# Patient Record
Sex: FEMALE | Race: WHITE | NOT HISPANIC OR LATINO | URBAN - METROPOLITAN AREA
[De-identification: names, ages, dates, MRNs, and addresses within clinical notes are randomized per-mention and may not be internally consistent; named-entity substitution may affect disease eponyms.]

---

## 2017-02-09 ENCOUNTER — OUTPATIENT (OUTPATIENT)
Dept: OUTPATIENT SERVICES | Facility: HOSPITAL | Age: 52
LOS: 1 days | Discharge: HOME | End: 2017-02-09

## 2017-06-27 DIAGNOSIS — C50.919 MALIGNANT NEOPLASM OF UNSPECIFIED SITE OF UNSPECIFIED FEMALE BREAST: ICD-10-CM

## 2018-06-04 ENCOUNTER — HOSPITAL ENCOUNTER (INPATIENT)
Dept: HOSPITAL 17 - NEPC | Age: 53
LOS: 7 days | Discharge: HOME HEALTH | DRG: 843 | End: 2018-06-11
Attending: HOSPITALIST | Admitting: HOSPITALIST
Payer: COMMERCIAL

## 2018-06-04 VITALS
SYSTOLIC BLOOD PRESSURE: 195 MMHG | DIASTOLIC BLOOD PRESSURE: 117 MMHG | TEMPERATURE: 98.4 F | HEART RATE: 98 BPM | RESPIRATION RATE: 20 BRPM | OXYGEN SATURATION: 100 %

## 2018-06-04 VITALS
DIASTOLIC BLOOD PRESSURE: 85 MMHG | HEART RATE: 83 BPM | SYSTOLIC BLOOD PRESSURE: 130 MMHG | RESPIRATION RATE: 20 BRPM | OXYGEN SATURATION: 99 %

## 2018-06-04 VITALS
RESPIRATION RATE: 18 BRPM | OXYGEN SATURATION: 100 % | HEART RATE: 87 BPM | DIASTOLIC BLOOD PRESSURE: 85 MMHG | SYSTOLIC BLOOD PRESSURE: 132 MMHG

## 2018-06-04 VITALS
RESPIRATION RATE: 20 BRPM | OXYGEN SATURATION: 96 % | SYSTOLIC BLOOD PRESSURE: 165 MMHG | HEART RATE: 90 BPM | DIASTOLIC BLOOD PRESSURE: 102 MMHG

## 2018-06-04 VITALS — BODY MASS INDEX: 34.32 KG/M2 | HEIGHT: 62 IN | WEIGHT: 186.51 LBS

## 2018-06-04 DIAGNOSIS — R74.0: ICD-10-CM

## 2018-06-04 DIAGNOSIS — E66.9: ICD-10-CM

## 2018-06-04 DIAGNOSIS — R14.0: ICD-10-CM

## 2018-06-04 DIAGNOSIS — R60.0: ICD-10-CM

## 2018-06-04 DIAGNOSIS — K59.09: ICD-10-CM

## 2018-06-04 DIAGNOSIS — R63.0: ICD-10-CM

## 2018-06-04 DIAGNOSIS — J90: ICD-10-CM

## 2018-06-04 DIAGNOSIS — N39.0: ICD-10-CM

## 2018-06-04 DIAGNOSIS — J18.9: ICD-10-CM

## 2018-06-04 DIAGNOSIS — Z90.13: ICD-10-CM

## 2018-06-04 DIAGNOSIS — C78.00: ICD-10-CM

## 2018-06-04 DIAGNOSIS — Z92.3: ICD-10-CM

## 2018-06-04 DIAGNOSIS — R10.13: ICD-10-CM

## 2018-06-04 DIAGNOSIS — D63.0: ICD-10-CM

## 2018-06-04 DIAGNOSIS — Z92.21: ICD-10-CM

## 2018-06-04 DIAGNOSIS — Z85.3: ICD-10-CM

## 2018-06-04 DIAGNOSIS — C79.89: Primary | ICD-10-CM

## 2018-06-04 LAB
ALBUMIN SERPL-MCNC: 3.3 GM/DL (ref 3.4–5)
ALP SERPL-CCNC: 164 U/L (ref 45–117)
ALT SERPL-CCNC: 249 U/L (ref 10–53)
AMORPHOUS SEDIMENT, URINE: (no result)
AST SERPL-CCNC: 113 U/L (ref 15–37)
BASOPHILS # BLD AUTO: 0.1 TH/MM3 (ref 0–0.2)
BASOPHILS NFR BLD: 0.6 % (ref 0–2)
BILIRUB SERPL-MCNC: 0.5 MG/DL (ref 0.2–1)
BUN SERPL-MCNC: 19 MG/DL (ref 7–18)
CALCIUM SERPL-MCNC: 9.3 MG/DL (ref 8.5–10.1)
CHLORIDE SERPL-SCNC: 100 MEQ/L (ref 98–107)
COLOR UR: YELLOW
CREAT SERPL-MCNC: 0.79 MG/DL (ref 0.5–1)
EOSINOPHIL # BLD: 0 TH/MM3 (ref 0–0.4)
EOSINOPHIL NFR BLD: 0.4 % (ref 0–4)
ERYTHROCYTE [DISTWIDTH] IN BLOOD BY AUTOMATED COUNT: 18.9 % (ref 11.6–17.2)
GFR SERPLBLD BASED ON 1.73 SQ M-ARVRAT: 76 ML/MIN (ref 89–?)
GLUCOSE SERPL-MCNC: 125 MG/DL (ref 74–106)
GLUCOSE UR STRIP-MCNC: (no result) MG/DL
HCO3 BLD-SCNC: 22.4 MEQ/L (ref 21–32)
HCT VFR BLD CALC: 33.6 % (ref 35–46)
HGB BLD-MCNC: 10.1 GM/DL (ref 11.6–15.3)
HGB UR QL STRIP: (no result)
INR PPP: 1.1 RATIO
KETONES UR STRIP-MCNC: (no result) MG/DL
LYMPHOCYTES # BLD AUTO: 2.2 TH/MM3 (ref 1–4.8)
LYMPHOCYTES NFR BLD AUTO: 19.8 % (ref 9–44)
MAGNESIUM SERPL-MCNC: 2.2 MG/DL (ref 1.5–2.5)
MCH RBC QN AUTO: 20.8 PG (ref 27–34)
MCHC RBC AUTO-ENTMCNC: 30.1 % (ref 32–36)
MCV RBC AUTO: 69 FL (ref 80–100)
MONOCYTE #: 1.1 TH/MM3 (ref 0–0.9)
MONOCYTES NFR BLD: 9.8 % (ref 0–8)
MUCOUS THREADS #/AREA URNS LPF: (no result) /LPF
NEUTROPHILS # BLD AUTO: 7.6 TH/MM3 (ref 1.8–7.7)
NEUTROPHILS NFR BLD AUTO: 69.4 % (ref 16–70)
NITRITE UR QL STRIP: (no result)
PLATELET # BLD: 447 TH/MM3 (ref 150–450)
PMV BLD AUTO: 9.6 FL (ref 7–11)
PROT SERPL-MCNC: 7 GM/DL (ref 6.4–8.2)
PROTHROMBIN TIME: 11 SEC (ref 9.8–11.6)
RBC # BLD AUTO: 4.87 MIL/MM3 (ref 4–5.3)
SODIUM SERPL-SCNC: 134 MEQ/L (ref 136–145)
SP GR UR STRIP: 1.03 (ref 1–1.03)
SQUAMOUS #/AREA URNS HPF: 2 /HPF (ref 0–5)
TRANS CELLS #/AREA URNS HPF: 2 /HPF
TROPONIN I SERPL-MCNC: (no result) NG/ML (ref 0.02–0.05)
URINE LEUKOCYTE ESTERASE: (no result)
WBC # BLD AUTO: 11 TH/MM3 (ref 4–11)

## 2018-06-04 PROCEDURE — 82550 ASSAY OF CK (CPK): CPT

## 2018-06-04 PROCEDURE — 85025 COMPLETE CBC W/AUTO DIFF WBC: CPT

## 2018-06-04 PROCEDURE — 99153 MOD SED SAME PHYS/QHP EA: CPT

## 2018-06-04 PROCEDURE — 76705 ECHO EXAM OF ABDOMEN: CPT

## 2018-06-04 PROCEDURE — 96361 HYDRATE IV INFUSION ADD-ON: CPT

## 2018-06-04 PROCEDURE — 80048 BASIC METABOLIC PNL TOTAL CA: CPT

## 2018-06-04 PROCEDURE — 99152 MOD SED SAME PHYS/QHP 5/>YRS: CPT

## 2018-06-04 PROCEDURE — 86403 PARTICLE AGGLUT ANTBDY SCRN: CPT

## 2018-06-04 PROCEDURE — C1729 CATH, DRAINAGE: HCPCS

## 2018-06-04 PROCEDURE — 88112 CYTOPATH CELL ENHANCE TECH: CPT

## 2018-06-04 PROCEDURE — 84484 ASSAY OF TROPONIN QUANT: CPT

## 2018-06-04 PROCEDURE — 83615 LACTATE (LD) (LDH) ENZYME: CPT

## 2018-06-04 PROCEDURE — 74177 CT ABD & PELVIS W/CONTRAST: CPT

## 2018-06-04 PROCEDURE — 93308 TTE F-UP OR LMTD: CPT

## 2018-06-04 PROCEDURE — 85610 PROTHROMBIN TIME: CPT

## 2018-06-04 PROCEDURE — 93005 ELECTROCARDIOGRAM TRACING: CPT

## 2018-06-04 PROCEDURE — 87086 URINE CULTURE/COLONY COUNT: CPT

## 2018-06-04 PROCEDURE — 81001 URINALYSIS AUTO W/SCOPE: CPT

## 2018-06-04 PROCEDURE — 71275 CT ANGIOGRAPHY CHEST: CPT

## 2018-06-04 PROCEDURE — 96375 TX/PRO/DX INJ NEW DRUG ADDON: CPT

## 2018-06-04 PROCEDURE — 85730 THROMBOPLASTIN TIME PARTIAL: CPT

## 2018-06-04 PROCEDURE — 83690 ASSAY OF LIPASE: CPT

## 2018-06-04 PROCEDURE — 80053 COMPREHEN METABOLIC PANEL: CPT

## 2018-06-04 PROCEDURE — 94618 PULMONARY STRESS TESTING: CPT

## 2018-06-04 PROCEDURE — C1769 GUIDE WIRE: HCPCS

## 2018-06-04 PROCEDURE — 83605 ASSAY OF LACTIC ACID: CPT

## 2018-06-04 PROCEDURE — 96365 THER/PROPH/DIAG IV INF INIT: CPT

## 2018-06-04 PROCEDURE — 83880 ASSAY OF NATRIURETIC PEPTIDE: CPT

## 2018-06-04 PROCEDURE — 74018 RADEX ABDOMEN 1 VIEW: CPT

## 2018-06-04 PROCEDURE — 94640 AIRWAY INHALATION TREATMENT: CPT

## 2018-06-04 PROCEDURE — 82239 BILE ACIDS TOTAL: CPT

## 2018-06-04 PROCEDURE — 94664 DEMO&/EVAL PT USE INHALER: CPT

## 2018-06-04 PROCEDURE — 89051 BODY FLUID CELL COUNT: CPT

## 2018-06-04 PROCEDURE — 33010: CPT

## 2018-06-04 PROCEDURE — 83735 ASSAY OF MAGNESIUM: CPT

## 2018-06-04 PROCEDURE — 71045 X-RAY EXAM CHEST 1 VIEW: CPT

## 2018-06-04 PROCEDURE — 77012 CT SCAN FOR NEEDLE BIOPSY: CPT

## 2018-06-04 PROCEDURE — 87040 BLOOD CULTURE FOR BACTERIA: CPT

## 2018-06-04 PROCEDURE — 88305 TISSUE EXAM BY PATHOLOGIST: CPT

## 2018-06-04 NOTE — RADRPT
EXAM DATE:  6/4/2018 7:30 PM EDT

AGE/SEX:        53 years / Female



INDICATIONS:  Short of breath.



CLINICAL DATA:  This is the patient's initial encounter. Patient reports that signs and symptoms have
 been present for 1 day and indicates a pain score of 0/10. 

                                                                          

MEDICAL/SURGICAL HISTORY:       Carcinoma, breast. . Port placement.



COMPARISON:      [facility] [place], [place] PULMONARY ANGIOGRAM, 6/4/2018.  .



FINDINGS:  

Right [id number] in superior vena cava. Cardiomegaly. Right greater than left basilar airspace dis
ease with small pleural effusions.



CONCLUSION: 

Cardiomegaly with bilateral small pleural effusions and basilar airspace disease, right greater than 
left.



Electronically signed by: Conner Aparicio MD  6/4/2018 7:40 PM EDT

## 2018-06-04 NOTE — PD
Physical Exam


Narrative


General: 


The patient is a well-developed well-nourished female, pale appearing, however 

otherwise in no acute distress. 





Head and Neck exam: 


Head is normocephalic atraumatic. 


Eyes: EOMI, pupils are equal round and reactive to light. 


Nose: Midline septum with pink mucous membranes 


Mouth: Dentition unremarkable. Moist mucus membranes. Posterior oropharynx is 

not erythematous. No tonsillar hypertrophy. Uvula midline. Airway patent. 


Neck: No palpable lymphadenopathy. No nuchal rigidity. No thyromegaly. 





Cardiovascular: 


Regular rate and rhythm without murmurs, gallops, or rubs. No pulse deficit to 

the extremities on simultaneous auscultation and palpation of her radial 

artery. 





Lungs: 


Decreased breath sounds are noted in the right lower lung base, no wheezes, 

rhonchi, or crackles are audible.  No accessory muscle use noted.  No 

tripoding.  No paroxysmal abdominal breathing.


 


Abdomen:


Soft, without tenderness to palpation in all 4 quadrants of the abdomen. No 

guarding, rebound, or rigidity.  Normal bowel sounds are audible.  No 

tenderness on palpation of McBurney's point.





Extremities: 


No clubbing, cyanosis, or edema. 2+ pulses in all 4 extremities.  No calf 

tenderness on palpation.





Back: 


No spinous process tenderness to palpation. No costovertebral angle tenderness 

to palpation. 





Neurologic Exam: Grossly nonfocal.





Skin Exam: No rash noted. Intact skin that is warm and dry.





Data


Data


Last Documented VS





Vital Signs








  Date Time  Temp Pulse Resp B/P (MAP) Pulse Ox O2 Delivery O2 Flow Rate FiO2


 


6/4/18 22:59  83 20 130/85 (100) 99 Room Air  


 


6/4/18 18:01 98.4       








Orders





 Orders


Complete Blood Count With Diff (6/4/18 18:56)


Comprehensive Metabolic Panel (6/4/18 18:56)


Act Partial Throm Time (Ptt) (6/4/18 18:56)


Prothrombin Time / Inr (Pt) (6/4/18 18:56)


Magnesium (Mg) (6/4/18 18:56)


Ckmb (Isoenzyme) Profile (6/4/18 18:56)


Troponin I (6/4/18 18:56)


Urinalysis - C+S If Indicated (6/4/18 18:56)


Iv Access Insert/Monitor (6/4/18 18:56)


Electrocardiogram (6/4/18 18:56)


Ecg Monitoring (6/4/18 18:56)


Oximetry (6/4/18 18:56)


Oxygen Administration (6/4/18 18:56)


Chest, Single Ap (6/4/18 18:56)


Ct Pulmonary Angiogram (6/4/18 18:56)


Sodium Chloride 0.9% Flush (Ns Flush) (6/4/18 19:00)


Lipase (6/4/18 18:56)


Heparin Central Flush (Heparin Central F (6/4/18 19:00)


Sodium Chloride 0.9% Flush (Ns Flush) (6/4/18 19:00)


Heparin Central Flush (Heparin Central F (6/4/18 19:00)


Heparin Central Flush (Heparin Central F (6/4/18 19:15)


Heparin Central Flush (Heparin Central F (6/4/18 19:15)


B-Type Natriuretic Peptide (6/4/18 19:04)


Sodium Chlor 0.9% 1000 Ml Inj (Ns 1000 M (6/4/18 21:15)


Ct Abd/Pel W Iv Contrast(Rout) (6/4/18 21:47)


Urine Culture (6/4/18 22:20)


Lorazepam Inj (Ativan Inj) (6/4/18 23:30)


Blood Culture (6/5/18 00:17)


Lactic Acid Sepsis Protocol (6/5/18 00:17)


Ceftriaxone Inj (Rocephin Inj) (6/5/18 00:30)


Azithromycin Inj (Zithromax Inj) (6/5/18 00:30)


Admit Order (Ed Use Only) (6/5/18 00:46)





Labs





Laboratory Tests








Test


  6/4/18


19:42 6/4/18


20:40 6/4/18


22:20 6/5/18


00:41


 


White Blood Count 11.0 TH/MM3    


 


Red Blood Count 4.87 MIL/MM3    


 


Hemoglobin 10.1 GM/DL    


 


Hematocrit 33.6 %    


 


Mean Corpuscular Volume 69.0 FL    


 


Mean Corpuscular Hemoglobin 20.8 PG    


 


Mean Corpuscular Hemoglobin


Concent 30.1 % 


  


  


  


 


 


Red Cell Distribution Width 18.9 %    


 


Platelet Count 447 TH/MM3    


 


Mean Platelet Volume 9.6 FL    


 


Neutrophils (%) (Auto) 69.4 %    


 


Lymphocytes (%) (Auto) 19.8 %    


 


Monocytes (%) (Auto) 9.8 %    


 


Eosinophils (%) (Auto) 0.4 %    


 


Basophils (%) (Auto) 0.6 %    


 


Neutrophils # (Auto) 7.6 TH/MM3    


 


Lymphocytes # (Auto) 2.2 TH/MM3    


 


Monocytes # (Auto) 1.1 TH/MM3    


 


Eosinophils # (Auto) 0.0 TH/MM3    


 


Basophils # (Auto) 0.1 TH/MM3    


 


CBC Comment DIFF FINAL    


 


Differential Comment     


 


Prothrombin Time 11.0 SEC    


 


Prothromb Time International


Ratio 1.1 RATIO 


  


  


  


 


 


Activated Partial


Thromboplast Time 21.7 SEC 


  


  


  


 


 


Blood Urea Nitrogen 19 MG/DL    


 


Creatinine 0.79 MG/DL    


 


Random Glucose 125 MG/DL    


 


Total Protein 7.0 GM/DL    


 


Albumin 3.3 GM/DL    


 


Calcium Level 9.3 MG/DL    


 


Magnesium Level 2.2 MG/DL    


 


Alkaline Phosphatase 164 U/L    


 


Aspartate Amino Transf


(AST/SGOT) 113 U/L 


  


  


  


 


 


Alanine Aminotransferase


(ALT/SGPT) 249 U/L 


  


  


  


 


 


Total Bilirubin 0.5 MG/DL    


 


Sodium Level 134 MEQ/L    


 


Potassium Level 4.0 MEQ/L    


 


Chloride Level 100 MEQ/L    


 


Carbon Dioxide Level 22.4 MEQ/L    


 


Anion Gap 12 MEQ/L    


 


Estimat Glomerular Filtration


Rate 76 ML/MIN 


  


  


  


 


 


Total Creatine Kinase 60 U/L    


 


Troponin I


  LESS THAN 0.02


NG/ML 


  


  


 


 


Lipase 259 U/L    


 


B-Type Natriuretic Peptide  18 PG/ML   


 


Urine Color   YELLOW  


 


Urine Turbidity   HAZY  


 


Urine pH   5.5  


 


Urine Specific Gravity   1.029  


 


Urine Protein   30 mg/dL  


 


Urine Glucose (UA)   NEG mg/dL  


 


Urine Ketones   NEG mg/dL  


 


Urine Occult Blood   NEG  


 


Urine Nitrite   NEG  


 


Urine Bilirubin   NEG  


 


Urine Urobilinogen


  


  


  LESS THAN 2.0


MG/DL 


 


 


Urine Leukocyte Esterase   LARGE  


 


Urine RBC   3 /hpf  


 


Urine WBC   11 /hpf  


 


Urine Squamous Epithelial


Cells 


  


  2 /hpf 


  


 


 


Urine Transitional Epithelial


Cells 


  


  2 /hpf 


  


 


 


Urine Amorphous Sediment   RARE  


 


Urine Mucus   FEW /lpf  


 


Microscopic Urinalysis Comment


  


  


  CULTURE


INDICATED 


 


 


Lactic Acid Level    1.5 mmol/L 











MDM


Medical Record Reviewed:  Yes


Supervised Visit with IMANI:  Yes


Narrative Course


I, Dr. Montoya, have reviewed the advance practice practitioner's documentation 

and am in agreement, met with the patient face to face, made the diagnosis, and 

the medical decision making was done by me.  The patient was initially 

evaluated by Sharonda, the PA.  Please see their complete history and physical.





*My assessment and Findings: The patient presents with a history of progressive 

decline over the last 10 days with dyspnea on exertion, nausea with vomiting 1 

yesterday, poor p.o. intake.  The patient reports that she suffers with chronic 

constipation and uses an enema to move her bowels.  She last moved her bowels 

earlier today with the use of an enema.  The patient's medical history is 

significant for having a history of breast cancer with metastasis on palliative 

chemotherapy.





During the course of the patient's emergency department visit, the patient's 

history, examination, and differential diagnosis were reviewed with the 

patient. The patient was placed on a cardiac monitor with oximetry and frequent 

blood pressure monitoring. The patient had  IV access obtained and blood work 

sent for analysis.  The patient had a EKG done on arrival that shows a sinus 

rhythm heart rate of 86, QRS duration 101 ms.  No acute ST segment elevation





The patient's laboratory studies were reviewed and remarkable for a white count 

of 11, hemoglobin 10.1, platelets 447 with 9.8 monocytes.  CMP is remarkable 

for a sodium of 134, BUN 19, glucose 125, , , alk phos 164 which 

is elevated compared to the patient's prior LFTs.  There was a concern that the 

patient may have metastasis to the liver as a next dictation for the elevated 

LFTs.  CT scan of the abdomen and pelvis was added to her workup, initial set 

of cardiac enzymes are within normal limits, BNP 18, lipase 259, lactic acid 1.5

, PT 11, PTT 21.7, urinalysis shows large leukocyte Estrace 11 WBCs, culture 

indicated.





Radiology studies were reviewed and remarkable for a chest x-ray that shows 

cardiomegaly with bilateral small pleural effusions and basilar airspace 

disease right greater than the left.  Blood cultures were added to the patient'

s workup.  The patient was given Rocephin 1 g IV, Zithromax 500 IV for 

antibiotic coverage for what appeared to be a UTI and possible lung infiltrate.

  The patient was also given Ativan 1 mg IV for sedation related to anxiety 

while she was completing her CT scan of the abdomen and pelvis and CTA to rule 

out PE.








Last Impressions








Liver Ultrasound 6/5/18 0000 Signed





Impressions: 





 CONCLUSION: 





 1.  The gallbladder is thickened however the patient reports no tenderness over





  the gallbladder. Small amount of ascites around the liver.





  





 


 


Abdomen/Pelvis CT 6/4/18 2147 Signed





Impressions: 





 CONCLUSION:





 1.  There is a trace of fluid adjacent to the liver.





 2.  Gallbladder wall thickening.





 3.  Bulky fibroid uterus.





  





 


 


Chest X-Ray 6/4/18 1856 Signed





Impressions: 





 CONCLUSION: 





 Cardiomegaly with bilateral small pleural effusions and basilar airspace diseas





 e, right greater than left.





  





 


 


CT Angiography 6/4/18 1856 Signed





Impressions: 





 CONCLUSION:





 1.  No evidence of pulmonary embolism





 2.  Small to moderate pericardial effusion.





 3.  Bilateral nonspecific interstitial pulmonary infiltrates





 4.  Parenchymal consolidation in the right lower lung.





 5.  Small bilateral pleural effusions.





 6.  Diffuse bony metastatic disease.





  





 





Given the patient's findings of a small to moderate pericardial effusion, 

bilateral nonspecific interstitial pulmonary infiltrates, parenchymal 

consolidation consistent with pneumonia in the right lower lung, UTI, the 

patient will be admitted to the hospital for continued antibiotics and further 

investigation regarding the pericardial effusion.





The patient's results were discussed with the patient, including the plan of 

care. I explained that further testing and/ or monitoring is indicated based on 

the patient's history, examination, and/ or laboratory findings. Therefore, I 

recommended admission for additional evaluation. The patient expressed 

understanding and was agreeable with this plan. The patient was admitted to the 

hospital in stable condition and sent to a bed under the care of the [facility] service.


Physician Communication


Physician Communication


The patient's case including history, pertinent physical examination findings, 

and laboratory studies were discussed with Dr. Mendez. It was agreed that the 

patient would be admitted to the [facility] service.





Diagnosis





 Primary Impression:  


 Generalized weakness


 Additional Impressions:  


 Shortness of breath


 Pneumonia


 Qualified Codes:  J18.1 - Lobar pneumonia, unspecified organism


 Urinary tract infection


 Qualified Codes:  N39.0 - Urinary tract infection, site not specified


 Pericardial effusion





Admitting Information


Admitting Physician Requests:  Admit


Scripts


No Active Prescriptions or Reported Meds











Suzy Montoya MD Jun 4, 2018 19:47

## 2018-06-04 NOTE — PD
HPI


Chief Complaint:  General Weakness


Time Seen by Provider:  18:18


Travel History


International Travel<30 days:  No


Contact w/Intl Traveler<30days:  No


Traveled to known affect area:  No





History of Present Illness


HPI


52 YO female with PMH of metastatic breast cancer status post bilateral 

mastectomy.  Presents to the ED for evaluation of 10 day history of dyspnea on 

exertion, shortness of breath.  Patient describes a sensation of "feeling 

smothered.'  She states that this is improved by using a small hand-held fan or 

having cold air blowing on her.  She denies chest pain, palpitations.  She 

denies fever, chills.  She endorses nausea and states that the symptoms are 

worsened by attempting to eat.  She endorses one episode of emesis.  She denies 

abdominal pain.  She reports small, hard, nonbloody stools.  States her urine 

has been pale and yellow, without foul odor.  Patient sees Dr. Parker but is not 

undergoing any current therapies.  She endorses metastasis to bone, lung and 

spine.  She endorses history of anemia, takes iron supplements.  She has a 

indwelling port in the left chest.





PFSH


Past Medical History


Cancer:  Yes (BREAST)


Tetanus Vaccination:  Unknown


Influenza Vaccination:  No


Pregnant?:  Not Pregnant





Past Surgical History


 Section:  Yes


Mastectomy:  Yes


Thoracic Surgery:  Yes


Other Surgery:  Yes (LYMPH NODES;  BIOPSIES)





Social History


Alcohol Use:  No


Tobacco Use:  No


Substance Use:  No





Allergies-Medications


(Allergen,Severity, Reaction):  


Coded Allergies:  


     paclitaxel (Verified  Allergy, Intermediate, 18)


Reported Meds & Prescriptions





Reported Meds & Active Scripts


Active


No Active Prescriptions or Reported Medications    








Review of Systems


Except as stated in HPI:  all other systems reviewed are Neg





Physical Exam


Narrative


GENERAL: Chronically ill-appearing white female in no acute distress.


SKIN: Focused skin assessment waxy, pale, dry.


HEAD: Normocephalic.


EYES: No scleral icterus. No injection or drainage. 


NECK: Supple, trachea midline. No JVD or lymphadenopathy.


CARDIOVASCULAR: Regular rate and rhythm without murmurs, gallops, or rubs. 


RESPIRATORY: Breath sounds clear and equal bilaterally. No accessory muscle use.


GASTROINTESTINAL: Abdomen soft, non-tender, nondistended.  Hypoactive bowel 

sounds.


MUSCULOSKELETAL: No cyanosis, or edema. 


BACK: Nontender without obvious deformity. No CVA tenderness.





Data


Data


Last Documented VS





Vital Signs








  Date Time  Temp Pulse Resp B/P (MAP) Pulse Ox O2 Delivery O2 Flow Rate FiO2


 


18 22:59  83 20 130/85 (100) 99 Room Air  


 


18 18:01 98.4       








Orders





 Orders


Complete Blood Count With Diff (18 18:56)


Comprehensive Metabolic Panel (18 18:56)


Act Partial Throm Time (Ptt) (18 18:56)


Prothrombin Time / Inr (Pt) (18 18:56)


Magnesium (Mg) (18 18:56)


Ckmb (Isoenzyme) Profile (18 18:56)


Troponin I (18 18:56)


Urinalysis - C+S If Indicated (18 18:56)


Iv Access Insert/Monitor (18 18:56)


Electrocardiogram (18 18:56)


Ecg Monitoring (18 18:56)


Oximetry (18 18:56)


Oxygen Administration (18 18:56)


Chest, Single Ap (18 18:56)


Ct Pulmonary Angiogram (18 18:56)


Sodium Chloride 0.9% Flush (Ns Flush) (18 19:00)


Lipase (18 18:56)


Heparin Central Flush (Heparin Central F (18 19:00)


Sodium Chloride 0.9% Flush (Ns Flush) (18 19:00)


Heparin Central Flush (Heparin Central F (18 19:00)


Heparin Central Flush (Heparin Central F (18 19:15)


Heparin Central Flush (Heparin Central F (18 19:15)


B-Type Natriuretic Peptide (18 19:04)


Sodium Chlor 0.9% 1000 Ml Inj (Ns 1000 M (18 21:15)


Ct Abd/Pel W Iv Contrast(Rout) (18 21:47)


Urine Culture (18 22:20)


Lorazepam Inj (Ativan Inj) (18 23:30)


Blood Culture (18 00:17)


Lactic Acid Sepsis Protocol (18 00:17)


Ceftriaxone Inj (Rocephin Inj) (18 00:30)


Azithromycin Inj (Zithromax Inj) (18 00:30)


Admit Order (Ed Use Only) (18 00:46)





Labs





Laboratory Tests








Test


  18


19:42 18


20:40 18


22:20 18


00:41


 


White Blood Count 11.0 TH/MM3    


 


Red Blood Count 4.87 MIL/MM3    


 


Hemoglobin 10.1 GM/DL    


 


Hematocrit 33.6 %    


 


Mean Corpuscular Volume 69.0 FL    


 


Mean Corpuscular Hemoglobin 20.8 PG    


 


Mean Corpuscular Hemoglobin


Concent 30.1 % 


  


  


  


 


 


Red Cell Distribution Width 18.9 %    


 


Platelet Count 447 TH/MM3    


 


Mean Platelet Volume 9.6 FL    


 


Neutrophils (%) (Auto) 69.4 %    


 


Lymphocytes (%) (Auto) 19.8 %    


 


Monocytes (%) (Auto) 9.8 %    


 


Eosinophils (%) (Auto) 0.4 %    


 


Basophils (%) (Auto) 0.6 %    


 


Neutrophils # (Auto) 7.6 TH/MM3    


 


Lymphocytes # (Auto) 2.2 TH/MM3    


 


Monocytes # (Auto) 1.1 TH/MM3    


 


Eosinophils # (Auto) 0.0 TH/MM3    


 


Basophils # (Auto) 0.1 TH/MM3    


 


CBC Comment DIFF FINAL    


 


Differential Comment     


 


Prothrombin Time 11.0 SEC    


 


Prothromb Time International


Ratio 1.1 RATIO 


  


  


  


 


 


Activated Partial


Thromboplast Time 21.7 SEC 


  


  


  


 


 


Blood Urea Nitrogen 19 MG/DL    


 


Creatinine 0.79 MG/DL    


 


Random Glucose 125 MG/DL    


 


Total Protein 7.0 GM/DL    


 


Albumin 3.3 GM/DL    


 


Calcium Level 9.3 MG/DL    


 


Magnesium Level 2.2 MG/DL    


 


Alkaline Phosphatase 164 U/L    


 


Aspartate Amino Transf


(AST/SGOT) 113 U/L 


  


  


  


 


 


Alanine Aminotransferase


(ALT/SGPT) 249 U/L 


  


  


  


 


 


Total Bilirubin 0.5 MG/DL    


 


Sodium Level 134 MEQ/L    


 


Potassium Level 4.0 MEQ/L    


 


Chloride Level 100 MEQ/L    


 


Carbon Dioxide Level 22.4 MEQ/L    


 


Anion Gap 12 MEQ/L    


 


Estimat Glomerular Filtration


Rate 76 ML/MIN 


  


  


  


 


 


Total Creatine Kinase 60 U/L    


 


Troponin I


  LESS THAN 0.02


NG/ML 


  


  


 


 


Lipase 259 U/L    


 


B-Type Natriuretic Peptide  18 PG/ML   


 


Urine Color   YELLOW  


 


Urine Turbidity   HAZY  


 


Urine pH   5.5  


 


Urine Specific Gravity   1.029  


 


Urine Protein   30 mg/dL  


 


Urine Glucose (UA)   NEG mg/dL  


 


Urine Ketones   NEG mg/dL  


 


Urine Occult Blood   NEG  


 


Urine Nitrite   NEG  


 


Urine Bilirubin   NEG  


 


Urine Urobilinogen


  


  


  LESS THAN 2.0


MG/DL 


 


 


Urine Leukocyte Esterase   LARGE  


 


Urine RBC   3 /hpf  


 


Urine WBC   11 /hpf  


 


Urine Squamous Epithelial


Cells 


  


  2 /hpf 


  


 


 


Urine Transitional Epithelial


Cells 


  


  2 /hpf 


  


 


 


Urine Amorphous Sediment   RARE  


 


Urine Mucus   FEW /lpf  


 


Microscopic Urinalysis Comment


  


  


  CULTURE


INDICATED 


 


 


Lactic Acid Level    1.5 mmol/L 











MDM


Medical Decision Making


Medical Screen Exam Complete:  Yes


Emergency Medical Condition:  Yes


Differential Diagnosis


Metastatic breast cancer versus PNA versus PE versus anemia versus metabolic 

derangement versus dehydration versus other


Narrative Course


52 YO female with PMH of metastatic breast cancer status post bilateral 

mastectomy presents to the ED for evaluation of 10 day history of dyspnea on 

exertion, shortness of breath.  She endorses one episode of emesis.  Patient 

sees Dr. Parker but is not undergoing any current therapies.  She endorses 

metastasis to bone, lung and spine.  She endorses history of anemia, takes iron 

supplements.  She has a indwelling port in the left chest.  Patient's afebrile, 

pulse 98, respiratory rate 20, O2 sats 100% on room air on arrival.  The 

patient is pale and chronically ill-appearing but exam is otherwise reassuring.

  IV was established.  Patient was ministered 1 L normal saline.





EKG rate 86, sinus rhythm.  KY interval 152, , QTc 389 ms.  Normal axis.

  No acute ST changes.  Reviewed by Dr. Montoya.


CBC: WBC 11.0.  Hemoglobin 10.1.


INR 1.1.


CMP: BUN 19, creatinine 0.79.  , .  Bili 0.5


Lipase 259.


Lactic acid 1.5





Remaining lab work and imaging pending in the shift.  Patient signed out to Dr. Montoya.  Please see her note for disposition.





Diagnosis





 Primary Impression:  


 Generalized weakness


 Additional Impression:  


 Shortness of breath


Scripts


No Active Prescriptions or Reported Meds











Sharonda James 2018 20:44

## 2018-06-05 VITALS — HEART RATE: 90 BPM

## 2018-06-05 VITALS
HEART RATE: 89 BPM | DIASTOLIC BLOOD PRESSURE: 81 MMHG | OXYGEN SATURATION: 100 % | RESPIRATION RATE: 18 BRPM | SYSTOLIC BLOOD PRESSURE: 126 MMHG | TEMPERATURE: 97.9 F

## 2018-06-05 VITALS
OXYGEN SATURATION: 99 % | DIASTOLIC BLOOD PRESSURE: 95 MMHG | TEMPERATURE: 97.6 F | RESPIRATION RATE: 18 BRPM | SYSTOLIC BLOOD PRESSURE: 146 MMHG | HEART RATE: 85 BPM

## 2018-06-05 VITALS
OXYGEN SATURATION: 100 % | TEMPERATURE: 97.7 F | DIASTOLIC BLOOD PRESSURE: 90 MMHG | SYSTOLIC BLOOD PRESSURE: 121 MMHG | RESPIRATION RATE: 18 BRPM | HEART RATE: 95 BPM

## 2018-06-05 VITALS — HEART RATE: 80 BPM

## 2018-06-05 VITALS
RESPIRATION RATE: 18 BRPM | OXYGEN SATURATION: 100 % | HEART RATE: 95 BPM | DIASTOLIC BLOOD PRESSURE: 120 MMHG | SYSTOLIC BLOOD PRESSURE: 155 MMHG | TEMPERATURE: 97.9 F

## 2018-06-05 VITALS — HEART RATE: 86 BPM

## 2018-06-05 VITALS — HEART RATE: 88 BPM

## 2018-06-05 VITALS — OXYGEN SATURATION: 99 %

## 2018-06-05 VITALS
DIASTOLIC BLOOD PRESSURE: 111 MMHG | RESPIRATION RATE: 20 BRPM | TEMPERATURE: 98.5 F | HEART RATE: 89 BPM | OXYGEN SATURATION: 95 % | SYSTOLIC BLOOD PRESSURE: 149 MMHG

## 2018-06-05 VITALS — SYSTOLIC BLOOD PRESSURE: 145 MMHG | DIASTOLIC BLOOD PRESSURE: 105 MMHG

## 2018-06-05 VITALS — HEART RATE: 78 BPM

## 2018-06-05 VITALS — HEART RATE: 97 BPM

## 2018-06-05 VITALS — HEART RATE: 93 BPM

## 2018-06-05 VITALS — HEART RATE: 79 BPM

## 2018-06-05 VITALS — HEART RATE: 87 BPM

## 2018-06-05 VITALS — HEART RATE: 92 BPM

## 2018-06-05 VITALS — HEART RATE: 99 BPM

## 2018-06-05 VITALS — HEART RATE: 94 BPM

## 2018-06-05 VITALS — HEART RATE: 100 BPM

## 2018-06-05 RX ADMIN — HEPARIN SODIUM SCH UNITS: 10000 INJECTION, SOLUTION INTRAVENOUS; SUBCUTANEOUS at 14:00

## 2018-06-05 RX ADMIN — IPRATROPIUM BROMIDE AND ALBUTEROL SULFATE SCH AMPULE: .5; 3 SOLUTION RESPIRATORY (INHALATION) at 04:25

## 2018-06-05 RX ADMIN — IPRATROPIUM BROMIDE AND ALBUTEROL SULFATE SCH AMPULE: .5; 3 SOLUTION RESPIRATORY (INHALATION) at 10:50

## 2018-06-05 RX ADMIN — IPRATROPIUM BROMIDE AND ALBUTEROL SULFATE SCH AMPULE: .5; 3 SOLUTION RESPIRATORY (INHALATION) at 21:40

## 2018-06-05 RX ADMIN — IPRATROPIUM BROMIDE AND ALBUTEROL SULFATE SCH AMPULE: .5; 3 SOLUTION RESPIRATORY (INHALATION) at 16:00

## 2018-06-05 RX ADMIN — HEPARIN SODIUM SCH UNITS: 10000 INJECTION, SOLUTION INTRAVENOUS; SUBCUTANEOUS at 20:57

## 2018-06-05 RX ADMIN — HEPARIN SODIUM SCH UNITS: 10000 INJECTION, SOLUTION INTRAVENOUS; SUBCUTANEOUS at 06:04

## 2018-06-05 RX ADMIN — Medication SCH ML: at 19:53

## 2018-06-05 RX ADMIN — Medication SCH ML: at 09:00

## 2018-06-05 NOTE — HHI.PR
Subjective


Remarks


Follow-up for metastatic breast cancer, shortness of breath, pericarditis.  

Patient is currently doing well.  She is on 2 L of oxygen and feels 

comfortable. She does report that at home sometimes she is short of breath and 

uses multiple fans to feel better. No chest pain, shortness of breath, fever, 

chills.





Objective


Vitals





Vital Signs








  Date Time  Temp Pulse Resp B/P (MAP) Pulse Ox O2 Delivery O2 Flow Rate FiO2


 


6/5/18 09:48  87      


 


6/5/18 08:17     99 Nasal Cannula 2.00 


 


6/5/18 08:17 97.6 85 18 146/95 (112) 99   


 


6/5/18 08:17  85      


 


6/5/18 06:00  80      


 


6/5/18 05:00  90      


 


6/5/18 04:30     99 Nasal Cannula 2.00 


 


6/5/18 04:00  78      


 


6/5/18 03:33     95 Nasal Cannula 2.00 


 


6/5/18 03:32 98.5 89 20 149/111 (124) 95   


 


6/5/18 03:00  79      


 


6/4/18 22:59  83 20 130/85 (100) 99 Room Air  


 


6/4/18 19:27  87 18 132/85 (101) 100 Room Air  


 


6/4/18 18:19  90 20  99 Room Air  


 


6/4/18 18:18  90 20 165/102 (123) 96 Room Air  


 


6/4/18 18:01 98.4 98 20 195/117 (143) 100   














I/O      


 


 6/4/18 6/4/18 6/4/18 6/5/18 6/5/18 6/5/18





 07:00 15:00 23:00 07:00 15:00 23:00


 


Intake Total    240 ml  


 


Output Total    100 ml  


 


Balance    140 ml  


 


      


 


Intake Oral    240 ml  


 


Output Urine Total    100 ml  








Result Diagram:  


6/4/18 1942 6/4/18 1942





Imaging





Last Impressions








Abdomen/Pelvis CT 6/4/18 2147 Signed





Impressions: 





 CONCLUSION:





 1.  There is a trace of fluid adjacent to the liver.





 2.  Gallbladder wall thickening.





 3.  Bulky fibroid uterus.





  





 


 


Chest X-Ray 6/4/18 1856 Signed





Impressions: 





 CONCLUSION: 





 Cardiomegaly with bilateral small pleural effusions and basilar airspace diseas





 e, right greater than left.





  





 


 


CT Angiography 6/4/18 1856 Signed





Impressions: 





 CONCLUSION:





 1.  No evidence of pulmonary embolism





 2.  Small to moderate pericardial effusion.





 3.  Bilateral nonspecific interstitial pulmonary infiltrates





 4.  Parenchymal consolidation in the right lower lung.





 5.  Small bilateral pleural effusions.





 6.  Diffuse bony metastatic disease.





  





 








Objective Remarks


GENERAL:  Alert, Oriented x 3, NAD. 


SKIN: Warm and dry.


HEAD: Normocephalic.


EYES: No scleral icterus. No injection or drainage. 


NECK: Supple, trachea midline. No JVD or lymphadenopathy.


CARDIOVASCULAR: Regular rate and rhythm without murmurs, gallops, or rubs. 


RESPIRATORY: Breath sounds equal bilaterally. No accessory muscle use.


GASTROINTESTINAL: Abdomen soft, non-tender, nondistended. 


MUSCULOSKELETAL: No cyanosis, or edema. 


BACK: Nontender without obvious deformity. No CVA tenderness.





Procedures


None.





A/P


Assessment and Plan


1.  Pneumonia


Chest CT significant for consolidation in the right lower lung with small 

bilateral pleural effusions and diffuse bony metastatic disease, personally 

reviewed


Azithromycin/Rocephin


Supplemental oxygen as needed


Continue DuoNeb treatments. 


Patient reports feeling significant relief on oxygen, requests home oxygen.  

Walk test shows no need for home O2. 





2.  Pericardial effusion


Small to moderate pericardial effusion on CT


Limited echo pending to evaluate pericardial effusion. 





3.  Transaminitis


New onset


CT of the abdomen and pelvis shows gallbladder wall thickening with trace fluid 

adjacent to the liver


Unclear etiology


Liver ultrasound pending


Patient reports itchiness, bile salts pending





4.  Metastatic breast cancer


Patient currently choosing not to undergo treatment. She prefers alternative 

treatments. 


Patient's oncologist, Dr. Parker  is following. 





Discharge plan: If echo shows non-significant pericardial effusion, patient can 

likely be discharged on PO Levaquin.











Booker Dotson DO Jun 5, 2018 10:08

## 2018-06-05 NOTE — HHI.HP
__________________________________________________





HPI


Service


[facility]


Primary Care Physician


No Primary Care Physician


Admission Diagnosis





Pneumonia, Pericardial effusion, h/o metastatic breast CA


Diagnoses:  


Travel History


International Travel<30 Days:  No


Contact w/Intl Traveler <30 Da:  No


Traveled to Known Affected Are:  No


History of Present Illness


53-year-old female with past medical history significant for metastatic breast 

cancer presents the emergency department for the evaluation of weakness and 

shortness of breath 2 weeks.  The patient reports that she has been feeling 

dizzy and has had associated anorexia.  She reports feeling itchy every time 

she eats food.  She endorses subjective fever/chills.  States she has 

difficulty breathing which is worse at night and she often awakens from sleep 

gasping for breath.  Positive dizziness.  No chest pain.  No abdominal pain.  

No nausea/vomiting/diarrhea.  No lateralizing signs/symptoms.


The patient has declined chemotherapy which was recommended by her oncologist.  

She wishes to pursue natural therapies.





Review of Systems


Except as stated in HPI:  all other systems reviewed are Neg





Past Family Social History


Past Medical History


Metastatic breast cancer


Past Surgical History


Bilateral vasectomy


Right lung Pleurx catheter


Port placement


 1


Reported Medications





Reported Meds & Active Scripts


Active


No Active Prescriptions or Reported Medications


Allergies:  


Coded Allergies:  


     paclitaxel (Verified  Allergy, Intermediate, 18)


Family History


Mother with diabetes mellitus


Social History


Negative for alcohol, tobacco and illicit drugs





Physical Exam


Vital Signs





Vital Signs








  Date Time  Temp Pulse Resp B/P (MAP) Pulse Ox O2 Delivery O2 Flow Rate FiO2


 


18 22:59  83 20 130/85 (100) 99 Room Air  


 


18 19:27  87 18 132/85 (101) 100 Room Air  


 


18 18:19  90 20  99 Room Air  


 


18 18:18  90 20 165/102 (123) 96 Room Air  


 


18 18:01 98.4 98 20 195/117 (143) 100   








Physical Exam


GENERAL:  female lying in bed sleeping


SKIN: No rashes, ecchymoses or lesions. Cool and dry.


HEAD: Atraumatic. Normocephalic. No temporal or scalp tenderness.


EYES: Pupils equal round and reactive. Extraocular motions intact. No scleral 

icterus. No injection or drainage. 


ENT: Nose without bleeding, purulent drainage or septal hematoma. Throat 

without erythema, tonsillar hypertrophy or exudate. Uvula midline. Airway 

patent.


NECK: Trachea midline. No JVD or lymphadenopathy. Supple, nontender, no 

meningeal signs.


CARDIOVASCULAR: Regular rate and rhythm without murmurs, gallops, or rubs. 


RESPIRATORY: Clear to auscultation. Breath sounds equal bilaterally. No wheezes

, rales, or rhonchi.  


GASTROINTESTINAL: Abdomen soft, non-tender, nondistended. No hepato-splenomegaly

, or palpable masses. No guarding.


MUSCULOSKELETAL: Extremities without clubbing, cyanosis, or edema. No joint 

tenderness, effusion, or edema noted. No calf tenderness. 


NEUROLOGICAL: Awake and alert. Cranial nerves II through XII intact.  Motor and 

sensory grossly within normal limits. Normal speech.


Laboratory





Laboratory Tests








Test


  18


19:42 18


20:40 18


22:20 18


00:41


 


White Blood Count 11.0    


 


Red Blood Count 4.87    


 


Hemoglobin 10.1    


 


Hematocrit 33.6    


 


Mean Corpuscular Volume 69.0    


 


Mean Corpuscular Hemoglobin 20.8    


 


Mean Corpuscular Hemoglobin


Concent 30.1 


  


  


  


 


 


Red Cell Distribution Width 18.9    


 


Platelet Count 447    


 


Mean Platelet Volume 9.6    


 


Neutrophils (%) (Auto) 69.4    


 


Lymphocytes (%) (Auto) 19.8    


 


Monocytes (%) (Auto) 9.8    


 


Eosinophils (%) (Auto) 0.4    


 


Basophils (%) (Auto) 0.6    


 


Neutrophils # (Auto) 7.6    


 


Lymphocytes # (Auto) 2.2    


 


Monocytes # (Auto) 1.1    


 


Eosinophils # (Auto) 0.0    


 


Basophils # (Auto) 0.1    


 


CBC Comment DIFF FINAL    


 


Differential Comment     


 


Prothrombin Time 11.0    


 


Prothromb Time International


Ratio 1.1 


  


  


  


 


 


Activated Partial


Thromboplast Time 21.7 


  


  


  


 


 


Blood Urea Nitrogen 19    


 


Creatinine 0.79    


 


Random Glucose 125    


 


Total Protein 7.0    


 


Albumin 3.3    


 


Calcium Level 9.3    


 


Magnesium Level 2.2    


 


Alkaline Phosphatase 164    


 


Aspartate Amino Transf


(AST/SGOT) 113 


  


  


  


 


 


Alanine Aminotransferase


(ALT/SGPT) 249 


  


  


  


 


 


Total Bilirubin 0.5    


 


Sodium Level 134    


 


Potassium Level 4.0    


 


Chloride Level 100    


 


Carbon Dioxide Level 22.4    


 


Anion Gap 12    


 


Estimat Glomerular Filtration


Rate 76 


  


  


  


 


 


Total Creatine Kinase 60    


 


Troponin I LESS THAN 0.02    


 


Lipase 259    


 


B-Type Natriuretic Peptide  18   


 


Urine Color   YELLOW  


 


Urine Turbidity   HAZY  


 


Urine pH   5.5  


 


Urine Specific Gravity   1.029  


 


Urine Protein   30  


 


Urine Glucose (UA)   NEG  


 


Urine Ketones   NEG  


 


Urine Occult Blood   NEG  


 


Urine Nitrite   NEG  


 


Urine Bilirubin   NEG  


 


Urine Urobilinogen   LESS THAN 2.0  


 


Urine Leukocyte Esterase   LARGE  


 


Urine RBC   3  


 


Urine WBC   11  


 


Urine Squamous Epithelial


Cells 


  


  2 


  


 


 


Urine Transitional Epithelial


Cells 


  


  2 


  


 


 


Urine Amorphous Sediment   RARE  


 


Urine Mucus   FEW  


 


Microscopic Urinalysis Comment


  


  


  CULTURE


INDICATED 


 


 


Lactic Acid Level    1.5 














 Date/Time


Source Procedure


Growth Status


 


 


 18 00:45


Blood Peripheral Aerobic Blood Culture


Pending Received


 


 18 00:45


Blood Peripheral Anaerobic Blood Culture


Pending Received


 


 18 22:20


Urine Random Urine Urine Culture


Pending Received








Result Diagram:  


18








Caprini VTE Risk Assessment


Caprini VTE Risk Assessment:  Mod/High Risk (score >= 2)


Caprini Risk Assessment Model











 Point Value = 1          Point Value = 2  Point Value = 3  Point Value = 5


 


Age 41-60


Minor surgery


BMI > 25 kg/m2


Swollen legs


Varicose veins


Pregnancy or postpartum


History of unexplained or recurrent


   spontaneous 


Oral contraceptives or hormone


   replacement


Sepsis (< 1 month)


Serious lung disease, including


   pneumonia (< 1 month)


Abnormal pulmonary function


Acute myocardial infarction


Congestive heart failure (< 1 month)


History of inflammatory bowel disease


Medical patient at bed rest Age 61-74


Arthroscopic surgery


Major open surgery (> 45 min)


Laparoscopic surgery (> 45 min)


Malignancy


Confined to bed (> 72 hours)


Immobilizing plaster cast


Central venous access Age >= 75


History of VTE


Family history of VTE


Factor V Leiden


Prothrombin 85729Y


Lupus anticoagulant


Anticardiolipin antibodies


Elevated serum homocysteine


Heparin-induced thrombocytopenia


Other congenital or acquired


   thrombophilia Stroke (< 1 month)


Elective arthroplasty


Hip, pelvis, or leg fracture


Acute spinal cord injury (< 1 month)








Prophylaxis Regimen











   Total Risk


Factor Score Risk Level Prophylaxis Regimen


 


0-1      Low Early ambulation


 


2 Moderate Order ONE of the following:


*Sequential Compression Device (SCD)


*Heparin 5000 units SQ BID


 


3-4 Higher Order ONE of the following medications:


*Heparin 5000 units SQ TID


*Enoxaparin/Lovenox 40 mg SQ daily (WT < 150 kg, CrCl > 30 mL/min)


*Enoxaparin/Lovenox 30 mg SQ daily (WT < 150 kg, CrCl > 10-29 mL/min)


*Enoxaparin/Lovenox 30 mg SQ BID (WT < 150 kg, CrCl > 30 mL/min)


AND/OR


*Sequential Compression Device (SCD)


 


5 or more Highest Order ONE of the following medications:


*Heparin 5000 units SQ TID (Preferred with Epidurals)


*Enoxaparin/Lovenox 40 mg SQ daily (WT < 150 kg, CrCl > 30 mL/min)


*Enoxaparin/Lovenox 30 mg SQ daily (WT < 150 kg, CrCl > 10-29 mL/min)


*Enoxaparin/Lovenox 30 mg SQ BID (WT < 150 kg, CrCl > 30 mL/min)


AND


*Sequential Compression Device (SCD)











Assessment and Plan


Assessment and Plan


Assessment/plan:





1.  Pneumonia


Chest CT significant for consolidation in the right lower lung with small 

bilateral pleural effusions and diffuse bony metastatic disease, personally 

reviewed


Azithromycin/Rocephin


Supplemental oxygen as needed


Duo nebs


Patient reports feeling significant relief on oxygen, requests home oxygen.  

Walk test ordered.





2.  Pericardial effusion


Small to moderate pericardial effusion on CT


Echo pending





3.  Transaminitis


New onset


CT of the abdomen and pelvis shows gallbladder wall thickening with trace fluid 

adjacent to the liver


Unclear etiology


Liver ultrasound pending


Patient reports itchiness, bile salts pending





4.  Metastatic breast cancer


Patient currently choosing not to undergo treatment


Patient's oncologist, Dr. Pinedo consulted, appreciate any assistance


Palliative care consulted, appreciate clarification of goals of care





FEN


Regular diet


Electrolytes: Monitor and replete as needed


Heparin





Physician Certification


2 Midnight Certification Type:  Admission for Inpatient Services


Order for Inpatient Services


The services are ordered in accordance with Medicare regulations or non-

Medicare payer requirements, as applicable.  In the case of services not 

specified as inpatient-only, they are appropriately provided as inpatient 

services in accordance with the 2-midnight benchmark.


Estimated LOS (days):  2


2 days is the estimated time the patient will need to remain in the hospital, 

assuming treatment plan goals are met and no additional complications.


Post-Hospital Plan:  Not yet determined











Ciara Mendez MD 2018 03:12

## 2018-06-05 NOTE — RADRPT
EXAM DATE:  2018 5:22 PM EDT

AGE/SEX:        53 years / Female



INDICATIONS:  Increased lab values.



CLINICAL DATA:  This is the patient's initial encounter. Patient reports that signs and symptoms have
 been present for 1 day and indicates a pain score of 3/10. 

                                                                          

MEDICAL/SURGICAL HISTORY:       Gastroesophageal reflux disease.  Anemia. Sleep apnea. Metastatic valentín
ast cancer. Shortness of breath. Right lower lung pneumonia. Bilateral pleural effusions. Pericardial
 effusion.   section. Right lung Pleurx catheter . Mastectomy. Chemotherapy. Radiation therap
y. Lymph node biopsy. Port placement.



COMPARISON:      No prior [facility] exams available for comparison. [facility], PET/CT, 
017.



MEASUREMENTS (cm x cm x cm):

Liver:__ 18.5  cm length

Common Bile Duct:__ 5mm

Right Kidney:__ 11.2 x 5.8 x 3.4 cm



FINDINGS:

Liver: Normal echotexture without focal lesion or ductal dilatation. There is some fluid around the l
iver.

Portal Vein: Hepatopedal flow seen in portal vein.

Common Duct: No intraluminal mass or stone visualized.

Gallbladder: No stones or mass identified Gallbladder Wall: 7 mm

Pancreas: The visualized portions are within normal limits

Right Kidney:  No mass or hydronephrosis

Other: None.



CONCLUSION: 

1.  The gallbladder is thickened however the patient reports no tenderness over the gallbladder. Smal
l amount of ascites around the liver.



Electronically signed by: Jose Miguel Moody MD  2018 5:27 PM EDT

## 2018-06-05 NOTE — EKG
Date [facility]: 06/04/2018       Time [facility]: 19:53:42

 

PTAGE:      53 years

 

EKG:      Sinus rhythm 

 

 NONSPECIFIC T-WAVE ABNORMALITY BORDERLINE ECG 

 

NO PREVIOUS TRACING            

 

DOCTOR:   Michelle Queen  Interpretating Date/Time  06/05/2018 15:19:55

## 2018-06-05 NOTE — MB
cc:

Oj Parker MD

****

 

 

DATE:

2018

 

ATTENDING PHYSICIAN:

Dr. Mendez.

 

REASON FOR CONSULTATION:

Oncology consulted to render opinion regarding patient with metastatic

breast cancer, admitted with weakness and shortness of breath.

 

HISTORY OF PRESENT ILLNESS:

The patient is a 53-year-old female with history of metastatic breast 

cancer and refusing treatment, presented to the hospital complaining 

of increased weakness and shortness of breath.  She has been doing 

natural therapy.  She stated that she drank hydrogen peroxide a few 

weeks ago, she started having a headache about 2 weeks ago.  It lasted

for 3 days and then it resolved.  She also experienced pruritus every 

time she eats. About a week ago, she started having increased weakness

and shortness of breath.  She has dizziness.  She has decreased oral 

intake because it makes her uncomfortable after she eats.  She had 

nausea and threw up once yesterday.  She has midepigastric pain.  She 

denies any cough.  She had subjective fever.  She denies any diarrhea.

 Denies any change in urinary habit.  She has no numbness or weakness 

of extremities.  She has low back pain.

 

PAST MEDICAL HISTORY:

1.  Metastatic breast cancer.

2.  Pleural effusion.

3.  Obesity.

 

PAST SURGICAL HISTORY:

Bilateral mastectomy, PleurX catheter placement in the right side, 

pleural biopsy, port placement, .

 

FAMILY HISTORY:

Two sons and 2 daughters, all healthy.  No cancer history in the 

family.

 

SOCIAL HISTORY:

Denies tobacco or alcohol use.  She lives with her .

 

ALLERGIES:

TAXOL.

 

CURRENT MEDICATIONS:

1.  Ceftriaxone.

2.  Azithromycin.

3.  Heparin.

4.  DuoNebs.

 

REVIEW OF SYSTEMS:

CONSTITUTIONAL:  As above.

EYES: Negative.

ENT:  Negative.

CARDIOVASCULAR:  No chest pressure or palpitation.

RESPIRATORY:  As above.

GASTROINTESTINAL:  As above.

GENITOURINARY:  No dysuria or hematuria.

MUSCULOSKELETAL:  As above.

ENDOCRINE:  Negative.

HEMATOLOGIC:  Negative.

DERMATOLOGIC:  As above.

PSYCHIATRIC:  She has anxiety.

NEUROLOGIC:  Negative.

 

PHYSICAL EXAMINATION:

VITAL SIGNS:  Temperature 98.5, blood pressure 149/111, O2 saturation 

99% on 2 liters nasal cannula.

GENERAL:  She is alert, oriented x 3, in no acute distress.

HEENT:  Atraumatic, normocephalic.  Pupils are equal, round and 

reactive to light.  Extraocular movements are intact.  No scleral 

icterus.  Oropharynx dry mucosa.  No lesion, no thrush.  No mucositis.

NECK:  No thyromegaly.  No palpable mass.

LYMPHATIC:  No palpable cervical, clavicular, axillary, inguinal lymph

nodes.

CARDIOVASCULAR:  Regular S1, S2 with no murmur.

LUNGS:  Decreased breath sounds at lung bases.  No wheezing.

ABDOMEN:  Soft. A little tender in the midepigastric area.  No 

rebound, no rigidity.  Positive bowel sounds.

EXTREMITIES:  No cyanosis, clubbing or edema.

BACK:  No paravertebral tenderness.

SKIN:  No rash or petechiae.

NEUROLOGIC:  Nonfocal.

 

LABORATORY DATA:

, , alkaline phosphatase 164, total bilirubin 0.5.

 

ASSESSMENT AND PLAN:

1.  Metastatic breast cancer.  She was first diagnosed with left 

breast cancer 10/2001.  She did not followup for treatment.  She 

re-presented in  with progression of disease and had left 

mastectomy.  She received chemotherapy and radiation at that time.

 She was on tamoxifen for 5 years.  In , she was found to have a 

right pleural effusion.  She subsequently had a right pleural biopsy 

which showed metastatic breast cancer. At the same time, she was found

to have a right breast mass and underwent right mastectomy, but 

pathology was benign.  She was given Taxol in 2017, but had 

anaphylactic reaction.  She was switched to Gemzar and had poor 

tolerance and decided to stop around 2017.  She has not been on any

therapy since then.  She has been trying natural therapy.  Recent PET 

scan showed multiple bone lesions consistent with metastatic disease. 

There was also left lower lobe and right lower lobe lung nodules with 

bilateral pleural effusion consistent with metastatic disease.  CA 

15-3 trended up to around 90.  I have offered her aromatase inhibitor 

with Ibrance, but she declined all treatment.  I suspect some of her 

symptoms are due to progression of the breast cancer.

2.  Shortness of breath.  A CT angiogram did not show any pulmonary 

embolism; however, she has small to moderate bilateral pleural 

effusion.  There is also a consolidation right lower lobe, which could

be due to progression of breast cancer versus pneumonia.  She also 

noted to have a pericardial effusion.  Echocardiogram is pending at 

this time.

3.  Anorexia with pruritus. Her liver enzymes are elevated.  CT 

abdomen and pelvis did not show clear evidence of metastatic disease 

in the liver; however, there was thickening of gallbladder wall.  Ultrasound of 

the abdomen, liver is pending at this time.

4.  Anemia, which is chronic due to anemia of chronic disease.

 

RECOMMENDATIONS:

1.  Await echocardiogram to evaluate pericardial effusion and 

ultrasound to evaluate the gallbladder.

2.  May need pericardiocentesis. If she has a significant pericardial 

effusion, can send for fluid cytology at that time.

3.  Continue supportive care.

4.  The patient still declines treatment for breast cancer.

5.  Continue deep venous thrombosis prophylaxis with heparin.

 

Thank you, Dr. Mendez, for asking me to see this patient.

 

 

__________________________________

Oj Parker MD

 

 

BYC/TL

D: 2018, 09:18 AM

T: 2018, 10:03 AM

Visit #: [id number]

Job #: [id number]

[email or web address]

## 2018-06-05 NOTE — PD.CONS
Consult


Service


Palliative Care


.


Consult Requested By


Zara Claudio NP/ Dr. Mendez 


.


Primary Care Physician


No Primary Care Physician


.


Reason for Consultation


   a.  To assist with evaluation and management of symptoms including: weakness

, dyspnea, decreased appetite, anxiety. 


   b.  To assist medical decision maker(s) with: better understanding of 

current medical conditions; weighing benefits/burdens of medical treatment 

options; making        


        medical treatment decisions.


.





HPI


History of Present Illness


Mrs. Asencio is a 53 year old female with past medical history of metastatic 

breast cancer to lung and bone, anemia, pleural effusion and elevated BMI. 

Patient was initially diagnosed with left breast cancer in 2001, she 

decided not to have treatment. In  she presented with increased pain and 

underwent left mastectomy with chemo before and after surgery. She then had 

radiation therapy and was on Tamoxifen for 5 years. In , she was seen by 

cardiothoracic surgery for cough and pleural effusion. She underwent lung 

biopsy that revealed metastatic breast cancer ER/UT +, HER2 negative. Around 

that same time she was found to have a mass in the right breast and underwent 

right mastectomy (2016). In 2017, she was started on Taxol 

chemotherapy but had an anaphylactic reaction, treatment was switched to Gemzar 

until 2017. She reportedly did not tolerate chemotherapy well and was 

lost to follow up until she relocated to [place] when she saw Dr. Parker, medical 

oncologist. Notes indicate patient has declined any treatment offered by 

oncology and has been doing natural therapies, recently reports drinking 

hydrogen peroxide. 





Patient presented to [facility] emergency department on 18 with 10 day 

history of dyspnea. She reported sensation of "feeling smothered" improved with 

fan blowing on her. Denied any associated fever, chills, palpitations or chest 

pain. She also reported nausea worsening when eating, one episode of emesis. 

Urine was malodorous. ER evaluation revealed:


* VS; 98.4, pulse 87, respiration 18, /85, oxygen saturation 100% on room 

air


* WBC 11, hemoglobin 10.1, hematocrit 33.6, platelet count 447, neutrophils 69.4

%


* PT 11, INR 1.1, PTT 21.7


* BUN 19, creatinine 0.79, glucose 125, calcium 9.3, magnesium 2.2, sodium 134, 

potassium 4.0, chloride 100, carbon dioxide 22.4, GFR 76


* Total bilirubin 0.5, alkaline phosphatase 164, , 


* Total protein 7.0, albumin 3.3


* Troponin less than 0.02, total creatine kinase 60


* Lipase 259


* EKG sinus rhythm, no ST changes


* Chest x-ray-cardiomegaly with bilateral small pleural effusions and basilar 

airspace disease, right greater than left.


* CTA-no pulmonary embolism, small to moderate pericardial effusion, bilateral 

nonspecific interstitial pulmonary infiltrates, parenchymal consolidation in 

the right lower lung, small bilateral pleural effusions, diffuse bony 

metastasis.


* CT abdomen/pelvis-trace fluid adjacent to the liver, gallbladder wall 

thickening, bulky fibroid uterus.





Patient was admitted with shortness of breath likely secondary to mets breast 

cancer disease progression. Echocardiogram pending to evaluate of pericardial 

effusion for possible pericardiocentesis. Gallbladder US also pending. Dr. Parker 

was consulted and patient continues to decline treatment for breast cancer. 





Palliative care was consulted to assist with further clarification of treatment 

goals. 


.


Function/Cognitive Trajectory


Patient has been able to care for herself until about 10 days ago. She then had 

significant change in functional and nutritional status. She was only eating 

bites and sips. She reports increased weakness and fatigue. She was very short 

of breath and unable to ambulate long distances. She was having trouble 

sleeping. 


.





Review of Systems


Constitutional:  COMPLAINS OF: Fatigue, Weight loss, Change in appetite (

decreased), Generalized weakness, Sleep problems (insommnia. )


Endocrine:  DENIES: Abnorml menstrual pattern, Heat/cold intolerance, Polydipsia

, Polyuria, Polyphagia


Eyes:  DENIES: Blurred vision, Diplopia, Eye inflammation, Eye pain, Vision loss

, Photosensitivity, Double Vision, Blind spots


Ears, nose, mouth, throat:  DENIES: Tinnitus, Hearing loss, Vertigo, Nasal 

discharge, Oral lesions, Throat pain, Hoarseness, Ear Pain, Running Nose, 

Epistaxis, Sinus Pain, Toothache, Odynophagia


Respiratory:  COMPLAINS OF: Shortness of breath


Cardiovascular:  COMPLAINS OF: Dyspnea on Exertion


Gastrointestinal:  COMPLAINS OF: Nausea, Vomiting


Genitourinary:  DENIES: Abnormal vaginal bleeding, Dysmenorrhea, Dyspareunia, 

Sexual dysfunction, Urinary frequency, Urinary incontinence, Urgency, Hematuria

, Dysuria, Nocturia, Vaginal discharge, Hesitancy, Dribbling, Decreased stream


Musculoskeletal:  COMPLAINS OF: Back pain


Integumentary:  DENIES: Abnormal pigmentation, Pruritus, Rash, Nail changes, 

Breast masses, Breast skin changes, Nipple discharge, Nodules, Tumors, 

Excessive dryness, Non-healing sores


Hematologic/Lymphatics:  COMPLAINS OF: Bruising


Psychiatric:  COMPLAINS OF: Anxiety





Past Family Social History


Coded Allergies:  


     paclitaxel (Verified  Allergy, Intermediate, 18)


Past Medical History


Metastatic breast cancer to lung


Anemia


Elevated BMI


.


Past Surgical History


Breast biopsy


Bilateral mastectomy (left , right )


Right lung Pleurx catheter


Port placement


 1


Lung biopsy


Thoracentesis


LN biopsy


Root Canal


.


Reported Medications


Reported Meds & Active Scripts


Active


No Active Prescriptions or Reported Medications    


.





Current Medications








 Medications


  (Trade)  Dose


 Ordered  Sig/Antonia


 Route  Start Time


 Stop Time Status Last Admin


 


  (NS Flush)  5 ml  UNSCH  PRN


 IVF  18 19:00


     


 


 


  (Heparin Central


 Flush)  250 unit  UNSCH  PRN


 IV FLUSH  18 19:15


     


 


 


  (Heparin Central


 Flush)  500 unit  UNSCH


 IV FLUSH  18 19:15


     


 


 


  (NS Flush)  2 ml  UNSCH  PRN


 IV FLUSH  18 01:45


     


 


 


  (NS Flush)  2 ml  BID


 IV FLUSH  18 09:00


    18 09:00


 


 


  (Tylenol)  650 mg  Q4H  PRN


 PO  18 01:45


     


 


 


  (Narcan Inj)  0.4 mg  UNSCH  PRN


 IV PUSH  18 01:45


     


 


 


  (Milk Of


 Magnesia Liq)  30 ml  Q12H  PRN


 PO  18 01:45


     


 


 


  (Senokot)  17.2 mg  Q12H  PRN


 PO  18 01:45


     


 


 


  (Dulcolax Supp)  10 mg  DAILY  PRN


 RECTAL  18 01:45


     


 


 


  (Lactulose Liq)  30 ml  DAILY  PRN


 PO  18 01:45


     


 


 


 Ceftriaxone


 Sodium 1000 mg/


 Sodium Chloride  100 ml @ 


 200 mls/hr  Q24H


 IV  18 01:00


     


 


 


 Azithromycin 500


 mg/Sodium Chloride  250 ml @ 


 250 mls/hr  Q24H


 IV  18 00:00


     


 


 


  (Duoneb Neb)  1 ampule  Q4HR NEB  PRN


 NEB  18 03:00


     


 


 


  (Duoneb Neb)  1 ampule  Q6HR  NEB


 NEB  18 04:00


    18 04:25


 


 


  (Heparin Inj)  5,000 units  Q8HR


 SQ  18 06:00


    18 06:04


 








Family History


Mother with diabetes mellitus, alive. Father alive with colon cancer. Maternal 

and paternal grandparents , unknown causes. 


.


Substance Use


Tobacco: never smoked.


Alcohol: former drinker. 


Prescription med abuse: none. 


Illicits: none.


.


Psychosocial History


. College graduate. Retired. Has 2 sons and 2 daughters. 1 brother. 


.


Spiritual/Cultural Factors


Pentecostalism. 


.


Living Will:  Never completed


Health Care Surrogate:  Never completed


Durable Power of :  Never completed


Health Care Surrogate(s):


Patient is currently capacitated to make her own decisions. No written advanced 

directives, she declines completion. According to [place] statutes, health care 

proxy decision making falls to her spouse (which is in keeping with the patient 

wishes). 


.


Documented care wishes:


No written advanced directives. 


.


Today's verbally stated goals:


Patient desires continued evaluation (echo, abdomen US) at this time. She 

practices more holistic treatments and hopes to return home in the coming days 

to continue her holistic treatment. She does not want want standard treatment 

for her cancer. She is considering code status. 


.


Family/friends goals:


Spouse is supportive of patient wishes. 


.


Ethical and Legal Issues


Patient is currently capacitated to make her own decisions. No written advanced 

directives, she declines completion. According to [place] statutes, health care 

proxy decision making falls to her spouse (which is in keeping with the patient 

wishes). 


.





Physical Exam





Vital Signs








  Date Time  Temp Pulse Resp B/P (MAP) Pulse Ox O2 Delivery O2 Flow Rate FiO2


 


18 09:48  87      


 


18 08:17     99 Nasal Cannula 2.00 


 


18 08:17 97.6 85 18 146/95 (112) 99   


 


18 08:17  85      


 


18 06:00  80      


 


18 05:00  90      


 


18 04:30     99 Nasal Cannula 2.00 


 


18 04:00  78      


 


18 03:33     95 Nasal Cannula 2.00 


 


18 03:32 98.5 89 20 149/111 (124) 95   


 


18 03:00  79      


 


18 22:59  83 20 130/85 (100) 99 Room Air  


 


18 19:27  87 18 132/85 (101) 100 Room Air  


 


18 18:19  90 20  99 Room Air  


 


18 18:18  90 20 165/102 (123) 96 Room Air  


 


18 18:01 98.4 98 20 195/117 (143) 100   








Exam


CONSTITUTIONAL/GENERAL: This is an adequately nourished patient, in no apparent 

distress.


TUBES/LINES/DRAINS: Oxygen, PIV. 


SKIN: No jaundice, rashes, or lesions. Ecchymoses on upper extremities. No 

wounds seen anteriorly. Skin temperature appropriate. Not diaphoretic. 


HEAD: Atraumatic. Normocephalic.


EYES: Pupils equal and round and reactive. Extraocular motions intact. No 

scleral icterus. No injection or drainage. Fundi not examined.


ENT: Hearing grossly normal. Nose without bleeding or purulent drainage. Throat 

without visible erythema, exudates, masses, or lesions.


NECK: Trachea midline. Supple, nontender. 


CARDIOVASCULAR: Regular rate and rhythm without murmurs, gallops, or rubs. No 

JVD. Peripheral pulses symmetric.


RESPIRATORY/CHEST: Clear to auscultation. Breath sounds equal bilaterally. 


GASTROINTESTINAL: Abdomen soft, non-tender, nondistended. No guarding. Bowel 

sounds present.


GENITOURINARY: Without palpable bladder distension. 


MUSCULOSKELETAL: Extremities without clubbing, cyanosis, or edema. No mottling 

or clubbing.


LYMPHATICS: No palpable cervical or supraclavicular adenopathy.


NEUROLOGICAL: Awake and alert. Generalized weakness. Follows commands. 

Cognitively sharp. Moves all extremities.


PSYCHIATRIC: Self reports anxiety at times. 


.





Diagnostic Tests


Laboratory





Laboratory Tests








Test


  18


19:42 18


20:40 18


22:20 18


00:41


 


White Blood Count


  11.0 TH/MM3


(4.0-11.0) 


  


  


 


 


Red Blood Count


  4.87 MIL/MM3


(4.00-5.30) 


  


  


 


 


Hemoglobin


  10.1 GM/DL


(11.6-15.3) 


  


  


 


 


Hematocrit


  33.6 %


(35.0-46.0) 


  


  


 


 


Mean Corpuscular Volume


  69.0 FL


(80.0-100.0) 


  


  


 


 


Mean Corpuscular Hemoglobin


  20.8 PG


(27.0-34.0) 


  


  


 


 


Mean Corpuscular Hemoglobin


Concent 30.1 %


(32.0-36.0) 


  


  


 


 


Red Cell Distribution Width


  18.9 %


(11.6-17.2) 


  


  


 


 


Platelet Count


  447 TH/MM3


(150-450) 


  


  


 


 


Mean Platelet Volume


  9.6 FL


(7.0-11.0) 


  


  


 


 


Neutrophils (%) (Auto)


  69.4 %


(16.0-70.0) 


  


  


 


 


Lymphocytes (%) (Auto)


  19.8 %


(9.0-44.0) 


  


  


 


 


Monocytes (%) (Auto)


  9.8 %


(0.0-8.0) 


  


  


 


 


Eosinophils (%) (Auto)


  0.4 %


(0.0-4.0) 


  


  


 


 


Basophils (%) (Auto)


  0.6 %


(0.0-2.0) 


  


  


 


 


Neutrophils # (Auto)


  7.6 TH/MM3


(1.8-7.7) 


  


  


 


 


Lymphocytes # (Auto)


  2.2 TH/MM3


(1.0-4.8) 


  


  


 


 


Monocytes # (Auto)


  1.1 TH/MM3


(0-0.9) 


  


  


 


 


Eosinophils # (Auto)


  0.0 TH/MM3


(0-0.4) 


  


  


 


 


Basophils # (Auto)


  0.1 TH/MM3


(0-0.2) 


  


  


 


 


CBC Comment DIFF FINAL    


 


Differential Comment     


 


Prothrombin Time


  11.0 SEC


(9.8-11.6) 


  


  


 


 


Prothromb Time International


Ratio 1.1 RATIO 


  


  


  


 


 


Activated Partial


Thromboplast Time 21.7 SEC


(24.3-30.1) 


  


  


 


 


Blood Urea Nitrogen


  19 MG/DL


(7-18) 


  


  


 


 


Creatinine


  0.79 MG/DL


(0.50-1.00) 


  


  


 


 


Random Glucose


  125 MG/DL


() 


  


  


 


 


Total Protein


  7.0 GM/DL


(6.4-8.2) 


  


  


 


 


Albumin


  3.3 GM/DL


(3.4-5.0) 


  


  


 


 


Calcium Level


  9.3 MG/DL


(8.5-10.1) 


  


  


 


 


Magnesium Level


  2.2 MG/DL


(1.5-2.5) 


  


  


 


 


Alkaline Phosphatase


  164 U/L


() 


  


  


 


 


Aspartate Amino Transf


(AST/SGOT) 113 U/L


(15-37) 


  


  


 


 


Alanine Aminotransferase


(ALT/SGPT) 249 U/L


(10-53) 


  


  


 


 


Total Bilirubin


  0.5 MG/DL


(0.2-1.0) 


  


  


 


 


Sodium Level


  134 MEQ/L


(136-145) 


  


  


 


 


Potassium Level


  4.0 MEQ/L


(3.5-5.1) 


  


  


 


 


Chloride Level


  100 MEQ/L


() 


  


  


 


 


Carbon Dioxide Level


  22.4 MEQ/L


(21.0-32.0) 


  


  


 


 


Anion Gap


  12 MEQ/L


(5-15) 


  


  


 


 


Estimat Glomerular Filtration


Rate 76 ML/MIN


(>89) 


  


  


 


 


Total Creatine Kinase


  60 U/L


() 


  


  


 


 


Troponin I


  LESS THAN 0.02


NG/ML 


  


  


 


 


Lipase


  259 U/L


() 


  


  


 


 


B-Type Natriuretic Peptide


  


  18 PG/ML


(0-100) 


  


 


 


Urine Color


  


  


  YELLOW


(YELLW/STRAW) 


 


 


Urine Turbidity   HAZY (CLEAR)  


 


Urine pH   5.5 (5.0-8.5)  


 


Urine Specific Gravity


  


  


  1.029


(1.002-1.035) 


 


 


Urine Protein


  


  


  30 mg/dL


(NEG-TRACE) 


 


 


Urine Glucose (UA)


  


  


  NEG mg/dL


(NEG) 


 


 


Urine Ketones


  


  


  NEG mg/dL


(NEG) 


 


 


Urine Occult Blood   NEG (NEG)  


 


Urine Nitrite   NEG (NEG)  


 


Urine Bilirubin   NEG (NEG)  


 


Urine Urobilinogen


  


  


  LESS THAN 2.0


MG/DL (LESS 


 


 


Urine Leukocyte Esterase   LARGE (NEG)  


 


Urine RBC   3 /hpf (0-3)  


 


Urine WBC   11 /hpf (0-5)  


 


Urine Squamous Epithelial


Cells 


  


  2 /hpf (0-5) 


  


 


 


Urine Transitional Epithelial


Cells 


  


  2 /hpf (NONE) 


  


 


 


Urine Amorphous Sediment   RARE  


 


Urine Mucus   FEW /lpf (OCC)  


 


Microscopic Urinalysis Comment


  


  


  CULTURE


INDICATED 


 


 


Lactic Acid Level


  


  


  


  1.5 mmol/L


(0.4-2.0)








Result Diagram:  


18





Microbiology





Microbiology








 Date/Time


Source Procedure


Growth Status


 


 


 18 00:45


Blood Peripheral Aerobic Blood Culture


Pending Received


 


 18 00:45


Blood Peripheral Anaerobic Blood Culture


Pending Received





 18 00:40


Blood Peripheral Aerobic Blood Culture


Pending Received


 


 18 00:40


Blood Peripheral Anaerobic Blood Culture


Pending Received


 


 18 22:20


Urine Random Urine Urine Culture


Pending Received








Imaging





Last Impressions








Abdomen/Pelvis CT 18 Signed





Impressions: 





 CONCLUSION:





 1.  There is a trace of fluid adjacent to the liver.





 2.  Gallbladder wall thickening.





 3.  Bulky fibroid uterus.





  





 


 


Chest X-Ray 18 [id number] Signed





Impressions: 





 CONCLUSION: 





 Cardiomegaly with bilateral small pleural effusions and basilar airspace diseas





 e, right greater than left.





  





 


 


CT Angiography 18 [id number] Signed





Impressions: 





 CONCLUSION:





 1.  No evidence of pulmonary embolism





 2.  Small to moderate pericardial effusion.





 3.  Bilateral nonspecific interstitial pulmonary infiltrates





 4.  Parenchymal consolidation in the right lower lung.





 5.  Small bilateral pleural effusions.





 6.  Diffuse bony metastatic disease.





  





 











Patient/Family Conference


Present at Family Conference:


Patient and her , Carrillo. 


.


Family Conference Time (mins):  60


Family Conference Location:  Bedside


Issues Discussed:


* Palliative care role, purpose, approach


* Additional medical, psychosocial, and spiritual history


* Patients general health, functional status, and cognitive changes in the 

months leading up to the current hospitalization


* Patient/family understanding of the current medical problems


* Patient/family understanding of prognosis


* Patients goals of care as best understood from advance directives and/or 

conversations and/or values


* Current medical treatment options and benefits/burdens of those options


* Likely scenarios comparing ongoing aggressive care with a transition to 

comfort measures only


* Questions answered to the best of my ability


* Palliative care contact information provided


.





Assessment and Plan


Disease Oriented Problem List:  


(1) Metastatic breast cancer to lung and bone


(2) Pericardial effusion


(3) Bilateral pleural effusion


(4) Pneumonia


(5) Shortness of breath


(6) Transaminitis


(7) Generalized weakness


Symptom Scale:  


(1) Shortness of breath


0-10 Scale:  3





(2) Generalized weakness


0-10 Scale:  3





Pertinent Non-Medical Issues


Psychosocial: . Has 2 sons and 2 daughters. 


Spiritual: Pentecostalism. 


Legal: Patient is currently capacitated to make her own decisions. No written 

advanced directives, she declines completion. According to [place] statutes, 

health care proxy decision making falls to her spouse (which is in keeping with 

the patient wishes). 


Ethical issues impacting care: No known concerns at this time. 


.


Important Contacts


* Omari Asencio, spouse: [phone]


* Fabian Hicks, son: [phone]


.


Prognosis


Mrs. Asencio has metastatic breast cancer to lung, she was admitted with weakness

, decreased appetite and severe shortness of breath at rest and with exertion 

found to have pericardial effusion. Her overall prognosis is poor. Hospice 

appropriate if goals were comfort oriented. 


.


Code Status:  Full Code


Plan


* Decision Maker: Patient is currently capacitated to make her own decisions. 

No written advanced directives, she declines completion. According to [place] 

statutes, health care proxy decision making falls to her spouse (which is in 

keeping with the patient wishes). 


* FULL CODE 


* Palliative care met with patient/ family:patient is considering code status. 

She declined completion of written advanced directives. She wants to pursue 

additional testing, though is not sure if she would have pericardiocentesis. 

She hopes to return home in the coming days to continue her holistic therapies. 


* SYMPTOMS: Weakness: due to mets breast cancer to lung and bone. Dyspnea: due 

to mets breast cancer to lung, pleural and pericardial effusions. Decreased 

appetite: due to malignancy, possible progression. Anxiety: due to shortness of 

breath, declines use of medication. No new medication recommendations at this 

time. 


* Palliative care number provided. 


* Palliative care will continue to follow throughout hospital course to assist 

with symptom management and clarification of goals as needed. 


.





Thank you for the opportunity to participate in the care of Ms. Asencio.





Attestation


To help prompt me to consider important information that might be impacting 

today's encounter and assessment, information from prior notes written by 

myself or my colleagues may have been "brought forward" into today's note.  My 

signature on this note, however, is an attestation that I personally performed 

the exam, history, and/or decision-making noted today, and, unless otherwise 

indicated, the interactions with patient, family, and staff as well as the 

review of records all occurred today.  I also attest that the listed assessment 

and stated plan reflect my best clinical judgment today based on the 

combination of historical information, prior notes, and today's exam/ 

interactions.  When time spent is documented, it refers only to time spent 

today by the signer, or if indicated, combined time spent today by 

collaborating physician/nurse practitioner.











Stephanie Jackson 2018 10:25

## 2018-06-05 NOTE — RADRPT
EXAM DATE:  6/5/2018 12:03 AM EDT

AGE/SEX:        53 years / Female



INDICATIONS:  Shortness of breath. Stage 4 breast cancer.



CLINICAL DATA:  This is the patient's initial encounter. Patient reports that signs and symptoms have
 been present for 1 week and indicates a pain score of 4/10. 

                                                                          

MEDICAL/SURGICAL HISTORY:   Carcinoma, breast. Mastectomy, left.



RADIATION DOSE:  22.41 CTDI (mGy)









COMPARISON:      No prior [facility] exams available for comparison. 





TECHNIQUE:  Volumetric scanning was performed using a multi-row detector CT scanner during bolus infu
nohemy of 70 ml Omnipaque 350 (iohexol)  nonionic water-soluble contrast as a cumulative dose for multi
ple exams. The data was post processed with a variety of visualization algorithms including full volu
me maximum intensity projection and sliding thin slab reformation.  Using automated exposure control 
and adjustment of the mA and/or kV according to patient size, radiation dose was kept as low as reaso
nably achievable to obtain optimal diagnostic quality images.



FINDINGS:  

Pulmonary Arteries:  No filling defects are seen in the pulmonary arteries out to the subsegmental ve
ssels.  The left and right pulmonary arteries are normal in diameter.

Lung:  Bilateral interstitial infiltrates. There are some parenchymal infiltrates in the right lower 
lung. There is some atelectasis in the left lung base.

Effusion:  Small bilateral effusions.

Mediastinum:  No evidence of mediastinal or hilar adenopathy. Small to moderate pericardial effusion.
 There is approximately 1.9 cm of separation.

Other:  Diffuse bony metastatic disease.



CONCLUSION:

1.  No evidence of pulmonary embolism

2.  Small to moderate pericardial effusion.

3.  Bilateral nonspecific interstitial pulmonary infiltrates

4.  Parenchymal consolidation in the right lower lung.

5.  Small bilateral pleural effusions.

6.  Diffuse bony metastatic disease.



Electronically signed by: Tomasz Vazquez MD  6/5/2018 12:13 AM EDT

## 2018-06-06 VITALS — HEART RATE: 101 BPM

## 2018-06-06 VITALS
SYSTOLIC BLOOD PRESSURE: 136 MMHG | TEMPERATURE: 99 F | DIASTOLIC BLOOD PRESSURE: 78 MMHG | RESPIRATION RATE: 18 BRPM | OXYGEN SATURATION: 98 % | HEART RATE: 88 BPM

## 2018-06-06 VITALS — HEART RATE: 80 BPM

## 2018-06-06 VITALS
DIASTOLIC BLOOD PRESSURE: 92 MMHG | TEMPERATURE: 99.6 F | HEART RATE: 94 BPM | OXYGEN SATURATION: 95 % | SYSTOLIC BLOOD PRESSURE: 132 MMHG | RESPIRATION RATE: 16 BRPM

## 2018-06-06 VITALS
RESPIRATION RATE: 18 BRPM | DIASTOLIC BLOOD PRESSURE: 72 MMHG | OXYGEN SATURATION: 100 % | SYSTOLIC BLOOD PRESSURE: 130 MMHG | TEMPERATURE: 98.8 F | HEART RATE: 98 BPM

## 2018-06-06 VITALS
HEART RATE: 88 BPM | TEMPERATURE: 97.5 F | OXYGEN SATURATION: 96 % | RESPIRATION RATE: 18 BRPM | DIASTOLIC BLOOD PRESSURE: 85 MMHG | SYSTOLIC BLOOD PRESSURE: 113 MMHG

## 2018-06-06 VITALS
SYSTOLIC BLOOD PRESSURE: 146 MMHG | HEART RATE: 86 BPM | TEMPERATURE: 98 F | DIASTOLIC BLOOD PRESSURE: 105 MMHG | OXYGEN SATURATION: 96 % | RESPIRATION RATE: 18 BRPM

## 2018-06-06 VITALS — HEART RATE: 78 BPM

## 2018-06-06 VITALS — HEART RATE: 94 BPM

## 2018-06-06 VITALS
OXYGEN SATURATION: 93 % | HEART RATE: 93 BPM | SYSTOLIC BLOOD PRESSURE: 141 MMHG | DIASTOLIC BLOOD PRESSURE: 96 MMHG | RESPIRATION RATE: 18 BRPM | TEMPERATURE: 97.7 F

## 2018-06-06 VITALS
HEART RATE: 87 BPM | SYSTOLIC BLOOD PRESSURE: 145 MMHG | OXYGEN SATURATION: 99 % | RESPIRATION RATE: 18 BRPM | TEMPERATURE: 98.1 F | DIASTOLIC BLOOD PRESSURE: 98 MMHG

## 2018-06-06 VITALS — HEART RATE: 98 BPM

## 2018-06-06 VITALS — HEART RATE: 93 BPM

## 2018-06-06 VITALS — OXYGEN SATURATION: 97 %

## 2018-06-06 VITALS — HEART RATE: 87 BPM

## 2018-06-06 VITALS — HEART RATE: 86 BPM

## 2018-06-06 VITALS — HEART RATE: 84 BPM

## 2018-06-06 VITALS — HEART RATE: 96 BPM

## 2018-06-06 VITALS — HEART RATE: 99 BPM

## 2018-06-06 VITALS — HEART RATE: 100 BPM

## 2018-06-06 LAB
BASOPHILS # BLD AUTO: 0 TH/MM3 (ref 0–0.2)
BASOPHILS NFR BLD: 0.4 % (ref 0–2)
BUN SERPL-MCNC: 22 MG/DL (ref 7–18)
CALCIUM SERPL-MCNC: 9.2 MG/DL (ref 8.5–10.1)
CHLORIDE SERPL-SCNC: 102 MEQ/L (ref 98–107)
CREAT SERPL-MCNC: 0.77 MG/DL (ref 0.5–1)
EOSINOPHIL # BLD: 0 TH/MM3 (ref 0–0.4)
EOSINOPHIL NFR BLD: 0.2 % (ref 0–4)
ERYTHROCYTE [DISTWIDTH] IN BLOOD BY AUTOMATED COUNT: 19 % (ref 11.6–17.2)
GFR SERPLBLD BASED ON 1.73 SQ M-ARVRAT: 78 ML/MIN (ref 89–?)
GLUCOSE SERPL-MCNC: 130 MG/DL (ref 74–106)
HCO3 BLD-SCNC: 24.9 MEQ/L (ref 21–32)
HCT VFR BLD CALC: 32.9 % (ref 35–46)
HGB BLD-MCNC: 10.1 GM/DL (ref 11.6–15.3)
LYMPHOCYTES # BLD AUTO: 1.9 TH/MM3 (ref 1–4.8)
LYMPHOCYTES NFR BLD AUTO: 15.4 % (ref 9–44)
MCH RBC QN AUTO: 21.1 PG (ref 27–34)
MCHC RBC AUTO-ENTMCNC: 30.6 % (ref 32–36)
MCV RBC AUTO: 68.9 FL (ref 80–100)
MONOCYTE #: 0.9 TH/MM3 (ref 0–0.9)
MONOCYTES NFR BLD: 7.3 % (ref 0–8)
NEUTROPHILS # BLD AUTO: 9.2 TH/MM3 (ref 1.8–7.7)
NEUTROPHILS NFR BLD AUTO: 76.7 % (ref 16–70)
PLATELET # BLD: 387 TH/MM3 (ref 150–450)
PMV BLD AUTO: 9.5 FL (ref 7–11)
RBC # BLD AUTO: 4.78 MIL/MM3 (ref 4–5.3)
SODIUM SERPL-SCNC: 135 MEQ/L (ref 136–145)
WBC # BLD AUTO: 12 TH/MM3 (ref 4–11)

## 2018-06-06 RX ADMIN — HEPARIN SODIUM SCH UNITS: 10000 INJECTION, SOLUTION INTRAVENOUS; SUBCUTANEOUS at 21:18

## 2018-06-06 RX ADMIN — HEPARIN SODIUM SCH UNITS: 10000 INJECTION, SOLUTION INTRAVENOUS; SUBCUTANEOUS at 14:00

## 2018-06-06 RX ADMIN — IPRATROPIUM BROMIDE AND ALBUTEROL SULFATE SCH AMPULE: .5; 3 SOLUTION RESPIRATORY (INHALATION) at 02:47

## 2018-06-06 RX ADMIN — AZITHROMYCIN SCH MLS/HR: 500 INJECTION, POWDER, LYOPHILIZED, FOR SOLUTION INTRAVENOUS at 23:09

## 2018-06-06 RX ADMIN — Medication SCH ML: at 09:00

## 2018-06-06 RX ADMIN — AZITHROMYCIN SCH MLS/HR: 500 INJECTION, POWDER, LYOPHILIZED, FOR SOLUTION INTRAVENOUS at 00:14

## 2018-06-06 RX ADMIN — HEPARIN SODIUM SCH UNITS: 10000 INJECTION, SOLUTION INTRAVENOUS; SUBCUTANEOUS at 06:00

## 2018-06-06 RX ADMIN — SODIUM CHLORIDE SCH MLS/HR: 900 INJECTION INTRAVENOUS at 02:28

## 2018-06-06 RX ADMIN — IPRATROPIUM BROMIDE AND ALBUTEROL SULFATE SCH AMPULE: .5; 3 SOLUTION RESPIRATORY (INHALATION) at 16:33

## 2018-06-06 RX ADMIN — IPRATROPIUM BROMIDE AND ALBUTEROL SULFATE SCH AMPULE: .5; 3 SOLUTION RESPIRATORY (INHALATION) at 19:31

## 2018-06-06 RX ADMIN — IPRATROPIUM BROMIDE AND ALBUTEROL SULFATE SCH AMPULE: .5; 3 SOLUTION RESPIRATORY (INHALATION) at 09:14

## 2018-06-06 RX ADMIN — Medication SCH ML: at 20:47

## 2018-06-06 NOTE — HHI.HCPN
Attempted to see Mrs. Asencio x2 today. Door is closed, son is sitting in the 

hallway. Son requests patient's privacy at this time. Offered to set up time 

for visit tomorrow, Thursday 6/7 as he desires any updated medical information. 

He declines coordination of visit and states his father will be present and be 

able to update him. Palliative care to visit again tomorrow. Palliative care 

will continue follow.











Andra Frank, COSMEW Jun 6, 2018 15:06

## 2018-06-06 NOTE — HHI.PR
Subjective


Remarks


Follow-up for metastatic breast cancer, shortness of breath, pericarditis. 

Patient is currently doing well. On room air. No CP, SOB. Waiting for Echo to 

be done.





Objective


Vitals





Vital Signs








  Date Time  Temp Pulse Resp B/P (MAP) Pulse Ox O2 Delivery O2 Flow Rate FiO2


 


6/6/18 09:15     97   21


 


6/6/18 04:42 98.1 87 18 145/98 (114) 99   


 


6/6/18 04:00  84      


 


6/6/18 03:00  78      


 


6/6/18 02:00  80      


 


6/6/18 01:00  80      


 


6/6/18 00:13  87      


 


6/6/18 00:11 97.5 88 18 113/85 (94) 96   


 


6/5/18 23:00  94      


 


6/5/18 22:00  86      


 


6/5/18 21:00  88      


 


6/5/18 20:02  99      


 


6/5/18 20:00     100 Nasal Cannula 2.00 


 


6/5/18 19:53 97.7 95 18 121/90 (100) 100   


 


6/5/18 19:00  100      


 


6/5/18 18:31    145/105 (118)    


 


6/5/18 18:12  97      


 


6/5/18 17:40  92      


 


6/5/18 16:34 97.9 95 18 155/120 (132) 100   


 


6/5/18 16:34  95      


 


6/5/18 14:47  93      


 


6/5/18 13:42  99      


 


6/5/18 12:41  93      


 


6/5/18 11:39 97.9 102 18 126/81 (96) 100   


 


6/5/18 11:39  89      


 


6/5/18 10:51     99 Nasal Cannula 2.00 


 


6/5/18 10:35       2.00 


 


6/5/18 10:08  88      














I/O      


 


 6/5/18 6/5/18 6/5/18 6/6/18 6/6/18 6/6/18





 06:59 14:59 22:59 06:59 14:59 22:59


 


Intake Total 240 ml  640 ml 490 ml  


 


Output Total 100 ml     


 


Balance 140 ml  640 ml 490 ml  


 


      


 


Intake Oral 240 ml  640 ml 240 ml  


 


IV Total    250 ml  


 


Output Urine Total 100 ml     


 


# Voids   4 3  


 


# Bowel Movements    1  








Result Diagram:  


6/6/18 0500                                                                    

            6/6/18 0500





Objective Remarks


GENERAL:  Alert, Oriented x 3, NAD. 


SKIN: Warm and dry.


HEAD: Normocephalic.


EYES: No scleral icterus. No injection or drainage. 


NECK: Supple, trachea midline. No JVD or lymphadenopathy.


CARDIOVASCULAR: Regular rate and rhythm without murmurs, gallops, or rubs. 


RESPIRATORY: Breath sounds equal bilaterally. No accessory muscle use.


GASTROINTESTINAL: Abdomen soft, non-tender, nondistended. 


MUSCULOSKELETAL: No cyanosis, or edema. 


BACK: Nontender without obvious deformity. No CVA tenderness.





Procedures


None.





A/P


Assessment and Plan


1.  Pneumonia


Chest CT significant for consolidation in the right lower lung with small 

bilateral pleural effusions and diffuse bony metastatic disease, personally 

reviewed


Azithromycin/Rocephin


Supplemental oxygen as needed


Continue DuoNeb treatments. 


Patient reports feeling significant relief on oxygen, requests home oxygen.  

Walk test shows no need for home O2. 





2.  Pericardial effusion


Small to moderate pericardial effusion on CT


Limited echo was done this morning - shows moderate pericardial effusion with 

some RV hemodynamic effect. 


Will consult IR for CT guided pericardiocentesis. Will send fluid for Cytology 

a well. 


If IR finds it difficult to do, we will obtain CV Surgery consult. 





3.  Transaminitis


New onset


CT of the abdomen and pelvis shows gallbladder wall thickening with trace fluid 

adjacent to the liver


Unclear etiology


Liver ultrasound pending


Patient reports itchiness, bile salts pending





4.  Metastatic breast cancer


Patient currently choosing not to undergo treatment. She prefers alternative 

treatments. 


Patient's oncologist, Dr. Parker  is following.











Booker Dotson DO Jun 6, 2018 09:57

## 2018-06-06 NOTE — ECHRPT
Indication:   Pericardial Effusion 

 

 CONCLUSIONS 

 Normal left ventricular size and wall thickness. 

 The left ventricular systolic function is normal with an estimated ejection fraction in the range of
 60-65%. 

 Left ventricular diastolic function parameters are normal.   

 

 There is a moderate pericardial effusion present measuring 2.90 cm in apical four chamber view. 

 There is evidence of right ventricular hemodynamic compromise. 

 

 BP:  146   / 95      HR: 85                       Rhythm:           Sinus 

 

                                                   Technical Quality:Fair 

 

 FINDINGS 

 

 LEFT VENTRICLE 

 Normal LV systolic fx and size. 

 

 RIGHT VENTRICLE 

 Normal right ventricular size and systolic function.   

 

 LEFT ATRIUM 

 The left atrial size is normal.   

 

 RIGHT ATRIUM 

 The right atrial size is normal.   

 

 ATRIAL SEPTUM 

 Normal atrial septal thickness without atrial level shunting by limited color doppler interrogation.
   

 

 AORTA 

 The aortic root and proximal ascending aorta are normal in size on limited imaging.   

 

 MITRAL VALVE 

 Structurally normal mitral valve. No mitral valve stenosis or regurgitation.   

 

 AORTIC VALVE 

 Trileaflet aortic valve. No aortic valve stenosis or regurgitation.   

 

 TRICUSPID VALVE 

 Structurally normal tricuspid valve. No tricuspid valve stenosis or regurgitation.   

 

 PULMONARY VALVE 

 The pulmonary valve is not well visualized.   

 

 VESSELS 

 The inferior vena cava is normal in size.   

 

 PERICARDIUM 

 There is a moderate pericardial effusion present.  

 Measured at 2.90 cm in apical four chamber view. 

 Evidence of RV hemodynamic compromise. 

 

 

 

 

  Olga Salvador MD, FACC 

  (Electronically Signed) 

  Final Date:06 June 2018 18:09

## 2018-06-07 VITALS
SYSTOLIC BLOOD PRESSURE: 143 MMHG | HEART RATE: 85 BPM | OXYGEN SATURATION: 98 % | DIASTOLIC BLOOD PRESSURE: 87 MMHG | RESPIRATION RATE: 18 BRPM

## 2018-06-07 VITALS — HEART RATE: 100 BPM

## 2018-06-07 VITALS
OXYGEN SATURATION: 92 % | TEMPERATURE: 97.5 F | RESPIRATION RATE: 18 BRPM | HEART RATE: 110 BPM | SYSTOLIC BLOOD PRESSURE: 171 MMHG | DIASTOLIC BLOOD PRESSURE: 107 MMHG

## 2018-06-07 VITALS — HEART RATE: 84 BPM

## 2018-06-07 VITALS — HEART RATE: 88 BPM

## 2018-06-07 VITALS
TEMPERATURE: 97.6 F | DIASTOLIC BLOOD PRESSURE: 73 MMHG | OXYGEN SATURATION: 99 % | HEART RATE: 93 BPM | SYSTOLIC BLOOD PRESSURE: 137 MMHG | RESPIRATION RATE: 18 BRPM

## 2018-06-07 VITALS
RESPIRATION RATE: 18 BRPM | HEART RATE: 92 BPM | TEMPERATURE: 97.1 F | DIASTOLIC BLOOD PRESSURE: 95 MMHG | OXYGEN SATURATION: 98 % | SYSTOLIC BLOOD PRESSURE: 165 MMHG

## 2018-06-07 VITALS
RESPIRATION RATE: 18 BRPM | DIASTOLIC BLOOD PRESSURE: 108 MMHG | SYSTOLIC BLOOD PRESSURE: 143 MMHG | HEART RATE: 83 BPM | TEMPERATURE: 97.5 F | OXYGEN SATURATION: 97 %

## 2018-06-07 VITALS — HEART RATE: 90 BPM

## 2018-06-07 VITALS
HEART RATE: 92 BPM | SYSTOLIC BLOOD PRESSURE: 164 MMHG | RESPIRATION RATE: 18 BRPM | DIASTOLIC BLOOD PRESSURE: 90 MMHG | OXYGEN SATURATION: 98 %

## 2018-06-07 VITALS
RESPIRATION RATE: 16 BRPM | SYSTOLIC BLOOD PRESSURE: 116 MMHG | HEART RATE: 88 BPM | DIASTOLIC BLOOD PRESSURE: 64 MMHG | TEMPERATURE: 98.3 F | OXYGEN SATURATION: 93 %

## 2018-06-07 VITALS
HEART RATE: 96 BPM | RESPIRATION RATE: 18 BRPM | DIASTOLIC BLOOD PRESSURE: 91 MMHG | OXYGEN SATURATION: 98 % | SYSTOLIC BLOOD PRESSURE: 146 MMHG | TEMPERATURE: 97.4 F

## 2018-06-07 VITALS
HEART RATE: 110 BPM | OXYGEN SATURATION: 92 % | SYSTOLIC BLOOD PRESSURE: 171 MMHG | RESPIRATION RATE: 18 BRPM | DIASTOLIC BLOOD PRESSURE: 107 MMHG | TEMPERATURE: 97.5 F

## 2018-06-07 VITALS — HEART RATE: 83 BPM

## 2018-06-07 VITALS — HEART RATE: 85 BPM

## 2018-06-07 VITALS — HEART RATE: 101 BPM

## 2018-06-07 VITALS — HEART RATE: 96 BPM

## 2018-06-07 VITALS — HEART RATE: 98 BPM

## 2018-06-07 VITALS — HEART RATE: 78 BPM

## 2018-06-07 LAB
EOSINOPHIL NFR PCAR MANUAL: 5 %
HISTIOCYTES/CELLS IN PERICARDIAL FLUID BY MANUAL COUNT: 11 %
LEUKOCYTES [#/VOLUME] IN PERICARDIAL FLUID BY AUTOMATED COUNT: 2340 /MM3 (ref 0–10)
LYMPHOCYTES/LEUKOCYTES IN PERICARDIAL FLUID BY MANUAL COUNT: 12 %
MESOTHELIAL CELLS/LEUKOCYTES IN PERICARDIAL FLUID BY MANUAL COUNT: 22 %
NEUTROPHILS NFR PCAR MANUAL: 50 %
RBC # PCAR AUTO: (no result) /MM3 (ref 0–0)

## 2018-06-07 PROCEDURE — 0W9D3ZZ DRAINAGE OF PERICARDIAL CAVITY, PERCUTANEOUS APPROACH: ICD-10-PCS

## 2018-06-07 RX ADMIN — SODIUM CHLORIDE SCH MLS/HR: 900 INJECTION INTRAVENOUS at 00:36

## 2018-06-07 RX ADMIN — NIFEDIPINE SCH MG: 30 TABLET, FILM COATED, EXTENDED RELEASE ORAL at 11:22

## 2018-06-07 RX ADMIN — IPRATROPIUM BROMIDE AND ALBUTEROL SULFATE SCH AMPULE: .5; 3 SOLUTION RESPIRATORY (INHALATION) at 16:00

## 2018-06-07 RX ADMIN — IPRATROPIUM BROMIDE AND ALBUTEROL SULFATE SCH AMPULE: .5; 3 SOLUTION RESPIRATORY (INHALATION) at 03:42

## 2018-06-07 RX ADMIN — Medication SCH ML: at 09:00

## 2018-06-07 RX ADMIN — Medication SCH ML: at 20:48

## 2018-06-07 RX ADMIN — IPRATROPIUM BROMIDE AND ALBUTEROL SULFATE SCH AMPULE: .5; 3 SOLUTION RESPIRATORY (INHALATION) at 20:26

## 2018-06-07 RX ADMIN — IPRATROPIUM BROMIDE AND ALBUTEROL SULFATE SCH AMPULE: .5; 3 SOLUTION RESPIRATORY (INHALATION) at 08:40

## 2018-06-07 RX ADMIN — HEPARIN SODIUM SCH UNITS: 10000 INJECTION, SOLUTION INTRAVENOUS; SUBCUTANEOUS at 05:46

## 2018-06-07 RX ADMIN — AZITHROMYCIN SCH MLS/HR: 500 INJECTION, POWDER, LYOPHILIZED, FOR SOLUTION INTRAVENOUS at 23:13

## 2018-06-07 RX ADMIN — HEPARIN SODIUM SCH UNITS: 10000 INJECTION, SOLUTION INTRAVENOUS; SUBCUTANEOUS at 21:52

## 2018-06-07 RX ADMIN — HEPARIN SODIUM SCH UNITS: 10000 INJECTION, SOLUTION INTRAVENOUS; SUBCUTANEOUS at 14:00

## 2018-06-07 NOTE — RADRPT
EXAM DATE:  2018 2:20 PM EDT

AGE/SEX:        53 years / Female



INDICATIONS:  Nausea.



CLINICAL DATA:  This is the patient's subsequent encounter. Patient reports that signs and symptoms h
ave been present for 2 days and indicates a pain score of 0/10. 

                                                                          

MEDICAL/SURGICAL HISTORY:       . metastatic breast cancer. chemo. radation   section.  Maste
ctomy, bilateral.



COMPARISON:      No prior exams available for comparison. 





FINDINGS: The bowel gas is nonspecific.  There are no signs of obstruction or free air for technique.
 No definite calcified stones are identified for technique.  



CONCLUSION: Unremarkable study.  



Electronically signed by: GAUDENCIO Salomon MD  2018 2:51 PM EDT

## 2018-06-07 NOTE — PD.RAD
Post CT Procedure Prog Note


Pre Procedure Diagnosis:  


(1) Pericardial effusion


Post Procedure Diagnosis:  


(1) Pericardial effusion


Procedure Date:


Jun 7, 2018


Supervising Radiologist:


Miguel Ángel Schneider


Anesthesia:  Conscious Sedation


Plan of Activity


Patient to Unit:  ROPU


Patient Condition:  Good


See PACS Report for procedural detail/treatment





Drainage Procedure


Procedure 1


Imaging Guidance:  CT


Procedure Type:  Aspiration


Procedure:  Removal


Estonian:  6


Drainage:  Suction


Fluid Removal (CCs):  470


Fluid Description:  Red


Plan


To ROPU for 1 hour then return to floor.











Miguel Ángel Schneider MD Jun 7, 2018 15:29

## 2018-06-07 NOTE — PD.ONC.PN
Subjective


Subjective


Remarks


Oncology coverage for Dr. Parker.


Ms. Asencio was seen and examined this morning, vital signs, labs, medications 

and imaging studies were reviewed.  Echocardiogram was also reviewed and plans 

for CT-guided pericardiocentesis also noted for later today.


Subjectively; the patient reports difficulty breathing with exertion, pain in 

her upper abdomen, diarrhea and an unusual symptom of upper abdominal pain, 

difficulty breathing and palpitations after she tries to eat or drink almost 

anything.


Her  is at bedside who also participates in the discussions.





Objective


Data











  Date Time  Temp Pulse Resp B/P (MAP) Pulse Ox O2 Delivery O2 Flow Rate FiO2


 


6/7/18 06:00  88      


 


6/7/18 05:00  100      


 


6/7/18 04:00  88      


 


6/7/18 03:42      Room Air  


 


6/7/18 03:36 97.5 83 18 143/108 (120) 97   


 


6/7/18 03:07  83      


 


6/7/18 02:00  78      


 


6/7/18 01:00  84      


 


6/7/18 00:24   18     


 


6/7/18 00:00  90      


 


6/6/18 23:20 97.7 93 18 141/96 (111) 93   


 


6/6/18 23:20      Room Air  


 


6/6/18 23:03  93      


 


6/6/18 22:00  98      


 


6/6/18 21:00  94      


 


6/6/18 20:05      Room Air  


 


6/6/18 20:04 99.6 94 16 132/92 (105) 95   


 


6/6/18 20:00  96      


 


6/6/18 19:05  101      


 


6/6/18 18:00  96      


 


6/6/18 17:00  100      


 


6/6/18 16:00  99      


 


6/6/18 15:00  88      


 


6/6/18 15:00     99 Room Air  


 


6/6/18 15:00 99.0  18 136/78 (97) 98   


 


6/6/18 14:00  80      


 


6/6/18 13:00  86      


 


6/6/18 12:00  78      


 


6/6/18 11:00      Room Air  


 


6/6/18 11:00 98.0 94 18 146/105 (119) 96   


 


6/6/18 11:00  86      


 


6/6/18 10:00  78      


 


6/6/18 09:15     97   21


 


6/6/18 09:00  80      


 


6/6/18 08:00  78      














 6/7/18 6/7/18 6/7/18





 07:00 15:00 23:00


 


Intake Total 240 ml  


 


Balance 240 ml  








Result Diagram:  


6/6/18 0500                                                                    

            6/6/18 0500





Culture Results





Microbiology








 Date/Time


Source Procedure


Growth Status


 


 


 6/5/18 00:45


Blood Peripheral Aerobic Blood Culture - Preliminary


NO GROWTH IN 1 DAY Resulted


 


 6/5/18 00:45


Blood Peripheral Anaerobic Blood Culture - Preliminary


NO GROWTH IN 1 DAY Resulted





 6/5/18 00:40


Blood Peripheral Aerobic Blood Culture - Preliminary


NO GROWTH IN 1 DAY Resulted


 


 6/5/18 00:40


Blood Peripheral Anaerobic Blood Culture - Preliminary


NO GROWTH IN 1 DAY Resulted


 


 6/4/18 22:20


Urine Random Urine Urine Culture - Final


Group B Beta Strep Complete











Administered Medications








 Medications


  (Trade)  Dose


 Ordered  Sig/Antonia


 Route


 PRN Reason  Start Time


 Stop Time Status Last Admin


Dose Admin


 


 Sodium Chloride


  (NS Flush)  2 ml  BID


 IV FLUSH


   6/5/18 09:00


    6/6/18 20:47


 


 


 Acetaminophen


  (Tylenol)  650 mg  Q4H  PRN


 PO


 TEMP > 100.4  6/5/18 01:45


    6/6/18 23:24


 


 


 Sennosides


  (Senokot)  17.2 mg  Q12H  PRN


 PO


 Moderate constipation  6/5/18 01:45


    6/6/18 06:23


 


 


 Ceftriaxone


 Sodium 1000 mg/


 Sodium Chloride  100 ml @ 


 200 mls/hr  Q24H


 IV


   6/6/18 01:00


    6/7/18 00:36


 


 


 Azithromycin 500


 mg/Sodium Chloride  250 ml @ 


 250 mls/hr  Q24H


 IV


   6/6/18 00:00


    6/6/18 23:09


 


 


 Albuterol/


 Ipratropium


  (Duoneb Neb)  1 ampule  Q6HR  NEB


 NEB


   6/5/18 04:00


    6/5/18 10:50


 


 


 Heparin Sodium


  (Porcine)


  (Heparin Inj)  5,000 units  Q8HR


 SQ


   6/5/18 06:00


    6/5/18 06:04


 


 


 Ondansetron HCl


  (Zofran  Odt)  4 mg  Q6H  PRN


 SL


 nausea  6/6/18 06:30


    6/7/18 00:25


 


 


 Simethicone


  (Mylicon Chew)  80 mg  PCHS  PRN


 CHEW


 gas  6/6/18 06:30


    6/6/18 23:08


 


 


 Lorazepam


  (Ativan)  0.5 mg  Q8H  PRN


 PO


 Anxiety  6/6/18 10:00


    6/7/18 03:34


 








Objective Remarks


GENERAL: Middle-aged female, sitting up in bed, appears to be uncomfortable, 

she is holding an electric fan to her face.


SKIN: Warm and damp.


HEAD: Normocephalic.


EYES: No scleral icterus. No injection or drainage. 


NECK: Supple, trachea midline. No JVD or lymphadenopathy.


LYMPHATIC: No adenopathy.


CARDIOVASCULAR: Tachycardic, regular rate and rhythm, S1-S2 no obvious murmurs 

or gallops.


RESPIRATORY: Decreased bibasilar breath sounds, good air movement over the 

upper and middle lung zones on posterior examination.


GASTROINTESTINAL: Obese abdomen, there appears to be some element of distention

, positive bowel sounds abdomen soft, non-tender, nondistended. No organ 

enlargement is appreciated.


EXTREMITIES: No cyanosis, or edema. 


MUSCULOSKELETAL: Adequate muscle tone.


NEUROLOGICAL: No obvious focal deficit. Awake, alert, and oriented x3.


PSYCHIATRIC: Appropriate mood and affect; insight and judgment normal.





Assessment/Plan


Assessment


Ms. Asencio is a 53-year-old female with a diagnosis of metastatic breast 

carcinoma, she has had metastatic disease for several years now and has been on 

various lines of palliative systemic chemotherapy.  Her treatment to date has 

been delivered in [place] prior to her relocating to the [facility].  

She has not been on treatment and a little over 1 year.  The patient has 

radiographic evidence of metastatic disease to the vertebral bodies, pleura and 

now appears to have likely pericardial metastases resulting in a pericardial 

effusion with resultant impairment/compression of the left atrium.  Per the 

patient and her primary oncologist's notes the patient has declined additional 

palliative systemic therapy in favor of supportive care. She now has symptoms 

of progressive difficulty breathing, diarrhea and upper abdominal pain.


Plan


1.  Metastatic breast carcinoma: I did speak to the patient today about overall 

goals of care and treatment options.  This included future potential treatments 

which are disease directed i.e. palliative systemic therapy.  The patient tells 

me that she is open to palliative treatment and has been open to palliative 

treatment in the past but she just had difficulty tolerating treatments 

including Gemzar in the past and therefore was hesitant/reluctant to receive 

any more treatment.  She tells me she will be willing to discuss treatment 

options with Dr. Parker, but at the present time her major concern is to have her 

symptoms of difficulty breathing, abdominal pain, diarrhea and food intolerance 

addressed.


2.  Pericardial effusion: Agree with pericardiocentesis.  I will request fluid 

be sent off for cytology.


3.  Upper abdominal pain: May be related to metastatic disease burden to her 

thoracic/lumbar spine.  No definite etiology of the pain is identified on the 

imaging studies.


4.  Intolerance of food: With symptoms including difficulty breathing, 

palpitations and abdominal pain immediately after eating.  I see she was 

initiated on simethicone, it may be reasonable to start her on a proton pump 

inhibitor and also to have her evaluated by gastroenterology if this does not 

work.





Oncology will follow with you.











Mart Rowe MD Jun 7, 2018 08:08

## 2018-06-07 NOTE — HHI.PR
Subjective


Remarks


Follow-up for metastatic breast cancer, shortness of breath, pericardial 

effusion. Patient reports no CP, fever, chills. However, she complains of 

epigastric pain, discomfort including sweating, allergic type of reaction after 

eating any food.





Objective


Vitals





Vital Signs








  Date Time  Temp Pulse Resp B/P (MAP) Pulse Ox O2 Delivery O2 Flow Rate FiO2


 


6/7/18 16:25  92 18 164/90 (114) 98   


 


6/7/18 15:55  85 18 143/87 (105) 98   


 


6/7/18 15:40 97.1 92 18 165/95 (118) 98   


 


6/7/18 12:01  100      


 


6/7/18 11:45 97.5 110 18 171/107 (128) 92   


 


6/7/18 11:45     96 Room Air  


 


6/7/18 11:00  100      


 


6/7/18 10:00  98      


 


6/7/18 09:00  90      


 


6/7/18 08:30 97.5 110 18 171/107 (128) 92   


 


6/7/18 08:30     92 Room Air  


 


6/7/18 08:00  96      


 


6/7/18 07:00  85      


 


6/7/18 06:00  88      


 


6/7/18 05:00  100      


 


6/7/18 04:00  88      


 


6/7/18 03:42      Room Air  


 


6/7/18 03:36 97.5 83 18 143/108 (120) 97   


 


6/7/18 03:07  83      


 


6/7/18 02:00  78      


 


6/7/18 01:00  84      


 


6/7/18 00:24   18     


 


6/7/18 00:00  90      


 


6/6/18 23:20 97.7 93 18 141/96 (111) 93   


 


6/6/18 23:20      Room Air  


 


6/6/18 23:03  93      


 


6/6/18 22:00  98      


 


6/6/18 21:00  94      


 


6/6/18 20:05      Room Air  


 


6/6/18 20:04 99.6 94 16 132/92 (105) 95   


 


6/6/18 20:00  96      


 


6/6/18 19:05  101      


 


6/6/18 18:00  96      














I/O      


 


 6/6/18 6/6/18 6/6/18 6/7/18 6/7/18 6/7/18





 07:00 15:00 23:00 07:00 15:00 23:00


 


Intake Total 490 ml  760 ml 240 ml  


 


Output Total   520 ml   


 


Balance 490 ml  240 ml 240 ml  


 


      


 


Intake Oral 240 ml  760 ml 240 ml  


 


IV Total 250 ml     


 


Output Urine Total   520 ml   


 


# Voids 3   2  


 


# Bowel Movements 1     








Result Diagram:  


6/6/18 0500                                                                    

            6/6/18 0500





Imaging





Last Impressions








Pericardiocentesis 6/7/18 0000 Signed





Impressions: 





 CONCLUSION:  





 Pericardiocentesis procedure was performed, as above, with removal of 470 cc of





  fluid. Samples were sent to the lab for evaluation as ordered. There is a ques





 tionable amount of left pleural air on the postprocedure imaging. Therefore, th





 e pleural space may have been crossed by the needle. A follow-up chest x-ray ha





 s been ordered for later this afternoon.





  





 


 


Abdomen X-Ray 6/7/18 0000 Signed





Impressions: 





 CONCLUSION: Unremarkable study.  





  





 


 


Liver Ultrasound 6/5/18 0000 Signed





Impressions: 





 CONCLUSION: 





 1.  The gallbladder is thickened however the patient reports no tenderness over





  the gallbladder. Small amount of ascites around the liver.





  





 


 


Abdomen/Pelvis CT 6/4/18 2147 Signed





Impressions: 





 CONCLUSION:





 1.  There is a trace of fluid adjacent to the liver.





 2.  Gallbladder wall thickening.





 3.  Bulky fibroid uterus.





  





 


 


Chest X-Ray 6/4/18 1856 Signed





Impressions: 





 CONCLUSION: 





 Cardiomegaly with bilateral small pleural effusions and basilar airspace diseas





 e, right greater than left.





  





 


 


CT Angiography 6/4/18 1856 Signed





Impressions: 





 CONCLUSION:





 1.  No evidence of pulmonary embolism





 2.  Small to moderate pericardial effusion.





 3.  Bilateral nonspecific interstitial pulmonary infiltrates





 4.  Parenchymal consolidation in the right lower lung.





 5.  Small bilateral pleural effusions.





 6.  Diffuse bony metastatic disease.





  





 








Objective Remarks


GENERAL:  Alert, Oriented x 3, NAD. 


SKIN: Warm and dry.


HEAD: Normocephalic.


EYES: No scleral icterus. No injection or drainage. 


NECK: Supple, trachea midline. No JVD or lymphadenopathy.


CARDIOVASCULAR: Regular rate and rhythm without murmurs, gallops, or rubs. 


RESPIRATORY: Breath sounds equal bilaterally. No accessory muscle use.


GASTROINTESTINAL: Abdomen soft, non-tender, nondistended. 


MUSCULOSKELETAL: No cyanosis, or edema. 


BACK: Nontender without obvious deformity. No CVA tenderness.





Procedures


None.





A/P


Assessment and Plan


53-year-old female with past medical history significant for metastatic breast 

cancer presents the emergency department for the evaluation of weakness and 

shortness of breath 2 weeks. Imaging studies indicated possible pneumonia as 

well as small to moderate pleural effusion. 





1.  Pneumonia


Chest CT significant for consolidation in the right lower lung with small 

bilateral pleural effusions and diffuse bony metastatic disease, personally 

reviewed


Azithromycin/Rocephin


Supplemental oxygen as needed


Continue DuoNeb treatments. 


Patient reports feeling significant relief on oxygen, requests home oxygen.  

Walk test shows no need for home O2. 





2.  Pericardial effusion


Small to moderate pericardial effusion on CT


Limited echo was done - shows moderate pericardial effusion with some RV 

hemodynamic effect. 


status post CT guided pericardiocentesis. Fluid labs pending. 





3.  Transaminitis


New onset


CT of the abdomen and pelvis shows gallbladder wall thickening with trace fluid 

adjacent to the liver


Unclear etiology


Liver ultrasound showed gallbladder wall thickening - no acute concerns for 

Cholecystitis. 





4.  Metastatic breast cancer


Patient currently choosing not to undergo treatment. She prefers alternative 

treatments. 


Oncology following. 





Full code. Heparin SQ.











Booker Dotson DO Jun 7, 2018 17:40

## 2018-06-07 NOTE — RADRPT
EXAM DATE:  2018 5:33 PM EDT

AGE/SEX:        53 years / Female



INDICATIONS:  Pneumothorax.



CLINICAL DATA:  This is the patient's initial encounter. Patient reports that signs and symptoms have
 been present for 1 day and indicates a pain score of 0/10. 

                                                                          

MEDICAL/SURGICAL HISTORY:       .  Metastatic breast cancer. chemo. radiation.  .   section. 
Mastectomy, bilateral.Infusaport.



COMPARISON:      [facility] [place], 2018.  . 





FINDINGS:  

Right chest port is stable in position and is accessed. There are bilateral pleural effusions present
, larger on the right than the left. Slight right base consolidation/ volume loss with rightward card
iomediastinal shift is unchanged.



CONCLUSION: 

No significant change



Electronically signed by: Miguel Ángel Ramirez MD  2018 5:40 PM EDT

## 2018-06-08 VITALS — HEART RATE: 70 BPM

## 2018-06-08 VITALS
SYSTOLIC BLOOD PRESSURE: 140 MMHG | HEART RATE: 91 BPM | DIASTOLIC BLOOD PRESSURE: 81 MMHG | OXYGEN SATURATION: 95 % | TEMPERATURE: 98.4 F | RESPIRATION RATE: 20 BRPM

## 2018-06-08 VITALS — HEART RATE: 56 BPM

## 2018-06-08 VITALS
RESPIRATION RATE: 18 BRPM | HEART RATE: 84 BPM | SYSTOLIC BLOOD PRESSURE: 134 MMHG | DIASTOLIC BLOOD PRESSURE: 81 MMHG | TEMPERATURE: 98.7 F | OXYGEN SATURATION: 92 %

## 2018-06-08 VITALS
HEART RATE: 78 BPM | TEMPERATURE: 98.1 F | DIASTOLIC BLOOD PRESSURE: 81 MMHG | OXYGEN SATURATION: 93 % | RESPIRATION RATE: 18 BRPM | SYSTOLIC BLOOD PRESSURE: 127 MMHG

## 2018-06-08 VITALS — HEART RATE: 86 BPM

## 2018-06-08 VITALS — HEART RATE: 72 BPM

## 2018-06-08 VITALS — HEART RATE: 97 BPM

## 2018-06-08 VITALS
RESPIRATION RATE: 17 BRPM | OXYGEN SATURATION: 90 % | SYSTOLIC BLOOD PRESSURE: 107 MMHG | DIASTOLIC BLOOD PRESSURE: 47 MMHG | TEMPERATURE: 97.9 F | HEART RATE: 85 BPM

## 2018-06-08 VITALS — HEART RATE: 71 BPM

## 2018-06-08 VITALS
OXYGEN SATURATION: 94 % | HEART RATE: 82 BPM | TEMPERATURE: 98.7 F | DIASTOLIC BLOOD PRESSURE: 88 MMHG | SYSTOLIC BLOOD PRESSURE: 130 MMHG | RESPIRATION RATE: 18 BRPM

## 2018-06-08 VITALS — HEART RATE: 66 BPM

## 2018-06-08 VITALS — HEART RATE: 88 BPM

## 2018-06-08 VITALS — HEART RATE: 81 BPM

## 2018-06-08 VITALS — HEART RATE: 92 BPM

## 2018-06-08 VITALS — HEART RATE: 64 BPM

## 2018-06-08 VITALS — HEART RATE: 90 BPM

## 2018-06-08 LAB
BASOPHILS # BLD AUTO: 0 TH/MM3 (ref 0–0.2)
BASOPHILS NFR BLD: 0.2 % (ref 0–2)
BUN SERPL-MCNC: 15 MG/DL (ref 7–18)
CALCIUM SERPL-MCNC: 8.9 MG/DL (ref 8.5–10.1)
CHLORIDE SERPL-SCNC: 103 MEQ/L (ref 98–107)
CREAT SERPL-MCNC: 0.65 MG/DL (ref 0.5–1)
EOSINOPHIL # BLD: 0 TH/MM3 (ref 0–0.4)
EOSINOPHIL NFR BLD: 0.3 % (ref 0–4)
ERYTHROCYTE [DISTWIDTH] IN BLOOD BY AUTOMATED COUNT: 18.9 % (ref 11.6–17.2)
GFR SERPLBLD BASED ON 1.73 SQ M-ARVRAT: 95 ML/MIN (ref 89–?)
GLUCOSE SERPL-MCNC: 86 MG/DL (ref 74–106)
HCO3 BLD-SCNC: 29.1 MEQ/L (ref 21–32)
HCT VFR BLD CALC: 29.2 % (ref 35–46)
HGB BLD-MCNC: 9 GM/DL (ref 11.6–15.3)
LYMPHOCYTES # BLD AUTO: 1.3 TH/MM3 (ref 1–4.8)
LYMPHOCYTES NFR BLD AUTO: 12.1 % (ref 9–44)
MCH RBC QN AUTO: 21.4 PG (ref 27–34)
MCHC RBC AUTO-ENTMCNC: 31 % (ref 32–36)
MCV RBC AUTO: 69.1 FL (ref 80–100)
MONOCYTE #: 1.1 TH/MM3 (ref 0–0.9)
MONOCYTES NFR BLD: 9.8 % (ref 0–8)
NEUTROPHILS # BLD AUTO: 8.6 TH/MM3 (ref 1.8–7.7)
NEUTROPHILS NFR BLD AUTO: 77.6 % (ref 16–70)
PLATELET # BLD: 319 TH/MM3 (ref 150–450)
PMV BLD AUTO: 8.9 FL (ref 7–11)
RBC # BLD AUTO: 4.23 MIL/MM3 (ref 4–5.3)
SODIUM SERPL-SCNC: 140 MEQ/L (ref 136–145)
WBC # BLD AUTO: 11.1 TH/MM3 (ref 4–11)

## 2018-06-08 PROCEDURE — 0DJ08ZZ INSPECTION OF UPPER INTESTINAL TRACT, VIA NATURAL OR ARTIFICIAL OPENING ENDOSCOPIC: ICD-10-PCS | [facility] by: SPECIALIST

## 2018-06-08 RX ADMIN — Medication SCH ML: at 12:50

## 2018-06-08 RX ADMIN — HEPARIN SODIUM SCH UNITS: 10000 INJECTION, SOLUTION INTRAVENOUS; SUBCUTANEOUS at 21:16

## 2018-06-08 RX ADMIN — HEPARIN SODIUM SCH UNITS: 10000 INJECTION, SOLUTION INTRAVENOUS; SUBCUTANEOUS at 13:20

## 2018-06-08 RX ADMIN — SODIUM CHLORIDE SCH MLS/HR: 900 INJECTION INTRAVENOUS at 00:18

## 2018-06-08 RX ADMIN — NIFEDIPINE SCH MG: 30 TABLET, FILM COATED, EXTENDED RELEASE ORAL at 09:00

## 2018-06-08 RX ADMIN — IPRATROPIUM BROMIDE AND ALBUTEROL SULFATE SCH AMPULE: .5; 3 SOLUTION RESPIRATORY (INHALATION) at 09:42

## 2018-06-08 RX ADMIN — AZITHROMYCIN SCH MLS/HR: 500 INJECTION, POWDER, LYOPHILIZED, FOR SOLUTION INTRAVENOUS at 23:35

## 2018-06-08 RX ADMIN — IPRATROPIUM BROMIDE AND ALBUTEROL SULFATE SCH AMPULE: .5; 3 SOLUTION RESPIRATORY (INHALATION) at 15:37

## 2018-06-08 RX ADMIN — Medication SCH ML: at 21:00

## 2018-06-08 RX ADMIN — HEPARIN SODIUM SCH UNITS: 10000 INJECTION, SOLUTION INTRAVENOUS; SUBCUTANEOUS at 05:23

## 2018-06-08 RX ADMIN — PANTOPRAZOLE SCH MG: 40 TABLET, DELAYED RELEASE ORAL at 09:00

## 2018-06-08 RX ADMIN — IPRATROPIUM BROMIDE AND ALBUTEROL SULFATE SCH AMPULE: .5; 3 SOLUTION RESPIRATORY (INHALATION) at 03:19

## 2018-06-08 NOTE — HHI.PR
Subjective


Remarks


Follow-up for metastatic breast cancer, shortness of breath, pericardial 

effusion. Patient is currently doing well. She returned from GI lab after EGD 

which was normal. Patient feels much better after pericardiocentesis. No CP, SOB

, fever, chills.





Objective


Vitals





Vital Signs








  Date Time  Temp Pulse Resp B/P (MAP) Pulse Ox O2 Delivery O2 Flow Rate FiO2


 


6/8/18 12:04 98.5 95 18 128/58 (81) 90   


 


6/8/18 08:24 98.1 78 18 127/81 (96) 93   


 


6/8/18 08:24     93 Room Air  


 


6/8/18 06:00  71      


 


6/8/18 05:00  72      


 


6/8/18 04:00  90      


 


6/8/18 03:10     92 Room Air  


 


6/8/18 03:10 97.9 85 17 107/47 (67) 90   


 


6/8/18 03:00  86      


 


6/8/18 03:00  86      


 


6/8/18 03:00  81      


 


6/8/18 02:00  86      


 


6/8/18 01:00  88      


 


6/8/18 00:00  80      


 


6/8/18 00:00  92      


 


6/7/18 23:11 98.3 88 16 116/64 (81) 93   


 


6/7/18 23:10     93  2.00 


 


6/7/18 23:00  84      


 


6/7/18 22:00  90      


 


6/7/18 21:00  96      


 


6/7/18 20:23     99 Nasal Cannula 2.00 


 


6/7/18 20:23 97.6 93 18 137/73 (94) 99   


 


6/7/18 20:00  96      


 


6/7/18 19:00  98      


 


6/7/18 17:01  101      














I/O      


 


 6/7/18 6/7/18 6/7/18 6/8/18 6/8/18 6/8/18





 07:00 15:00 23:00 07:00 15:00 23:00


 


Intake Total 240 ml   1070 ml 250 ml 


 


Balance 240 ml   1070 ml 250 ml 


 


      


 


Intake Oral 240 ml   720 ml  


 


IV Total    350 ml  


 


Other     250 ml 


 


# Voids 2   2  








Result Diagram:  


6/8/18 1249                                                                    

            6/8/18 1249





Imaging





Last Impressions








Chest X-Ray 6/7/18 1700 Signed





Impressions: 





 CONCLUSION: 





 No significant change





  





 


 


Pericardiocentesis 6/7/18 0000 Signed





Impressions: 





 CONCLUSION:  





 Pericardiocentesis procedure was performed, as above, with removal of 470 cc of





  fluid. Samples were sent to the lab for evaluation as ordered. There is a ques





 tionable amount of left pleural air on the postprocedure imaging. Therefore, th





 e pleural space may have been crossed by the needle. A follow-up chest x-ray ha





 s been ordered for later this afternoon.





  





 


 


Abdomen X-Ray 6/7/18 0000 Signed





Impressions: 





 CONCLUSION: Unremarkable study.  





  





 


 


Liver Ultrasound 6/5/18 0000 Signed





Impressions: 





 CONCLUSION: 





 1.  The gallbladder is thickened however the patient reports no tenderness over





  the gallbladder. Small amount of ascites around the liver.





  





 


 


Abdomen/Pelvis CT 6/4/18 2147 Signed





Impressions: 





 CONCLUSION:





 1.  There is a trace of fluid adjacent to the liver.





 2.  Gallbladder wall thickening.





 3.  Bulky fibroid uterus.





  





 


 


CT Angiography 6/4/18 1856 Signed





Impressions: 





 CONCLUSION:





 1.  No evidence of pulmonary embolism





 2.  Small to moderate pericardial effusion.





 3.  Bilateral nonspecific interstitial pulmonary infiltrates





 4.  Parenchymal consolidation in the right lower lung.





 5.  Small bilateral pleural effusions.





 6.  Diffuse bony metastatic disease.





  





 








Objective Remarks


GENERAL:  Alert, Oriented x 3, NAD. 


SKIN: Warm and dry.


HEAD: Normocephalic.


EYES: No scleral icterus. No injection or drainage. 


NECK: Supple, trachea midline. No JVD or lymphadenopathy.


CARDIOVASCULAR: Regular rate and rhythm without murmurs, gallops, or rubs. 


RESPIRATORY: Breath sounds equal bilaterally. No accessory muscle use.


GASTROINTESTINAL: Abdomen soft, non-tender, nondistended. 


MUSCULOSKELETAL: No cyanosis, or edema. 


BACK: Nontender without obvious deformity. No CVA tenderness.





Procedures


None.





A/P


Assessment and Plan


53-year-old female with past medical history significant for metastatic breast 

cancer presents the emergency department for the evaluation of weakness and 

shortness of breath 2 weeks. Imaging studies indicated possible pneumonia as 

well as small to moderate pleural effusion. 





1.  Pneumonia


Chest CT significant for consolidation in the right lower lung with small 

bilateral pleural effusions and diffuse bony metastatic disease, personally 

reviewed


Azithromycin/Rocephin


Supplemental oxygen as needed


Continue DuoNeb treatments. 


Patient reports feeling significant relief on oxygen, requests home oxygen.  

Walk test shows no need for home O2. 





2.  Pericardial effusion


Small to moderate pericardial effusion on CT


Limited echo was done - shows moderate pericardial effusion with some RV 

hemodynamic effect. 


status post CT guided pericardiocentesis. Cytology ordered


Symptomatically much improved. 





3.  Transaminitis


New onset


CT of the abdomen and pelvis shows gallbladder wall thickening with trace fluid 

adjacent to the liver


Unclear etiology


Liver ultrasound showed gallbladder wall thickening - no acute concerns for 

Cholecystitis. 





4.  Metastatic breast cancer


Patient currently choosing not to undergo treatment. She prefers alternative 

treatments. 


Oncology following.  She will see Dr. Parker in the outpatient setting and 

consider Aromatase inhibitor. 





Full code. Heparin SQ. 





Probable discharge tomorrow Saturday 6/9/2018 after sunset due to Sabbath 

observation by the patient.











Booker Dotson DO Jun 8, 2018 16:34

## 2018-06-08 NOTE — HHI.HCPN
Reason for visit


   a.  To assist with evaluation and management of symptoms including: weakness

, dyspnea, decreased appetite, anxiety. 


   b.  To assist medical decision maker(s) with: better understanding of 

current medical conditions; weighing benefits/burdens of medical treatment 

options; making        


        medical treatment decisions.


.





Subjective/Interval History


INTERVAL NOTE:


The patient says her dyspnea is much improved since the pericardiocentesis.





470 cc of sanguinous fluid was obtained via pericardiocentesis yesterday.  

Cytology is pending, but certainly it seems this is likely malignant.





Patient denies pain or dyspnea at this time.


.


Family/friend interactions


As she has not wanted chemotherapy recently, but last evening said to the 

oncologist that she would now consider it, I reengaged her and her  in 

that subject.  She had questions about how much longer chemo might help her live

, and I suggested she ask that specifically to the oncologist.  I also reminded 

her that one person may not respond to any particular medication the same as 

another person would.  She smiled and commented, "I have been doing pretty well 

on my own so far, and I might just keep doing that."


.





Advance Directives


Living Will:  Never completed


Health Care Surrogate:  Never completed


Durable Power of :  Never completed


Advance Directive Specifics


Health Care Surrogate(s):


Patient is currently capacitated to make her own decisions. No written advanced 

directives, she declines completion. According to [place] statutes, health care 

proxy decision making falls to her spouse (which is in keeping with the patient 

wishes). 


.


Documented care wishes:


No written advanced directives. 


.





Objective





Vital Signs








  Date Time  Temp Pulse Resp B/P (MAP) Pulse Ox O2 Delivery O2 Flow Rate FiO2


 


6/8/18 08:24 98.1 78 18 127/81 (96) 93   


 


6/8/18 08:24     93 Room Air  


 


6/8/18 06:00  71      


 


6/8/18 05:00  72      


 


6/8/18 04:00  90      


 


6/8/18 03:10     92 Room Air  


 


6/8/18 03:10 97.9 85 17 107/47 (67) 90   


 


6/8/18 03:00  86      


 


6/8/18 03:00  86      


 


6/8/18 03:00  81      


 


6/8/18 02:00  86      


 


6/8/18 01:00  88      


 


6/8/18 00:00  80      


 


6/8/18 00:00  92      


 


6/7/18 23:11 98.3 88 16 116/64 (81) 93   


 


6/7/18 23:10     93  2.00 


 


6/7/18 23:00  84      


 


6/7/18 22:00  90      


 


6/7/18 21:00  96      


 


6/7/18 20:23     99 Nasal Cannula 2.00 


 


6/7/18 20:23 97.6 93 18 137/73 (94) 99   


 


6/7/18 20:00  96      


 


6/7/18 19:00  98      


 


6/7/18 17:01  101      


 


6/7/18 16:25  92 18 164/90 (114) 98   


 


6/7/18 16:01     98 Room Air  


 


6/7/18 16:01 97.4 96 18 146/91 (109) 98   


 


6/7/18 16:01  96      


 


6/7/18 15:55  85 18 143/87 (105) 98   


 


6/7/18 15:40 97.1 92 18 165/95 (118) 98   


 


6/7/18 12:01  100      














Intake & Output  


 


 6/8/18 6/8/18





 07:00 19:00


 


Intake Total 1070 ml 250 ml


 


Balance 1070 ml 250 ml


 


  


 


Intake Oral 720 ml 


 


IV Total 350 ml 


 


Other  250 ml


 


# Voids 2 








Physical Exam


CONSTITUTIONAL/GENERAL: This is an adequately nourished patient, in no apparent 

distress.


CARDIOVASCULAR: Regular rate and rhythm without murmurs, gallops, or rubs. No 

JVD. Peripheral pulses symmetric.


RESPIRATORY/CHEST: Clear to auscultation. Breath sounds equal bilaterally. 


GASTROINTESTINAL: Abdomen soft, non-tender, nondistended. No guarding. Bowel 

sounds present.


MUSCULOSKELETAL: Extremities without clubbing, cyanosis, or edema. No mottling 

or clubbing.


NEUROLOGICAL: Awake and alert. Generalized weakness. Follows commands. 

Cognitively sharp. Moves all extremities.


PSYCHIATRIC: Self reports anxiety at times. 


.





Diagnostic Tests


Laboratory





Laboratory Tests








Test


  6/6/18


05:00 6/7/18


15:45


 


White Blood Count


  12.0 TH/MM3


(4.0-11.0) 


 


 


Red Blood Count


  4.78 MIL/MM3


(4.00-5.30) 


 


 


Hemoglobin


  10.1 GM/DL


(11.6-15.3) 


 


 


Hematocrit


  32.9 %


(35.0-46.0) 


 


 


Mean Corpuscular Volume


  68.9 FL


(80.0-100.0) 


 


 


Mean Corpuscular Hemoglobin


  21.1 PG


(27.0-34.0) 


 


 


Mean Corpuscular Hemoglobin


Concent 30.6 %


(32.0-36.0) 


 


 


Red Cell Distribution Width


  19.0 %


(11.6-17.2) 


 


 


Platelet Count


  387 TH/MM3


(150-450) 


 


 


Mean Platelet Volume


  9.5 FL


(7.0-11.0) 


 


 


Neutrophils (%) (Auto)


  76.7 %


(16.0-70.0) 


 


 


Lymphocytes (%) (Auto)


  15.4 %


(9.0-44.0) 


 


 


Monocytes (%) (Auto)


  7.3 %


(0.0-8.0) 


 


 


Eosinophils (%) (Auto)


  0.2 %


(0.0-4.0) 


 


 


Basophils (%) (Auto)


  0.4 %


(0.0-2.0) 


 


 


Neutrophils # (Auto)


  9.2 TH/MM3


(1.8-7.7) 


 


 


Lymphocytes # (Auto)


  1.9 TH/MM3


(1.0-4.8) 


 


 


Monocytes # (Auto)


  0.9 TH/MM3


(0-0.9) 


 


 


Eosinophils # (Auto)


  0.0 TH/MM3


(0-0.4) 


 


 


Basophils # (Auto)


  0.0 TH/MM3


(0-0.2) 


 


 


CBC Comment DIFF FINAL  


 


Differential Comment   


 


Blood Urea Nitrogen


  22 MG/DL


(7-18) 


 


 


Creatinine


  0.77 MG/DL


(0.50-1.00) 


 


 


Random Glucose


  130 MG/DL


() 


 


 


Calcium Level


  9.2 MG/DL


(8.5-10.1) 


 


 


Sodium Level


  135 MEQ/L


(136-145) 


 


 


Potassium Level


  4.9 MEQ/L


(3.5-5.1) 


 


 


Chloride Level


  102 MEQ/L


() 


 


 


Carbon Dioxide Level


  24.9 MEQ/L


(21.0-32.0) 


 


 


Anion Gap 8 MEQ/L (5-15)  


 


Estimat Glomerular Filtration


Rate 78 ML/MIN


(>89) 


 


 


Pericardial Fluid WBC


  


  2340 /MM3


(0-10)


 


Pericardial Fluid RBC


  


  982487 /MM3


(0-0)


 


Pericardial Fluid Neutrophils  50 % 


 


Pericardial Fluid Lymphocytes  12 % 


 


Pericardial Fluid Eosinophils  5 % 


 


Pericardial Fluid Histiocytes  11 % 


 


Pericardial Fluid Mesothelial


Cells 


  22 % 


 


 


Pericardial Fluid Comment   


 


Pericardial Fluid LDH  472 U/L 








Result Diagram:  


6/6/18 0500                                                                    

            6/6/18 0500





Imaging





Last Impressions








Chest X-Ray 6/7/18 1700 Signed





Impressions: 





 CONCLUSION: 





 No significant change





  





 


 


Pericardiocentesis 6/7/18 0000 Signed





Impressions: 





 CONCLUSION:  





 Pericardiocentesis procedure was performed, as above, with removal of 470 cc of





  fluid. Samples were sent to the lab for evaluation as ordered. There is a ques





 tionable amount of left pleural air on the postprocedure imaging. Therefore, th





 e pleural space may have been crossed by the needle. A follow-up chest x-ray ha





 s been ordered for later this afternoon.





  





 


 


Abdomen X-Ray 6/7/18 0000 Signed





Impressions: 





 CONCLUSION: Unremarkable study.  





  





 


 


Liver Ultrasound 6/5/18 0000 Signed





Impressions: 





 CONCLUSION: 





 1.  The gallbladder is thickened however the patient reports no tenderness over





  the gallbladder. Small amount of ascites around the liver.





  





 


 


Abdomen/Pelvis CT 6/4/18 2147 Signed





Impressions: 





 CONCLUSION:





 1.  There is a trace of fluid adjacent to the liver.





 2.  Gallbladder wall thickening.





 3.  Bulky fibroid uterus.





  





 


 


CT Angiography 6/4/18 1856 Signed





Impressions: 





 CONCLUSION:





 1.  No evidence of pulmonary embolism





 2.  Small to moderate pericardial effusion.





 3.  Bilateral nonspecific interstitial pulmonary infiltrates





 4.  Parenchymal consolidation in the right lower lung.





 5.  Small bilateral pleural effusions.





 6.  Diffuse bony metastatic disease.





  





 








Procedures


EGD 6/8/18


.





Assessment and Plan


Disease Oriented Problem List:  


(1) Metastatic breast cancer to lung and bone


(2) Pericardial effusion


Comment:  470 cc of bloody fluid removed 6/7/18





(3) Bilateral pleural effusion


(4) Pneumonia


(5) Shortness of breath


Comment:  Improved after pericardiocentesis





(6) Transaminitis


(7) Generalized weakness


Symptom Scale:  


(1) Shortness of breath


0-10 Scale:  1 (Improved after pericardiocentesis)





(2) Generalized weakness


0-10 Scale:  3





Pertinent Non-Medical Issues


Psychosocial: . Has 2 sons and 2 daughters. 


Spiritual: Moravian. 


Legal: Patient is currently capacitated to make her own decisions. No written 

advanced directives, she declines completion. According to [place] statutes, 

health care proxy decision making falls to her spouse (which is in keeping with 

the patient wishes). 


Ethical issues impacting care: No known concerns at this time. 


.


Important Contacts


* Omari Asencio, spouse: [phone]


* Fabian Hicks, son: [phone]


.


Prognosis


Mrs. Asencio has metastatic breast cancer to lung, she was admitted with weakness

, decreased appetite and severe shortness of breath at rest and with exertion 

found to have pericardial effusion. Her overall prognosis is poor. Hospice 

appropriate if goals were comfort oriented. 


.


Code Status:  Full Code


Plan


* Decision Maker: Patient is currently capacitated to make her own decisions. 

No written advanced directives, she declines completion. According to [place] 

statutes, health care proxy decision making falls to her spouse (which is in 

keeping with the patient wishes). 


* FULL CODE 


* GOALS: The patient had the pericardiocentesis 6/7/18, and is undergoing EGD 

at this time 6/8/18.  As she has not wanted chemotherapy the past couple years, 

but 6/7/18 said to the oncologist that she would now consider it, I reengaged 

her and her  regarding that subject.  She had questions about how much 

longer chemo might help her live, and I suggested she ask that specifically to 

the oncologist.  I also reminded her that one person may not respond to any 

particular medication the same as another person would.  She smiled and 

commented, "I have been doing pretty well on my own so far, and I might just 

keep doing that."


* SYMPTOMS: Weakness: due to mets breast cancer to lung and bone. Dyspnea: due 

to mets breast cancer to lung, pleural and pericardial effusions; improved 

after pericardiocentesis. Decreased appetite: due to malignancy, possible 

progression.  Undergoing EGD 6/8/18.  Anxiety: due to shortness of breath, 

declines use of medication. No new medication recommendations at this time. 


* Palliative care will continue to follow throughout hospital course to assist 

with symptom management and clarification of goals as needed. 


.





Time Spent


Total Floor Time (mins):  39


Face to Face Time (mins):  25


>50% Counseling/Coord of Care:  Yes





Attestation


To help prompt me to consider important information that might be impacting 

today's encounter and assessment, information from prior notes written by 

myself or my colleagues may have been "brought forward" into today's note.  My 

signature on this note, however, is an attestation that I personally performed 

the exam, history, and/or decision-making noted today, and, unless otherwise 

indicated, the interactions with patient, family, and staff as well as the 

review of records all occurred today.  I also attest that the listed assessment 

and stated plan reflect my best clinical judgment today based on the 

combination of historical information, prior notes, and today's exam/ 

interactions.  When time spent is documented, it refers only to time spent 

today by the signer, or if indicated, combined time spent today by 

collaborating physician/nurse practitioner.











Joy Gomez MD Jun 8, 2018 11:57

## 2018-06-08 NOTE — PD.ONC.PN
Subjective


Subjective


Remarks


Afebrile overnight. 


Patient just back from EGD. 


She states she is breathing much better today. 


 at bedside. 


patient states her face feels hot, but her body feels cold. 


she is worried the ativan is causing her to feel anxious because when it starts 

to wear off, she feels jumpy.





Objective


Data











  Date Time  Temp Pulse Resp B/P (MAP) Pulse Ox O2 Delivery O2 Flow Rate FiO2


 


6/8/18 12:04 98.5 95 18 128/58 (81) 90   


 


6/8/18 08:24 98.1 78 18 127/81 (96) 93   


 


6/8/18 08:24     93 Room Air  


 


6/8/18 06:00  71      


 


6/8/18 05:00  72      


 


6/8/18 04:00  90      


 


6/8/18 03:10     92 Room Air  


 


6/8/18 03:10 97.9 85 17 107/47 (67) 90   


 


6/8/18 03:00  86      


 


6/8/18 03:00  86      


 


6/8/18 03:00  81      


 


6/8/18 02:00  86      


 


6/8/18 01:00  88      


 


6/8/18 00:00  80      


 


6/8/18 00:00  92      


 


6/7/18 23:11 98.3 88 16 116/64 (81) 93   


 


6/7/18 23:10     93  2.00 


 


6/7/18 23:00  84      


 


6/7/18 22:00  90      


 


6/7/18 21:00  96      


 


6/7/18 20:23     99 Nasal Cannula 2.00 


 


6/7/18 20:23 97.6 93 18 137/73 (94) 99   


 


6/7/18 20:00  96      


 


6/7/18 19:00  98      


 


6/7/18 17:01  101      


 


6/7/18 16:25  92 18 164/90 (114) 98   


 


6/7/18 16:01     98 Room Air  


 


6/7/18 16:01 97.4 96 18 146/91 (109) 98   


 


6/7/18 16:01  96      


 


6/7/18 15:55  85 18 143/87 (105) 98   


 


6/7/18 15:40 97.1 92 18 165/95 (118) 98   














 6/8/18 6/8/18 6/8/18





 07:00 15:00 23:00


 


Intake Total 1070 ml 250 ml 


 


Balance 1070 ml 250 ml 








Result Diagram:  


6/6/18 0500                                                                    

            6/6/18 0500





Laboratory Results





Laboratory Tests








Test


  6/7/18


15:45


 


Pericardial Fluid WBC 2340 /MM3 


 


Pericardial Fluid RBC 684566 /MM3 


 


Pericardial Fluid Neutrophils 50 % 


 


Pericardial Fluid Lymphocytes 12 % 


 


Pericardial Fluid Eosinophils 5 % 


 


Pericardial Fluid Histiocytes 11 % 


 


Pericardial Fluid Mesothelial


Cells 22 % 


 


 


Pericardial Fluid Comment  


 


Pericardial Fluid  U/L 








Imaging Studies





Last 24 hours Impressions








Chest X-Ray 6/7/18 1700 Signed





Impressions: 





 CONCLUSION: 





 No significant change





  





 











Administered Medications








 Medications


  (Trade)  Dose


 Ordered  Sig/Antonia


 Route


 PRN Reason  Start Time


 Stop Time Status Last Admin


Dose Admin


 


 Sodium Chloride


  (NS Flush)  2 ml  BID


 IV FLUSH


   6/5/18 09:00


    6/7/18 20:48


 


 


 Acetaminophen


  (Tylenol)  650 mg  Q4H  PRN


 PO


 TEMP > 100.4  6/5/18 01:45


    6/6/18 23:24


 


 


 Sennosides


  (Senokot)  17.2 mg  Q12H  PRN


 PO


 Moderate constipation  6/5/18 01:45


    6/6/18 06:23


 


 


 Ceftriaxone


 Sodium 1000 mg/


 Sodium Chloride  100 ml @ 


 200 mls/hr  Q24H


 IV


   6/6/18 01:00


    6/8/18 00:18


 


 


 Azithromycin 500


 mg/Sodium Chloride  250 ml @ 


 250 mls/hr  Q24H


 IV


   6/6/18 00:00


    6/7/18 23:13


 


 


 Heparin Sodium


  (Porcine)


  (Heparin Inj)  5,000 units  Q8HR


 SQ


   6/5/18 06:00


    6/5/18 06:04


 


 


 Ondansetron HCl


  (Zofran  Odt)  4 mg  Q6H  PRN


 SL


 nausea  6/6/18 06:30


    6/7/18 00:25


 


 


 Simethicone


  (Mylicon Chew)  80 mg  PCHS  PRN


 CHEW


 gas  6/6/18 06:30


    6/6/18 23:08


 


 


 Lorazepam


  (Ativan)  0.5 mg  Q8H  PRN


 PO


 Anxiety  6/6/18 10:00


    6/7/18 20:47


 


 


 Nifedipine


  (Procardia Xl)  30 mg  DAILY


 PO


   6/7/18 11:00


    6/7/18 11:22


 








Objective Remarks


GENERAL: Middle aged female, sitting up in bed


SKIN: Warm and dry.


HEAD: Normocephalic.


EYES: No injection or drainage. 


NECK: Supple, trachea midline.


CARDIOVASCULAR: Regular rate and rhythm


RESPIRATORY: Breath sounds equal bilaterally. No accessory muscle use.


GASTROINTESTINAL: Abdomen soft, non-tender, nondistended. 


EXTREMITIES: No cyanosis


NEUROLOGICAL: awake and alert. normal speech. moving all extremities.





Assessment/Plan


Assessment


Ms. Asencio is a 53-year-old female with a diagnosis of metastatic breast 

carcinoma, she has had metastatic disease for several years now and has been on 

various lines of palliative systemic chemotherapy.  Her treatment to date has 

been delivered in [place] prior to her relocating to the [facility].  

She has not been on treatment and a little over 1 year.  The patient has 

radiographic evidence of metastatic disease to the vertebral bodies, pleura and 

now appears to have likely pericardial metastases resulting in a pericardial 

effusion with resultant impairment/compression of the left atrium.  Per the 

patient and her primary oncologist's notes the patient has declined additional 

palliative systemic therapy in favor of supportive care. She now has symptoms 

of progressive difficulty breathing, diarrhea and upper abdominal pain.


Plan


1.  Metastatic breast carcinoma:


--patient considering trying aromatase inhibitor again. 


--she says she stopped it before because it made her feel crazy and made her 

"mean"


--she does is also worried the AI may have made her cancer spread in the past. 


--states she will make an appointment with Dr. Parker to discuss it further upon 

discharge. 


2.  Pericardial effusion: s/p pericardiocentesis.  cytology pending. patient's 

dyspnea improved.


3.  Intolerance of food: unclear etiology; GI following. s/p EGD today that was 

unremarkable.











Vivian Johnson Jun 8, 2018 12:44

## 2018-06-08 NOTE — PD.CONS
HPI


History of Present Illness


This is a 53 year old female who was admitted to the hospital on 2018 with 

shortness of breath, nausea especially after eating and emesis 1.  Patient 

also noted some gastric abdominal bloating aggravated by food.  Patient has a 

history of anemia and current hemoglobin remains 10.1.  Patient has also been 

treated for breast cancer and according to the record has been on palliative 

chemotherapy and multiple treatment regimens.  Patient also has complaints of 

urticaria which seems to wax and wane.  According to staff members and previous 

report patient has been into a lot of homeopathic treatment regimens and does 

note a history of constipation which was worsened 2 years ago during her 

treatment regimen.  Patient received pericardiocentesis on 2018 which 4 L 

was removed.  Patient states shortness of breath much improved but still 

complains of decreased appetite and mild abdominal bloating.  According to the 

patient she did take a fleets enema several days ago with good results.  EGD 

and colonoscopy was done approximately 10 years ago.  Patient states dad had 

colon cancer "cells".  Patient has supportive family members with her and is 

currently n.p.o. gastroenterology was asked to consult secondary to patient's 

anemia, epigastric pain especially after eating in the possible need for EGD.


 (Michelle Butler)





PFSH


Past Medical History


Per the record 


metastatic breast cancer to lung


Anemia


Obesity





.


Past Surgical History


Per the record breast biopsy


Bilateral mastectomy (left , right )


Right lung Pleurx catheter


Port placement


 1


Lung biopsy


Thoracentesis


LN biopsy


Root Canal


EGD colonoscopy approximately 10 years ago


.


 (Michelle Butler)


Coded Allergies:  


     morphine (Verified  Allergy, Intermediate, Itching, 18)


     paclitaxel (Verified  Allergy, Intermediate, 18)


Medications





Administered Medications








 Medications


  (Trade)  Dose


 Ordered  Sig/Antonia


 Route


 PRN Reason  Start Time


 Stop Time Status Last Admin


Dose Admin


 


 Sodium Chloride


  (NS Flush)  2 ml  BID


 IV FLUSH


   18 09:00


    18 20:48


 


 


 Acetaminophen


  (Tylenol)  650 mg  Q4H  PRN


 PO


 TEMP > 100.4  18 01:45


    18 23:24


 


 


 Sennosides


  (Senokot)  17.2 mg  Q12H  PRN


 PO


 Moderate constipation  18 01:45


    18 06:23


 


 


 Ceftriaxone


 Sodium 1000 mg/


 Sodium Chloride  100 ml @ 


 200 mls/hr  Q24H


 IV


   18 01:00


    18 00:18


 


 


 Azithromycin 500


 mg/Sodium Chloride  250 ml @ 


 250 mls/hr  Q24H


 IV


   18 00:00


    18 23:13


 


 


 Heparin Sodium


  (Porcine)


  (Heparin Inj)  5,000 units  Q8HR


 SQ


   18 06:00


    18 06:04


 


 


 Ondansetron HCl


  (Zofran  Odt)  4 mg  Q6H  PRN


 SL


 nausea  18 06:30


    18 00:25


 


 


 Simethicone


  (Mylicon Chew)  80 mg  PCHS  PRN


 CHEW


 gas  18 06:30


    18 23:08


 


 


 Lorazepam


  (Ativan)  0.5 mg  Q8H  PRN


 PO


 Anxiety  18 10:00


    18 20:47


 


 


 Nifedipine


  (Procardia Xl)  30 mg  DAILY


 PO


   18 11:00


    18 11:22


 








Family History


Mother with diabetes mellitus, alive. Father alive with colon cancer. Maternal 

and paternal grandparents , unknown causes. 


.


Social History


Negative for alcohol, tobacco and illicit drugs


 (Michelle Butler)





Review of Systems


Gastrointestinal:  COMPLAINS OF: Abdominal pain (Epigastric), Constipation (

History of), Nausea, Vomiting (Michelle Butler)





GI Exam


Vitals I&O





Vital Signs








  Date Time  Temp Pulse Resp B/P (MAP) Pulse Ox O2 Delivery O2 Flow Rate FiO2


 


18 08:24 98.1 78 18 127/81 (96) 93   


 


18 08:24     93 Room Air  


 


18 06:00  71      


 


18 05:00  72      


 


18 04:00  90      


 


18 03:10     92 Room Air  


 


18 03:10 97.9 85 17 107/47 (67) 90   


 


18 03:00  86      


 


18 03:00  86      


 


18 03:00  81      


 


18 02:00  86      


 


18 01:00  88      


 


18 00:00  80      


 


18 00:00  92      


 


18 23:11 98.3 88 16 116/64 (81) 93   


 


18 23:10     93  2.00 


 


18 23:00  84      


 


18 22:00  90      


 


18 21:00  96      


 


18 20:23     99 Nasal Cannula 2.00 


 


18 20:23 97.6 93 18 137/73 (94) 99   


 


18 20:00  96      


 


18 19:00  98      


 


18 17:01  101      


 


18 16:25  92 18 164/90 (114) 98   


 


18 16:01     98 Room Air  


 


18 16:01 97.4 96 18 146/91 (109) 98   


 


18 16:01  96      


 


18 15:55  85 18 143/87 (105) 98   


 


18 15:40 97.1 92 18 165/95 (118) 98   


 


18 12:01  100      


 


18 11:45 97.5 110 18 171/107 (128) 92   


 


18 11:45     96 Room Air  


 


18 11:00  100      














I/O      


 


 18





 06:59 14:59 22:59 06:59 14:59 22:59


 


Intake Total 240 ml   1070 ml  


 


Balance 240 ml   1070 ml  


 


      


 


Intake Oral 240 ml   720 ml  


 


IV Total    350 ml  


 


# Voids 2   2  








Imaging





Last Impressions








Chest X-Ray 18 1700 Signed





Impressions: 





 CONCLUSION: 





 No significant change





  





 


 


Pericardiocentesis 18 0000 Signed





Impressions: 





 CONCLUSION:  





 Pericardiocentesis procedure was performed, as above, with removal of 470 cc of





  fluid. Samples were sent to the lab for evaluation as ordered. There is a ques





 tionable amount of left pleural air on the postprocedure imaging. Therefore, th





 e pleural space may have been crossed by the needle. A follow-up chest x-ray ha





 s been ordered for later this afternoon.





  





 


 


Abdomen X-Ray 18 0000 Signed





Impressions: 





 CONCLUSION: Unremarkable study.  





  





 


 


Liver Ultrasound 18 0000 Signed





Impressions: 





 CONCLUSION: 





 1.  The gallbladder is thickened however the patient reports no tenderness over





  the gallbladder. Small amount of ascites around the liver.





  





 


 


Abdomen/Pelvis CT 18 Signed





Impressions: 





 CONCLUSION:





 1.  There is a trace of fluid adjacent to the liver.





 2.  Gallbladder wall thickening.





 3.  Bulky fibroid uterus.





  





 


 


CT Angiography 18 Signed





Impressions: 





 CONCLUSION:





 1.  No evidence of pulmonary embolism





 2.  Small to moderate pericardial effusion.





 3.  Bilateral nonspecific interstitial pulmonary infiltrates





 4.  Parenchymal consolidation in the right lower lung.





 5.  Small bilateral pleural effusions.





 6.  Diffuse bony metastatic disease.





  





 








Laboratory











Test


  18


15:45


 


Pericardial Fluid WBC 2340 /MM3 


 


Pericardial Fluid RBC 411262 /MM3 


 


Pericardial Fluid Neutrophils 50 % 


 


Pericardial Fluid Lymphocytes 12 % 


 


Pericardial Fluid Eosinophils 5 % 


 


Pericardial Fluid Histiocytes 11 % 


 


Pericardial Fluid Mesothelial


Cells 22 % 


 


 


Pericardial Fluid Comment  


 


Pericardial Fluid  U/L 














 Date/Time


Source Procedure


Growth Status


 


 


 18 00:45


Blood Peripheral Aerobic Blood Culture - Preliminary


NO GROWTH IN 2 DAYS Resulted


 


 18 00:45


Blood Peripheral Anaerobic Blood Culture - Preliminary


NO GROWTH IN 2 DAYS Resulted


 


 18 22:20


Urine Random Urine Urine Culture - Final


Group B Beta Strep Complete








Physical Examination


HEENT: Pale, normocephalic; atraumatic; no obvious jaundice


NECK: Neck is supple, 


CHEST: Diminished breath sounds


CARDIAC:  Regular rate and rhythm


ABDOMEN: Round, soft, nondistended, nontender; bowel sounds are present in all 

four quadrants.


EXTREMITIES: No clubbing, cyanosis, or edema.


SKIN: No rash


CNS: Fair historian


 (Michelle Butler)





Assessment and Plan


Assessment:  


(1) Transaminitis


ICD Codes:  R74.0 - Nonspecific elevation of levels of transaminase and lactic 

acid dehydrogenase [LDH]


Plan


Abdominal bloating and epigastric pain, patient states symptoms worsening when 

eating but is unable to give a time factor associated with her symptoms. 


Transaminitis elevated liver enzymes this could possibly be related to her 

metastasis


History of constipation, probably related to decreased appetite and medications

, decreased fiber in her diet





53-year-old female currently in the hospital day 3 with shortness of breath, 

epigastric abdominal pain and bloating worsening after eating.  Patient has 

significant history of breast cancer with multiple treatment regimens including 

chemotherapy and has also been diagnosed with metastasis.  Patient also has 

anemia current hemoglobin 10.1, WBC count 12, platelet count 387, alkaline 

phosphatase 164,   and normal lipase level.  X-rays and CT scans 

do show some gallbladder thickening but patient is asymptomatic for any right 

upper quadrant pain patient also is noted to have small amount of ascites in 

her abdomen which could be related to her cancer.  Patient had 

pericardiocentesis yesterday with removing 4 L which has much improved her 

shortness of breath.  She is currently n.p.o. and still states nausea abdominal 

bloating sensations.  Patient has elevated LFTs and alkaline phosphatase but 

will monitor for now.  These could be secondary to metastasis, medications





Plan


Consent for EGD today, scheduled


Explained the process to patient and her family


Anti-medics


MiraLAX daily


PPI


Further recommendations to follow after EGD





She was seen per myself and Dr. Gibson, note was written on his behalf


 (Michelle Butler)


Physician Comments


Seen with [place], plan as above.


Obtain consent for EGD today.


Thank you for the consult.


 (Julianna Gibson MD)











Michelle Butler 2018 10:57


Julianna Gibson MD 2018 11:10

## 2018-06-08 NOTE — GIPROC
[facility]

[address] [facility]. [facility], [address]

 

 

EGD PROCEDURE REPORT     EXAM DATE: 06/08/2018

 

PATIENT NAME:      Alberto Asencio           MR #:      [id number]

YOB: 1965      VISIT #:     [id number]

ATTENDING:     Julianna Gibson MD     ORDER #:     [id number]

ASSISTANT:      Andra Abraham and Jeane Reagan     STATUS:     inpatient

 

 

INDICATIONS:  The patient is a 53 yr old female here for an EGD due to nausea

and vomiting

PROCEDURE PERFORMED:     EGD, diagnostic

MEDICATIONS:     None and Per Anesthesia.

TOPICAL ANESTHETIC:     none

 

CONSENT: The patient understands the risks and benefits of the procedure and

understands that these risks include, but are not limited to: sedation,

allergic reaction, infection, perforation and/or bleeding. Alternative means of

evaluation and treatment include, among others: physical exam, x-rays, and/or

surgical intervention. The patient elects to proceed with this endoscopic

procedure.

 



medical equipment was checked for proper function. Hand hygiene and appropriate

measures for infection prevention was taken. After the risks, benefits and

alternatives of the procedure were thoroughly explained, Informed consent was

verified, confirmed and timeout was successfully executed by the treatment

team. The patient was anesthetized with topical anesthesia and the Pentax

[id number] endoscope was introduced through the mouth and advanced to the second

portion of the duodenum.  Retroflexion was performed and was normal  The

gastroscope was then slowly withdrawn and removed.

The endoscopy was otherwise normal.

 

 

 

ADVERSE EVENTS:     There were no complications.

IMPRESSIONS:     Normal endoscopy

 

RECOMMENDATIONS:     No treatment

PATIENT CONDITION:     stable

DISPOSITION:     Observation

REPEAT EXAM:     NONE

 

 

___________________________________

Julianna Gibson MD

eSigned:  Julianna Gibson MD 06/08/2018 11:47 AM

 

 

cc:

## 2018-06-09 VITALS — HEART RATE: 86 BPM

## 2018-06-09 VITALS — HEART RATE: 83 BPM

## 2018-06-09 VITALS
SYSTOLIC BLOOD PRESSURE: 129 MMHG | HEART RATE: 98 BPM | RESPIRATION RATE: 16 BRPM | OXYGEN SATURATION: 96 % | TEMPERATURE: 99 F | DIASTOLIC BLOOD PRESSURE: 79 MMHG

## 2018-06-09 VITALS — HEART RATE: 88 BPM

## 2018-06-09 VITALS — HEART RATE: 76 BPM

## 2018-06-09 VITALS — HEART RATE: 82 BPM

## 2018-06-09 VITALS — HEART RATE: 90 BPM

## 2018-06-09 VITALS — HEART RATE: 92 BPM

## 2018-06-09 VITALS
OXYGEN SATURATION: 94 % | HEART RATE: 81 BPM | TEMPERATURE: 98.4 F | SYSTOLIC BLOOD PRESSURE: 109 MMHG | DIASTOLIC BLOOD PRESSURE: 60 MMHG | RESPIRATION RATE: 18 BRPM

## 2018-06-09 VITALS — HEART RATE: 77 BPM

## 2018-06-09 VITALS — HEART RATE: 94 BPM

## 2018-06-09 VITALS — HEART RATE: 93 BPM

## 2018-06-09 VITALS — HEART RATE: 80 BPM

## 2018-06-09 VITALS — HEART RATE: 84 BPM

## 2018-06-09 RX ADMIN — IPRATROPIUM BROMIDE AND ALBUTEROL SULFATE SCH AMPULE: .5; 3 SOLUTION RESPIRATORY (INHALATION) at 10:42

## 2018-06-09 RX ADMIN — NIFEDIPINE SCH MG: 30 TABLET, FILM COATED, EXTENDED RELEASE ORAL at 09:00

## 2018-06-09 RX ADMIN — IPRATROPIUM BROMIDE AND ALBUTEROL SULFATE SCH AMPULE: .5; 3 SOLUTION RESPIRATORY (INHALATION) at 16:57

## 2018-06-09 RX ADMIN — IPRATROPIUM BROMIDE AND ALBUTEROL SULFATE SCH AMPULE: .5; 3 SOLUTION RESPIRATORY (INHALATION) at 19:42

## 2018-06-09 RX ADMIN — HEPARIN SODIUM SCH UNITS: 10000 INJECTION, SOLUTION INTRAVENOUS; SUBCUTANEOUS at 05:33

## 2018-06-09 RX ADMIN — SODIUM CHLORIDE SCH MLS/HR: 900 INJECTION INTRAVENOUS at 01:11

## 2018-06-09 RX ADMIN — Medication SCH ML: at 09:00

## 2018-06-09 RX ADMIN — LEVOFLOXACIN SCH MG: 750 TABLET, FILM COATED ORAL at 12:00

## 2018-06-09 RX ADMIN — TORSEMIDE SCH MG: 5 TABLET ORAL at 15:38

## 2018-06-09 RX ADMIN — HEPARIN SODIUM SCH UNITS: 10000 INJECTION, SOLUTION INTRAVENOUS; SUBCUTANEOUS at 14:00

## 2018-06-09 RX ADMIN — IPRATROPIUM BROMIDE AND ALBUTEROL SULFATE SCH AMPULE: .5; 3 SOLUTION RESPIRATORY (INHALATION) at 03:47

## 2018-06-09 RX ADMIN — Medication SCH ML: at 21:48

## 2018-06-09 RX ADMIN — POLYETHYLENE GLYCOL 3350 SCH GM: 17 POWDER, FOR SOLUTION ORAL at 10:33

## 2018-06-09 RX ADMIN — HEPARIN SODIUM SCH UNITS: 10000 INJECTION, SOLUTION INTRAVENOUS; SUBCUTANEOUS at 21:51

## 2018-06-09 RX ADMIN — PANTOPRAZOLE SCH MG: 40 TABLET, DELAYED RELEASE ORAL at 09:00

## 2018-06-09 NOTE — PD.ONC.PN
Subjective


Subjective


Remarks


Afebrile overnight. 


Resting in chair in room. 


states she feels short of breath when she lays down in bed for any length of 

time. 


prefers to sit up in chair.





Objective


Data











  Date Time  Temp Pulse Resp B/P (MAP) Pulse Ox O2 Delivery O2 Flow Rate FiO2


 


6/9/18 06:00  94      


 


6/9/18 05:00  93      


 


6/9/18 04:00  80      


 


6/9/18 03:16 98.4 81 18 109/60 (76) 94   


 


6/9/18 03:16     94 Room Air  


 


6/9/18 03:00  77      


 


6/9/18 02:00  88      


 


6/9/18 01:00  86      


 


6/9/18 00:00  88      


 


6/8/18 23:25 98.4 91 20 140/81 (100) 95   


 


6/8/18 23:25     95 Room Air  


 


6/8/18 23:00  97      


 


6/8/18 22:00  86      


 


6/8/18 21:01     94 Room Air  


 


6/8/18 21:01 98.7 82 18 130/88 (102) 94   


 


6/8/18 21:00  86      


 


6/8/18 20:00  90      


 


6/8/18 19:00  81      


 


6/8/18 15:00     93 Room Air  


 


6/8/18 15:00 98.7 92 18 134/81 (98) 92   


 


6/8/18 15:00  84      


 


6/8/18 14:00  66      


 


6/8/18 13:00  56      


 


6/8/18 12:04 98.5 95 18 128/58 (81) 90   


 


6/8/18 12:00  64      


 


6/8/18 11:00  66      














 6/9/18 6/9/18 6/9/18





 07:00 15:00 23:00


 


Intake Total 1070 ml  


 


Balance 1070 ml  








Result Diagram:  


6/8/18 1249                                                                    

            6/8/18 1249





Laboratory Results





Laboratory Tests








Test


  6/8/18


12:49


 


White Blood Count 11.1 TH/MM3 


 


Red Blood Count 4.23 MIL/MM3 


 


Hemoglobin 9.0 GM/DL 


 


Hematocrit 29.2 % 


 


Mean Corpuscular Volume 69.1 FL 


 


Mean Corpuscular Hemoglobin 21.4 PG 


 


Mean Corpuscular Hemoglobin


Concent 31.0 % 


 


 


Red Cell Distribution Width 18.9 % 


 


Platelet Count 319 TH/MM3 


 


Mean Platelet Volume 8.9 FL 


 


Neutrophils (%) (Auto) 77.6 % 


 


Lymphocytes (%) (Auto) 12.1 % 


 


Monocytes (%) (Auto) 9.8 % 


 


Eosinophils (%) (Auto) 0.3 % 


 


Basophils (%) (Auto) 0.2 % 


 


Neutrophils # (Auto) 8.6 TH/MM3 


 


Lymphocytes # (Auto) 1.3 TH/MM3 


 


Monocytes # (Auto) 1.1 TH/MM3 


 


Eosinophils # (Auto) 0.0 TH/MM3 


 


Basophils # (Auto) 0.0 TH/MM3 


 


CBC Comment DIFF FINAL 


 


Differential Comment  


 


Blood Urea Nitrogen 15 MG/DL 


 


Creatinine 0.65 MG/DL 


 


Random Glucose 86 MG/DL 


 


Calcium Level 8.9 MG/DL 


 


Sodium Level 140 MEQ/L 


 


Potassium Level 3.5 MEQ/L 


 


Chloride Level 103 MEQ/L 


 


Carbon Dioxide Level 29.1 MEQ/L 


 


Anion Gap 8 MEQ/L 


 


Estimat Glomerular Filtration


Rate 95 ML/MIN 


 











Administered Medications








 Medications


  (Trade)  Dose


 Ordered  Sig/Antonia


 Route


 PRN Reason  Start Time


 Stop Time Status Last Admin


Dose Admin


 


 Sodium Chloride


  (NS Flush)  2 ml  BID


 IV FLUSH


   6/5/18 09:00


    6/8/18 21:00


 


 


 Acetaminophen


  (Tylenol)  650 mg  Q4H  PRN


 PO


 TEMP > 100.4  6/5/18 01:45


    6/6/18 23:24


 


 


 Sennosides


  (Senokot)  17.2 mg  Q12H  PRN


 PO


 Moderate constipation  6/5/18 01:45


    6/6/18 06:23


 


 


 Heparin Sodium


  (Porcine)


  (Heparin Inj)  5,000 units  Q8HR


 SQ


   6/5/18 06:00


    6/5/18 06:04


 


 


 Ondansetron HCl


  (Zofran  Odt)  4 mg  Q6H  PRN


 SL


 nausea  6/6/18 06:30


    6/7/18 00:25


 


 


 Simethicone


  (Mylicon Chew)  80 mg  PCHS  PRN


 CHEW


 gas  6/6/18 06:30


    6/6/18 23:08


 


 


 Lorazepam


  (Ativan)  0.5 mg  Q8H  PRN


 PO


 Anxiety  6/6/18 10:00


    6/7/18 20:47


 


 


 Nifedipine


  (Procardia Xl)  30 mg  DAILY


 PO


   6/7/18 11:00


    6/7/18 11:22


 








Objective Remarks


GENERAL: Middle aged female, sitting up in chair next to bed in nad. 


SKIN: Warm and dry.


HEAD: Normocephalic.


EYES: No injection or drainage. 


NECK: Supple, trachea midline. 


CARDIOVASCULAR: Regular rate and rhythm


RESPIRATORY: anterior fields clear. 


GASTROINTESTINAL: Abdomen soft, non-tender, nondistended. 


EXTREMITIES: No cyanosis


NEUROLOGICAL: awake but lethargic. normal speech.





Assessment/Plan


Assessment


Ms. Asencio is a 53-year-old female with a diagnosis of metastatic breast 

carcinoma, she has had metastatic disease for several years now and has been on 

various lines of palliative systemic chemotherapy.  Her treatment to date has 

been delivered in [place] prior to her relocating to the [facility].  

She has not been on treatment and a little over 1 year.  The patient has 

radiographic evidence of metastatic disease to the vertebral bodies, pleura and 

now appears to have likely pericardial metastases resulting in a pericardial 

effusion with resultant impairment/compression of the left atrium.  Per the 

patient and her primary oncologist's notes the patient has declined additional 

palliative systemic therapy in favor of supportive care. She now has symptoms 

of progressive difficulty breathing, diarrhea and upper abdominal pain.


Plan


1.  Metastatic breast carcinoma:


--patient considering treatment for her breast cancer. 


--will discuss with Dr. Parker when he returns. 


2.  Pericardial effusion: s/p pericardiocentesis.  cytology pending. continue 

to monitor 


3.  Intolerance of food: unclear etiology; GI following. s/p unremarkable EGD.


Attending Statement


The exam, history, and the medical decision-making described in the above note 

were completed with the assistance of the mid-level provider. I reviewed and 

agree with the findings presented.  I attest that I had a face-to-face 

encounter with the patient on the same day, and personally performed and 

documented my assessment and findings in the medical record.





Pt seen and examined.


c/o of leg swelling, very suspicious, guarded.


Relates her breast cancer progression to the port flush she had recently.


Discussed the coincidence but that the true culprit to her symptoms is the 

progression of breast cancer


despite the fact that she thinks she was doing well last 6 months.


Suspect she was progressing slowly all this time.











Vivian Johnson Jun 9, 2018 10:40


Jie Lemus MD Jun 9, 2018 12:28

## 2018-06-09 NOTE — HHI.PR
Subjective


Remarks


Follow-up for metastatic breast cancer, shortness of breath, pericardial 

effusion. Patient is ambulating well, no chest pain, fever, chills. She does 

report some orthopnea. Her legs are also edematous.





Objective


Vitals





Vital Signs








  Date Time  Temp Pulse Resp B/P (MAP) Pulse Ox O2 Delivery O2 Flow Rate FiO2


 


6/9/18 06:00  94      


 


6/9/18 05:00  93      


 


6/9/18 04:00  80      


 


6/9/18 03:16 98.4 81 18 109/60 (76) 94   


 


6/9/18 03:16     94 Room Air  


 


6/9/18 03:00  77      


 


6/9/18 02:00  88      


 


6/9/18 01:00  86      


 


6/9/18 00:00  88      


 


6/8/18 23:25 98.4 91 20 140/81 (100) 95   


 


6/8/18 23:25     95 Room Air  


 


6/8/18 23:00  97      


 


6/8/18 22:00  86      


 


6/8/18 21:01     94 Room Air  


 


6/8/18 21:01 98.7 82 18 130/88 (102) 94   


 


6/8/18 21:00  86      


 


6/8/18 20:00  90      


 


6/8/18 19:00  81      














I/O      


 


 6/8/18 6/8/18 6/8/18 6/9/18 6/9/18 6/9/18





 07:00 15:00 23:00 07:00 15:00 23:00


 


Intake Total 1070 ml 250 ml  1070 ml  


 


Balance 1070 ml 250 ml  1070 ml  


 


      


 


Intake Oral 720 ml   720 ml  


 


IV Total 350 ml   350 ml  


 


Other  250 ml    


 


# Voids 2   1  








Result Diagram:  


6/8/18 1249                                                                    

            6/8/18 1249





Imaging





Last Impressions








Chest X-Ray 6/7/18 1700 Signed





Impressions: 





 CONCLUSION: 





 No significant change





  





 


 


Pericardiocentesis 6/7/18 0000 Signed





Impressions: 





 CONCLUSION:  





 Pericardiocentesis procedure was performed, as above, with removal of 470 cc of





  fluid. Samples were sent to the lab for evaluation as ordered. There is a ques





 tionable amount of left pleural air on the postprocedure imaging. Therefore, th





 e pleural space may have been crossed by the needle. A follow-up chest x-ray ha





 s been ordered for later this afternoon.





  





 


 


Abdomen X-Ray 6/7/18 0000 Signed





Impressions: 





 CONCLUSION: Unremarkable study.  





  





 


 


Liver Ultrasound 6/5/18 0000 Signed





Impressions: 





 CONCLUSION: 





 1.  The gallbladder is thickened however the patient reports no tenderness over





  the gallbladder. Small amount of ascites around the liver.





  





 


 


Abdomen/Pelvis CT 6/4/18 2147 Signed





Impressions: 





 CONCLUSION:





 1.  There is a trace of fluid adjacent to the liver.





 2.  Gallbladder wall thickening.





 3.  Bulky fibroid uterus.





  





 


 


CT Angiography 6/4/18 1856 Signed





Impressions: 





 CONCLUSION:





 1.  No evidence of pulmonary embolism





 2.  Small to moderate pericardial effusion.





 3.  Bilateral nonspecific interstitial pulmonary infiltrates





 4.  Parenchymal consolidation in the right lower lung.





 5.  Small bilateral pleural effusions.





 6.  Diffuse bony metastatic disease.





  





 








Objective Remarks


GENERAL:  Alert, Oriented x 3, NAD. 


SKIN: Warm and dry.


HEAD: Normocephalic.


EYES: No scleral icterus. No injection or drainage. 


NECK: Supple, trachea midline. No JVD or lymphadenopathy.


CARDIOVASCULAR: Regular rate and rhythm without murmurs, gallops, or rubs. 


RESPIRATORY: Breath sounds equal bilaterally. No accessory muscle use.


GASTROINTESTINAL: Abdomen soft, non-tender, nondistended. 


MUSCULOSKELETAL: No cyanosis, or edema. 


BACK: Nontender without obvious deformity. No CVA tenderness.





Procedures


None.





A/P


Assessment and Plan


53-year-old female with past medical history significant for metastatic breast 

cancer presents the emergency department for the evaluation of weakness and 

shortness of breath 2 weeks. Imaging studies indicated possible pneumonia as 

well as small to moderate pleural effusion. 





1.  Pneumonia


Chest CT significant for consolidation in the right lower lung with small 

bilateral pleural effusions and diffuse bony metastatic disease, personally 

reviewed


Switch abx to Levaquin PO. 


Supplemental oxygen as needed - currently on room air. 


Continue DuoNeb treatments. 


Patient reports feeling significant relief on oxygen, requests home oxygen.  

Walk test shows no need for home O2. 





2.  Pericardial effusion


Small to moderate pericardial effusion on CT


Limited echo was done - shows moderate pericardial effusion with some RV 

hemodynamic effect. 


status post CT guided pericardiocentesis. Cytology ordered


Symptomatically much improved. 





3.  Lower ext swelling


Keep legs elevated. Lower ext and orthopnea is concerning for diastolic heart 

failure. EF is preserved. 


Will start on Torsemide 10mg BID. Advised low salt diet. 





4.  Metastatic breast cancer


Patient currently choosing not to undergo treatment. She prefers alternative 

treatments. 


Oncology following.  She will see Dr. Parker in the outpatient setting and 

consider Aromatase inhibitor. 





Full code. Heparin SQ.











Booker Dotson DO Jun 9, 2018 18:06

## 2018-06-10 VITALS
RESPIRATION RATE: 20 BRPM | SYSTOLIC BLOOD PRESSURE: 148 MMHG | DIASTOLIC BLOOD PRESSURE: 97 MMHG | OXYGEN SATURATION: 96 % | TEMPERATURE: 98.8 F | HEART RATE: 85 BPM

## 2018-06-10 VITALS — HEART RATE: 90 BPM

## 2018-06-10 VITALS — HEART RATE: 74 BPM

## 2018-06-10 VITALS
DIASTOLIC BLOOD PRESSURE: 71 MMHG | OXYGEN SATURATION: 98 % | TEMPERATURE: 98.6 F | HEART RATE: 86 BPM | RESPIRATION RATE: 18 BRPM | SYSTOLIC BLOOD PRESSURE: 124 MMHG

## 2018-06-10 VITALS — HEART RATE: 88 BPM

## 2018-06-10 VITALS
OXYGEN SATURATION: 94 % | DIASTOLIC BLOOD PRESSURE: 94 MMHG | RESPIRATION RATE: 16 BRPM | SYSTOLIC BLOOD PRESSURE: 142 MMHG | HEART RATE: 82 BPM

## 2018-06-10 VITALS — HEART RATE: 92 BPM

## 2018-06-10 VITALS
OXYGEN SATURATION: 94 % | DIASTOLIC BLOOD PRESSURE: 83 MMHG | HEART RATE: 94 BPM | RESPIRATION RATE: 16 BRPM | SYSTOLIC BLOOD PRESSURE: 150 MMHG

## 2018-06-10 VITALS
RESPIRATION RATE: 18 BRPM | HEART RATE: 85 BPM | TEMPERATURE: 98.7 F | SYSTOLIC BLOOD PRESSURE: 139 MMHG | OXYGEN SATURATION: 95 % | DIASTOLIC BLOOD PRESSURE: 82 MMHG

## 2018-06-10 VITALS — HEART RATE: 84 BPM

## 2018-06-10 VITALS — HEART RATE: 78 BPM

## 2018-06-10 VITALS — HEART RATE: 94 BPM

## 2018-06-10 VITALS — HEART RATE: 86 BPM

## 2018-06-10 VITALS
TEMPERATURE: 98.3 F | OXYGEN SATURATION: 96 % | DIASTOLIC BLOOD PRESSURE: 65 MMHG | HEART RATE: 78 BPM | SYSTOLIC BLOOD PRESSURE: 110 MMHG | RESPIRATION RATE: 20 BRPM

## 2018-06-10 VITALS — HEART RATE: 82 BPM

## 2018-06-10 VITALS — HEART RATE: 80 BPM

## 2018-06-10 VITALS — HEART RATE: 76 BPM

## 2018-06-10 RX ADMIN — HEPARIN SODIUM SCH UNITS: 10000 INJECTION, SOLUTION INTRAVENOUS; SUBCUTANEOUS at 05:10

## 2018-06-10 RX ADMIN — NYSTATIN SCH ML: 100000 SUSPENSION ORAL at 16:59

## 2018-06-10 RX ADMIN — HEPARIN SODIUM SCH UNITS: 10000 INJECTION, SOLUTION INTRAVENOUS; SUBCUTANEOUS at 21:17

## 2018-06-10 RX ADMIN — IPRATROPIUM BROMIDE AND ALBUTEROL SULFATE SCH AMPULE: .5; 3 SOLUTION RESPIRATORY (INHALATION) at 20:21

## 2018-06-10 RX ADMIN — Medication SCH ML: at 21:14

## 2018-06-10 RX ADMIN — HEPARIN SODIUM SCH UNITS: 10000 INJECTION, SOLUTION INTRAVENOUS; SUBCUTANEOUS at 14:00

## 2018-06-10 RX ADMIN — SUCRALFATE SCH GM: 1 SUSPENSION ORAL at 17:00

## 2018-06-10 RX ADMIN — SUCRALFATE SCH GM: 1 SUSPENSION ORAL at 21:00

## 2018-06-10 RX ADMIN — Medication SCH ML: at 09:25

## 2018-06-10 RX ADMIN — TORSEMIDE SCH MG: 5 TABLET ORAL at 16:59

## 2018-06-10 RX ADMIN — IPRATROPIUM BROMIDE AND ALBUTEROL SULFATE SCH AMPULE: .5; 3 SOLUTION RESPIRATORY (INHALATION) at 09:07

## 2018-06-10 RX ADMIN — NIFEDIPINE SCH MG: 30 TABLET, FILM COATED, EXTENDED RELEASE ORAL at 09:27

## 2018-06-10 RX ADMIN — IPRATROPIUM BROMIDE AND ALBUTEROL SULFATE SCH AMPULE: .5; 3 SOLUTION RESPIRATORY (INHALATION) at 03:04

## 2018-06-10 RX ADMIN — POLYETHYLENE GLYCOL 3350 SCH GM: 17 POWDER, FOR SOLUTION ORAL at 09:26

## 2018-06-10 RX ADMIN — NYSTATIN SCH ML: 100000 SUSPENSION ORAL at 14:42

## 2018-06-10 RX ADMIN — LEVOFLOXACIN SCH MG: 750 TABLET, FILM COATED ORAL at 13:00

## 2018-06-10 RX ADMIN — NYSTATIN SCH ML: 100000 SUSPENSION ORAL at 21:15

## 2018-06-10 RX ADMIN — IPRATROPIUM BROMIDE AND ALBUTEROL SULFATE SCH AMPULE: .5; 3 SOLUTION RESPIRATORY (INHALATION) at 16:11

## 2018-06-10 RX ADMIN — TORSEMIDE SCH MG: 5 TABLET ORAL at 09:30

## 2018-06-10 RX ADMIN — PANTOPRAZOLE SCH MG: 40 TABLET, DELAYED RELEASE ORAL at 09:48

## 2018-06-10 RX ADMIN — NYSTATIN SCH ML: 100000 SUSPENSION ORAL at 09:46

## 2018-06-10 NOTE — HHI.PR
Subjective


Remarks


Follow-up for metastatic breast cancer, shortness of breath, pericardial 

effusion. Patient is ambulating well, on room air. Still has leg edema.





Objective


Vitals





Vital Signs








  Date Time  Temp Pulse Resp B/P (MAP) Pulse Ox O2 Delivery O2 Flow Rate FiO2


 


6/10/18 09:16 98.8 85 20 148/97 (114) 96   


 


6/10/18 09:16     96 Room Air  


 


6/10/18 06:00  82      


 


6/10/18 05:00  76      


 


6/10/18 04:00  80      


 


6/10/18 03:00  94      


 


6/10/18 03:00     94 Room Air  


 


6/10/18 03:00  83 16 150/83 (105) 94   


 


6/10/18 02:00  92      


 


6/10/18 01:05     94 Room Air  


 


6/10/18 01:05  82 16 142/94 (110) 94   


 


6/10/18 01:00  94      


 


6/10/18 00:00  84      


 


6/9/18 23:00  84      


 


6/9/18 22:00  88      


 


6/9/18 21:00  88      


 


6/9/18 20:00  88      


 


6/9/18 19:45     96 Room Air  


 


6/9/18 19:45 99.0 98 16 129/79 (96) 96   


 


6/9/18 19:00  83      


 


6/9/18 18:00  83      


 


6/9/18 17:00  86      


 


6/9/18 16:00  88      


 


6/9/18 15:00  90      


 


6/9/18 14:00  82      


 


6/9/18 13:00  88      


 


6/9/18 12:00  82      


 


6/9/18 11:00  92      














I/O      


 


 6/9/18 6/9/18 6/9/18 6/10/18 6/10/18 6/10/18





 07:00 15:00 23:00 07:00 15:00 23:00


 


Intake Total 1070 ml   240 ml  


 


Output Total   1700 ml   


 


Balance 1070 ml  -1700 ml 240 ml  


 


      


 


Intake Oral 720 ml   240 ml  


 


IV Total 350 ml     


 


Output Urine Total   1700 ml   


 


# Voids 1   2  


 


# Bowel Movements    0  








Result Diagram:  


6/8/18 1249                                                                    

            6/8/18 1249





Imaging





Last Impressions








Chest X-Ray 6/7/18 1700 Signed





Impressions: 





 CONCLUSION: 





 No significant change





  





 


 


Pericardiocentesis 6/7/18 0000 Signed





Impressions: 





 CONCLUSION:  





 Pericardiocentesis procedure was performed, as above, with removal of 470 cc of





  fluid. Samples were sent to the lab for evaluation as ordered. There is a ques





 tionable amount of left pleural air on the postprocedure imaging. Therefore, th





 e pleural space may have been crossed by the needle. A follow-up chest x-ray ha





 s been ordered for later this afternoon.





  





 


 


Abdomen X-Ray 6/7/18 0000 Signed





Impressions: 





 CONCLUSION: Unremarkable study.  





  





 


 


Liver Ultrasound 6/5/18 0000 Signed





Impressions: 





 CONCLUSION: 





 1.  The gallbladder is thickened however the patient reports no tenderness over





  the gallbladder. Small amount of ascites around the liver.





  





 


 


Abdomen/Pelvis CT 6/4/18 2147 Signed





Impressions: 





 CONCLUSION:





 1.  There is a trace of fluid adjacent to the liver.





 2.  Gallbladder wall thickening.





 3.  Bulky fibroid uterus.





  





 


 


CT Angiography 6/4/18 1856 Signed





Impressions: 





 CONCLUSION:





 1.  No evidence of pulmonary embolism





 2.  Small to moderate pericardial effusion.





 3.  Bilateral nonspecific interstitial pulmonary infiltrates





 4.  Parenchymal consolidation in the right lower lung.





 5.  Small bilateral pleural effusions.





 6.  Diffuse bony metastatic disease.





  





 








Objective Remarks


GENERAL:  Alert, Oriented x 3, NAD. 


SKIN: Warm and dry.


HEAD: Normocephalic.


EYES: No scleral icterus. No injection or drainage. 


NECK: Supple, trachea midline. No JVD or lymphadenopathy.


CARDIOVASCULAR: Regular rate and rhythm without murmurs, gallops, or rubs. 


RESPIRATORY: Breath sounds equal bilaterally. No accessory muscle use.


GASTROINTESTINAL: Abdomen soft, non-tender, nondistended. 


MUSCULOSKELETAL: No cyanosis, or edema. 


BACK: Nontender without obvious deformity. No CVA tenderness.





Procedures


None.





A/P


Assessment and Plan


53-year-old female with past medical history significant for metastatic breast 

cancer presents the emergency department for the evaluation of weakness and 

shortness of breath 2 weeks. Imaging studies indicated possible pneumonia as 

well as small to moderate pleural effusion. 





1.  Pneumonia


Chest CT significant for consolidation in the right lower lung with small 

bilateral pleural effusions and diffuse bony metastatic disease, personally 

reviewed


Switch abx to Levaquin PO. 


Supplemental oxygen as needed - currently on room air. 


Continue DuoNeb treatments. 


Patient reports feeling significant relief on oxygen, requests home oxygen.  

Walk test shows no need for home O2. 





2.  Pericardial effusion


Small to moderate pericardial effusion on CT scan. 


Limited echo was done - shows moderate pericardial effusion with some RV 

hemodynamic effect. 


status post CT guided pericardiocentesis. Cytology ordered


Symptomatically much improved. 





3.  Lower ext swelling


Keep legs elevated. Lower ext and orthopnea is concerning for diastolic heart 

failure. EF is preserved. 


Will continue Torsemide 10mg BID. Advised low salt diet. 





4.  Metastatic breast cancer


Patient currently choosing not to undergo treatment. She prefers alternative 

treatments. 


Oncology following.  She will see Dr. Parker in the outpatient setting and 

consider Aromatase inhibitor. 





5. Constipation - patient is not uncomfortable. Will give her Milk of Mag and 

later if needed Mag Citrate. 





Full code. Heparin SQ. 





Will plan on discharging patient home, especially if she has a bowel movement 

after mag citrate.











Booker Dotson DO Leno 10, 2018 10:13 am

## 2018-06-10 NOTE — HHI.GIFU
Subjective


Remarks


Patient resting in the bed and sits on the side of the bed at intervals


Nausea but no vomiting, 


No BM X 1 week, has been given meds 


Current hemoglobin 9


 (Michelle Butler)





Objective


Vitals I&O





Vital Signs








  Date Time  Temp Pulse Resp B/P (MAP) Pulse Ox O2 Delivery O2 Flow Rate FiO2


 


6/10/18 12:56 98.7 85 18 139/82 (101) 95   


 


6/10/18 09:16 98.8 85 20 148/97 (114) 96   


 


6/10/18 09:16     96 Room Air  


 


6/10/18 06:00  82      


 


6/10/18 05:00  76      


 


6/10/18 04:00  80      


 


6/10/18 03:00  94      


 


6/10/18 03:00     94 Room Air  


 


6/10/18 03:00  83 16 150/83 (105) 94   


 


6/10/18 02:00  92      


 


6/10/18 01:05     94 Room Air  


 


6/10/18 01:05  82 16 142/94 (110) 94   


 


6/10/18 01:00  94      


 


6/10/18 00:00  84      


 


6/9/18 23:00  84      


 


6/9/18 22:00  88      


 


6/9/18 21:00  88      


 


6/9/18 20:00  88      


 


6/9/18 19:45     96 Room Air  


 


6/9/18 19:45 99.0 98 16 129/79 (96) 96   


 


6/9/18 19:00  83      


 


6/9/18 18:00  83      


 


6/9/18 17:00  86      


 


6/9/18 16:00  88      














I/O      


 


 6/9/18 6/9/18 6/9/18 6/10/18 6/10/18 6/10/18





 07:00 15:00 23:00 07:00 15:00 23:00


 


Intake Total 1070 ml   240 ml  


 


Output Total   1700 ml   


 


Balance 1070 ml  -1700 ml 240 ml  


 


      


 


Intake Oral 720 ml   240 ml  


 


IV Total 350 ml     


 


Output Urine Total   1700 ml   


 


# Voids 1   2  


 


# Bowel Movements    0  








Laboratory











 Date/Time


Source Procedure


Growth Status


 


 


 6/5/18 00:45


Blood Peripheral Aerobic Blood Culture - Final


NO GROWTH IN 5 DAYS Complete


 


 6/5/18 00:45


Blood Peripheral Anaerobic Blood Culture - Final


NO GROWTH IN 5 DAYS Complete


 


 6/4/18 22:20


Urine Random Urine Urine Culture - Final


Group B Beta Strep Complete








Imaging





Last Impressions








Chest X-Ray 6/7/18 1700 Signed





Impressions: 





 CONCLUSION: 





 No significant change





  





 


 


Pericardiocentesis 6/7/18 0000 Signed





Impressions: 





 CONCLUSION:  





 Pericardiocentesis procedure was performed, as above, with removal of 470 cc of





  fluid. Samples were sent to the lab for evaluation as ordered. There is a ques





 tionable amount of left pleural air on the postprocedure imaging. Therefore, th





 e pleural space may have been crossed by the needle. A follow-up chest x-ray ha





 s been ordered for later this afternoon.





  





 


 


Abdomen X-Ray 6/7/18 0000 Signed





Impressions: 





 CONCLUSION: Unremarkable study.  





  





 


 


Liver Ultrasound 6/5/18 0000 Signed





Impressions: 





 CONCLUSION: 





 1.  The gallbladder is thickened however the patient reports no tenderness over





  the gallbladder. Small amount of ascites around the liver.





  





 


 


Abdomen/Pelvis CT 6/4/18 2147 Signed





Impressions: 





 CONCLUSION:





 1.  There is a trace of fluid adjacent to the liver.





 2.  Gallbladder wall thickening.





 3.  Bulky fibroid uterus.





  





 


 


CT Angiography 6/4/18 1856 Signed





Impressions: 





 CONCLUSION:





 1.  No evidence of pulmonary embolism





 2.  Small to moderate pericardial effusion.





 3.  Bilateral nonspecific interstitial pulmonary infiltrates





 4.  Parenchymal consolidation in the right lower lung.





 5.  Small bilateral pleural effusions.





 6.  Diffuse bony metastatic disease.





  





 








Physical Exam


HEENT: Pupils round and reactive to light; normocephalic; atraumatic; 


NECK: Neck is supple, no JVD


CHEST: Mild diminished breath sounds


CARDIAC:  Regular rate and rhythm


ABDOMEN:  Soft,mild distention., nontender; no hepatosplenomegaly; bowel sounds 

are present in all four quadrants.


EXTREMITIES: No clubbing, cyanosis, or edema.


SKIN: Pale, no rash; no jaundice.


CNS: Awake answers questions appropriately


 (Michelle Butler)





Assessment and Plan


Assessment:  


(1) Transaminitis


ICD Codes:  R74.0 - Nonspecific elevation of levels of transaminase and lactic 

acid dehydrogenase [LDH]


Plan


Abdominal bloating and epigastric pain, patient states symptoms worsening when 

eating but is unable to give a time factor associated with her symptoms. 


Transaminitis elevated liver enzymes this could possibly be related to her 

metastasis


History of constipation, probably related to decreased appetite and medications

, decreased fiber in her diet





53-year-old female currently in the hospital day 3 with shortness of breath, 

epigastric abdominal pain and bloating worsening after eating.  Patient has 

significant history of breast cancer with multiple treatment regimens including 

chemotherapy and has also been diagnosed with metastasis.  Patient also has 

anemia current hemoglobin 10.1, WBC count 12, platelet count 387, alkaline 

phosphatase 164,   and normal lipase level.  X-rays and CT scans 

do show some gallbladder thickening but patient is asymptomatic for any right 

upper quadrant pain patient also is noted to have small amount of ascites in 

her abdomen which could be related to her cancer.  Patient had 

pericardiocentesis yesterday with removing 4 L which has much improved her 

shortness of breath.  She is currently n.p.o. and still states nausea abdominal 

bloating sensations.  Patient has elevated LFTs and alkaline phosphatase but 

will monitor for now.  These could be secondary to metastasis, medications


6/10/2018 patient had EGD done on 6/9/2018 which showed normal results.  

Patient tolerated procedure well.  Nausea and/or upper GI symptoms still 

probably related to treatment regimen.  Encourage patient to eat small feedings 

chew food very well, and eat small amounts 4-6 times a day.  Can also do shakes 

if unable to tolerate food for a while. Constipation, no BM X 1 week, but 

decreased food amounts. Feels stomach gurgling, feels she will go soon.   

Tentative Discharge.





Plan


bowel regimen


Diet as tolerated


Carafate


Anti-medics


MiraLAX daily


Supportive care





She was seen per myself and Dr. Gibson, note was written on his behalf


 (Michelle Butler)


Physician Comments


No active GI management at this time.


Will sign off for now.


Please notify us if needed again.


 (Julianna Gibson MD)











Michelle Butler Leno 10, 2018 15:40


Julianna Gibson MD Leno 10, 2018 22:48

## 2018-06-10 NOTE — HHI.FF
Face to Face Verification


Diagnosis:  


(1) Pneumonia


(2) Pericardial effusion


(3) Metastatic breast cancer to lung and bone


Physical Therapy


Order:  Evaluate and Treat, Improve ambulation, Strength and gait training





Home Health Nursing








Order: Medical education





 Signs/symptoms of disease process





 Nursing assessment with vital signs

















I have seen patient Alberto Asencio on 6/10/18. My clinical findings support the 

need for the requested home health care services because:








 Ltd mobility - disease progression





 Deconditioned w/ increased weakness





 Limited ability to care for self





 Need for psychosocial assistance





 High risk of falls





 Infection w/ risk of complications














I certify that my clinical findings support that this patient is homebound 

because:








 Unsteady gait/balance





 Unsafe to leave home unassisted





 Need for psychosocial assistance





 Unable to use public transportation

















Booker Dotson DO L[place] 10, 2018 15:38

## 2018-06-10 NOTE — RADRPT
EXAM DATE:  6/10/2018 6:56 PM EDT

AGE/SEX:        53 years / Female



INDICATIONS:  Constipation for seven days.



CLINICAL DATA:  This is the patient's initial encounter. Patient reports that signs and symptoms have
 been present for 1 week and indicates a pain score of 0/10. 

                                                                          

MEDICAL/SURGICAL HISTORY:       . Carcinoma, breast. Carcinoma, metastatic.  Hysterectomy.



COMPARISON:      [facility] [place], ABDOMEN KUB ONLY, 6/7/2018.  . 





FINDINGS: 

There is mild constipation. Mild ileus. No evidence for obstruction or free air. No acute bony abnorm
alities.



CONCLUSION:

Moderate constipation. No acute findings.



Electronically signed by: Conner Aparicio MD  6/10/2018 7:04 PM EDT

## 2018-06-11 VITALS — HEART RATE: 72 BPM

## 2018-06-11 VITALS
SYSTOLIC BLOOD PRESSURE: 137 MMHG | RESPIRATION RATE: 16 BRPM | HEART RATE: 78 BPM | DIASTOLIC BLOOD PRESSURE: 86 MMHG | OXYGEN SATURATION: 97 % | TEMPERATURE: 98.4 F

## 2018-06-11 VITALS
TEMPERATURE: 98.1 F | OXYGEN SATURATION: 97 % | DIASTOLIC BLOOD PRESSURE: 76 MMHG | RESPIRATION RATE: 18 BRPM | HEART RATE: 80 BPM | SYSTOLIC BLOOD PRESSURE: 133 MMHG

## 2018-06-11 VITALS
HEART RATE: 75 BPM | DIASTOLIC BLOOD PRESSURE: 70 MMHG | SYSTOLIC BLOOD PRESSURE: 110 MMHG | RESPIRATION RATE: 20 BRPM | OXYGEN SATURATION: 96 % | TEMPERATURE: 98.2 F

## 2018-06-11 VITALS — HEART RATE: 84 BPM

## 2018-06-11 VITALS — HEART RATE: 78 BPM

## 2018-06-11 VITALS
OXYGEN SATURATION: 98 % | TEMPERATURE: 98.4 F | DIASTOLIC BLOOD PRESSURE: 79 MMHG | SYSTOLIC BLOOD PRESSURE: 135 MMHG | HEART RATE: 75 BPM | RESPIRATION RATE: 20 BRPM

## 2018-06-11 VITALS — HEART RATE: 86 BPM

## 2018-06-11 VITALS — HEART RATE: 68 BPM

## 2018-06-11 VITALS — HEART RATE: 74 BPM

## 2018-06-11 VITALS — HEART RATE: 80 BPM

## 2018-06-11 RX ADMIN — IPRATROPIUM BROMIDE AND ALBUTEROL SULFATE SCH AMPULE: .5; 3 SOLUTION RESPIRATORY (INHALATION) at 08:58

## 2018-06-11 RX ADMIN — HEPARIN SODIUM SCH UNITS: 10000 INJECTION, SOLUTION INTRAVENOUS; SUBCUTANEOUS at 12:35

## 2018-06-11 RX ADMIN — SUCRALFATE SCH GM: 1 SUSPENSION ORAL at 08:00

## 2018-06-11 RX ADMIN — LEVOFLOXACIN SCH MG: 750 TABLET, FILM COATED ORAL at 12:19

## 2018-06-11 RX ADMIN — SUCRALFATE SCH GM: 1 SUSPENSION ORAL at 12:00

## 2018-06-11 RX ADMIN — POLYETHYLENE GLYCOL 3350 SCH GM: 17 POWDER, FOR SOLUTION ORAL at 09:00

## 2018-06-11 RX ADMIN — NYSTATIN SCH ML: 100000 SUSPENSION ORAL at 10:48

## 2018-06-11 RX ADMIN — NIFEDIPINE SCH MG: 30 TABLET, FILM COATED, EXTENDED RELEASE ORAL at 09:00

## 2018-06-11 RX ADMIN — NYSTATIN SCH ML: 100000 SUSPENSION ORAL at 12:19

## 2018-06-11 RX ADMIN — HEPARIN SODIUM SCH UNITS: 10000 INJECTION, SOLUTION INTRAVENOUS; SUBCUTANEOUS at 05:28

## 2018-06-11 RX ADMIN — Medication SCH ML: at 09:00

## 2018-06-11 RX ADMIN — IPRATROPIUM BROMIDE AND ALBUTEROL SULFATE SCH AMPULE: .5; 3 SOLUTION RESPIRATORY (INHALATION) at 03:24

## 2018-06-11 RX ADMIN — TORSEMIDE SCH MG: 5 TABLET ORAL at 09:00

## 2018-06-11 NOTE — HHI.DS
__________________________________________________





Discharge Summary


Admission Date


Jun 5, 2018 at 00:49


Discharge Date:  Jun 11, 2018


Admitting Diagnosis





Pneumonia, Pericardial effusion, h/o metastatic breast CA





(1) Metastatic breast cancer to lung and bone


(2) Pericardial effusion


ICD Code:  I31.3 - Pericardial effusion (noninflammatory)


(3) Pneumonia


ICD Code:  J18.9 - Pneumonia, unspecified organism


Procedures


Echocardiogram


 Normal left ventricular size and wall thickness. 


 The left ventricular systolic function is normal with an estimated ejection 

fraction in the range of 60-65%. 


 Left ventricular diastolic function parameters are normal.   


 


 There is a moderate pericardial effusion present measuring 2.90 cm in apical 

four chamber view. 


 There is evidence of right ventricular hemodynamic compromise. 





Pericardiocentesis by Interventional radiology





EGD - normal.


Brief History - From Admission


53-year-old female with past medical history significant for metastatic breast 

cancer presents the emergency department for the evaluation of weakness and 

shortness of breath 2 weeks.  The patient reports that she has been feeling 

dizzy and has had associated anorexia.  She reports feeling itchy every time 

she eats food.  She endorses subjective fever/chills.  States she has 

difficulty breathing which is worse at night and she often awakens from sleep 

gasping for breath.  Positive dizziness.  No chest pain.  No abdominal pain.  

No nausea/vomiting/diarrhea.  No lateralizing signs/symptoms.


The patient has declined chemotherapy which was recommended by her oncologist.  

She wishes to pursue natural therapies.


CBC/BMP:  


6/8/18 1249                                                                    

            6/8/18 1249





Imaging





Last Impressions








Abdomen X-Ray 6/10/18 0000 Signed





Impressions: 





 CONCLUSION:





 Moderate constipation. No acute findings.





  





 


 


Chest X-Ray 6/7/18 1700 Signed





Impressions: 





 CONCLUSION: 





 No significant change





  





 


 


Pericardiocentesis 6/7/18 0000 Signed





Impressions: 





 CONCLUSION:  





 Pericardiocentesis procedure was performed, as above, with removal of 470 cc of





  fluid. Samples were sent to the lab for evaluation as ordered. There is a ques





 tionable amount of left pleural air on the postprocedure imaging. Therefore, th





 e pleural space may have been crossed by the needle. A follow-up chest x-ray ha





 s been ordered for later this afternoon.





  





 


 


Liver Ultrasound 6/5/18 0000 Signed





Impressions: 





 CONCLUSION: 





 1.  The gallbladder is thickened however the patient reports no tenderness over





  the gallbladder. Small amount of ascites around the liver.





  





 


 


Abdomen/Pelvis CT 6/4/18 2147 Signed





Impressions: 





 CONCLUSION:





 1.  There is a trace of fluid adjacent to the liver.





 2.  Gallbladder wall thickening.





 3.  Bulky fibroid uterus.





  





 


 


CT Angiography 6/4/18 1856 Signed





Impressions: 





 CONCLUSION:





 1.  No evidence of pulmonary embolism





 2.  Small to moderate pericardial effusion.





 3.  Bilateral nonspecific interstitial pulmonary infiltrates





 4.  Parenchymal consolidation in the right lower lung.





 5.  Small bilateral pleural effusions.





 6.  Diffuse bony metastatic disease.





  





 








PE at Discharge


GENERAL:  Alert, Oriented x 3, NAD. 


SKIN: Warm and dry.


HEAD: Normocephalic.


EYES: No scleral icterus. No injection or drainage. 


NECK: Supple, trachea midline. No JVD or lymphadenopathy.


CARDIOVASCULAR: Regular rate and rhythm without murmurs, gallops, or rubs. 


RESPIRATORY: Breath sounds equal bilaterally. No accessory muscle use.


GASTROINTESTINAL: Abdomen soft, non-tender, nondistended. 


MUSCULOSKELETAL: No cyanosis, or edema. 


BACK: Nontender without obvious deformity. No CVA tenderness.





Pt update on day of discharge


Patient is doing well. No acute concerns. No fever, chills. She is having BM 

because she took laxatives.


Hospital Course


53-year-old female with past medical history significant for metastatic breast 

cancer presents the emergency department for the evaluation of weakness and 

shortness of breath 2 weeks. Imaging studies indicated possible pneumonia as 

well as small to moderate pleural effusion. 





1.  Pneumonia


Chest CT significant for consolidation in the right lower lung with small 

bilateral pleural effusions and diffuse bony metastatic disease, personally 

reviewed


Continue Levaquin PO to provide abx for a total of 7 days. 


Supplemental oxygen as needed - currently on room air. 


Continue DuoNeb treatments. 


Patient reports feeling significant relief on oxygen, requests home oxygen.  

Walk test shows no need for home O2. 





2.  Pericardial effusion


Small to moderate pericardial effusion on CT scan. 


Limited echo was done - shows moderate pericardial effusion with some RV 

hemodynamic effect. 


status post CT guided pericardiocentesis. Cytology ordered


Symptomatically much improved. 


Future pericardial effusion is a possibility. Patient is advised to obtain a 

PCP and also keep close follow up with her Oncologist, Dr. Parker. 





3.  Lower ext swelling


Keep legs elevated. Lower ext and orthopnea is concerning for diastolic heart 

failure. EF is preserved. 


Avoid salt. Leg swelling is improved. Advised patient to take Torsemide as 

needed. BMP in one week after discharge. 





4.  Metastatic breast cancer


Patient currently choosing not to undergo treatment. She prefers alternative 

treatments. 


Oncology following.  She will see Dr. Parker in the outpatient setting and 

consider Aromatase inhibitor. 





5. Constipation - resolved. 





6. Low normal potassium K+ 3.5. Will give 30meq of KCL today and she can take 

KCL if she takes Torsemide. 





Full code. Heparin SQ for DVT prophylaxis while in-patient.


Pt Condition on Discharge:  Good


Discharge Disposition:  Disch w/ Home Health Serv


Discharge Time:  > 30 minutes


Discharge Instructions


DIET: Follow Instructions for:  Heart Healthy Diet


Activities you can perform:  Regular-No Restrictions


Follow up Referrals:  


Oncology/Hematology - 1 Week with Oj Parker MD


PCP Follow-up - 1 Week





New Orders:  


BASIC METABOLIC PROF - 1 Week





New Medications:  


Potassium Chloride ER (K-Tab) 10 Meq Tab


10 MEQ PO DAILY for Electrolyte Replacement, #30 TAB 0 Refills





Levofloxacin (Levaquin) 750 Mg Tablet


750 MG PO Q24H for Infection, #5 TAB





Lorazepam (Ativan) 0.5 Mg Tab


0.5 MG PO Q8H PRN for Anxiety, #21 TAB





Nifedipine ER 24 HR (Nifedipine ER 24 HR) 30 Mg Tab


30 MG PO DAILY for Blood Pressure Management, #30 TAB





Ondansetron Odt (Ondansetron Odt) 4 Mg Tab


4 MG SL Q6H PRN for nausea, #20 TAB





Pantoprazole (Pantoprazole) 40 Mg Tab


40 MG PO DAILY for Reflux, #30 TAB





Torsemide (Torsemide) 5 Mg Tab


10 MG PO BID@09,18 for Fluid, #60 TAB





[Nystatin  Liq] () 5 ML SUSP


5 ML SWISH-SPIT QID for Infection for 7 Days, BOTTLE

















Booker Dotson DO Jun 11, 2018 18:22

## 2018-06-11 NOTE — HHI.PR
Subjective


Remarks


Follow-up for metastatic breast cancer, shortness of breath, pericardial 

effusion. Patient is currently doing well. Due to multiple laxatives she took 

yesterday, she is having loose BM today. No fever, chills. Leg swelling down.





Objective


Vitals





Vital Signs








  Date Time  Temp Pulse Resp B/P (MAP) Pulse Ox O2 Delivery O2 Flow Rate FiO2


 


6/11/18 12:00      Room Air  


 


6/11/18 12:00 98.1 80 18 133/76 (95) 97   


 


6/11/18 12:00  73      


 


6/11/18 11:00  78      


 


6/11/18 10:00  80      


 


6/11/18 09:00  68      


 


6/11/18 08:00 98.2 79 16 137/86 (103) 97   


 


6/11/18 08:00  78      


 


6/11/18 08:00 98.4 78 16 137/86 (103) 97   


 


6/11/18 08:00      Room Air  


 


6/11/18 07:00  86      


 


6/11/18 06:00  84      


 


6/11/18 05:00  74      


 


6/11/18 04:00 98.2 75 20 110/70 (83) 96   


 


6/11/18 04:00  75      


 


6/11/18 04:00     98 Room Air  


 


6/11/18 03:00  72      


 


6/11/18 02:00  78      


 


6/11/18 01:00  80      


 


6/11/18 00:00  91      


 


6/11/18 00:00     98 Room Air  


 


6/11/18 00:00 98.4 75 20 135/79 (97) 98   


 


6/10/18 23:00  82      


 


6/10/18 22:00  90      


 


6/10/18 21:00  80      


 


6/10/18 20:00 98.3 78 20 110/65 (80) 96   


 


6/10/18 20:00     98 Room Air  


 


6/10/18 20:00  80      


 


6/10/18 19:00  86      


 


6/10/18 18:00  84      


 


6/10/18 17:00  88      


 


6/10/18 16:00  86      


 


6/10/18 15:40     96 Room Air  


 


6/10/18 15:40 98.6 86 18 124/71 (88) 98   


 


6/10/18 15:00  90      














I/O      


 


 6/10/18 6/10/18 6/10/18 6/11/18 6/11/18 6/11/18





 07:00 15:00 23:00 07:00 15:00 23:00


 


Intake Total 240 ml  1200 ml 360 ml  


 


Balance 240 ml  1200 ml 360 ml  


 


      


 


Intake Oral 240 ml  1200 ml 360 ml  


 


# Voids 2  5 1  


 


# Bowel Movements 0  0 1  








Result Diagram:  


6/8/18 1249                                                                    

            6/8/18 1249





Imaging





Last Impressions








Abdomen X-Ray 6/10/18 0000 Signed





Impressions: 





 CONCLUSION:





 Moderate constipation. No acute findings.





  





 


 


Chest X-Ray 6/7/18 1700 Signed





Impressions: 





 CONCLUSION: 





 No significant change





  





 


 


Pericardiocentesis 6/7/18 0000 Signed





Impressions: 





 CONCLUSION:  





 Pericardiocentesis procedure was performed, as above, with removal of 470 cc of





  fluid. Samples were sent to the lab for evaluation as ordered. There is a ques





 tionable amount of left pleural air on the postprocedure imaging. Therefore, th





 e pleural space may have been crossed by the needle. A follow-up chest x-ray ha





 s been ordered for later this afternoon.





  





 


 


Liver Ultrasound 6/5/18 0000 Signed





Impressions: 





 CONCLUSION: 





 1.  The gallbladder is thickened however the patient reports no tenderness over





  the gallbladder. Small amount of ascites around the liver.





  





 


 


Abdomen/Pelvis CT 6/4/18 3137 Signed





Impressions: 





 CONCLUSION:





 1.  There is a trace of fluid adjacent to the liver.





 2.  Gallbladder wall thickening.





 3.  Bulky fibroid uterus.





  





 


 


CT Angiography 6/4/18 8756 Signed





Impressions: 





 CONCLUSION:





 1.  No evidence of pulmonary embolism





 2.  Small to moderate pericardial effusion.





 3.  Bilateral nonspecific interstitial pulmonary infiltrates





 4.  Parenchymal consolidation in the right lower lung.





 5.  Small bilateral pleural effusions.





 6.  Diffuse bony metastatic disease.





  





 








Objective Remarks


GENERAL:  Alert, Oriented x 3, NAD. 


SKIN: Warm and dry.


HEAD: Normocephalic.


EYES: No scleral icterus. No injection or drainage. 


NECK: Supple, trachea midline. No JVD or lymphadenopathy.


CARDIOVASCULAR: Regular rate and rhythm without murmurs, gallops, or rubs. 


RESPIRATORY: Breath sounds equal bilaterally. No accessory muscle use.


GASTROINTESTINAL: Abdomen soft, non-tender, nondistended. 


MUSCULOSKELETAL: No cyanosis, or edema. 


BACK: Nontender without obvious deformity. No CVA tenderness.





Procedures


None.





A/P


Assessment and Plan


53-year-old female with past medical history significant for metastatic breast 

cancer presents the emergency department for the evaluation of weakness and 

shortness of breath 2 weeks. Imaging studies indicated possible pneumonia as 

well as small to moderate pleural effusion. 





1.  Pneumonia


Chest CT significant for consolidation in the right lower lung with small 

bilateral pleural effusions and diffuse bony metastatic disease, personally 

reviewed


Continue Levaquin PO to provide abx for a total of 7 days. 


Supplemental oxygen as needed - currently on room air. 


Continue DuoNeb treatments. 


Patient reports feeling significant relief on oxygen, requests home oxygen.  

Walk test shows no need for home O2. 





2.  Pericardial effusion


Small to moderate pericardial effusion on CT scan. 


Limited echo was done - shows moderate pericardial effusion with some RV 

hemodynamic effect. 


status post CT guided pericardiocentesis. Cytology ordered


Symptomatically much improved. 


Future pericardial effusion is a possibility. Patient is advised to obtain a 

PCP and also keep close follow up with her Oncologist, Dr. Parker. 





3.  Lower ext swelling


Keep legs elevated. Lower ext and orthopnea is concerning for diastolic heart 

failure. EF is preserved. 


Avoid salt. Leg swelling is improved. Advised patient to take Torsemide as 

needed. BMP in one week after discharge. 





4.  Metastatic breast cancer


Patient currently choosing not to undergo treatment. She prefers alternative 

treatments. 


Oncology following.  She will see Dr. Parker in the outpatient setting and 

consider Aromatase inhibitor. 





5. Constipation - resolved. 





6. Low normal potassium K+ 3.5. Will give 30meq of KCL today and she can take 

KCL if she takes Torsemide. 





Full code. Heparin SQ.











Booker Dotson DO Jun 11, 2018 2:11 pm

## 2020-01-01 ENCOUNTER — INPATIENT (INPATIENT)
Facility: HOSPITAL | Age: 55
LOS: 11 days | End: 2021-01-03
Attending: STUDENT IN AN ORGANIZED HEALTH CARE EDUCATION/TRAINING PROGRAM | Admitting: STUDENT IN AN ORGANIZED HEALTH CARE EDUCATION/TRAINING PROGRAM
Payer: COMMERCIAL

## 2020-01-01 ENCOUNTER — TRANSCRIPTION ENCOUNTER (OUTPATIENT)
Age: 55
End: 2020-01-01

## 2020-01-01 ENCOUNTER — INPATIENT (INPATIENT)
Facility: HOSPITAL | Age: 55
LOS: 4 days | Discharge: HOME HEALTH | End: 2020-11-03
Attending: INTERNAL MEDICINE | Admitting: INTERNAL MEDICINE
Payer: COMMERCIAL

## 2020-01-01 ENCOUNTER — INPATIENT (INPATIENT)
Facility: HOSPITAL | Age: 55
LOS: 2 days | Discharge: HOME HEALTH | End: 2020-12-14
Attending: STUDENT IN AN ORGANIZED HEALTH CARE EDUCATION/TRAINING PROGRAM | Admitting: STUDENT IN AN ORGANIZED HEALTH CARE EDUCATION/TRAINING PROGRAM
Payer: COMMERCIAL

## 2020-01-01 ENCOUNTER — INPATIENT (INPATIENT)
Facility: HOSPITAL | Age: 55
LOS: 5 days | Discharge: HOME HEALTH | End: 2020-12-03
Attending: INTERNAL MEDICINE | Admitting: INTERNAL MEDICINE
Payer: COMMERCIAL

## 2020-01-01 ENCOUNTER — APPOINTMENT (OUTPATIENT)
Dept: INTERNAL MEDICINE | Facility: CLINIC | Age: 55
End: 2020-01-01

## 2020-01-01 ENCOUNTER — INPATIENT (INPATIENT)
Facility: HOSPITAL | Age: 55
LOS: 6 days | Discharge: HOME HEALTH | DRG: 176 | End: 2020-09-18
Attending: INTERNAL MEDICINE | Admitting: INTERNAL MEDICINE
Payer: COMMERCIAL

## 2020-01-01 ENCOUNTER — INPATIENT (INPATIENT)
Facility: HOSPITAL | Age: 55
LOS: 8 days | Discharge: HOME HEALTH | End: 2020-10-09
Attending: HOSPITALIST | Admitting: HOSPITALIST
Payer: COMMERCIAL

## 2020-01-01 VITALS
WEIGHT: 169.98 LBS | OXYGEN SATURATION: 99 % | SYSTOLIC BLOOD PRESSURE: 109 MMHG | HEART RATE: 52 BPM | DIASTOLIC BLOOD PRESSURE: 85 MMHG | TEMPERATURE: 97 F | RESPIRATION RATE: 40 BRPM

## 2020-01-01 VITALS
DIASTOLIC BLOOD PRESSURE: 99 MMHG | TEMPERATURE: 98 F | OXYGEN SATURATION: 99 % | HEART RATE: 77 BPM | SYSTOLIC BLOOD PRESSURE: 164 MMHG | WEIGHT: 169.98 LBS | RESPIRATION RATE: 28 BRPM | HEIGHT: 61 IN

## 2020-01-01 VITALS — TEMPERATURE: 97 F

## 2020-01-01 VITALS
RESPIRATION RATE: 25 BRPM | OXYGEN SATURATION: 100 % | SYSTOLIC BLOOD PRESSURE: 177 MMHG | TEMPERATURE: 99 F | HEART RATE: 105 BPM | DIASTOLIC BLOOD PRESSURE: 116 MMHG

## 2020-01-01 VITALS — RESPIRATION RATE: 24 BRPM

## 2020-01-01 VITALS
HEART RATE: 71 BPM | OXYGEN SATURATION: 100 % | TEMPERATURE: 98 F | DIASTOLIC BLOOD PRESSURE: 70 MMHG | RESPIRATION RATE: 16 BRPM | SYSTOLIC BLOOD PRESSURE: 120 MMHG

## 2020-01-01 VITALS — HEART RATE: 71 BPM | OXYGEN SATURATION: 100 %

## 2020-01-01 VITALS — OXYGEN SATURATION: 99 % | HEART RATE: 74 BPM

## 2020-01-01 VITALS — HEIGHT: 63 IN | OXYGEN SATURATION: 100 % | HEART RATE: 79 BPM

## 2020-01-01 VITALS — DIASTOLIC BLOOD PRESSURE: 102 MMHG | OXYGEN SATURATION: 99 % | SYSTOLIC BLOOD PRESSURE: 175 MMHG | HEART RATE: 92 BPM

## 2020-01-01 VITALS — OXYGEN SATURATION: 96 %

## 2020-01-01 DIAGNOSIS — Z79.01 LONG TERM (CURRENT) USE OF ANTICOAGULANTS: ICD-10-CM

## 2020-01-01 DIAGNOSIS — Z88.8 ALLERGY STATUS TO OTHER DRUGS, MEDICAMENTS AND BIOLOGICAL SUBSTANCES: ICD-10-CM

## 2020-01-01 DIAGNOSIS — Z91.19 PATIENT'S NONCOMPLIANCE WITH OTHER MEDICAL TREATMENT AND REGIMEN: ICD-10-CM

## 2020-01-01 DIAGNOSIS — C78.00 SECONDARY MALIGNANT NEOPLASM OF UNSPECIFIED LUNG: ICD-10-CM

## 2020-01-01 DIAGNOSIS — R41.82 ALTERED MENTAL STATUS, UNSPECIFIED: ICD-10-CM

## 2020-01-01 DIAGNOSIS — N39.0 URINARY TRACT INFECTION, SITE NOT SPECIFIED: ICD-10-CM

## 2020-01-01 DIAGNOSIS — Z90.13 ACQUIRED ABSENCE OF BILATERAL BREASTS AND NIPPLES: ICD-10-CM

## 2020-01-01 DIAGNOSIS — C79.51 SECONDARY MALIGNANT NEOPLASM OF BONE: ICD-10-CM

## 2020-01-01 DIAGNOSIS — Z66 DO NOT RESUSCITATE: ICD-10-CM

## 2020-01-01 DIAGNOSIS — J96.12 CHRONIC RESPIRATORY FAILURE WITH HYPERCAPNIA: ICD-10-CM

## 2020-01-01 DIAGNOSIS — C50.919 MALIGNANT NEOPLASM OF UNSPECIFIED SITE OF UNSPECIFIED FEMALE BREAST: ICD-10-CM

## 2020-01-01 DIAGNOSIS — J90 PLEURAL EFFUSION, NOT ELSEWHERE CLASSIFIED: ICD-10-CM

## 2020-01-01 DIAGNOSIS — R60.1 GENERALIZED EDEMA: ICD-10-CM

## 2020-01-01 DIAGNOSIS — I27.82 CHRONIC PULMONARY EMBOLISM: ICD-10-CM

## 2020-01-01 DIAGNOSIS — Z99.81 DEPENDENCE ON SUPPLEMENTAL OXYGEN: ICD-10-CM

## 2020-01-01 DIAGNOSIS — Z79.899 OTHER LONG TERM (CURRENT) DRUG THERAPY: ICD-10-CM

## 2020-01-01 DIAGNOSIS — E87.3 ALKALOSIS: ICD-10-CM

## 2020-01-01 DIAGNOSIS — R74.01 ELEVATION OF LEVELS OF LIVER TRANSAMINASE LEVELS: ICD-10-CM

## 2020-01-01 DIAGNOSIS — I10 ESSENTIAL (PRIMARY) HYPERTENSION: ICD-10-CM

## 2020-01-01 DIAGNOSIS — J96.22 ACUTE AND CHRONIC RESPIRATORY FAILURE WITH HYPERCAPNIA: ICD-10-CM

## 2020-01-01 DIAGNOSIS — J91.0 MALIGNANT PLEURAL EFFUSION: ICD-10-CM

## 2020-01-01 DIAGNOSIS — I26.99 OTHER PULMONARY EMBOLISM WITHOUT ACUTE COR PULMONALE: ICD-10-CM

## 2020-01-01 DIAGNOSIS — T40.2X1A POISONING BY OTHER OPIOIDS, ACCIDENTAL (UNINTENTIONAL), INITIAL ENCOUNTER: ICD-10-CM

## 2020-01-01 DIAGNOSIS — Z78.9 OTHER SPECIFIED HEALTH STATUS: ICD-10-CM

## 2020-01-01 DIAGNOSIS — E87.1 HYPO-OSMOLALITY AND HYPONATREMIA: ICD-10-CM

## 2020-01-01 DIAGNOSIS — K59.00 CONSTIPATION, UNSPECIFIED: ICD-10-CM

## 2020-01-01 DIAGNOSIS — R63.8 OTHER SYMPTOMS AND SIGNS CONCERNING FOOD AND FLUID INTAKE: ICD-10-CM

## 2020-01-01 DIAGNOSIS — E86.1 HYPOVOLEMIA: ICD-10-CM

## 2020-01-01 DIAGNOSIS — Z23 ENCOUNTER FOR IMMUNIZATION: ICD-10-CM

## 2020-01-01 DIAGNOSIS — Z86.718 PERSONAL HISTORY OF OTHER VENOUS THROMBOSIS AND EMBOLISM: ICD-10-CM

## 2020-01-01 DIAGNOSIS — Z86.711 PERSONAL HISTORY OF PULMONARY EMBOLISM: ICD-10-CM

## 2020-01-01 DIAGNOSIS — G89.3 NEOPLASM RELATED PAIN (ACUTE) (CHRONIC): ICD-10-CM

## 2020-01-01 DIAGNOSIS — R53.2 FUNCTIONAL QUADRIPLEGIA: ICD-10-CM

## 2020-01-01 DIAGNOSIS — F41.9 ANXIETY DISORDER, UNSPECIFIED: ICD-10-CM

## 2020-01-01 DIAGNOSIS — E83.52 HYPERCALCEMIA: ICD-10-CM

## 2020-01-01 DIAGNOSIS — D47.3 ESSENTIAL (HEMORRHAGIC) THROMBOCYTHEMIA: ICD-10-CM

## 2020-01-01 DIAGNOSIS — J96.21 ACUTE AND CHRONIC RESPIRATORY FAILURE WITH HYPOXIA: ICD-10-CM

## 2020-01-01 DIAGNOSIS — Z20.828 CONTACT WITH AND (SUSPECTED) EXPOSURE TO OTHER VIRAL COMMUNICABLE DISEASES: ICD-10-CM

## 2020-01-01 DIAGNOSIS — Z90.13 ACQUIRED ABSENCE OF BILATERAL BREASTS AND NIPPLES: Chronic | ICD-10-CM

## 2020-01-01 DIAGNOSIS — R40.4 TRANSIENT ALTERATION OF AWARENESS: ICD-10-CM

## 2020-01-01 DIAGNOSIS — R06.03 ACUTE RESPIRATORY DISTRESS: ICD-10-CM

## 2020-01-01 DIAGNOSIS — E87.8 OTHER DISORDERS OF ELECTROLYTE AND FLUID BALANCE, NOT ELSEWHERE CLASSIFIED: ICD-10-CM

## 2020-01-01 DIAGNOSIS — J96.92 RESPIRATORY FAILURE, UNSPECIFIED WITH HYPERCAPNIA: ICD-10-CM

## 2020-01-01 DIAGNOSIS — I26.94 MULTIPLE SUBSEGMENTAL PULMONARY EMBOLI WITHOUT ACUTE COR PULMONALE: ICD-10-CM

## 2020-01-01 DIAGNOSIS — R64 CACHEXIA: ICD-10-CM

## 2020-01-01 DIAGNOSIS — G93.41 METABOLIC ENCEPHALOPATHY: ICD-10-CM

## 2020-01-01 DIAGNOSIS — J96.11 CHRONIC RESPIRATORY FAILURE WITH HYPOXIA: ICD-10-CM

## 2020-01-01 DIAGNOSIS — Z79.810 LONG TERM (CURRENT) USE OF SELECTIVE ESTROGEN RECEPTOR MODULATORS (SERMS): ICD-10-CM

## 2020-01-01 DIAGNOSIS — C79.81 SECONDARY MALIGNANT NEOPLASM OF BREAST: ICD-10-CM

## 2020-01-01 DIAGNOSIS — D50.9 IRON DEFICIENCY ANEMIA, UNSPECIFIED: ICD-10-CM

## 2020-01-01 DIAGNOSIS — J98.11 ATELECTASIS: ICD-10-CM

## 2020-01-01 DIAGNOSIS — R40.1 STUPOR: ICD-10-CM

## 2020-01-01 DIAGNOSIS — Z85.3 PERSONAL HISTORY OF MALIGNANT NEOPLASM OF BREAST: ICD-10-CM

## 2020-01-01 DIAGNOSIS — R68.0 HYPOTHERMIA, NOT ASSOCIATED WITH LOW ENVIRONMENTAL TEMPERATURE: ICD-10-CM

## 2020-01-01 DIAGNOSIS — I80.219 PHLEBITIS AND THROMBOPHLEBITIS OF UNSPECIFIED ILIAC VEIN: ICD-10-CM

## 2020-01-01 LAB
6-ACETYLMORPHINE, UR RESULT: NEGATIVE NG/ML — SIGNIFICANT CHANGE UP
6MAM UR CFM-MCNC: NEGATIVE NG/ML — SIGNIFICANT CHANGE UP
ALBUMIN SERPL ELPH-MCNC: 3.8 G/DL — SIGNIFICANT CHANGE UP (ref 3.5–5.2)
ALBUMIN SERPL ELPH-MCNC: 3.9 G/DL — SIGNIFICANT CHANGE UP (ref 3.5–5.2)
ALBUMIN SERPL ELPH-MCNC: 3.9 G/DL — SIGNIFICANT CHANGE UP (ref 3.5–5.2)
ALBUMIN SERPL ELPH-MCNC: 4 G/DL — SIGNIFICANT CHANGE UP (ref 3.5–5.2)
ALBUMIN SERPL ELPH-MCNC: 4.1 G/DL — SIGNIFICANT CHANGE UP (ref 3.5–5.2)
ALBUMIN SERPL ELPH-MCNC: 4.2 G/DL — SIGNIFICANT CHANGE UP (ref 3.3–5)
ALBUMIN SERPL ELPH-MCNC: 4.2 G/DL — SIGNIFICANT CHANGE UP (ref 3.3–5)
ALBUMIN SERPL ELPH-MCNC: 4.2 G/DL — SIGNIFICANT CHANGE UP (ref 3.5–5.2)
ALBUMIN SERPL ELPH-MCNC: 4.3 G/DL — SIGNIFICANT CHANGE UP (ref 3.3–5)
ALBUMIN SERPL ELPH-MCNC: 4.4 G/DL — SIGNIFICANT CHANGE UP (ref 3.5–5.2)
ALP SERPL-CCNC: 100 U/L — SIGNIFICANT CHANGE UP (ref 30–115)
ALP SERPL-CCNC: 102 U/L — SIGNIFICANT CHANGE UP (ref 30–115)
ALP SERPL-CCNC: 104 U/L — SIGNIFICANT CHANGE UP (ref 30–115)
ALP SERPL-CCNC: 115 U/L — SIGNIFICANT CHANGE UP (ref 30–115)
ALP SERPL-CCNC: 118 U/L — HIGH (ref 30–115)
ALP SERPL-CCNC: 123 U/L — HIGH (ref 30–115)
ALP SERPL-CCNC: 124 U/L — HIGH (ref 30–115)
ALP SERPL-CCNC: 130 U/L — HIGH (ref 30–115)
ALP SERPL-CCNC: 139 U/L — HIGH (ref 30–115)
ALP SERPL-CCNC: 144 U/L — HIGH (ref 30–115)
ALP SERPL-CCNC: 148 U/L — HIGH (ref 40–120)
ALP SERPL-CCNC: 151 U/L — HIGH (ref 30–115)
ALP SERPL-CCNC: 155 U/L — HIGH (ref 40–120)
ALP SERPL-CCNC: 159 U/L — HIGH (ref 30–115)
ALP SERPL-CCNC: 163 U/L — HIGH (ref 40–120)
ALT FLD-CCNC: 142 U/L — HIGH (ref 0–41)
ALT FLD-CCNC: 15 U/L — SIGNIFICANT CHANGE UP (ref 10–45)
ALT FLD-CCNC: 17 U/L — SIGNIFICANT CHANGE UP (ref 0–41)
ALT FLD-CCNC: 171 U/L — HIGH (ref 0–41)
ALT FLD-CCNC: 18 U/L — SIGNIFICANT CHANGE UP (ref 10–45)
ALT FLD-CCNC: 18 U/L — SIGNIFICANT CHANGE UP (ref 10–45)
ALT FLD-CCNC: 184 U/L — HIGH (ref 0–41)
ALT FLD-CCNC: 202 U/L — HIGH (ref 0–41)
ALT FLD-CCNC: 24 U/L — SIGNIFICANT CHANGE UP (ref 0–41)
ALT FLD-CCNC: 27 U/L — SIGNIFICANT CHANGE UP (ref 0–41)
ALT FLD-CCNC: 30 U/L — SIGNIFICANT CHANGE UP (ref 0–41)
ALT FLD-CCNC: 50 U/L — HIGH (ref 0–41)
ALT FLD-CCNC: 56 U/L — HIGH (ref 0–41)
ALT FLD-CCNC: 68 U/L — HIGH (ref 0–41)
ALT FLD-CCNC: 77 U/L — HIGH (ref 0–41)
AMPHET UR-MCNC: NEGATIVE — SIGNIFICANT CHANGE UP
AMPHET UR-MCNC: NEGATIVE — SIGNIFICANT CHANGE UP
ANION GAP SERPL CALC-SCNC: 1 MMOL/L — LOW (ref 7–14)
ANION GAP SERPL CALC-SCNC: 10 MMOL/L — SIGNIFICANT CHANGE UP (ref 5–17)
ANION GAP SERPL CALC-SCNC: 10 MMOL/L — SIGNIFICANT CHANGE UP (ref 7–14)
ANION GAP SERPL CALC-SCNC: 11 MMOL/L — SIGNIFICANT CHANGE UP (ref 5–17)
ANION GAP SERPL CALC-SCNC: 12 MMOL/L — SIGNIFICANT CHANGE UP (ref 5–17)
ANION GAP SERPL CALC-SCNC: 13 MMOL/L — SIGNIFICANT CHANGE UP (ref 5–17)
ANION GAP SERPL CALC-SCNC: 16 MMOL/L — HIGH (ref 7–14)
ANION GAP SERPL CALC-SCNC: 3 MMOL/L — LOW (ref 7–14)
ANION GAP SERPL CALC-SCNC: 4 MMOL/L — LOW (ref 7–14)
ANION GAP SERPL CALC-SCNC: 4 MMOL/L — LOW (ref 7–14)
ANION GAP SERPL CALC-SCNC: 5 MMOL/L — LOW (ref 7–14)
ANION GAP SERPL CALC-SCNC: 6 MMOL/L — LOW (ref 7–14)
ANION GAP SERPL CALC-SCNC: 7 MMOL/L — SIGNIFICANT CHANGE UP (ref 5–17)
ANION GAP SERPL CALC-SCNC: 7 MMOL/L — SIGNIFICANT CHANGE UP (ref 7–14)
ANION GAP SERPL CALC-SCNC: 8 MMOL/L — SIGNIFICANT CHANGE UP (ref 5–17)
ANION GAP SERPL CALC-SCNC: 8 MMOL/L — SIGNIFICANT CHANGE UP (ref 5–17)
ANION GAP SERPL CALC-SCNC: 8 MMOL/L — SIGNIFICANT CHANGE UP (ref 7–14)
ANION GAP SERPL CALC-SCNC: 9 MMOL/L — SIGNIFICANT CHANGE UP (ref 7–14)
ANION GAP SERPL CALC-SCNC: 9 MMOL/L — SIGNIFICANT CHANGE UP (ref 7–14)
ANION GAP SERPL CALC-SCNC: <1 MMOL/L — LOW (ref 7–14)
APAP SERPL-MCNC: <5 UG/ML — LOW (ref 10–30)
APPEARANCE UR: CLEAR — SIGNIFICANT CHANGE UP
APPEARANCE UR: CLEAR — SIGNIFICANT CHANGE UP
APTT BLD: 32.2 SEC — SIGNIFICANT CHANGE UP (ref 27–39.2)
APTT BLD: 33.5 SEC — SIGNIFICANT CHANGE UP (ref 27.5–35.5)
APTT BLD: 33.5 SEC — SIGNIFICANT CHANGE UP (ref 27–39.2)
APTT BLD: 34.3 SEC — SIGNIFICANT CHANGE UP (ref 27–39.2)
APTT BLD: 34.7 SEC — SIGNIFICANT CHANGE UP (ref 27.5–35.5)
APTT BLD: 61.5 SEC — HIGH (ref 27.5–35.5)
AST SERPL-CCNC: 28 U/L — SIGNIFICANT CHANGE UP (ref 10–40)
AST SERPL-CCNC: 28 U/L — SIGNIFICANT CHANGE UP (ref 10–40)
AST SERPL-CCNC: 30 U/L — SIGNIFICANT CHANGE UP (ref 10–40)
AST SERPL-CCNC: 36 U/L — SIGNIFICANT CHANGE UP (ref 0–41)
AST SERPL-CCNC: 39 U/L — SIGNIFICANT CHANGE UP (ref 0–41)
AST SERPL-CCNC: 40 U/L — SIGNIFICANT CHANGE UP (ref 0–41)
AST SERPL-CCNC: 42 U/L — HIGH (ref 0–41)
AST SERPL-CCNC: 44 U/L — HIGH (ref 0–41)
AST SERPL-CCNC: 48 U/L — HIGH (ref 0–41)
AST SERPL-CCNC: 48 U/L — HIGH (ref 0–41)
AST SERPL-CCNC: 56 U/L — HIGH (ref 0–41)
AST SERPL-CCNC: 60 U/L — HIGH (ref 0–41)
AST SERPL-CCNC: 69 U/L — HIGH (ref 0–41)
AST SERPL-CCNC: 80 U/L — HIGH (ref 0–41)
AST SERPL-CCNC: 94 U/L — HIGH (ref 0–41)
BACTERIA # UR AUTO: ABNORMAL
BACTERIA # UR AUTO: ABNORMAL /HPF
BARBITURATES UR SCN-MCNC: NEGATIVE — SIGNIFICANT CHANGE UP
BARBITURATES UR SCN-MCNC: NEGATIVE — SIGNIFICANT CHANGE UP
BASE EXCESS BLDA CALC-SCNC: 20.5 MMOL/L — HIGH (ref -2–2)
BASE EXCESS BLDA CALC-SCNC: 9.3 MMOL/L — HIGH (ref -2–2)
BASE EXCESS BLDA CALC-SCNC: 9.8 MMOL/L — HIGH (ref -2–2)
BASE EXCESS BLDV CALC-SCNC: 10.7 MMOL/L — HIGH (ref -2–2)
BASE EXCESS BLDV CALC-SCNC: 11.1 MMOL/L — HIGH (ref -2–2)
BASE EXCESS BLDV CALC-SCNC: 17.3 MMOL/L — HIGH (ref -2–2)
BASE EXCESS BLDV CALC-SCNC: 18.4 MMOL/L — HIGH (ref -2–2)
BASE EXCESS BLDV CALC-SCNC: 21.1 MMOL/L — HIGH (ref -2–2)
BASE EXCESS BLDV CALC-SCNC: 9.3 MMOL/L — SIGNIFICANT CHANGE UP
BASOPHILS # BLD AUTO: 0.01 K/UL — SIGNIFICANT CHANGE UP (ref 0–0.2)
BASOPHILS # BLD AUTO: 0.02 K/UL — SIGNIFICANT CHANGE UP (ref 0–0.2)
BASOPHILS # BLD AUTO: 0.03 K/UL — SIGNIFICANT CHANGE UP (ref 0–0.2)
BASOPHILS # BLD AUTO: 0.04 K/UL — SIGNIFICANT CHANGE UP (ref 0–0.2)
BASOPHILS # BLD AUTO: 0.06 K/UL — SIGNIFICANT CHANGE UP (ref 0–0.2)
BASOPHILS NFR BLD AUTO: 0.2 % — SIGNIFICANT CHANGE UP (ref 0–1)
BASOPHILS NFR BLD AUTO: 0.3 % — SIGNIFICANT CHANGE UP (ref 0–1)
BASOPHILS NFR BLD AUTO: 0.4 % — SIGNIFICANT CHANGE UP (ref 0–1)
BASOPHILS NFR BLD AUTO: 0.5 % — SIGNIFICANT CHANGE UP (ref 0–1)
BASOPHILS NFR BLD AUTO: 0.6 % — SIGNIFICANT CHANGE UP (ref 0–2)
BASOPHILS NFR BLD AUTO: 0.6 % — SIGNIFICANT CHANGE UP (ref 0–2)
BASOPHILS NFR BLD AUTO: 0.7 % — SIGNIFICANT CHANGE UP (ref 0–1)
BASOPHILS NFR BLD AUTO: 0.7 % — SIGNIFICANT CHANGE UP (ref 0–1)
BASOPHILS NFR BLD AUTO: 0.7 % — SIGNIFICANT CHANGE UP (ref 0–2)
BASOPHILS NFR BLD AUTO: 0.7 % — SIGNIFICANT CHANGE UP (ref 0–2)
BASOPHILS NFR BLD AUTO: 0.8 % — SIGNIFICANT CHANGE UP (ref 0–1)
BENZODIAZ UR-MCNC: NEGATIVE — SIGNIFICANT CHANGE UP
BENZODIAZ UR-MCNC: NEGATIVE — SIGNIFICANT CHANGE UP
BILIRUB SERPL-MCNC: 0.3 MG/DL — SIGNIFICANT CHANGE UP (ref 0.2–1.2)
BILIRUB SERPL-MCNC: 0.4 MG/DL — SIGNIFICANT CHANGE UP (ref 0.2–1.2)
BILIRUB SERPL-MCNC: 0.5 MG/DL — SIGNIFICANT CHANGE UP (ref 0.2–1.2)
BILIRUB UR-MCNC: ABNORMAL
BILIRUB UR-MCNC: NEGATIVE — SIGNIFICANT CHANGE UP
BLD GP AB SCN SERPL QL: SIGNIFICANT CHANGE UP
BUN SERPL-MCNC: 10 MG/DL — SIGNIFICANT CHANGE UP (ref 10–20)
BUN SERPL-MCNC: 10 MG/DL — SIGNIFICANT CHANGE UP (ref 10–20)
BUN SERPL-MCNC: 11 MG/DL — SIGNIFICANT CHANGE UP (ref 10–20)
BUN SERPL-MCNC: 11 MG/DL — SIGNIFICANT CHANGE UP (ref 10–20)
BUN SERPL-MCNC: 13 MG/DL — SIGNIFICANT CHANGE UP (ref 10–20)
BUN SERPL-MCNC: 14 MG/DL — SIGNIFICANT CHANGE UP (ref 10–20)
BUN SERPL-MCNC: 14 MG/DL — SIGNIFICANT CHANGE UP (ref 7–23)
BUN SERPL-MCNC: 15 MG/DL — SIGNIFICANT CHANGE UP (ref 10–20)
BUN SERPL-MCNC: 17 MG/DL — SIGNIFICANT CHANGE UP (ref 10–20)
BUN SERPL-MCNC: 17 MG/DL — SIGNIFICANT CHANGE UP (ref 7–23)
BUN SERPL-MCNC: 18 MG/DL — SIGNIFICANT CHANGE UP (ref 7–23)
BUN SERPL-MCNC: 18 MG/DL — SIGNIFICANT CHANGE UP (ref 7–23)
BUN SERPL-MCNC: 19 MG/DL — SIGNIFICANT CHANGE UP (ref 10–20)
BUN SERPL-MCNC: 19 MG/DL — SIGNIFICANT CHANGE UP (ref 10–20)
BUN SERPL-MCNC: 20 MG/DL — SIGNIFICANT CHANGE UP (ref 7–23)
BUN SERPL-MCNC: 21 MG/DL — HIGH (ref 10–20)
BUN SERPL-MCNC: 22 MG/DL — HIGH (ref 10–20)
BUN SERPL-MCNC: 24 MG/DL — HIGH (ref 10–20)
BUN SERPL-MCNC: 26 MG/DL — HIGH (ref 10–20)
BUN SERPL-MCNC: 29 MG/DL — HIGH (ref 10–20)
BUN SERPL-MCNC: 45 MG/DL — HIGH (ref 10–20)
BUN SERPL-MCNC: 45 MG/DL — HIGH (ref 10–20)
BUN SERPL-MCNC: 54 MG/DL — HIGH (ref 10–20)
BUN SERPL-MCNC: 66 MG/DL — CRITICAL HIGH (ref 10–20)
BUN SERPL-MCNC: 73 MG/DL — CRITICAL HIGH (ref 10–20)
BUN SERPL-MCNC: 74 MG/DL — CRITICAL HIGH (ref 10–20)
BUN SERPL-MCNC: 82 MG/DL — CRITICAL HIGH (ref 10–20)
BUN SERPL-MCNC: 9 MG/DL — LOW (ref 10–20)
CA-I SERPL-SCNC: 1.21 MMOL/L — SIGNIFICANT CHANGE UP (ref 1.12–1.3)
CA-I SERPL-SCNC: 1.24 MMOL/L — SIGNIFICANT CHANGE UP (ref 1.12–1.3)
CA-I SERPL-SCNC: 1.27 MMOL/L — SIGNIFICANT CHANGE UP (ref 1.12–1.3)
CA-I SERPL-SCNC: 1.28 MMOL/L — SIGNIFICANT CHANGE UP (ref 1.12–1.3)
CA-I SERPL-SCNC: 1.28 MMOL/L — SIGNIFICANT CHANGE UP (ref 1.12–1.3)
CA-I SERPL-SCNC: 1.3 MMOL/L — SIGNIFICANT CHANGE UP (ref 1.12–1.3)
CA-I SERPL-SCNC: 1.35 MMOL/L — HIGH (ref 1.12–1.3)
CA-I SERPL-SCNC: 1.37 MMOL/L — HIGH (ref 1.12–1.3)
CALCIUM SERPL-MCNC: 10 MG/DL — SIGNIFICANT CHANGE UP (ref 8.5–10.1)
CALCIUM SERPL-MCNC: 10 MG/DL — SIGNIFICANT CHANGE UP (ref 8.5–10.1)
CALCIUM SERPL-MCNC: 10.1 MG/DL — SIGNIFICANT CHANGE UP (ref 8.5–10.1)
CALCIUM SERPL-MCNC: 10.2 MG/DL — HIGH (ref 8.5–10.1)
CALCIUM SERPL-MCNC: 10.3 MG/DL — HIGH (ref 8.5–10.1)
CALCIUM SERPL-MCNC: 10.3 MG/DL — HIGH (ref 8.5–10.1)
CALCIUM SERPL-MCNC: 10.4 MG/DL — HIGH (ref 8.5–10.1)
CALCIUM SERPL-MCNC: 10.5 MG/DL — HIGH (ref 8.5–10.1)
CALCIUM SERPL-MCNC: 10.6 MG/DL — HIGH (ref 8.5–10.1)
CALCIUM SERPL-MCNC: 10.6 MG/DL — HIGH (ref 8.5–10.1)
CALCIUM SERPL-MCNC: 10.7 MG/DL — HIGH (ref 8.5–10.1)
CALCIUM SERPL-MCNC: 10.8 MG/DL — HIGH (ref 8.5–10.1)
CALCIUM SERPL-MCNC: 10.8 MG/DL — HIGH (ref 8.5–10.1)
CALCIUM SERPL-MCNC: 10.9 MG/DL — HIGH (ref 8.5–10.1)
CALCIUM SERPL-MCNC: 11.1 MG/DL — HIGH (ref 8.5–10.1)
CALCIUM SERPL-MCNC: 11.1 MG/DL — HIGH (ref 8.5–10.1)
CALCIUM SERPL-MCNC: 11.2 MG/DL — HIGH (ref 8.5–10.1)
CALCIUM SERPL-MCNC: 11.3 MG/DL — HIGH (ref 8.4–10.5)
CALCIUM SERPL-MCNC: 11.3 MG/DL — HIGH (ref 8.5–10.1)
CALCIUM SERPL-MCNC: 11.5 MG/DL — HIGH (ref 8.4–10.5)
CALCIUM SERPL-MCNC: 11.5 MG/DL — HIGH (ref 8.4–10.5)
CALCIUM SERPL-MCNC: 11.7 MG/DL — HIGH (ref 8.4–10.5)
CALCIUM SERPL-MCNC: 12 MG/DL — HIGH (ref 8.4–10.5)
CALCIUM SERPL-MCNC: 12 MG/DL — HIGH (ref 8.4–10.5)
CALCIUM SERPL-MCNC: 9.6 MG/DL — SIGNIFICANT CHANGE UP (ref 8.5–10.1)
CALCIUM SERPL-MCNC: 9.7 MG/DL — SIGNIFICANT CHANGE UP (ref 8.4–10.5)
CALCIUM SERPL-MCNC: 9.7 MG/DL — SIGNIFICANT CHANGE UP (ref 8.5–10.1)
CHLORIDE SERPL-SCNC: 78 MMOL/L — LOW (ref 98–110)
CHLORIDE SERPL-SCNC: 79 MMOL/L — LOW (ref 98–110)
CHLORIDE SERPL-SCNC: 80 MMOL/L — LOW (ref 98–110)
CHLORIDE SERPL-SCNC: 81 MMOL/L — LOW (ref 98–110)
CHLORIDE SERPL-SCNC: 81 MMOL/L — LOW (ref 98–110)
CHLORIDE SERPL-SCNC: 82 MMOL/L — LOW (ref 98–110)
CHLORIDE SERPL-SCNC: 83 MMOL/L — LOW (ref 98–110)
CHLORIDE SERPL-SCNC: 83 MMOL/L — LOW (ref 98–110)
CHLORIDE SERPL-SCNC: 84 MMOL/L — LOW (ref 98–110)
CHLORIDE SERPL-SCNC: 86 MMOL/L — LOW (ref 98–110)
CHLORIDE SERPL-SCNC: 86 MMOL/L — LOW (ref 98–110)
CHLORIDE SERPL-SCNC: 87 MMOL/L — LOW (ref 98–110)
CHLORIDE SERPL-SCNC: 88 MMOL/L — LOW (ref 96–108)
CHLORIDE SERPL-SCNC: 88 MMOL/L — LOW (ref 98–110)
CHLORIDE SERPL-SCNC: 89 MMOL/L — LOW (ref 98–110)
CHLORIDE SERPL-SCNC: 90 MMOL/L — LOW (ref 98–110)
CHLORIDE SERPL-SCNC: 90 MMOL/L — LOW (ref 98–110)
CHLORIDE SERPL-SCNC: 91 MMOL/L — LOW (ref 98–110)
CHLORIDE SERPL-SCNC: 92 MMOL/L — LOW (ref 96–108)
CHLORIDE SERPL-SCNC: 92 MMOL/L — LOW (ref 98–110)
CHLORIDE SERPL-SCNC: 93 MMOL/L — LOW (ref 98–110)
CHLORIDE SERPL-SCNC: 94 MMOL/L — LOW (ref 96–108)
CHLORIDE SERPL-SCNC: 94 MMOL/L — LOW (ref 98–110)
CHLORIDE SERPL-SCNC: 94 MMOL/L — LOW (ref 98–110)
CHLORIDE SERPL-SCNC: 95 MMOL/L — LOW (ref 98–110)
CHLORIDE SERPL-SCNC: 96 MMOL/L — SIGNIFICANT CHANGE UP (ref 96–108)
CHLORIDE SERPL-SCNC: 96 MMOL/L — SIGNIFICANT CHANGE UP (ref 96–108)
CK MB CFR SERPL CALC: 1.8 NG/ML — SIGNIFICANT CHANGE UP (ref 0.6–6.3)
CK SERPL-CCNC: 38 U/L — SIGNIFICANT CHANGE UP (ref 0–225)
CO2 SERPL-SCNC: 32 MMOL/L — SIGNIFICANT CHANGE UP (ref 17–32)
CO2 SERPL-SCNC: 33 MMOL/L — HIGH (ref 17–32)
CO2 SERPL-SCNC: 33 MMOL/L — HIGH (ref 17–32)
CO2 SERPL-SCNC: 34 MMOL/L — HIGH (ref 22–31)
CO2 SERPL-SCNC: 35 MMOL/L — HIGH (ref 22–31)
CO2 SERPL-SCNC: 36 MMOL/L — HIGH (ref 17–32)
CO2 SERPL-SCNC: 36 MMOL/L — HIGH (ref 22–31)
CO2 SERPL-SCNC: 36 MMOL/L — HIGH (ref 22–31)
CO2 SERPL-SCNC: 37 MMOL/L — HIGH (ref 17–32)
CO2 SERPL-SCNC: 37 MMOL/L — HIGH (ref 17–32)
CO2 SERPL-SCNC: 38 MMOL/L — HIGH (ref 17–32)
CO2 SERPL-SCNC: 38 MMOL/L — HIGH (ref 22–31)
CO2 SERPL-SCNC: 39 MMOL/L — HIGH (ref 17–32)
CO2 SERPL-SCNC: 39 MMOL/L — HIGH (ref 17–32)
CO2 SERPL-SCNC: 41 MMOL/L — CRITICAL HIGH (ref 17–32)
CO2 SERPL-SCNC: 42 MMOL/L — CRITICAL HIGH (ref 17–32)
CO2 SERPL-SCNC: 43 MMOL/L — CRITICAL HIGH (ref 17–32)
CO2 SERPL-SCNC: 44 MMOL/L — CRITICAL HIGH (ref 17–32)
CO2 SERPL-SCNC: 45 MMOL/L — CRITICAL HIGH (ref 17–32)
CO2 SERPL-SCNC: 46 MMOL/L — CRITICAL HIGH (ref 17–32)
CO2 SERPL-SCNC: 46 MMOL/L — CRITICAL HIGH (ref 17–32)
CO2 SERPL-SCNC: >50 MMOL/L — CRITICAL HIGH (ref 17–32)
COCAINE METAB.OTHER UR-MCNC: NEGATIVE — SIGNIFICANT CHANGE UP
COCAINE METAB.OTHER UR-MCNC: NEGATIVE — SIGNIFICANT CHANGE UP
COD CRY URNS QL: NEGATIVE — SIGNIFICANT CHANGE UP
CODEINE UR CFM-MCNC: NEGATIVE NG/ML — SIGNIFICANT CHANGE UP
CODEINE, UR RESULT: NEGATIVE NG/ML — SIGNIFICANT CHANGE UP
COLOR SPEC: YELLOW — SIGNIFICANT CHANGE UP
COLOR SPEC: YELLOW — SIGNIFICANT CHANGE UP
CORTIS AM PEAK SERPL-MCNC: 27.9 UG/DL — HIGH (ref 6–18.4)
CREAT SERPL-MCNC: 0.49 MG/DL — LOW (ref 0.5–1.3)
CREAT SERPL-MCNC: 0.5 MG/DL — LOW (ref 0.7–1.5)
CREAT SERPL-MCNC: 0.51 MG/DL — SIGNIFICANT CHANGE UP (ref 0.5–1.3)
CREAT SERPL-MCNC: 0.53 MG/DL — SIGNIFICANT CHANGE UP (ref 0.5–1.3)
CREAT SERPL-MCNC: 0.55 MG/DL — SIGNIFICANT CHANGE UP (ref 0.5–1.3)
CREAT SERPL-MCNC: 0.56 MG/DL — SIGNIFICANT CHANGE UP (ref 0.5–1.3)
CREAT SERPL-MCNC: 0.56 MG/DL — SIGNIFICANT CHANGE UP (ref 0.5–1.3)
CREAT SERPL-MCNC: 0.59 MG/DL — SIGNIFICANT CHANGE UP (ref 0.5–1.3)
CREAT SERPL-MCNC: 0.6 MG/DL — LOW (ref 0.7–1.5)
CREAT SERPL-MCNC: 1 MG/DL — SIGNIFICANT CHANGE UP (ref 0.7–1.5)
CREAT SERPL-MCNC: 1.2 MG/DL — SIGNIFICANT CHANGE UP (ref 0.7–1.5)
CREAT SERPL-MCNC: 1.3 MG/DL — SIGNIFICANT CHANGE UP (ref 0.7–1.5)
CREAT SERPL-MCNC: 1.3 MG/DL — SIGNIFICANT CHANGE UP (ref 0.7–1.5)
CREAT SERPL-MCNC: 2.4 MG/DL — HIGH (ref 0.7–1.5)
CREAT SERPL-MCNC: 3.2 MG/DL — HIGH (ref 0.7–1.5)
CREAT SERPL-MCNC: 3.4 MG/DL — HIGH (ref 0.7–1.5)
CREAT SERPL-MCNC: 3.7 MG/DL — HIGH (ref 0.7–1.5)
CREAT SERPL-MCNC: <0.5 MG/DL — LOW (ref 0.7–1.5)
CULTURE RESULTS: NO GROWTH — SIGNIFICANT CHANGE UP
CULTURE RESULTS: NO GROWTH — SIGNIFICANT CHANGE UP
CULTURE RESULTS: SIGNIFICANT CHANGE UP
CULTURE RESULTS: SIGNIFICANT CHANGE UP
D DIMER BLD IA.RAPID-MCNC: 475 NG/ML DDU — HIGH
DIFF PNL FLD: ABNORMAL
DIFF PNL FLD: NEGATIVE — SIGNIFICANT CHANGE UP
DRUG SCREEN 1, URINE RESULT: SIGNIFICANT CHANGE UP
EOSINOPHIL # BLD AUTO: 0 K/UL — SIGNIFICANT CHANGE UP (ref 0–0.7)
EOSINOPHIL # BLD AUTO: 0 K/UL — SIGNIFICANT CHANGE UP (ref 0–0.7)
EOSINOPHIL # BLD AUTO: 0.01 K/UL — SIGNIFICANT CHANGE UP (ref 0–0.7)
EOSINOPHIL # BLD AUTO: 0.02 K/UL — SIGNIFICANT CHANGE UP (ref 0–0.7)
EOSINOPHIL # BLD AUTO: 0.04 K/UL — SIGNIFICANT CHANGE UP (ref 0–0.7)
EOSINOPHIL # BLD AUTO: 0.04 K/UL — SIGNIFICANT CHANGE UP (ref 0–0.7)
EOSINOPHIL # BLD AUTO: 0.05 K/UL — SIGNIFICANT CHANGE UP (ref 0–0.7)
EOSINOPHIL # BLD AUTO: 0.06 K/UL — SIGNIFICANT CHANGE UP (ref 0–0.7)
EOSINOPHIL # BLD AUTO: 0.06 K/UL — SIGNIFICANT CHANGE UP (ref 0–0.7)
EOSINOPHIL # BLD AUTO: 0.07 K/UL — SIGNIFICANT CHANGE UP (ref 0–0.7)
EOSINOPHIL # BLD AUTO: 0.07 K/UL — SIGNIFICANT CHANGE UP (ref 0–0.7)
EOSINOPHIL # BLD AUTO: 0.1 K/UL — SIGNIFICANT CHANGE UP (ref 0–0.5)
EOSINOPHIL # BLD AUTO: 0.1 K/UL — SIGNIFICANT CHANGE UP (ref 0–0.7)
EOSINOPHIL # BLD AUTO: 0.16 K/UL — SIGNIFICANT CHANGE UP (ref 0–0.5)
EOSINOPHIL # BLD AUTO: 0.24 K/UL — SIGNIFICANT CHANGE UP (ref 0–0.5)
EOSINOPHIL # BLD AUTO: 0.26 K/UL — SIGNIFICANT CHANGE UP (ref 0–0.5)
EOSINOPHIL NFR BLD AUTO: 0 % — SIGNIFICANT CHANGE UP (ref 0–8)
EOSINOPHIL NFR BLD AUTO: 0 % — SIGNIFICANT CHANGE UP (ref 0–8)
EOSINOPHIL NFR BLD AUTO: 0.1 % — SIGNIFICANT CHANGE UP (ref 0–8)
EOSINOPHIL NFR BLD AUTO: 0.2 % — SIGNIFICANT CHANGE UP (ref 0–8)
EOSINOPHIL NFR BLD AUTO: 0.2 % — SIGNIFICANT CHANGE UP (ref 0–8)
EOSINOPHIL NFR BLD AUTO: 0.3 % — SIGNIFICANT CHANGE UP (ref 0–8)
EOSINOPHIL NFR BLD AUTO: 0.4 % — SIGNIFICANT CHANGE UP (ref 0–8)
EOSINOPHIL NFR BLD AUTO: 0.6 % — SIGNIFICANT CHANGE UP (ref 0–8)
EOSINOPHIL NFR BLD AUTO: 0.8 % — SIGNIFICANT CHANGE UP (ref 0–8)
EOSINOPHIL NFR BLD AUTO: 1 % — SIGNIFICANT CHANGE UP (ref 0–8)
EOSINOPHIL NFR BLD AUTO: 1.4 % — SIGNIFICANT CHANGE UP (ref 0–8)
EOSINOPHIL NFR BLD AUTO: 1.6 % — SIGNIFICANT CHANGE UP (ref 0–8)
EOSINOPHIL NFR BLD AUTO: 1.7 % — SIGNIFICANT CHANGE UP (ref 0–6)
EOSINOPHIL NFR BLD AUTO: 1.7 % — SIGNIFICANT CHANGE UP (ref 0–8)
EOSINOPHIL NFR BLD AUTO: 2.5 % — SIGNIFICANT CHANGE UP (ref 0–6)
EOSINOPHIL NFR BLD AUTO: 2.6 % — SIGNIFICANT CHANGE UP (ref 0–6)
EOSINOPHIL NFR BLD AUTO: 2.6 % — SIGNIFICANT CHANGE UP (ref 0–8)
EOSINOPHIL NFR BLD AUTO: 4.2 % — SIGNIFICANT CHANGE UP (ref 0–6)
EPI CELLS # UR: ABNORMAL /HPF
EPI CELLS # UR: ABNORMAL /HPF
ETHANOL SERPL-MCNC: <10 MG/DL — SIGNIFICANT CHANGE UP
FERRITIN SERPL-MCNC: 101 NG/ML — SIGNIFICANT CHANGE UP (ref 15–150)
GAS PNL BLDA: SIGNIFICANT CHANGE UP
GAS PNL BLDV: 125 MMOL/L — LOW (ref 136–145)
GAS PNL BLDV: 125 MMOL/L — LOW (ref 136–145)
GAS PNL BLDV: 129 MMOL/L — LOW (ref 136–145)
GAS PNL BLDV: 130 MMOL/L — LOW (ref 136–145)
GAS PNL BLDV: 138 MMOL/L — SIGNIFICANT CHANGE UP (ref 136–145)
GAS PNL BLDV: 138 MMOL/L — SIGNIFICANT CHANGE UP (ref 138–146)
GAS PNL BLDV: 139 MMOL/L — SIGNIFICANT CHANGE UP (ref 136–145)
GAS PNL BLDV: 141 MMOL/L — SIGNIFICANT CHANGE UP (ref 136–145)
GAS PNL BLDV: SIGNIFICANT CHANGE UP
GLUCOSE BLDC GLUCOMTR-MCNC: 106 MG/DL — HIGH (ref 70–99)
GLUCOSE BLDC GLUCOMTR-MCNC: 130 MG/DL — HIGH (ref 70–99)
GLUCOSE SERPL-MCNC: 101 MG/DL — HIGH (ref 70–99)
GLUCOSE SERPL-MCNC: 101 MG/DL — HIGH (ref 70–99)
GLUCOSE SERPL-MCNC: 102 MG/DL — HIGH (ref 70–99)
GLUCOSE SERPL-MCNC: 102 MG/DL — HIGH (ref 70–99)
GLUCOSE SERPL-MCNC: 103 MG/DL — HIGH (ref 70–99)
GLUCOSE SERPL-MCNC: 105 MG/DL — HIGH (ref 70–99)
GLUCOSE SERPL-MCNC: 106 MG/DL — HIGH (ref 70–99)
GLUCOSE SERPL-MCNC: 106 MG/DL — HIGH (ref 70–99)
GLUCOSE SERPL-MCNC: 107 MG/DL — HIGH (ref 70–99)
GLUCOSE SERPL-MCNC: 108 MG/DL — HIGH (ref 70–99)
GLUCOSE SERPL-MCNC: 109 MG/DL — HIGH (ref 70–99)
GLUCOSE SERPL-MCNC: 111 MG/DL — HIGH (ref 70–99)
GLUCOSE SERPL-MCNC: 111 MG/DL — HIGH (ref 70–99)
GLUCOSE SERPL-MCNC: 113 MG/DL — HIGH (ref 70–99)
GLUCOSE SERPL-MCNC: 113 MG/DL — HIGH (ref 70–99)
GLUCOSE SERPL-MCNC: 114 MG/DL — HIGH (ref 70–99)
GLUCOSE SERPL-MCNC: 119 MG/DL — HIGH (ref 70–99)
GLUCOSE SERPL-MCNC: 119 MG/DL — HIGH (ref 70–99)
GLUCOSE SERPL-MCNC: 129 MG/DL — HIGH (ref 70–99)
GLUCOSE SERPL-MCNC: 133 MG/DL — HIGH (ref 70–99)
GLUCOSE SERPL-MCNC: 139 MG/DL — HIGH (ref 70–99)
GLUCOSE SERPL-MCNC: 142 MG/DL — HIGH (ref 70–99)
GLUCOSE SERPL-MCNC: 151 MG/DL — HIGH (ref 70–99)
GLUCOSE SERPL-MCNC: 220 MG/DL — HIGH (ref 70–99)
GLUCOSE SERPL-MCNC: 69 MG/DL — LOW (ref 70–99)
GLUCOSE SERPL-MCNC: 70 MG/DL — SIGNIFICANT CHANGE UP (ref 70–99)
GLUCOSE SERPL-MCNC: 75 MG/DL — SIGNIFICANT CHANGE UP (ref 70–99)
GLUCOSE SERPL-MCNC: 78 MG/DL — SIGNIFICANT CHANGE UP (ref 70–99)
GLUCOSE SERPL-MCNC: 78 MG/DL — SIGNIFICANT CHANGE UP (ref 70–99)
GLUCOSE SERPL-MCNC: 84 MG/DL — SIGNIFICANT CHANGE UP (ref 70–99)
GLUCOSE SERPL-MCNC: 87 MG/DL — SIGNIFICANT CHANGE UP (ref 70–99)
GLUCOSE SERPL-MCNC: 91 MG/DL — SIGNIFICANT CHANGE UP (ref 70–99)
GLUCOSE SERPL-MCNC: 93 MG/DL — SIGNIFICANT CHANGE UP (ref 70–99)
GLUCOSE SERPL-MCNC: 93 MG/DL — SIGNIFICANT CHANGE UP (ref 70–99)
GLUCOSE SERPL-MCNC: 95 MG/DL — SIGNIFICANT CHANGE UP (ref 70–99)
GLUCOSE SERPL-MCNC: 97 MG/DL — SIGNIFICANT CHANGE UP (ref 70–99)
GLUCOSE SERPL-MCNC: 99 MG/DL — SIGNIFICANT CHANGE UP (ref 70–99)
GLUCOSE UR QL: NEGATIVE MG/DL — SIGNIFICANT CHANGE UP
GLUCOSE UR QL: NEGATIVE MG/DL — SIGNIFICANT CHANGE UP
GRAN CASTS # UR COMP ASSIST: NEGATIVE — SIGNIFICANT CHANGE UP
HCO3 BLDA-SCNC: 37 MMOL/L — HIGH (ref 23–27)
HCO3 BLDA-SCNC: 37 MMOL/L — HIGH (ref 23–27)
HCO3 BLDA-SCNC: 49 MMOL/L — CRITICAL HIGH (ref 23–27)
HCO3 BLDV-SCNC: 38 MMOL/L — HIGH (ref 20–27)
HCO3 BLDV-SCNC: 41 MMOL/L — HIGH (ref 22–29)
HCO3 BLDV-SCNC: 41 MMOL/L — HIGH (ref 22–29)
HCO3 BLDV-SCNC: 49 MMOL/L — HIGH (ref 22–29)
HCO3 BLDV-SCNC: 50 MMOL/L — HIGH (ref 22–29)
HCO3 BLDV-SCNC: 53 MMOL/L — HIGH (ref 22–29)
HCT VFR BLD CALC: 32.6 % — LOW (ref 37–47)
HCT VFR BLD CALC: 34.1 % — LOW (ref 37–47)
HCT VFR BLD CALC: 34.5 % — LOW (ref 37–47)
HCT VFR BLD CALC: 35.1 % — LOW (ref 37–47)
HCT VFR BLD CALC: 35.3 % — LOW (ref 37–47)
HCT VFR BLD CALC: 35.4 % — LOW (ref 37–47)
HCT VFR BLD CALC: 36.1 % — LOW (ref 37–47)
HCT VFR BLD CALC: 36.2 % — LOW (ref 37–47)
HCT VFR BLD CALC: 36.8 % — SIGNIFICANT CHANGE UP (ref 34.5–45)
HCT VFR BLD CALC: 36.9 % — SIGNIFICANT CHANGE UP (ref 34.5–45)
HCT VFR BLD CALC: 37.6 % — SIGNIFICANT CHANGE UP (ref 37–47)
HCT VFR BLD CALC: 38 % — SIGNIFICANT CHANGE UP (ref 37–47)
HCT VFR BLD CALC: 38.2 % — SIGNIFICANT CHANGE UP (ref 37–47)
HCT VFR BLD CALC: 38.5 % — SIGNIFICANT CHANGE UP (ref 34.5–45)
HCT VFR BLD CALC: 38.7 % — SIGNIFICANT CHANGE UP (ref 37–47)
HCT VFR BLD CALC: 39 % — SIGNIFICANT CHANGE UP (ref 37–47)
HCT VFR BLD CALC: 39.1 % — SIGNIFICANT CHANGE UP (ref 34.5–45)
HCT VFR BLD CALC: 39.3 % — SIGNIFICANT CHANGE UP (ref 37–47)
HCT VFR BLD CALC: 39.5 % — SIGNIFICANT CHANGE UP (ref 37–47)
HCT VFR BLD CALC: 39.8 % — SIGNIFICANT CHANGE UP (ref 34.5–45)
HCT VFR BLD CALC: 39.8 % — SIGNIFICANT CHANGE UP (ref 37–47)
HCT VFR BLD CALC: 39.9 % — SIGNIFICANT CHANGE UP (ref 34.5–45)
HCT VFR BLD CALC: 40.5 % — SIGNIFICANT CHANGE UP (ref 34.5–45)
HCT VFR BLD CALC: 40.5 % — SIGNIFICANT CHANGE UP (ref 37–47)
HCT VFR BLD CALC: 40.7 % — SIGNIFICANT CHANGE UP (ref 37–47)
HCT VFR BLD CALC: 40.9 % — SIGNIFICANT CHANGE UP (ref 37–47)
HCT VFR BLD CALC: 42 % — SIGNIFICANT CHANGE UP (ref 37–47)
HCT VFR BLD CALC: 43.6 % — SIGNIFICANT CHANGE UP (ref 37–47)
HCT VFR BLD CALC: 43.9 % — SIGNIFICANT CHANGE UP (ref 37–47)
HCT VFR BLD CALC: 44.1 % — SIGNIFICANT CHANGE UP (ref 37–47)
HCT VFR BLD CALC: 44.8 % — SIGNIFICANT CHANGE UP (ref 37–47)
HCT VFR BLD CALC: 46.6 % — SIGNIFICANT CHANGE UP (ref 37–47)
HCT VFR BLDA CALC: 31.4 % — LOW (ref 34–44)
HCT VFR BLDA CALC: 32 % — LOW (ref 34–44)
HCT VFR BLDA CALC: 33.1 % — LOW (ref 34–44)
HCT VFR BLDA CALC: 35 % — SIGNIFICANT CHANGE UP (ref 34–44)
HCT VFR BLDA CALC: 36.2 % — SIGNIFICANT CHANGE UP (ref 34–44)
HCT VFR BLDA CALC: 38 % — SIGNIFICANT CHANGE UP (ref 34–44)
HCT VFR BLDA CALC: 39.2 % — SIGNIFICANT CHANGE UP (ref 34–44)
HCV AB S/CO SERPL IA: 0.08 S/CO — SIGNIFICANT CHANGE UP
HCV AB SERPL-IMP: SIGNIFICANT CHANGE UP
HGB BLD CALC-MCNC: 10.3 G/DL — LOW (ref 14–18)
HGB BLD CALC-MCNC: 10.4 G/DL — LOW (ref 14–18)
HGB BLD CALC-MCNC: 10.8 G/DL — LOW (ref 14–18)
HGB BLD CALC-MCNC: 11.4 G/DL — LOW (ref 14–18)
HGB BLD CALC-MCNC: 11.8 G/DL — LOW (ref 14–18)
HGB BLD CALC-MCNC: 12.8 G/DL — LOW (ref 14–18)
HGB BLD CALC-MCNC: 28.5 G/DL — CRITICAL HIGH (ref 14–18)
HGB BLD-MCNC: 10 G/DL — LOW (ref 12–16)
HGB BLD-MCNC: 10.1 G/DL — LOW (ref 12–16)
HGB BLD-MCNC: 10.2 G/DL — LOW (ref 12–16)
HGB BLD-MCNC: 10.3 G/DL — LOW (ref 12–16)
HGB BLD-MCNC: 10.4 G/DL — LOW (ref 12–16)
HGB BLD-MCNC: 10.4 G/DL — LOW (ref 12–16)
HGB BLD-MCNC: 10.5 G/DL — LOW (ref 11.5–15.5)
HGB BLD-MCNC: 10.5 G/DL — LOW (ref 11.5–15.5)
HGB BLD-MCNC: 10.6 G/DL — LOW (ref 12–16)
HGB BLD-MCNC: 10.7 G/DL — LOW (ref 12–16)
HGB BLD-MCNC: 10.8 G/DL — LOW (ref 12–16)
HGB BLD-MCNC: 10.9 G/DL — LOW (ref 11.5–15.5)
HGB BLD-MCNC: 11.1 G/DL — LOW (ref 11.5–15.5)
HGB BLD-MCNC: 11.1 G/DL — LOW (ref 11.5–15.5)
HGB BLD-MCNC: 11.2 G/DL — LOW (ref 12–16)
HGB BLD-MCNC: 11.3 G/DL — LOW (ref 11.5–15.5)
HGB BLD-MCNC: 11.3 G/DL — LOW (ref 12–16)
HGB BLD-MCNC: 11.4 G/DL — LOW (ref 12–16)
HGB BLD-MCNC: 11.4 G/DL — LOW (ref 12–16)
HGB BLD-MCNC: 11.5 G/DL — SIGNIFICANT CHANGE UP (ref 11.5–15.5)
HGB BLD-MCNC: 11.8 G/DL — LOW (ref 12–16)
HGB BLD-MCNC: 11.9 G/DL — LOW (ref 12–16)
HGB BLD-MCNC: 12 G/DL — SIGNIFICANT CHANGE UP (ref 12–16)
HGB BLD-MCNC: 12.1 G/DL — SIGNIFICANT CHANGE UP (ref 12–16)
HGB BLD-MCNC: 12.3 G/DL — SIGNIFICANT CHANGE UP (ref 12–16)
HGB BLD-MCNC: 12.4 G/DL — SIGNIFICANT CHANGE UP (ref 12–16)
HGB BLD-MCNC: 9.3 G/DL — LOW (ref 12–16)
HGB BLD-MCNC: 9.9 G/DL — LOW (ref 12–16)
HOROWITZ INDEX BLDA+IHG-RTO: 100 — SIGNIFICANT CHANGE UP
HOROWITZ INDEX BLDA+IHG-RTO: 40 — SIGNIFICANT CHANGE UP
HOROWITZ INDEX BLDA+IHG-RTO: 50 — SIGNIFICANT CHANGE UP
HOROWITZ INDEX BLDA+IHG-RTO: 60 — SIGNIFICANT CHANGE UP
HOROWITZ INDEX BLDV+IHG-RTO: 100 — SIGNIFICANT CHANGE UP
HOROWITZ INDEX BLDV+IHG-RTO: 21 — SIGNIFICANT CHANGE UP
HOROWITZ INDEX BLDV+IHG-RTO: 40 — SIGNIFICANT CHANGE UP
HOROWITZ INDEX BLDV+IHG-RTO: 45 — SIGNIFICANT CHANGE UP
HOROWITZ INDEX BLDV+IHG-RTO: 50 — SIGNIFICANT CHANGE UP
HYALINE CASTS # UR AUTO: NEGATIVE — SIGNIFICANT CHANGE UP
HYDROCODONE UR QL CFM: NEGATIVE NG/ML — SIGNIFICANT CHANGE UP
HYDROCODONE, UR RESULT: NEGATIVE NG/ML — SIGNIFICANT CHANGE UP
HYDROMORPHONE UR QL CFM: NEGATIVE NG/ML — SIGNIFICANT CHANGE UP
HYDROMORPHONE, UR RESULT: NEGATIVE NG/ML — SIGNIFICANT CHANGE UP
IMM GRANULOCYTES NFR BLD AUTO: 0.2 % — SIGNIFICANT CHANGE UP (ref 0.1–0.3)
IMM GRANULOCYTES NFR BLD AUTO: 0.5 % — HIGH (ref 0.1–0.3)
IMM GRANULOCYTES NFR BLD AUTO: 0.5 % — HIGH (ref 0.1–0.3)
IMM GRANULOCYTES NFR BLD AUTO: 0.7 % — HIGH (ref 0.1–0.3)
IMM GRANULOCYTES NFR BLD AUTO: 0.8 % — HIGH (ref 0.1–0.3)
IMM GRANULOCYTES NFR BLD AUTO: 0.8 % — HIGH (ref 0.1–0.3)
IMM GRANULOCYTES NFR BLD AUTO: 0.8 % — SIGNIFICANT CHANGE UP (ref 0–1.5)
IMM GRANULOCYTES NFR BLD AUTO: 0.8 % — SIGNIFICANT CHANGE UP (ref 0–1.5)
IMM GRANULOCYTES NFR BLD AUTO: 1 % — SIGNIFICANT CHANGE UP (ref 0–1.5)
IMM GRANULOCYTES NFR BLD AUTO: 1.1 % — HIGH (ref 0.1–0.3)
IMM GRANULOCYTES NFR BLD AUTO: 1.1 % — SIGNIFICANT CHANGE UP (ref 0–1.5)
IMM GRANULOCYTES NFR BLD AUTO: 1.2 % — HIGH (ref 0.1–0.3)
IMM GRANULOCYTES NFR BLD AUTO: 1.4 % — HIGH (ref 0.1–0.3)
IMM GRANULOCYTES NFR BLD AUTO: 1.5 % — HIGH (ref 0.1–0.3)
IMM GRANULOCYTES NFR BLD AUTO: 1.7 % — HIGH (ref 0.1–0.3)
IMM GRANULOCYTES NFR BLD AUTO: 5.6 % — HIGH (ref 0.1–0.3)
INR BLD: 1.04 RATIO — SIGNIFICANT CHANGE UP (ref 0.65–1.3)
INR BLD: 1.04 RATIO — SIGNIFICANT CHANGE UP (ref 0.65–1.3)
INR BLD: 1.06 — SIGNIFICANT CHANGE UP (ref 0.88–1.16)
INR BLD: 1.1 RATIO — SIGNIFICANT CHANGE UP (ref 0.65–1.3)
IRON SATN MFR SERPL: 17 % — SIGNIFICANT CHANGE UP (ref 15–50)
IRON SATN MFR SERPL: 56 UG/DL — SIGNIFICANT CHANGE UP (ref 35–150)
IRON SATN MFR SERPL: 56 UG/DL — SIGNIFICANT CHANGE UP (ref 35–150)
KETONES UR-MCNC: 15
KETONES UR-MCNC: NEGATIVE — SIGNIFICANT CHANGE UP
LACTATE BLDV-MCNC: 0.6 MMOL/L — SIGNIFICANT CHANGE UP (ref 0.5–1.6)
LACTATE BLDV-MCNC: 0.7 MMOL/L — SIGNIFICANT CHANGE UP (ref 0.5–1.6)
LACTATE BLDV-MCNC: 0.8 MMOL/L — SIGNIFICANT CHANGE UP (ref 0.5–1.6)
LACTATE BLDV-MCNC: 0.8 MMOL/L — SIGNIFICANT CHANGE UP (ref 0.5–1.6)
LACTATE BLDV-MCNC: 1.1 MMOL/L — SIGNIFICANT CHANGE UP (ref 0.5–1.6)
LACTATE BLDV-MCNC: 1.1 MMOL/L — SIGNIFICANT CHANGE UP (ref 0.5–1.6)
LACTATE BLDV-MCNC: 2.7 MMOL/L — HIGH (ref 0.5–1.6)
LACTATE SERPL-SCNC: 0.9 MMOL/L — SIGNIFICANT CHANGE UP (ref 0.7–2)
LEUKOCYTE ESTERASE UR-ACNC: NEGATIVE — SIGNIFICANT CHANGE UP
LEUKOCYTE ESTERASE UR-ACNC: NEGATIVE — SIGNIFICANT CHANGE UP
LYMPHOCYTES # BLD AUTO: 0.61 K/UL — LOW (ref 1.2–3.4)
LYMPHOCYTES # BLD AUTO: 0.89 K/UL — LOW (ref 1–3.3)
LYMPHOCYTES # BLD AUTO: 0.96 K/UL — LOW (ref 1.2–3.4)
LYMPHOCYTES # BLD AUTO: 1.03 K/UL — LOW (ref 1.2–3.4)
LYMPHOCYTES # BLD AUTO: 1.04 K/UL — LOW (ref 1.2–3.4)
LYMPHOCYTES # BLD AUTO: 1.1 K/UL — LOW (ref 1.2–3.4)
LYMPHOCYTES # BLD AUTO: 1.11 K/UL — LOW (ref 1.2–3.4)
LYMPHOCYTES # BLD AUTO: 1.15 K/UL — SIGNIFICANT CHANGE UP (ref 1–3.3)
LYMPHOCYTES # BLD AUTO: 1.18 K/UL — LOW (ref 1.2–3.4)
LYMPHOCYTES # BLD AUTO: 1.24 K/UL — SIGNIFICANT CHANGE UP (ref 1.2–3.4)
LYMPHOCYTES # BLD AUTO: 1.29 K/UL — SIGNIFICANT CHANGE UP (ref 1.2–3.4)
LYMPHOCYTES # BLD AUTO: 1.42 K/UL — SIGNIFICANT CHANGE UP (ref 1.2–3.4)
LYMPHOCYTES # BLD AUTO: 1.44 K/UL — SIGNIFICANT CHANGE UP (ref 1.2–3.4)
LYMPHOCYTES # BLD AUTO: 1.46 K/UL — SIGNIFICANT CHANGE UP (ref 1.2–3.4)
LYMPHOCYTES # BLD AUTO: 1.47 K/UL — SIGNIFICANT CHANGE UP (ref 1.2–3.4)
LYMPHOCYTES # BLD AUTO: 1.54 K/UL — SIGNIFICANT CHANGE UP (ref 1–3.3)
LYMPHOCYTES # BLD AUTO: 1.88 K/UL — SIGNIFICANT CHANGE UP (ref 1.2–3.4)
LYMPHOCYTES # BLD AUTO: 10.4 % — LOW (ref 20.5–51.1)
LYMPHOCYTES # BLD AUTO: 15 % — SIGNIFICANT CHANGE UP (ref 13–44)
LYMPHOCYTES # BLD AUTO: 15.5 % — LOW (ref 20.5–51.1)
LYMPHOCYTES # BLD AUTO: 18 % — SIGNIFICANT CHANGE UP (ref 13–44)
LYMPHOCYTES # BLD AUTO: 18.5 % — LOW (ref 20.5–51.1)
LYMPHOCYTES # BLD AUTO: 18.9 % — LOW (ref 20.5–51.1)
LYMPHOCYTES # BLD AUTO: 2.28 K/UL — SIGNIFICANT CHANGE UP (ref 1–3.3)
LYMPHOCYTES # BLD AUTO: 20 % — LOW (ref 20.5–51.1)
LYMPHOCYTES # BLD AUTO: 20.2 % — LOW (ref 20.5–51.1)
LYMPHOCYTES # BLD AUTO: 21.7 % — SIGNIFICANT CHANGE UP (ref 20.5–51.1)
LYMPHOCYTES # BLD AUTO: 22.4 % — SIGNIFICANT CHANGE UP (ref 20.5–51.1)
LYMPHOCYTES # BLD AUTO: 22.6 % — SIGNIFICANT CHANGE UP (ref 20.5–51.1)
LYMPHOCYTES # BLD AUTO: 23.5 % — SIGNIFICANT CHANGE UP (ref 20.5–51.1)
LYMPHOCYTES # BLD AUTO: 23.8 % — SIGNIFICANT CHANGE UP (ref 20.5–51.1)
LYMPHOCYTES # BLD AUTO: 24.5 % — SIGNIFICANT CHANGE UP (ref 20.5–51.1)
LYMPHOCYTES # BLD AUTO: 25 % — SIGNIFICANT CHANGE UP (ref 13–44)
LYMPHOCYTES # BLD AUTO: 25.1 % — SIGNIFICANT CHANGE UP (ref 13–44)
LYMPHOCYTES # BLD AUTO: 26.1 % — SIGNIFICANT CHANGE UP (ref 20.5–51.1)
LYMPHOCYTES # BLD AUTO: 29.4 % — SIGNIFICANT CHANGE UP (ref 20.5–51.1)
MAGNESIUM SERPL-MCNC: 2 MG/DL — SIGNIFICANT CHANGE UP (ref 1.6–2.6)
MAGNESIUM SERPL-MCNC: 2.1 MG/DL — SIGNIFICANT CHANGE UP (ref 1.6–2.6)
MAGNESIUM SERPL-MCNC: 2.1 MG/DL — SIGNIFICANT CHANGE UP (ref 1.6–2.6)
MAGNESIUM SERPL-MCNC: 2.1 MG/DL — SIGNIFICANT CHANGE UP (ref 1.8–2.4)
MAGNESIUM SERPL-MCNC: 2.2 MG/DL — SIGNIFICANT CHANGE UP (ref 1.6–2.6)
MAGNESIUM SERPL-MCNC: 2.2 MG/DL — SIGNIFICANT CHANGE UP (ref 1.8–2.4)
MAGNESIUM SERPL-MCNC: 2.3 MG/DL — SIGNIFICANT CHANGE UP (ref 1.8–2.4)
MAGNESIUM SERPL-MCNC: 2.4 MG/DL — SIGNIFICANT CHANGE UP (ref 1.8–2.4)
MAGNESIUM SERPL-MCNC: 2.6 MG/DL — HIGH (ref 1.8–2.4)
MAGNESIUM SERPL-MCNC: 3 MG/DL — HIGH (ref 1.8–2.4)
MCHC RBC-ENTMCNC: 22.2 PG — LOW (ref 27–31)
MCHC RBC-ENTMCNC: 22.2 PG — LOW (ref 27–31)
MCHC RBC-ENTMCNC: 22.4 PG — LOW (ref 27–34)
MCHC RBC-ENTMCNC: 22.5 PG — LOW (ref 27–31)
MCHC RBC-ENTMCNC: 22.5 PG — LOW (ref 27–31)
MCHC RBC-ENTMCNC: 22.6 PG — LOW (ref 27–34)
MCHC RBC-ENTMCNC: 22.7 PG — LOW (ref 27–34)
MCHC RBC-ENTMCNC: 22.8 PG — LOW (ref 27–31)
MCHC RBC-ENTMCNC: 22.8 PG — LOW (ref 27–34)
MCHC RBC-ENTMCNC: 22.9 PG — LOW (ref 27–31)
MCHC RBC-ENTMCNC: 22.9 PG — LOW (ref 27–34)
MCHC RBC-ENTMCNC: 23.3 PG — LOW (ref 27–31)
MCHC RBC-ENTMCNC: 23.3 PG — LOW (ref 27–31)
MCHC RBC-ENTMCNC: 23.5 PG — LOW (ref 27–31)
MCHC RBC-ENTMCNC: 23.5 PG — LOW (ref 27–31)
MCHC RBC-ENTMCNC: 24 PG — LOW (ref 27–31)
MCHC RBC-ENTMCNC: 24.1 PG — LOW (ref 27–31)
MCHC RBC-ENTMCNC: 24.2 PG — LOW (ref 27–31)
MCHC RBC-ENTMCNC: 24.4 PG — LOW (ref 27–31)
MCHC RBC-ENTMCNC: 24.4 PG — LOW (ref 27–31)
MCHC RBC-ENTMCNC: 24.5 PG — LOW (ref 27–31)
MCHC RBC-ENTMCNC: 24.6 PG — LOW (ref 27–31)
MCHC RBC-ENTMCNC: 24.6 PG — LOW (ref 27–31)
MCHC RBC-ENTMCNC: 24.7 PG — LOW (ref 27–31)
MCHC RBC-ENTMCNC: 24.9 PG — LOW (ref 27–31)
MCHC RBC-ENTMCNC: 25 PG — LOW (ref 27–31)
MCHC RBC-ENTMCNC: 25.1 PG — LOW (ref 27–31)
MCHC RBC-ENTMCNC: 25.2 PG — LOW (ref 27–31)
MCHC RBC-ENTMCNC: 25.8 G/DL — LOW (ref 32–37)
MCHC RBC-ENTMCNC: 27 G/DL — LOW (ref 32–37)
MCHC RBC-ENTMCNC: 27.1 G/DL — LOW (ref 32–37)
MCHC RBC-ENTMCNC: 27.4 G/DL — LOW (ref 32–37)
MCHC RBC-ENTMCNC: 27.6 G/DL — LOW (ref 32–37)
MCHC RBC-ENTMCNC: 27.7 G/DL — LOW (ref 32–37)
MCHC RBC-ENTMCNC: 27.8 G/DL — LOW (ref 32–37)
MCHC RBC-ENTMCNC: 27.8 G/DL — LOW (ref 32–37)
MCHC RBC-ENTMCNC: 27.9 GM/DL — LOW (ref 32–36)
MCHC RBC-ENTMCNC: 28 G/DL — LOW (ref 32–37)
MCHC RBC-ENTMCNC: 28 G/DL — LOW (ref 32–37)
MCHC RBC-ENTMCNC: 28.1 G/DL — LOW (ref 32–37)
MCHC RBC-ENTMCNC: 28.1 G/DL — LOW (ref 32–37)
MCHC RBC-ENTMCNC: 28.2 G/DL — LOW (ref 32–37)
MCHC RBC-ENTMCNC: 28.3 G/DL — LOW (ref 32–37)
MCHC RBC-ENTMCNC: 28.3 GM/DL — LOW (ref 32–36)
MCHC RBC-ENTMCNC: 28.3 GM/DL — LOW (ref 32–36)
MCHC RBC-ENTMCNC: 28.4 G/DL — LOW (ref 32–37)
MCHC RBC-ENTMCNC: 28.4 GM/DL — LOW (ref 32–36)
MCHC RBC-ENTMCNC: 28.4 GM/DL — LOW (ref 32–36)
MCHC RBC-ENTMCNC: 28.5 G/DL — LOW (ref 32–37)
MCHC RBC-ENTMCNC: 28.5 GM/DL — LOW (ref 32–36)
MCHC RBC-ENTMCNC: 28.5 GM/DL — LOW (ref 32–36)
MCHC RBC-ENTMCNC: 28.7 G/DL — LOW (ref 32–37)
MCHC RBC-ENTMCNC: 28.7 G/DL — LOW (ref 32–37)
MCHC RBC-ENTMCNC: 28.8 G/DL — LOW (ref 32–37)
MCHC RBC-ENTMCNC: 28.9 G/DL — LOW (ref 32–37)
MCHC RBC-ENTMCNC: 28.9 G/DL — LOW (ref 32–37)
MCHC RBC-ENTMCNC: 29.1 G/DL — LOW (ref 32–37)
MCHC RBC-ENTMCNC: 29.2 G/DL — LOW (ref 32–37)
MCHC RBC-ENTMCNC: 29.3 G/DL — LOW (ref 32–37)
MCHC RBC-ENTMCNC: 29.4 G/DL — LOW (ref 32–37)
MCV RBC AUTO: 76.5 FL — LOW (ref 81–99)
MCV RBC AUTO: 76.6 FL — LOW (ref 81–99)
MCV RBC AUTO: 77.2 FL — LOW (ref 81–99)
MCV RBC AUTO: 77.6 FL — LOW (ref 81–99)
MCV RBC AUTO: 77.9 FL — LOW (ref 81–99)
MCV RBC AUTO: 79.1 FL — LOW (ref 80–100)
MCV RBC AUTO: 79.2 FL — LOW (ref 80–100)
MCV RBC AUTO: 79.6 FL — LOW (ref 80–100)
MCV RBC AUTO: 80 FL — SIGNIFICANT CHANGE UP (ref 80–100)
MCV RBC AUTO: 80 FL — SIGNIFICANT CHANGE UP (ref 80–100)
MCV RBC AUTO: 80.6 FL — LOW (ref 81–99)
MCV RBC AUTO: 80.8 FL — LOW (ref 81–99)
MCV RBC AUTO: 80.9 FL — SIGNIFICANT CHANGE UP (ref 80–100)
MCV RBC AUTO: 80.9 FL — SIGNIFICANT CHANGE UP (ref 80–100)
MCV RBC AUTO: 81.7 FL — SIGNIFICANT CHANGE UP (ref 81–99)
MCV RBC AUTO: 81.9 FL — SIGNIFICANT CHANGE UP (ref 81–99)
MCV RBC AUTO: 81.9 FL — SIGNIFICANT CHANGE UP (ref 81–99)
MCV RBC AUTO: 85.5 FL — SIGNIFICANT CHANGE UP (ref 81–99)
MCV RBC AUTO: 86 FL — SIGNIFICANT CHANGE UP (ref 81–99)
MCV RBC AUTO: 86.2 FL — SIGNIFICANT CHANGE UP (ref 81–99)
MCV RBC AUTO: 86.6 FL — SIGNIFICANT CHANGE UP (ref 81–99)
MCV RBC AUTO: 86.7 FL — SIGNIFICANT CHANGE UP (ref 81–99)
MCV RBC AUTO: 87 FL — SIGNIFICANT CHANGE UP (ref 81–99)
MCV RBC AUTO: 88.1 FL — SIGNIFICANT CHANGE UP (ref 81–99)
MCV RBC AUTO: 88.5 FL — SIGNIFICANT CHANGE UP (ref 81–99)
MCV RBC AUTO: 88.6 FL — SIGNIFICANT CHANGE UP (ref 81–99)
MCV RBC AUTO: 90.4 FL — SIGNIFICANT CHANGE UP (ref 81–99)
MCV RBC AUTO: 90.7 FL — SIGNIFICANT CHANGE UP (ref 81–99)
MCV RBC AUTO: 91.1 FL — SIGNIFICANT CHANGE UP (ref 81–99)
MCV RBC AUTO: 91.7 FL — SIGNIFICANT CHANGE UP (ref 81–99)
MCV RBC AUTO: 93.2 FL — SIGNIFICANT CHANGE UP (ref 81–99)
MCV RBC AUTO: 93.8 FL — SIGNIFICANT CHANGE UP (ref 81–99)
METHADONE UR-MCNC: NEGATIVE — SIGNIFICANT CHANGE UP
METHADONE UR-MCNC: NEGATIVE — SIGNIFICANT CHANGE UP
MONOCYTES # BLD AUTO: 0.27 K/UL — SIGNIFICANT CHANGE UP (ref 0.1–0.6)
MONOCYTES # BLD AUTO: 0.47 K/UL — SIGNIFICANT CHANGE UP (ref 0.1–0.6)
MONOCYTES # BLD AUTO: 0.52 K/UL — SIGNIFICANT CHANGE UP (ref 0–0.9)
MONOCYTES # BLD AUTO: 0.55 K/UL — SIGNIFICANT CHANGE UP (ref 0.1–0.6)
MONOCYTES # BLD AUTO: 0.55 K/UL — SIGNIFICANT CHANGE UP (ref 0.1–0.6)
MONOCYTES # BLD AUTO: 0.59 K/UL — SIGNIFICANT CHANGE UP (ref 0.1–0.6)
MONOCYTES # BLD AUTO: 0.59 K/UL — SIGNIFICANT CHANGE UP (ref 0.1–0.6)
MONOCYTES # BLD AUTO: 0.62 K/UL — HIGH (ref 0.1–0.6)
MONOCYTES # BLD AUTO: 0.62 K/UL — SIGNIFICANT CHANGE UP (ref 0–0.9)
MONOCYTES # BLD AUTO: 0.63 K/UL — HIGH (ref 0.1–0.6)
MONOCYTES # BLD AUTO: 0.63 K/UL — SIGNIFICANT CHANGE UP (ref 0–0.9)
MONOCYTES # BLD AUTO: 0.65 K/UL — HIGH (ref 0.1–0.6)
MONOCYTES # BLD AUTO: 0.66 K/UL — HIGH (ref 0.1–0.6)
MONOCYTES # BLD AUTO: 0.67 K/UL — HIGH (ref 0.1–0.6)
MONOCYTES # BLD AUTO: 0.72 K/UL — HIGH (ref 0.1–0.6)
MONOCYTES # BLD AUTO: 0.76 K/UL — SIGNIFICANT CHANGE UP (ref 0–0.9)
MONOCYTES # BLD AUTO: 0.79 K/UL — HIGH (ref 0.1–0.6)
MONOCYTES # BLD AUTO: 0.81 K/UL — HIGH (ref 0.1–0.6)
MONOCYTES NFR BLD AUTO: 10 % — SIGNIFICANT CHANGE UP (ref 2–14)
MONOCYTES NFR BLD AUTO: 10.5 % — HIGH (ref 1.7–9.3)
MONOCYTES NFR BLD AUTO: 10.7 % — HIGH (ref 1.7–9.3)
MONOCYTES NFR BLD AUTO: 11.1 % — HIGH (ref 1.7–9.3)
MONOCYTES NFR BLD AUTO: 12 % — HIGH (ref 1.7–9.3)
MONOCYTES NFR BLD AUTO: 12.7 % — HIGH (ref 1.7–9.3)
MONOCYTES NFR BLD AUTO: 13.4 % — HIGH (ref 1.7–9.3)
MONOCYTES NFR BLD AUTO: 14 % — HIGH (ref 1.7–9.3)
MONOCYTES NFR BLD AUTO: 14.6 % — HIGH (ref 1.7–9.3)
MONOCYTES NFR BLD AUTO: 4.3 % — SIGNIFICANT CHANGE UP (ref 1.7–9.3)
MONOCYTES NFR BLD AUTO: 7.6 % — SIGNIFICANT CHANGE UP (ref 1.7–9.3)
MONOCYTES NFR BLD AUTO: 8.4 % — SIGNIFICANT CHANGE UP (ref 2–14)
MONOCYTES NFR BLD AUTO: 8.7 % — SIGNIFICANT CHANGE UP (ref 2–14)
MONOCYTES NFR BLD AUTO: 9.2 % — SIGNIFICANT CHANGE UP (ref 1.7–9.3)
MONOCYTES NFR BLD AUTO: 9.7 % — HIGH (ref 1.7–9.3)
MONOCYTES NFR BLD AUTO: 9.8 % — SIGNIFICANT CHANGE UP (ref 2–14)
MONOCYTES NFR BLD AUTO: 9.9 % — HIGH (ref 1.7–9.3)
MONOCYTES NFR BLD AUTO: 9.9 % — HIGH (ref 1.7–9.3)
MORPHINE UR QL CFM: 172 NG/ML — SIGNIFICANT CHANGE UP
MORPHINE, UR RESULT: 172 NG/ML — SIGNIFICANT CHANGE UP
NEUTROPHILS # BLD AUTO: 1.97 K/UL — SIGNIFICANT CHANGE UP (ref 1.4–6.5)
NEUTROPHILS # BLD AUTO: 2.4 K/UL — SIGNIFICANT CHANGE UP (ref 1.4–6.5)
NEUTROPHILS # BLD AUTO: 2.61 K/UL — SIGNIFICANT CHANGE UP (ref 1.4–6.5)
NEUTROPHILS # BLD AUTO: 2.66 K/UL — SIGNIFICANT CHANGE UP (ref 1.4–6.5)
NEUTROPHILS # BLD AUTO: 3.5 K/UL — SIGNIFICANT CHANGE UP (ref 1.4–6.5)
NEUTROPHILS # BLD AUTO: 3.64 K/UL — SIGNIFICANT CHANGE UP (ref 1.8–7.4)
NEUTROPHILS # BLD AUTO: 3.99 K/UL — SIGNIFICANT CHANGE UP (ref 1.4–6.5)
NEUTROPHILS # BLD AUTO: 4.14 K/UL — SIGNIFICANT CHANGE UP (ref 1.4–6.5)
NEUTROPHILS # BLD AUTO: 4.14 K/UL — SIGNIFICANT CHANGE UP (ref 1.4–6.5)
NEUTROPHILS # BLD AUTO: 4.35 K/UL — SIGNIFICANT CHANGE UP (ref 1.8–7.4)
NEUTROPHILS # BLD AUTO: 4.37 K/UL — SIGNIFICANT CHANGE UP (ref 1.8–7.4)
NEUTROPHILS # BLD AUTO: 4.38 K/UL — SIGNIFICANT CHANGE UP (ref 1.4–6.5)
NEUTROPHILS # BLD AUTO: 4.44 K/UL — SIGNIFICANT CHANGE UP (ref 1.4–6.5)
NEUTROPHILS # BLD AUTO: 4.6 K/UL — SIGNIFICANT CHANGE UP (ref 1.4–6.5)
NEUTROPHILS # BLD AUTO: 4.68 K/UL — SIGNIFICANT CHANGE UP (ref 1.4–6.5)
NEUTROPHILS # BLD AUTO: 5.3 K/UL — SIGNIFICANT CHANGE UP (ref 1.4–6.5)
NEUTROPHILS # BLD AUTO: 5.66 K/UL — SIGNIFICANT CHANGE UP (ref 1.8–7.4)
NEUTROPHILS # BLD AUTO: 6.65 K/UL — HIGH (ref 1.4–6.5)
NEUTROPHILS NFR BLD AUTO: 52.1 % — SIGNIFICANT CHANGE UP (ref 42.2–75.2)
NEUTROPHILS NFR BLD AUTO: 57 % — SIGNIFICANT CHANGE UP (ref 42.2–75.2)
NEUTROPHILS NFR BLD AUTO: 59.1 % — SIGNIFICANT CHANGE UP (ref 43–77)
NEUTROPHILS NFR BLD AUTO: 61.4 % — SIGNIFICANT CHANGE UP (ref 42.2–75.2)
NEUTROPHILS NFR BLD AUTO: 61.4 % — SIGNIFICANT CHANGE UP (ref 42.2–75.2)
NEUTROPHILS NFR BLD AUTO: 62.2 % — SIGNIFICANT CHANGE UP (ref 43–77)
NEUTROPHILS NFR BLD AUTO: 63.7 % — SIGNIFICANT CHANGE UP (ref 42.2–75.2)
NEUTROPHILS NFR BLD AUTO: 64.4 % — SIGNIFICANT CHANGE UP (ref 42.2–75.2)
NEUTROPHILS NFR BLD AUTO: 65 % — SIGNIFICANT CHANGE UP (ref 42.2–75.2)
NEUTROPHILS NFR BLD AUTO: 65.2 % — SIGNIFICANT CHANGE UP (ref 42.2–75.2)
NEUTROPHILS NFR BLD AUTO: 68.3 % — SIGNIFICANT CHANGE UP (ref 43–77)
NEUTROPHILS NFR BLD AUTO: 68.7 % — SIGNIFICANT CHANGE UP (ref 42.2–75.2)
NEUTROPHILS NFR BLD AUTO: 69.3 % — SIGNIFICANT CHANGE UP (ref 42.2–75.2)
NEUTROPHILS NFR BLD AUTO: 69.8 % — SIGNIFICANT CHANGE UP (ref 42.2–75.2)
NEUTROPHILS NFR BLD AUTO: 70.6 % — SIGNIFICANT CHANGE UP (ref 42.2–75.2)
NEUTROPHILS NFR BLD AUTO: 73.1 % — SIGNIFICANT CHANGE UP (ref 43–77)
NEUTROPHILS NFR BLD AUTO: 74.1 % — SIGNIFICANT CHANGE UP (ref 42.2–75.2)
NEUTROPHILS NFR BLD AUTO: 74.4 % — SIGNIFICANT CHANGE UP (ref 42.2–75.2)
NITRITE UR-MCNC: NEGATIVE — SIGNIFICANT CHANGE UP
NITRITE UR-MCNC: NEGATIVE — SIGNIFICANT CHANGE UP
NOROXYCODONE (OPIATES), UR RESULT: NEGATIVE NG/ML — SIGNIFICANT CHANGE UP
NOROXYCODONE UR CFM-MCNC: NEGATIVE NG/ML — SIGNIFICANT CHANGE UP
NRBC # BLD: 0 /100 WBCS — SIGNIFICANT CHANGE UP (ref 0–0)
NRBC # BLD: 0 /100 — SIGNIFICANT CHANGE UP (ref 0–0)
NRBC # BLD: 2 /100 WBCS — HIGH (ref 0–0)
NT-PROBNP SERPL-SCNC: 149 PG/ML — SIGNIFICANT CHANGE UP (ref 0–300)
NT-PROBNP SERPL-SCNC: 176 PG/ML — SIGNIFICANT CHANGE UP (ref 0–300)
NT-PROBNP SERPL-SCNC: 530 PG/ML — HIGH (ref 0–300)
NT-PROBNP SERPL-SCNC: 85 PG/ML — SIGNIFICANT CHANGE UP (ref 0–300)
NT-PROBNP SERPL-SCNC: 94 PG/ML — SIGNIFICANT CHANGE UP (ref 0–300)
OPIATES IN-HOUSE INTERPRETATION: POSITIVE
OPIATES UR QL CFM: POSITIVE
OPIATES UR-MCNC: POSITIVE
OPIATES UR-MCNC: POSITIVE
OSMOLALITY SERPL: 263 MOS/KG — LOW (ref 275–300)
OSMOLALITY SERPL: 288 MOS/KG — SIGNIFICANT CHANGE UP (ref 275–300)
OXYCODONE (OPIATES), UR RESULT: NEGATIVE NG/ML — SIGNIFICANT CHANGE UP
OXYCODONE UR-MCNC: NEGATIVE NG/ML — SIGNIFICANT CHANGE UP
OXYMORPHONE (OPIATES), UR RESULT: NEGATIVE NG/ML — SIGNIFICANT CHANGE UP
OXYMORPHONE UR CFM-MCNC: NEGATIVE NG/ML — SIGNIFICANT CHANGE UP
PCO2 BLDA: 63 MMHG — CRITICAL HIGH (ref 38–42)
PCO2 BLDA: 71 MMHG — CRITICAL HIGH (ref 38–42)
PCO2 BLDA: 80 MMHG — CRITICAL HIGH (ref 38–42)
PCO2 BLDV: 104 MMHG — HIGH (ref 41–51)
PCO2 BLDV: 105 MMHG — HIGH (ref 41–51)
PCO2 BLDV: 109 MMHG — HIGH (ref 41–51)
PCO2 BLDV: 76 MMHG — HIGH (ref 41–51)
PCO2 BLDV: 91 MMHG — HIGH (ref 41–51)
PCO2 BLDV: 94 MMHG — HIGH (ref 41–51)
PCP SPEC-MCNC: SIGNIFICANT CHANGE UP
PCP UR-MCNC: NEGATIVE — SIGNIFICANT CHANGE UP
PH BLDA: 7.04 — CRITICAL LOW (ref 7.38–7.42)
PH BLDA: 7.16 — CRITICAL LOW (ref 7.38–7.42)
PH BLDA: 7.17 — CRITICAL LOW (ref 7.38–7.42)
PH BLDA: 7.33 — LOW (ref 7.38–7.42)
PH BLDA: 7.38 — SIGNIFICANT CHANGE UP (ref 7.38–7.42)
PH BLDA: 7.4 — SIGNIFICANT CHANGE UP (ref 7.38–7.42)
PH BLDV: 7.01 — LOW (ref 7.26–7.43)
PH BLDV: 7.2 — LOW (ref 7.26–7.43)
PH BLDV: 7.25 — LOW (ref 7.26–7.43)
PH BLDV: 7.26 — SIGNIFICANT CHANGE UP (ref 7.26–7.43)
PH BLDV: 7.26 — SIGNIFICANT CHANGE UP (ref 7.26–7.43)
PH BLDV: 7.28 — SIGNIFICANT CHANGE UP (ref 7.26–7.43)
PH BLDV: 7.31 — SIGNIFICANT CHANGE UP (ref 7.26–7.43)
PH BLDV: 7.32 — SIGNIFICANT CHANGE UP (ref 7.32–7.43)
PH UR: 5.5 — SIGNIFICANT CHANGE UP (ref 5–8)
PH UR: 6.5 — SIGNIFICANT CHANGE UP (ref 5–8)
PHOSPHATE SERPL-MCNC: 2.4 MG/DL — LOW (ref 2.5–4.5)
PHOSPHATE SERPL-MCNC: 2.9 MG/DL — SIGNIFICANT CHANGE UP (ref 2.1–4.9)
PHOSPHATE SERPL-MCNC: 3.2 MG/DL — SIGNIFICANT CHANGE UP (ref 2.1–4.9)
PHOSPHATE SERPL-MCNC: 3.3 MG/DL — SIGNIFICANT CHANGE UP (ref 2.5–4.5)
PHOSPHATE SERPL-MCNC: 3.6 MG/DL — SIGNIFICANT CHANGE UP (ref 2.1–4.9)
PHOSPHATE SERPL-MCNC: 3.8 MG/DL — SIGNIFICANT CHANGE UP (ref 2.5–4.5)
PHOSPHATE SERPL-MCNC: 4.1 MG/DL — SIGNIFICANT CHANGE UP (ref 2.1–4.9)
PHOSPHATE SERPL-MCNC: 4.2 MG/DL — SIGNIFICANT CHANGE UP (ref 2.5–4.5)
PHOSPHATE SERPL-MCNC: 4.4 MG/DL — SIGNIFICANT CHANGE UP (ref 2.1–4.9)
PHOSPHATE SERPL-MCNC: 4.9 MG/DL — SIGNIFICANT CHANGE UP (ref 2.1–4.9)
PHOSPHATE SERPL-MCNC: 6.1 MG/DL — HIGH (ref 2.1–4.9)
PLAT MORPH BLD: NORMAL — SIGNIFICANT CHANGE UP
PLATELET # BLD AUTO: 170 K/UL — SIGNIFICANT CHANGE UP (ref 130–400)
PLATELET # BLD AUTO: 206 K/UL — SIGNIFICANT CHANGE UP (ref 130–400)
PLATELET # BLD AUTO: 233 K/UL — SIGNIFICANT CHANGE UP (ref 130–400)
PLATELET # BLD AUTO: 240 K/UL — SIGNIFICANT CHANGE UP (ref 130–400)
PLATELET # BLD AUTO: 241 K/UL — SIGNIFICANT CHANGE UP (ref 150–400)
PLATELET # BLD AUTO: 242 K/UL — SIGNIFICANT CHANGE UP (ref 130–400)
PLATELET # BLD AUTO: 243 K/UL — SIGNIFICANT CHANGE UP (ref 150–400)
PLATELET # BLD AUTO: 260 K/UL — SIGNIFICANT CHANGE UP (ref 130–400)
PLATELET # BLD AUTO: 265 K/UL — SIGNIFICANT CHANGE UP (ref 130–400)
PLATELET # BLD AUTO: 269 K/UL — SIGNIFICANT CHANGE UP (ref 130–400)
PLATELET # BLD AUTO: 269 K/UL — SIGNIFICANT CHANGE UP (ref 150–400)
PLATELET # BLD AUTO: 286 K/UL — SIGNIFICANT CHANGE UP (ref 130–400)
PLATELET # BLD AUTO: 286 K/UL — SIGNIFICANT CHANGE UP (ref 130–400)
PLATELET # BLD AUTO: 287 K/UL — SIGNIFICANT CHANGE UP (ref 130–400)
PLATELET # BLD AUTO: 292 K/UL — SIGNIFICANT CHANGE UP (ref 150–400)
PLATELET # BLD AUTO: 293 K/UL — SIGNIFICANT CHANGE UP (ref 150–400)
PLATELET # BLD AUTO: 305 K/UL — SIGNIFICANT CHANGE UP (ref 150–400)
PLATELET # BLD AUTO: 310 K/UL — SIGNIFICANT CHANGE UP (ref 130–400)
PLATELET # BLD AUTO: 324 K/UL — SIGNIFICANT CHANGE UP (ref 150–400)
PLATELET # BLD AUTO: 331 K/UL — SIGNIFICANT CHANGE UP (ref 130–400)
PLATELET # BLD AUTO: 335 K/UL — SIGNIFICANT CHANGE UP (ref 130–400)
PLATELET # BLD AUTO: 348 K/UL — SIGNIFICANT CHANGE UP (ref 130–400)
PLATELET # BLD AUTO: 353 K/UL — SIGNIFICANT CHANGE UP (ref 130–400)
PLATELET # BLD AUTO: 354 K/UL — SIGNIFICANT CHANGE UP (ref 130–400)
PLATELET # BLD AUTO: 366 K/UL — SIGNIFICANT CHANGE UP (ref 130–400)
PLATELET # BLD AUTO: 378 K/UL — SIGNIFICANT CHANGE UP (ref 130–400)
PLATELET # BLD AUTO: 389 K/UL — SIGNIFICANT CHANGE UP (ref 130–400)
PLATELET # BLD AUTO: 389 K/UL — SIGNIFICANT CHANGE UP (ref 130–400)
PLATELET # BLD AUTO: 391 K/UL — SIGNIFICANT CHANGE UP (ref 130–400)
PLATELET # BLD AUTO: 425 K/UL — HIGH (ref 130–400)
PLATELET # BLD AUTO: 430 K/UL — HIGH (ref 130–400)
PLATELET # BLD AUTO: 438 K/UL — HIGH (ref 130–400)
PLATELET COUNT - ESTIMATE: NORMAL — SIGNIFICANT CHANGE UP
PO2 BLDA: 118 MMHG — HIGH (ref 78–95)
PO2 BLDA: 125 MMHG — HIGH (ref 78–95)
PO2 BLDA: 139 MMHG — HIGH (ref 78–95)
PO2 BLDA: 158 MMHG — HIGH (ref 78–95)
PO2 BLDA: 300 MMHG — HIGH (ref 78–95)
PO2 BLDA: 31 MMHG — CRITICAL LOW (ref 78–95)
PO2 BLDV: 22 MMHG — SIGNIFICANT CHANGE UP (ref 20–40)
PO2 BLDV: 26 MMHG — SIGNIFICANT CHANGE UP (ref 20–40)
PO2 BLDV: 36 MMHG — SIGNIFICANT CHANGE UP (ref 20–40)
PO2 BLDV: 40 MMHG — SIGNIFICANT CHANGE UP (ref 20–40)
PO2 BLDV: 41 MMHG — HIGH (ref 20–40)
PO2 BLDV: 45 MMHG — HIGH (ref 20–40)
PO2 BLDV: 50 MMHG — SIGNIFICANT CHANGE UP
PO2 BLDV: 76 MMHG — HIGH (ref 20–40)
POTASSIUM BLDV-SCNC: 3.8 MMOL/L — SIGNIFICANT CHANGE UP (ref 3.5–4.9)
POTASSIUM BLDV-SCNC: 4 MMOL/L — SIGNIFICANT CHANGE UP (ref 3.3–5.6)
POTASSIUM BLDV-SCNC: 4.3 MMOL/L — SIGNIFICANT CHANGE UP (ref 3.3–5.6)
POTASSIUM BLDV-SCNC: 4.5 MMOL/L — SIGNIFICANT CHANGE UP (ref 3.3–5.6)
POTASSIUM BLDV-SCNC: 4.5 MMOL/L — SIGNIFICANT CHANGE UP (ref 3.3–5.6)
POTASSIUM BLDV-SCNC: 4.7 MMOL/L — SIGNIFICANT CHANGE UP (ref 3.3–5.6)
POTASSIUM BLDV-SCNC: 4.7 MMOL/L — SIGNIFICANT CHANGE UP (ref 3.3–5.6)
POTASSIUM SERPL-MCNC: 3.5 MMOL/L — SIGNIFICANT CHANGE UP (ref 3.5–5)
POTASSIUM SERPL-MCNC: 3.7 MMOL/L — SIGNIFICANT CHANGE UP (ref 3.5–5.3)
POTASSIUM SERPL-MCNC: 3.8 MMOL/L — SIGNIFICANT CHANGE UP (ref 3.5–5)
POTASSIUM SERPL-MCNC: 4 MMOL/L — SIGNIFICANT CHANGE UP (ref 3.5–5)
POTASSIUM SERPL-MCNC: 4 MMOL/L — SIGNIFICANT CHANGE UP (ref 3.5–5)
POTASSIUM SERPL-MCNC: 4.1 MMOL/L — SIGNIFICANT CHANGE UP (ref 3.5–5)
POTASSIUM SERPL-MCNC: 4.1 MMOL/L — SIGNIFICANT CHANGE UP (ref 3.5–5.3)
POTASSIUM SERPL-MCNC: 4.1 MMOL/L — SIGNIFICANT CHANGE UP (ref 3.5–5.3)
POTASSIUM SERPL-MCNC: 4.2 MMOL/L — SIGNIFICANT CHANGE UP (ref 3.5–5)
POTASSIUM SERPL-MCNC: 4.2 MMOL/L — SIGNIFICANT CHANGE UP (ref 3.5–5)
POTASSIUM SERPL-MCNC: 4.3 MMOL/L — SIGNIFICANT CHANGE UP (ref 3.5–5)
POTASSIUM SERPL-MCNC: 4.3 MMOL/L — SIGNIFICANT CHANGE UP (ref 3.5–5.3)
POTASSIUM SERPL-MCNC: 4.3 MMOL/L — SIGNIFICANT CHANGE UP (ref 3.5–5.3)
POTASSIUM SERPL-MCNC: 4.4 MMOL/L — SIGNIFICANT CHANGE UP (ref 3.5–5)
POTASSIUM SERPL-MCNC: 4.4 MMOL/L — SIGNIFICANT CHANGE UP (ref 3.5–5.3)
POTASSIUM SERPL-MCNC: 4.5 MMOL/L — SIGNIFICANT CHANGE UP (ref 3.5–5)
POTASSIUM SERPL-MCNC: 4.6 MMOL/L — SIGNIFICANT CHANGE UP (ref 3.5–5)
POTASSIUM SERPL-MCNC: 4.7 MMOL/L — SIGNIFICANT CHANGE UP (ref 3.5–5)
POTASSIUM SERPL-MCNC: 4.8 MMOL/L — SIGNIFICANT CHANGE UP (ref 3.5–5)
POTASSIUM SERPL-MCNC: 4.9 MMOL/L — SIGNIFICANT CHANGE UP (ref 3.5–5)
POTASSIUM SERPL-MCNC: 5.1 MMOL/L — HIGH (ref 3.5–5)
POTASSIUM SERPL-MCNC: 5.2 MMOL/L — HIGH (ref 3.5–5)
POTASSIUM SERPL-MCNC: 5.4 MMOL/L — HIGH (ref 3.5–5.3)
POTASSIUM SERPL-MCNC: 5.6 MMOL/L — HIGH (ref 3.5–5)
POTASSIUM SERPL-MCNC: 5.7 MMOL/L — HIGH (ref 3.5–5)
POTASSIUM SERPL-MCNC: 5.9 MMOL/L — HIGH (ref 3.5–5)
POTASSIUM SERPL-MCNC: 6.3 MMOL/L — CRITICAL HIGH (ref 3.5–5)
POTASSIUM SERPL-SCNC: 3.5 MMOL/L — SIGNIFICANT CHANGE UP (ref 3.5–5)
POTASSIUM SERPL-SCNC: 3.7 MMOL/L — SIGNIFICANT CHANGE UP (ref 3.5–5.3)
POTASSIUM SERPL-SCNC: 3.8 MMOL/L — SIGNIFICANT CHANGE UP (ref 3.5–5)
POTASSIUM SERPL-SCNC: 4 MMOL/L — SIGNIFICANT CHANGE UP (ref 3.5–5)
POTASSIUM SERPL-SCNC: 4 MMOL/L — SIGNIFICANT CHANGE UP (ref 3.5–5)
POTASSIUM SERPL-SCNC: 4.1 MMOL/L — SIGNIFICANT CHANGE UP (ref 3.5–5)
POTASSIUM SERPL-SCNC: 4.1 MMOL/L — SIGNIFICANT CHANGE UP (ref 3.5–5.3)
POTASSIUM SERPL-SCNC: 4.1 MMOL/L — SIGNIFICANT CHANGE UP (ref 3.5–5.3)
POTASSIUM SERPL-SCNC: 4.2 MMOL/L — SIGNIFICANT CHANGE UP (ref 3.5–5)
POTASSIUM SERPL-SCNC: 4.2 MMOL/L — SIGNIFICANT CHANGE UP (ref 3.5–5)
POTASSIUM SERPL-SCNC: 4.3 MMOL/L — SIGNIFICANT CHANGE UP (ref 3.5–5)
POTASSIUM SERPL-SCNC: 4.3 MMOL/L — SIGNIFICANT CHANGE UP (ref 3.5–5.3)
POTASSIUM SERPL-SCNC: 4.3 MMOL/L — SIGNIFICANT CHANGE UP (ref 3.5–5.3)
POTASSIUM SERPL-SCNC: 4.4 MMOL/L — SIGNIFICANT CHANGE UP (ref 3.5–5)
POTASSIUM SERPL-SCNC: 4.4 MMOL/L — SIGNIFICANT CHANGE UP (ref 3.5–5.3)
POTASSIUM SERPL-SCNC: 4.5 MMOL/L — SIGNIFICANT CHANGE UP (ref 3.5–5)
POTASSIUM SERPL-SCNC: 4.6 MMOL/L — SIGNIFICANT CHANGE UP (ref 3.5–5)
POTASSIUM SERPL-SCNC: 4.7 MMOL/L — SIGNIFICANT CHANGE UP (ref 3.5–5)
POTASSIUM SERPL-SCNC: 4.8 MMOL/L — SIGNIFICANT CHANGE UP (ref 3.5–5)
POTASSIUM SERPL-SCNC: 4.9 MMOL/L — SIGNIFICANT CHANGE UP (ref 3.5–5)
POTASSIUM SERPL-SCNC: 5.1 MMOL/L — HIGH (ref 3.5–5)
POTASSIUM SERPL-SCNC: 5.2 MMOL/L — HIGH (ref 3.5–5)
POTASSIUM SERPL-SCNC: 5.4 MMOL/L — HIGH (ref 3.5–5.3)
POTASSIUM SERPL-SCNC: 5.6 MMOL/L — HIGH (ref 3.5–5)
POTASSIUM SERPL-SCNC: 5.7 MMOL/L — HIGH (ref 3.5–5)
POTASSIUM SERPL-SCNC: 5.9 MMOL/L — HIGH (ref 3.5–5)
POTASSIUM SERPL-SCNC: 6.3 MMOL/L — CRITICAL HIGH (ref 3.5–5)
PROPOXYPHENE QUALITATIVE URINE RESULT: NEGATIVE — SIGNIFICANT CHANGE UP
PROPOXYPHENE QUALITATIVE URINE RESULT: NEGATIVE — SIGNIFICANT CHANGE UP
PROT SERPL-MCNC: 6.3 G/DL — SIGNIFICANT CHANGE UP (ref 6–8)
PROT SERPL-MCNC: 6.4 G/DL — SIGNIFICANT CHANGE UP (ref 6–8)
PROT SERPL-MCNC: 6.7 G/DL — SIGNIFICANT CHANGE UP (ref 6–8)
PROT SERPL-MCNC: 6.9 G/DL — SIGNIFICANT CHANGE UP (ref 6–8)
PROT SERPL-MCNC: 7 G/DL — SIGNIFICANT CHANGE UP (ref 6–8)
PROT SERPL-MCNC: 7 G/DL — SIGNIFICANT CHANGE UP (ref 6–8)
PROT SERPL-MCNC: 7.1 G/DL — SIGNIFICANT CHANGE UP (ref 6–8)
PROT SERPL-MCNC: 7.3 G/DL — SIGNIFICANT CHANGE UP (ref 6–8)
PROT SERPL-MCNC: 7.4 G/DL — SIGNIFICANT CHANGE UP (ref 6–8)
PROT SERPL-MCNC: 7.4 G/DL — SIGNIFICANT CHANGE UP (ref 6–8)
PROT SERPL-MCNC: 7.5 G/DL — SIGNIFICANT CHANGE UP (ref 6–8)
PROT SERPL-MCNC: 7.5 G/DL — SIGNIFICANT CHANGE UP (ref 6–8)
PROT SERPL-MCNC: 7.7 G/DL — SIGNIFICANT CHANGE UP (ref 6–8.3)
PROT SERPL-MCNC: 8.1 G/DL — SIGNIFICANT CHANGE UP (ref 6–8.3)
PROT SERPL-MCNC: 8.2 G/DL — SIGNIFICANT CHANGE UP (ref 6–8.3)
PROT UR-MCNC: 100 MG/DL
PROT UR-MCNC: 30 MG/DL
PROTHROM AB SERPL-ACNC: 12 SEC — SIGNIFICANT CHANGE UP (ref 9.95–12.87)
PROTHROM AB SERPL-ACNC: 12 SEC — SIGNIFICANT CHANGE UP (ref 9.95–12.87)
PROTHROM AB SERPL-ACNC: 12.7 SEC — SIGNIFICANT CHANGE UP (ref 10.6–13.6)
PROTHROM AB SERPL-ACNC: 12.7 SEC — SIGNIFICANT CHANGE UP (ref 9.95–12.87)
RAPID RVP RESULT: SIGNIFICANT CHANGE UP
RBC # BLD: 3.7 M/UL — LOW (ref 4.2–5.4)
RBC # BLD: 4.01 M/UL — LOW (ref 4.2–5.4)
RBC # BLD: 4.28 M/UL — SIGNIFICANT CHANGE UP (ref 4.2–5.4)
RBC # BLD: 4.34 M/UL — SIGNIFICANT CHANGE UP (ref 4.2–5.4)
RBC # BLD: 4.34 M/UL — SIGNIFICANT CHANGE UP (ref 4.2–5.4)
RBC # BLD: 4.38 M/UL — SIGNIFICANT CHANGE UP (ref 4.2–5.4)
RBC # BLD: 4.39 M/UL — SIGNIFICANT CHANGE UP (ref 4.2–5.4)
RBC # BLD: 4.41 M/UL — SIGNIFICANT CHANGE UP (ref 4.2–5.4)
RBC # BLD: 4.45 M/UL — SIGNIFICANT CHANGE UP (ref 4.2–5.4)
RBC # BLD: 4.5 M/UL — SIGNIFICANT CHANGE UP (ref 4.2–5.4)
RBC # BLD: 4.51 M/UL — SIGNIFICANT CHANGE UP (ref 4.2–5.4)
RBC # BLD: 4.56 M/UL — SIGNIFICANT CHANGE UP (ref 4.2–5.4)
RBC # BLD: 4.59 M/UL — SIGNIFICANT CHANGE UP (ref 4.2–5.4)
RBC # BLD: 4.61 M/UL — SIGNIFICANT CHANGE UP (ref 3.8–5.2)
RBC # BLD: 4.64 M/UL — SIGNIFICANT CHANGE UP (ref 4.2–5.4)
RBC # BLD: 4.65 M/UL — SIGNIFICANT CHANGE UP (ref 3.8–5.2)
RBC # BLD: 4.67 M/UL — SIGNIFICANT CHANGE UP (ref 4.2–5.4)
RBC # BLD: 4.69 M/UL — SIGNIFICANT CHANGE UP (ref 4.2–5.4)
RBC # BLD: 4.73 M/UL — SIGNIFICANT CHANGE UP (ref 4.2–5.4)
RBC # BLD: 4.76 M/UL — SIGNIFICANT CHANGE UP (ref 3.8–5.2)
RBC # BLD: 4.81 M/UL — SIGNIFICANT CHANGE UP (ref 4.2–5.4)
RBC # BLD: 4.89 M/UL — SIGNIFICANT CHANGE UP (ref 3.8–5.2)
RBC # BLD: 4.89 M/UL — SIGNIFICANT CHANGE UP (ref 4.2–5.4)
RBC # BLD: 4.91 M/UL — SIGNIFICANT CHANGE UP (ref 4.2–5.4)
RBC # BLD: 4.92 M/UL — SIGNIFICANT CHANGE UP (ref 3.8–5.2)
RBC # BLD: 4.92 M/UL — SIGNIFICANT CHANGE UP (ref 4.2–5.4)
RBC # BLD: 4.97 M/UL — SIGNIFICANT CHANGE UP (ref 4.2–5.4)
RBC # BLD: 4.97 M/UL — SIGNIFICANT CHANGE UP (ref 4.2–5.4)
RBC # BLD: 5.04 M/UL — SIGNIFICANT CHANGE UP (ref 3.8–5.2)
RBC # BLD: 5.06 M/UL — SIGNIFICANT CHANGE UP (ref 4.2–5.4)
RBC # BLD: 5.09 M/UL — SIGNIFICANT CHANGE UP (ref 3.8–5.2)
RBC # BLD: 5.09 M/UL — SIGNIFICANT CHANGE UP (ref 4.2–5.4)
RBC # FLD: 15.5 % — HIGH (ref 11.5–14.5)
RBC # FLD: 15.6 % — HIGH (ref 11.5–14.5)
RBC # FLD: 15.6 % — HIGH (ref 11.5–14.5)
RBC # FLD: 15.9 % — HIGH (ref 11.5–14.5)
RBC # FLD: 16.1 % — HIGH (ref 11.5–14.5)
RBC # FLD: 16.2 % — HIGH (ref 11.5–14.5)
RBC # FLD: 16.3 % — HIGH (ref 11.5–14.5)
RBC # FLD: 16.6 % — HIGH (ref 11.5–14.5)
RBC # FLD: 16.8 % — HIGH (ref 11.5–14.5)
RBC # FLD: 17 % — HIGH (ref 10.3–14.5)
RBC # FLD: 17 % — HIGH (ref 10.3–14.5)
RBC # FLD: 17.1 % — HIGH (ref 10.3–14.5)
RBC # FLD: 17.1 % — HIGH (ref 10.3–14.5)
RBC # FLD: 17.1 % — HIGH (ref 11.5–14.5)
RBC # FLD: 17.2 % — HIGH (ref 10.3–14.5)
RBC # FLD: 17.2 % — HIGH (ref 11.5–14.5)
RBC # FLD: 17.3 % — HIGH (ref 11.5–14.5)
RBC # FLD: 17.4 % — HIGH (ref 11.5–14.5)
RBC # FLD: 17.5 % — HIGH (ref 11.5–14.5)
RBC # FLD: 17.5 % — HIGH (ref 11.5–14.5)
RBC # FLD: 17.6 % — HIGH (ref 11.5–14.5)
RBC # FLD: 17.6 % — HIGH (ref 11.5–14.5)
RBC # FLD: 17.7 % — HIGH (ref 11.5–14.5)
RBC # FLD: 17.8 % — HIGH (ref 11.5–14.5)
RBC # FLD: 18 % — HIGH (ref 11.5–14.5)
RBC # FLD: 18 % — HIGH (ref 11.5–14.5)
RBC # FLD: 18.2 % — HIGH (ref 11.5–14.5)
RBC # FLD: 18.3 % — HIGH (ref 11.5–14.5)
RBC BLD AUTO: NORMAL — SIGNIFICANT CHANGE UP
RBC CASTS # UR COMP ASSIST: SIGNIFICANT CHANGE UP /HPF
SALICYLATES SERPL-MCNC: <0.3 MG/DL — LOW (ref 4–30)
SAO2 % BLDA: 100 % — HIGH (ref 94–98)
SAO2 % BLDA: 52 % — CRITICAL LOW (ref 94–98)
SAO2 % BLDA: 98 % — SIGNIFICANT CHANGE UP (ref 94–98)
SAO2 % BLDA: 98 % — SIGNIFICANT CHANGE UP (ref 94–98)
SAO2 % BLDA: 99 % — HIGH (ref 94–98)
SAO2 % BLDA: 99 % — HIGH (ref 94–98)
SAO2 % BLDV: 32 % — SIGNIFICANT CHANGE UP
SAO2 % BLDV: 43 % — SIGNIFICANT CHANGE UP
SAO2 % BLDV: 61 % — SIGNIFICANT CHANGE UP
SAO2 % BLDV: 65 % — SIGNIFICANT CHANGE UP
SAO2 % BLDV: 71 % — SIGNIFICANT CHANGE UP
SAO2 % BLDV: 76 % — SIGNIFICANT CHANGE UP
SAO2 % BLDV: 76 % — SIGNIFICANT CHANGE UP
SAO2 % BLDV: 92 % — SIGNIFICANT CHANGE UP
SARS-COV-2 IGG SERPL QL IA: NEGATIVE — SIGNIFICANT CHANGE UP
SARS-COV-2 IGM SERPL IA-ACNC: 0.08 INDEX — SIGNIFICANT CHANGE UP
SARS-COV-2 IGM SERPL IA-ACNC: <0.1 INDEX — SIGNIFICANT CHANGE UP
SARS-COV-2 RNA SPEC QL NAA+PROBE: SIGNIFICANT CHANGE UP
SODIUM SERPL-SCNC: 122 MMOL/L — LOW (ref 135–146)
SODIUM SERPL-SCNC: 124 MMOL/L — LOW (ref 135–146)
SODIUM SERPL-SCNC: 124 MMOL/L — LOW (ref 135–146)
SODIUM SERPL-SCNC: 125 MMOL/L — LOW (ref 135–146)
SODIUM SERPL-SCNC: 128 MMOL/L — LOW (ref 135–146)
SODIUM SERPL-SCNC: 129 MMOL/L — LOW (ref 135–146)
SODIUM SERPL-SCNC: 130 MMOL/L — LOW (ref 135–146)
SODIUM SERPL-SCNC: 131 MMOL/L — LOW (ref 135–146)
SODIUM SERPL-SCNC: 132 MMOL/L — LOW (ref 135–146)
SODIUM SERPL-SCNC: 134 MMOL/L — LOW (ref 135–146)
SODIUM SERPL-SCNC: 134 MMOL/L — LOW (ref 135–146)
SODIUM SERPL-SCNC: 135 MMOL/L — SIGNIFICANT CHANGE UP (ref 135–145)
SODIUM SERPL-SCNC: 135 MMOL/L — SIGNIFICANT CHANGE UP (ref 135–146)
SODIUM SERPL-SCNC: 135 MMOL/L — SIGNIFICANT CHANGE UP (ref 135–146)
SODIUM SERPL-SCNC: 136 MMOL/L — SIGNIFICANT CHANGE UP (ref 135–146)
SODIUM SERPL-SCNC: 137 MMOL/L — SIGNIFICANT CHANGE UP (ref 135–145)
SODIUM SERPL-SCNC: 137 MMOL/L — SIGNIFICANT CHANGE UP (ref 135–146)
SODIUM SERPL-SCNC: 138 MMOL/L — SIGNIFICANT CHANGE UP (ref 135–145)
SODIUM SERPL-SCNC: 138 MMOL/L — SIGNIFICANT CHANGE UP (ref 135–146)
SODIUM SERPL-SCNC: 139 MMOL/L — SIGNIFICANT CHANGE UP (ref 135–145)
SODIUM SERPL-SCNC: 139 MMOL/L — SIGNIFICANT CHANGE UP (ref 135–146)
SODIUM SERPL-SCNC: 140 MMOL/L — SIGNIFICANT CHANGE UP (ref 135–146)
SODIUM SERPL-SCNC: 141 MMOL/L — SIGNIFICANT CHANGE UP (ref 135–145)
SODIUM SERPL-SCNC: 141 MMOL/L — SIGNIFICANT CHANGE UP (ref 135–146)
SODIUM SERPL-SCNC: 143 MMOL/L — SIGNIFICANT CHANGE UP (ref 135–146)
SODIUM SERPL-SCNC: 143 MMOL/L — SIGNIFICANT CHANGE UP (ref 135–146)
SODIUM SERPL-SCNC: 144 MMOL/L — SIGNIFICANT CHANGE UP (ref 135–146)
SODIUM SERPL-SCNC: 146 MMOL/L — SIGNIFICANT CHANGE UP (ref 135–146)
SP GR SPEC: >=1.03 (ref 1.01–1.03)
SP GR SPEC: >=1.03 (ref 1.01–1.03)
SPECIMEN SOURCE: SIGNIFICANT CHANGE UP
THC UR QL: NEGATIVE — SIGNIFICANT CHANGE UP
TIBC SERPL-MCNC: 333 UG/DL — SIGNIFICANT CHANGE UP (ref 220–430)
TRANSFERRIN SERPL-MCNC: 267 MG/DL — SIGNIFICANT CHANGE UP (ref 200–360)
TRI-PHOS CRY UR QL COMP ASSIST: NEGATIVE — SIGNIFICANT CHANGE UP
TROPONIN T SERPL-MCNC: 0.03 NG/ML — CRITICAL HIGH
TROPONIN T SERPL-MCNC: <0.01 NG/ML — SIGNIFICANT CHANGE UP
TROPONIN T SERPL-MCNC: <0.01 NG/ML — SIGNIFICANT CHANGE UP (ref 0–0.01)
TSH SERPL-MCNC: 3.99 UIU/ML — SIGNIFICANT CHANGE UP (ref 0.27–4.2)
UIBC SERPL-MCNC: 277 UG/DL — SIGNIFICANT CHANGE UP (ref 110–370)
URATE CRY FLD QL MICRO: NEGATIVE — SIGNIFICANT CHANGE UP
UROBILINOGEN FLD QL: 1 MG/DL (ref 0.2–0.2)
UROBILINOGEN FLD QL: 4 MG/DL (ref 0.2–0.2)
WBC # BLD: 3.5 K/UL — LOW (ref 4.8–10.8)
WBC # BLD: 3.78 K/UL — LOW (ref 4.8–10.8)
WBC # BLD: 4.21 K/UL — LOW (ref 4.8–10.8)
WBC # BLD: 4.25 K/UL — LOW (ref 4.8–10.8)
WBC # BLD: 4.33 K/UL — LOW (ref 4.8–10.8)
WBC # BLD: 4.95 K/UL — SIGNIFICANT CHANGE UP (ref 3.8–10.5)
WBC # BLD: 5.36 K/UL — SIGNIFICANT CHANGE UP (ref 4.8–10.8)
WBC # BLD: 5.38 K/UL — SIGNIFICANT CHANGE UP (ref 4.8–10.8)
WBC # BLD: 5.43 K/UL — SIGNIFICANT CHANGE UP (ref 4.8–10.8)
WBC # BLD: 5.46 K/UL — SIGNIFICANT CHANGE UP (ref 4.8–10.8)
WBC # BLD: 5.48 K/UL — SIGNIFICANT CHANGE UP (ref 4.8–10.8)
WBC # BLD: 5.89 K/UL — SIGNIFICANT CHANGE UP (ref 4.8–10.8)
WBC # BLD: 5.95 K/UL — SIGNIFICANT CHANGE UP (ref 3.8–10.5)
WBC # BLD: 5.97 K/UL — SIGNIFICANT CHANGE UP (ref 4.8–10.8)
WBC # BLD: 6.02 K/UL — SIGNIFICANT CHANGE UP (ref 4.8–10.8)
WBC # BLD: 6.13 K/UL — SIGNIFICANT CHANGE UP (ref 4.8–10.8)
WBC # BLD: 6.17 K/UL — SIGNIFICANT CHANGE UP (ref 3.8–10.5)
WBC # BLD: 6.21 K/UL — SIGNIFICANT CHANGE UP (ref 4.8–10.8)
WBC # BLD: 6.26 K/UL — SIGNIFICANT CHANGE UP (ref 4.8–10.8)
WBC # BLD: 6.35 K/UL — SIGNIFICANT CHANGE UP (ref 4.8–10.8)
WBC # BLD: 6.37 K/UL — SIGNIFICANT CHANGE UP (ref 4.8–10.8)
WBC # BLD: 6.4 K/UL — SIGNIFICANT CHANGE UP (ref 3.8–10.5)
WBC # BLD: 6.4 K/UL — SIGNIFICANT CHANGE UP (ref 4.8–10.8)
WBC # BLD: 6.52 K/UL — SIGNIFICANT CHANGE UP (ref 3.8–10.5)
WBC # BLD: 6.68 K/UL — SIGNIFICANT CHANGE UP (ref 3.8–10.5)
WBC # BLD: 6.7 K/UL — SIGNIFICANT CHANGE UP (ref 4.8–10.8)
WBC # BLD: 6.94 K/UL — SIGNIFICANT CHANGE UP (ref 4.8–10.8)
WBC # BLD: 7.23 K/UL — SIGNIFICANT CHANGE UP (ref 4.8–10.8)
WBC # BLD: 7.72 K/UL — SIGNIFICANT CHANGE UP (ref 4.8–10.8)
WBC # BLD: 8.07 K/UL — SIGNIFICANT CHANGE UP (ref 4.8–10.8)
WBC # BLD: 9.09 K/UL — SIGNIFICANT CHANGE UP (ref 3.8–10.5)
WBC # BLD: 9.42 K/UL — SIGNIFICANT CHANGE UP (ref 4.8–10.8)
WBC # FLD AUTO: 3.5 K/UL — LOW (ref 4.8–10.8)
WBC # FLD AUTO: 3.78 K/UL — LOW (ref 4.8–10.8)
WBC # FLD AUTO: 4.21 K/UL — LOW (ref 4.8–10.8)
WBC # FLD AUTO: 4.25 K/UL — LOW (ref 4.8–10.8)
WBC # FLD AUTO: 4.33 K/UL — LOW (ref 4.8–10.8)
WBC # FLD AUTO: 4.95 K/UL — SIGNIFICANT CHANGE UP (ref 3.8–10.5)
WBC # FLD AUTO: 5.36 K/UL — SIGNIFICANT CHANGE UP (ref 4.8–10.8)
WBC # FLD AUTO: 5.38 K/UL — SIGNIFICANT CHANGE UP (ref 4.8–10.8)
WBC # FLD AUTO: 5.43 K/UL — SIGNIFICANT CHANGE UP (ref 4.8–10.8)
WBC # FLD AUTO: 5.46 K/UL — SIGNIFICANT CHANGE UP (ref 4.8–10.8)
WBC # FLD AUTO: 5.48 K/UL — SIGNIFICANT CHANGE UP (ref 4.8–10.8)
WBC # FLD AUTO: 5.89 K/UL — SIGNIFICANT CHANGE UP (ref 4.8–10.8)
WBC # FLD AUTO: 5.95 K/UL — SIGNIFICANT CHANGE UP (ref 3.8–10.5)
WBC # FLD AUTO: 5.97 K/UL — SIGNIFICANT CHANGE UP (ref 4.8–10.8)
WBC # FLD AUTO: 6.02 K/UL — SIGNIFICANT CHANGE UP (ref 4.8–10.8)
WBC # FLD AUTO: 6.13 K/UL — SIGNIFICANT CHANGE UP (ref 4.8–10.8)
WBC # FLD AUTO: 6.17 K/UL — SIGNIFICANT CHANGE UP (ref 3.8–10.5)
WBC # FLD AUTO: 6.21 K/UL — SIGNIFICANT CHANGE UP (ref 4.8–10.8)
WBC # FLD AUTO: 6.26 K/UL — SIGNIFICANT CHANGE UP (ref 4.8–10.8)
WBC # FLD AUTO: 6.35 K/UL — SIGNIFICANT CHANGE UP (ref 4.8–10.8)
WBC # FLD AUTO: 6.37 K/UL — SIGNIFICANT CHANGE UP (ref 4.8–10.8)
WBC # FLD AUTO: 6.4 K/UL — SIGNIFICANT CHANGE UP (ref 3.8–10.5)
WBC # FLD AUTO: 6.4 K/UL — SIGNIFICANT CHANGE UP (ref 4.8–10.8)
WBC # FLD AUTO: 6.52 K/UL — SIGNIFICANT CHANGE UP (ref 3.8–10.5)
WBC # FLD AUTO: 6.68 K/UL — SIGNIFICANT CHANGE UP (ref 3.8–10.5)
WBC # FLD AUTO: 6.7 K/UL — SIGNIFICANT CHANGE UP (ref 4.8–10.8)
WBC # FLD AUTO: 6.94 K/UL — SIGNIFICANT CHANGE UP (ref 4.8–10.8)
WBC # FLD AUTO: 7.23 K/UL — SIGNIFICANT CHANGE UP (ref 4.8–10.8)
WBC # FLD AUTO: 7.72 K/UL — SIGNIFICANT CHANGE UP (ref 4.8–10.8)
WBC # FLD AUTO: 8.07 K/UL — SIGNIFICANT CHANGE UP (ref 4.8–10.8)
WBC # FLD AUTO: 9.09 K/UL — SIGNIFICANT CHANGE UP (ref 3.8–10.5)
WBC # FLD AUTO: 9.42 K/UL — SIGNIFICANT CHANGE UP (ref 4.8–10.8)
WBC UR QL: SIGNIFICANT CHANGE UP /HPF
WBC UR QL: SIGNIFICANT CHANGE UP /HPF

## 2020-01-01 PROCEDURE — 71045 X-RAY EXAM CHEST 1 VIEW: CPT | Mod: 26,59

## 2020-01-01 PROCEDURE — 99232 SBSQ HOSP IP/OBS MODERATE 35: CPT

## 2020-01-01 PROCEDURE — 99223 1ST HOSP IP/OBS HIGH 75: CPT

## 2020-01-01 PROCEDURE — 99233 SBSQ HOSP IP/OBS HIGH 50: CPT

## 2020-01-01 PROCEDURE — 82803 BLOOD GASES ANY COMBINATION: CPT

## 2020-01-01 PROCEDURE — 71045 X-RAY EXAM CHEST 1 VIEW: CPT | Mod: 26

## 2020-01-01 PROCEDURE — 86850 RBC ANTIBODY SCREEN: CPT

## 2020-01-01 PROCEDURE — 76937 US GUIDE VASCULAR ACCESS: CPT | Mod: 26

## 2020-01-01 PROCEDURE — 99291 CRITICAL CARE FIRST HOUR: CPT | Mod: 25

## 2020-01-01 PROCEDURE — 99497 ADVNCD CARE PLAN 30 MIN: CPT | Mod: 25

## 2020-01-01 PROCEDURE — 32550 INSERT PLEURAL CATH: CPT | Mod: LT

## 2020-01-01 PROCEDURE — 99291 CRITICAL CARE FIRST HOUR: CPT

## 2020-01-01 PROCEDURE — 85610 PROTHROMBIN TIME: CPT

## 2020-01-01 PROCEDURE — 87635 SARS-COV-2 COVID-19 AMP PRB: CPT

## 2020-01-01 PROCEDURE — 82330 ASSAY OF CALCIUM: CPT

## 2020-01-01 PROCEDURE — 99239 HOSP IP/OBS DSCHRG MGMT >30: CPT

## 2020-01-01 PROCEDURE — 76705 ECHO EXAM OF ABDOMEN: CPT | Mod: 26,59

## 2020-01-01 PROCEDURE — 80048 BASIC METABOLIC PNL TOTAL CA: CPT

## 2020-01-01 PROCEDURE — ZZZZZ: CPT

## 2020-01-01 PROCEDURE — 71045 X-RAY EXAM CHEST 1 VIEW: CPT | Mod: 26,76

## 2020-01-01 PROCEDURE — 93010 ELECTROCARDIOGRAM REPORT: CPT

## 2020-01-01 PROCEDURE — 84484 ASSAY OF TROPONIN QUANT: CPT

## 2020-01-01 PROCEDURE — 71275 CT ANGIOGRAPHY CHEST: CPT

## 2020-01-01 PROCEDURE — 87040 BLOOD CULTURE FOR BACTERIA: CPT

## 2020-01-01 PROCEDURE — 99152 MOD SED SAME PHYS/QHP 5/>YRS: CPT | Mod: 25,59

## 2020-01-01 PROCEDURE — 76770 US EXAM ABDO BACK WALL COMP: CPT | Mod: 26

## 2020-01-01 PROCEDURE — 99222 1ST HOSP IP/OBS MODERATE 55: CPT

## 2020-01-01 PROCEDURE — 86803 HEPATITIS C AB TEST: CPT

## 2020-01-01 PROCEDURE — 71275 CT ANGIOGRAPHY CHEST: CPT | Mod: 26

## 2020-01-01 PROCEDURE — 85025 COMPLETE CBC W/AUTO DIFF WBC: CPT

## 2020-01-01 PROCEDURE — 70450 CT HEAD/BRAIN W/O DYE: CPT | Mod: 26

## 2020-01-01 PROCEDURE — 96372 THER/PROPH/DIAG INJ SC/IM: CPT

## 2020-01-01 PROCEDURE — 93005 ELECTROCARDIOGRAM TRACING: CPT

## 2020-01-01 PROCEDURE — 75989 ABSCESS DRAINAGE UNDER X-RAY: CPT | Mod: 26,TC

## 2020-01-01 PROCEDURE — 83880 ASSAY OF NATRIURETIC PEPTIDE: CPT

## 2020-01-01 PROCEDURE — 99285 EMERGENCY DEPT VISIT HI MDM: CPT

## 2020-01-01 PROCEDURE — 99153 MOD SED SAME PHYS/QHP EA: CPT

## 2020-01-01 PROCEDURE — 84100 ASSAY OF PHOSPHORUS: CPT

## 2020-01-01 PROCEDURE — 84295 ASSAY OF SERUM SODIUM: CPT

## 2020-01-01 PROCEDURE — 99233 SBSQ HOSP IP/OBS HIGH 50: CPT | Mod: 25

## 2020-01-01 PROCEDURE — C1729: CPT

## 2020-01-01 PROCEDURE — 82962 GLUCOSE BLOOD TEST: CPT

## 2020-01-01 PROCEDURE — 93970 EXTREMITY STUDY: CPT | Mod: 26

## 2020-01-01 PROCEDURE — 36415 COLL VENOUS BLD VENIPUNCTURE: CPT

## 2020-01-01 PROCEDURE — 80053 COMPREHEN METABOLIC PANEL: CPT

## 2020-01-01 PROCEDURE — 84132 ASSAY OF SERUM POTASSIUM: CPT

## 2020-01-01 PROCEDURE — U0003: CPT

## 2020-01-01 PROCEDURE — 32550 INSERT PLEURAL CATH: CPT

## 2020-01-01 PROCEDURE — 71045 X-RAY EXAM CHEST 1 VIEW: CPT

## 2020-01-01 PROCEDURE — 86900 BLOOD TYPING SEROLOGIC ABO: CPT

## 2020-01-01 PROCEDURE — 83735 ASSAY OF MAGNESIUM: CPT

## 2020-01-01 PROCEDURE — 93970 EXTREMITY STUDY: CPT

## 2020-01-01 PROCEDURE — 94640 AIRWAY INHALATION TREATMENT: CPT

## 2020-01-01 PROCEDURE — 85027 COMPLETE CBC AUTOMATED: CPT

## 2020-01-01 PROCEDURE — 75989 ABSCESS DRAINAGE UNDER X-RAY: CPT

## 2020-01-01 PROCEDURE — 85730 THROMBOPLASTIN TIME PARTIAL: CPT

## 2020-01-01 PROCEDURE — 97161 PT EVAL LOW COMPLEX 20 MIN: CPT

## 2020-01-01 PROCEDURE — 86901 BLOOD TYPING SEROLOGIC RH(D): CPT

## 2020-01-01 PROCEDURE — 99285 EMERGENCY DEPT VISIT HI MDM: CPT | Mod: 25

## 2020-01-01 PROCEDURE — C1769: CPT

## 2020-01-01 PROCEDURE — 99152 MOD SED SAME PHYS/QHP 5/>YRS: CPT

## 2020-01-01 PROCEDURE — 85379 FIBRIN DEGRADATION QUANT: CPT

## 2020-01-01 PROCEDURE — 36556 INSERT NON-TUNNEL CV CATH: CPT

## 2020-01-01 RX ORDER — FUROSEMIDE 40 MG
40 TABLET ORAL ONCE
Refills: 0 | Status: COMPLETED | OUTPATIENT
Start: 2020-01-01 | End: 2020-01-01

## 2020-01-01 RX ORDER — DEXAMETHASONE 0.5 MG/5ML
10 ELIXIR ORAL ONCE
Refills: 0 | Status: COMPLETED | OUTPATIENT
Start: 2020-01-01 | End: 2020-01-01

## 2020-01-01 RX ORDER — FENTANYL CITRATE 50 UG/ML
25 INJECTION INTRAVENOUS
Qty: 5 | Refills: 0
Start: 2020-01-01 | End: 2020-01-01

## 2020-01-01 RX ORDER — ACETAMINOPHEN 500 MG
650 TABLET ORAL
Qty: 0 | Refills: 0 | DISCHARGE

## 2020-01-01 RX ORDER — CEFEPIME 1 G/1
2000 INJECTION, POWDER, FOR SOLUTION INTRAMUSCULAR; INTRAVENOUS ONCE
Refills: 0 | Status: COMPLETED | OUTPATIENT
Start: 2020-01-01 | End: 2020-01-01

## 2020-01-01 RX ORDER — FUROSEMIDE 40 MG
60 TABLET ORAL ONCE
Refills: 0 | Status: DISCONTINUED | OUTPATIENT
Start: 2020-01-01 | End: 2020-01-01

## 2020-01-01 RX ORDER — CEFPODOXIME PROXETIL 100 MG
1 TABLET ORAL
Qty: 10 | Refills: 0
Start: 2020-01-01 | End: 2020-01-01

## 2020-01-01 RX ORDER — FUROSEMIDE 40 MG
20 TABLET ORAL
Refills: 0 | Status: DISCONTINUED | OUTPATIENT
Start: 2020-01-01 | End: 2020-01-01

## 2020-01-01 RX ORDER — ENOXAPARIN SODIUM 100 MG/ML
85 INJECTION SUBCUTANEOUS EVERY 12 HOURS
Refills: 0 | Status: DISCONTINUED | OUTPATIENT
Start: 2020-01-01 | End: 2020-01-01

## 2020-01-01 RX ORDER — ALPRAZOLAM 0.25 MG
0.25 TABLET ORAL EVERY 8 HOURS
Refills: 0 | Status: DISCONTINUED | OUTPATIENT
Start: 2020-01-01 | End: 2020-01-01

## 2020-01-01 RX ORDER — IPRATROPIUM/ALBUTEROL SULFATE 18-103MCG
3 AEROSOL WITH ADAPTER (GRAM) INHALATION EVERY 6 HOURS
Refills: 0 | Status: DISCONTINUED | OUTPATIENT
Start: 2020-01-01 | End: 2020-01-01

## 2020-01-01 RX ORDER — ENOXAPARIN SODIUM 100 MG/ML
80 INJECTION SUBCUTANEOUS EVERY 12 HOURS
Refills: 0 | Status: DISCONTINUED | OUTPATIENT
Start: 2020-01-01 | End: 2020-01-01

## 2020-01-01 RX ORDER — CLONAZEPAM 1 MG
0.5 TABLET ORAL ONCE
Refills: 0 | Status: DISCONTINUED | OUTPATIENT
Start: 2020-01-01 | End: 2020-01-01

## 2020-01-01 RX ORDER — METOPROLOL TARTRATE 50 MG
0.5 TABLET ORAL
Qty: 0 | Refills: 0 | DISCHARGE

## 2020-01-01 RX ORDER — NALOXONE HYDROCHLORIDE 4 MG/.1ML
4 SPRAY NASAL ONCE
Refills: 0 | Status: DISCONTINUED | OUTPATIENT
Start: 2020-01-01 | End: 2020-01-01

## 2020-01-01 RX ORDER — MORPHINE SULFATE 50 MG/1
2 CAPSULE, EXTENDED RELEASE ORAL EVERY 4 HOURS
Refills: 0 | Status: DISCONTINUED | OUTPATIENT
Start: 2020-01-01 | End: 2021-01-01

## 2020-01-01 RX ORDER — BUDESONIDE AND FORMOTEROL FUMARATE DIHYDRATE 160; 4.5 UG/1; UG/1
2 AEROSOL RESPIRATORY (INHALATION)
Refills: 0 | Status: DISCONTINUED | OUTPATIENT
Start: 2020-01-01 | End: 2020-01-01

## 2020-01-01 RX ORDER — TAMOXIFEN CITRATE 20 MG/1
140 TABLET, FILM COATED ORAL ONCE
Refills: 0 | Status: COMPLETED | OUTPATIENT
Start: 2020-01-01 | End: 2020-01-01

## 2020-01-01 RX ORDER — ONDANSETRON 8 MG/1
4 TABLET, FILM COATED ORAL ONCE
Refills: 0 | Status: COMPLETED | OUTPATIENT
Start: 2020-01-01 | End: 2020-01-01

## 2020-01-01 RX ORDER — MORPHINE SULFATE 50 MG/1
2 CAPSULE, EXTENDED RELEASE ORAL EVERY 4 HOURS
Refills: 0 | Status: DISCONTINUED | OUTPATIENT
Start: 2020-01-01 | End: 2020-01-01

## 2020-01-01 RX ORDER — EXEMESTANE 25 MG/1
1 TABLET, SUGAR COATED ORAL
Qty: 0 | Refills: 0 | DISCHARGE

## 2020-01-01 RX ORDER — SODIUM CHLORIDE 9 MG/ML
500 INJECTION INTRAMUSCULAR; INTRAVENOUS; SUBCUTANEOUS ONCE
Refills: 0 | Status: COMPLETED | OUTPATIENT
Start: 2020-01-01 | End: 2020-01-01

## 2020-01-01 RX ORDER — VINORELBINE 10 MG/ML
53 INJECTION, SOLUTION INTRAVENOUS ONCE
Refills: 0 | Status: COMPLETED | OUTPATIENT
Start: 2020-01-01 | End: 2020-01-01

## 2020-01-01 RX ORDER — CLONAZEPAM 1 MG
1 TABLET ORAL
Qty: 0 | Refills: 0 | DISCHARGE

## 2020-01-01 RX ORDER — EXEMESTANE 25 MG/1
25 TABLET, SUGAR COATED ORAL
Refills: 0 | Status: DISCONTINUED | OUTPATIENT
Start: 2020-01-01 | End: 2020-01-01

## 2020-01-01 RX ORDER — MORPHINE SULFATE 50 MG/1
2 CAPSULE, EXTENDED RELEASE ORAL ONCE
Refills: 0 | Status: DISCONTINUED | OUTPATIENT
Start: 2020-01-01 | End: 2020-01-01

## 2020-01-01 RX ORDER — METOPROLOL TARTRATE 50 MG
12.5 TABLET ORAL EVERY 12 HOURS
Refills: 0 | Status: DISCONTINUED | OUTPATIENT
Start: 2020-01-01 | End: 2020-01-01

## 2020-01-01 RX ORDER — ZOLEDRONIC ACID 5 MG/100ML
4 INJECTION, SOLUTION INTRAVENOUS ONCE
Refills: 0 | Status: COMPLETED | OUTPATIENT
Start: 2020-01-01 | End: 2020-01-01

## 2020-01-01 RX ORDER — OXYCODONE HYDROCHLORIDE 5 MG/1
1 TABLET ORAL
Qty: 84 | Refills: 0
Start: 2020-01-01 | End: 2020-01-01

## 2020-01-01 RX ORDER — ALPRAZOLAM 0.25 MG
1 TABLET ORAL
Qty: 10 | Refills: 0
Start: 2020-01-01 | End: 2020-01-01

## 2020-01-01 RX ORDER — EXEMESTANE 25 MG/1
1 TABLET, SUGAR COATED ORAL
Qty: 0 | Refills: 0 | DISCHARGE
Start: 2020-01-01

## 2020-01-01 RX ORDER — FUROSEMIDE 40 MG
40 TABLET ORAL
Refills: 0 | Status: DISCONTINUED | OUTPATIENT
Start: 2020-01-01 | End: 2020-01-01

## 2020-01-01 RX ORDER — SENNA PLUS 8.6 MG/1
2 TABLET ORAL AT BEDTIME
Refills: 0 | Status: DISCONTINUED | OUTPATIENT
Start: 2020-01-01 | End: 2020-01-01

## 2020-01-01 RX ORDER — METOPROLOL TARTRATE 50 MG
12.5 TABLET ORAL
Refills: 0 | Status: DISCONTINUED | OUTPATIENT
Start: 2020-01-01 | End: 2020-01-01

## 2020-01-01 RX ORDER — ERYTHROMYCIN BASE 5 MG/GRAM
1 OINTMENT (GRAM) OPHTHALMIC (EYE)
Refills: 0 | Status: DISCONTINUED | OUTPATIENT
Start: 2020-01-01 | End: 2021-01-01

## 2020-01-01 RX ORDER — NALOXONE HYDROCHLORIDE 4 MG/.1ML
2 SPRAY NASAL ONCE
Refills: 0 | Status: DISCONTINUED | OUTPATIENT
Start: 2020-01-01 | End: 2020-01-01

## 2020-01-01 RX ORDER — FENTANYL CITRATE 50 UG/ML
1 INJECTION INTRAVENOUS
Refills: 0 | Status: DISCONTINUED | OUTPATIENT
Start: 2020-01-01 | End: 2020-01-01

## 2020-01-01 RX ORDER — MORPHINE SULFATE 50 MG/1
4 CAPSULE, EXTENDED RELEASE ORAL
Refills: 0 | Status: DISCONTINUED | OUTPATIENT
Start: 2020-01-01 | End: 2020-01-01

## 2020-01-01 RX ORDER — MORPHINE SULFATE 50 MG/1
1 CAPSULE, EXTENDED RELEASE ORAL
Qty: 20 | Refills: 0
Start: 2020-01-01 | End: 2020-01-01

## 2020-01-01 RX ORDER — ENOXAPARIN SODIUM 100 MG/ML
80 INJECTION SUBCUTANEOUS
Refills: 0 | Status: DISCONTINUED | OUTPATIENT
Start: 2020-01-01 | End: 2020-01-01

## 2020-01-01 RX ORDER — HEPARIN SODIUM 5000 [USP'U]/ML
INJECTION INTRAVENOUS; SUBCUTANEOUS
Qty: 25000 | Refills: 0 | Status: DISCONTINUED | OUTPATIENT
Start: 2020-01-01 | End: 2020-01-01

## 2020-01-01 RX ORDER — ENOXAPARIN SODIUM 100 MG/ML
75 INJECTION SUBCUTANEOUS EVERY 12 HOURS
Refills: 0 | Status: DISCONTINUED | OUTPATIENT
Start: 2020-01-01 | End: 2020-01-01

## 2020-01-01 RX ORDER — PANTOPRAZOLE SODIUM 20 MG/1
40 TABLET, DELAYED RELEASE ORAL DAILY
Refills: 0 | Status: DISCONTINUED | OUTPATIENT
Start: 2020-01-01 | End: 2020-01-01

## 2020-01-01 RX ORDER — TAMOXIFEN CITRATE 20 MG/1
140 TABLET, FILM COATED ORAL
Refills: 0 | Status: COMPLETED | OUTPATIENT
Start: 2020-01-01 | End: 2020-01-01

## 2020-01-01 RX ORDER — SODIUM CHLORIDE 9 MG/ML
1000 INJECTION INTRAMUSCULAR; INTRAVENOUS; SUBCUTANEOUS
Refills: 0 | Status: DISCONTINUED | OUTPATIENT
Start: 2020-01-01 | End: 2020-01-01

## 2020-01-01 RX ORDER — MORPHINE SULFATE 50 MG/1
1 CAPSULE, EXTENDED RELEASE ORAL ONCE
Refills: 0 | Status: DISCONTINUED | OUTPATIENT
Start: 2020-01-01 | End: 2020-01-01

## 2020-01-01 RX ORDER — SODIUM CHLORIDE 9 MG/ML
1000 INJECTION, SOLUTION INTRAVENOUS
Refills: 0 | Status: DISCONTINUED | OUTPATIENT
Start: 2020-01-01 | End: 2021-01-01

## 2020-01-01 RX ORDER — PANTOPRAZOLE SODIUM 20 MG/1
40 TABLET, DELAYED RELEASE ORAL
Refills: 0 | Status: DISCONTINUED | OUTPATIENT
Start: 2020-01-01 | End: 2020-01-01

## 2020-01-01 RX ORDER — SODIUM ZIRCONIUM CYCLOSILICATE 10 G/10G
10 POWDER, FOR SUSPENSION ORAL ONCE
Refills: 0 | Status: COMPLETED | OUTPATIENT
Start: 2020-01-01 | End: 2020-01-01

## 2020-01-01 RX ORDER — CEFTRIAXONE 500 MG/1
1000 INJECTION, POWDER, FOR SOLUTION INTRAMUSCULAR; INTRAVENOUS EVERY 24 HOURS
Refills: 0 | Status: COMPLETED | OUTPATIENT
Start: 2020-01-01 | End: 2020-01-01

## 2020-01-01 RX ORDER — TRAMADOL HYDROCHLORIDE 50 MG/1
12.5 TABLET ORAL ONCE
Refills: 0 | Status: DISCONTINUED | OUTPATIENT
Start: 2020-01-01 | End: 2020-01-01

## 2020-01-01 RX ORDER — ENOXAPARIN SODIUM 100 MG/ML
0.85 INJECTION SUBCUTANEOUS
Qty: 51 | Refills: 1
Start: 2020-01-01 | End: 2020-01-01

## 2020-01-01 RX ORDER — SODIUM CHLORIDE 9 MG/ML
1000 INJECTION, SOLUTION INTRAVENOUS
Refills: 0 | Status: DISCONTINUED | OUTPATIENT
Start: 2020-01-01 | End: 2020-01-01

## 2020-01-01 RX ORDER — ENOXAPARIN SODIUM 100 MG/ML
70 INJECTION SUBCUTANEOUS
Refills: 0 | Status: DISCONTINUED | OUTPATIENT
Start: 2020-01-01 | End: 2020-01-01

## 2020-01-01 RX ORDER — FUROSEMIDE 40 MG
20 TABLET ORAL EVERY 12 HOURS
Refills: 0 | Status: DISCONTINUED | OUTPATIENT
Start: 2020-01-01 | End: 2020-01-01

## 2020-01-01 RX ORDER — VANCOMYCIN HCL 1 G
1500 VIAL (EA) INTRAVENOUS ONCE
Refills: 0 | Status: COMPLETED | OUTPATIENT
Start: 2020-01-01 | End: 2020-01-01

## 2020-01-01 RX ORDER — FUROSEMIDE 40 MG
20 TABLET ORAL DAILY
Refills: 0 | Status: DISCONTINUED | OUTPATIENT
Start: 2020-01-01 | End: 2020-01-01

## 2020-01-01 RX ORDER — INFLUENZA VIRUS VACCINE 15; 15; 15; 15 UG/.5ML; UG/.5ML; UG/.5ML; UG/.5ML
0.5 SUSPENSION INTRAMUSCULAR ONCE
Refills: 0 | Status: DISCONTINUED | OUTPATIENT
Start: 2020-01-01 | End: 2020-01-01

## 2020-01-01 RX ORDER — ALPRAZOLAM 0.25 MG
0.5 TABLET ORAL
Refills: 0 | Status: DISCONTINUED | OUTPATIENT
Start: 2020-01-01 | End: 2020-01-01

## 2020-01-01 RX ORDER — FUROSEMIDE 40 MG
80 TABLET ORAL ONCE
Refills: 0 | Status: COMPLETED | OUTPATIENT
Start: 2020-01-01 | End: 2020-01-01

## 2020-01-01 RX ORDER — HEPARIN SODIUM 5000 [USP'U]/ML
5000 INJECTION INTRAVENOUS; SUBCUTANEOUS EVERY 12 HOURS
Refills: 0 | Status: DISCONTINUED | OUTPATIENT
Start: 2020-01-01 | End: 2021-01-01

## 2020-01-01 RX ORDER — SODIUM CHLORIDE 9 MG/ML
500 INJECTION, SOLUTION INTRAVENOUS ONCE
Refills: 0 | Status: COMPLETED | OUTPATIENT
Start: 2020-01-01 | End: 2020-01-01

## 2020-01-01 RX ORDER — SODIUM CHLORIDE 9 MG/ML
1000 INJECTION, SOLUTION INTRAVENOUS ONCE
Refills: 0 | Status: COMPLETED | OUTPATIENT
Start: 2020-01-01 | End: 2020-01-01

## 2020-01-01 RX ORDER — NALOXONE HYDROCHLORIDE 4 MG/.1ML
4 SPRAY NASAL ONCE
Refills: 0 | Status: COMPLETED | OUTPATIENT
Start: 2020-01-01 | End: 2020-01-01

## 2020-01-01 RX ORDER — TIOTROPIUM BROMIDE 18 UG/1
1 CAPSULE ORAL; RESPIRATORY (INHALATION) DAILY
Refills: 0 | Status: DISCONTINUED | OUTPATIENT
Start: 2020-01-01 | End: 2020-01-01

## 2020-01-01 RX ORDER — ENOXAPARIN SODIUM 100 MG/ML
80 INJECTION SUBCUTANEOUS ONCE
Refills: 0 | Status: COMPLETED | OUTPATIENT
Start: 2020-01-01 | End: 2020-01-01

## 2020-01-01 RX ORDER — OXYCODONE HYDROCHLORIDE 5 MG/1
10 TABLET ORAL EVERY 4 HOURS
Refills: 0 | Status: DISCONTINUED | OUTPATIENT
Start: 2020-01-01 | End: 2020-01-01

## 2020-01-01 RX ORDER — CHLORHEXIDINE GLUCONATE 213 G/1000ML
1 SOLUTION TOPICAL
Refills: 0 | Status: DISCONTINUED | OUTPATIENT
Start: 2020-01-01 | End: 2020-01-01

## 2020-01-01 RX ORDER — CLONAZEPAM 1 MG
1 TABLET ORAL EVERY 6 HOURS
Refills: 0 | Status: DISCONTINUED | OUTPATIENT
Start: 2020-01-01 | End: 2020-01-01

## 2020-01-01 RX ORDER — ENOXAPARIN SODIUM 100 MG/ML
70 INJECTION SUBCUTANEOUS EVERY 12 HOURS
Refills: 0 | Status: DISCONTINUED | OUTPATIENT
Start: 2020-01-01 | End: 2020-01-01

## 2020-01-01 RX ORDER — ENOXAPARIN SODIUM 100 MG/ML
80 INJECTION SUBCUTANEOUS
Qty: 0 | Refills: 0 | DISCHARGE
Start: 2020-01-01

## 2020-01-01 RX ORDER — EXEMESTANE 25 MG/1
25 TABLET, SUGAR COATED ORAL EVERY 24 HOURS
Refills: 0 | Status: DISCONTINUED | OUTPATIENT
Start: 2020-01-01 | End: 2020-01-01

## 2020-01-01 RX ORDER — CLONAZEPAM 1 MG
0.5 TABLET ORAL DAILY
Refills: 0 | Status: DISCONTINUED | OUTPATIENT
Start: 2020-01-01 | End: 2020-01-01

## 2020-01-01 RX ORDER — HYDROCORTISONE 20 MG
100 TABLET ORAL ONCE
Refills: 0 | Status: COMPLETED | OUTPATIENT
Start: 2020-01-01 | End: 2020-01-01

## 2020-01-01 RX ORDER — NALOXONE HYDROCHLORIDE 4 MG/.1ML
2 SPRAY NASAL ONCE
Refills: 0 | Status: COMPLETED | OUTPATIENT
Start: 2020-01-01 | End: 2020-01-01

## 2020-01-01 RX ORDER — ONDANSETRON 8 MG/1
10 TABLET, FILM COATED ORAL ONCE
Refills: 0 | Status: COMPLETED | OUTPATIENT
Start: 2020-01-01 | End: 2020-01-01

## 2020-01-01 RX ORDER — POLYETHYLENE GLYCOL 3350 17 G/17G
17 POWDER, FOR SOLUTION ORAL DAILY
Refills: 0 | Status: DISCONTINUED | OUTPATIENT
Start: 2020-01-01 | End: 2020-01-01

## 2020-01-01 RX ORDER — CLONAZEPAM 1 MG
0.5 TABLET ORAL DAILY
Refills: 0 | Status: ACTIVE | OUTPATIENT
Start: 2020-01-01 | End: 2020-01-01

## 2020-01-01 RX ORDER — FENTANYL CITRATE 50 UG/ML
1 INJECTION INTRAVENOUS
Qty: 5 | Refills: 0
Start: 2020-01-01 | End: 2020-01-01

## 2020-01-01 RX ORDER — GEFITINIB 250 MG/1
250 TABLET, FILM COATED ORAL
Refills: 0 | Status: COMPLETED | OUTPATIENT
Start: 2020-01-01 | End: 2020-01-01

## 2020-01-01 RX ORDER — EXEMESTANE 25 MG/1
25 TABLET, SUGAR COATED ORAL DAILY
Refills: 0 | Status: DISCONTINUED | OUTPATIENT
Start: 2020-01-01 | End: 2020-01-01

## 2020-01-01 RX ORDER — GEFITINIB 250 MG/1
250 TABLET, FILM COATED ORAL ONCE
Refills: 0 | Status: COMPLETED | OUTPATIENT
Start: 2020-01-01 | End: 2020-01-01

## 2020-01-01 RX ORDER — METOPROLOL TARTRATE 50 MG
12.5 TABLET ORAL DAILY
Refills: 0 | Status: DISCONTINUED | OUTPATIENT
Start: 2020-01-01 | End: 2020-01-01

## 2020-01-01 RX ORDER — ENOXAPARIN SODIUM 100 MG/ML
100 INJECTION SUBCUTANEOUS EVERY 12 HOURS
Refills: 0 | Status: DISCONTINUED | OUTPATIENT
Start: 2020-01-01 | End: 2020-01-01

## 2020-01-01 RX ORDER — CLONAZEPAM 1 MG
1 TABLET ORAL
Qty: 10 | Refills: 0
Start: 2020-01-01 | End: 2020-01-01

## 2020-01-01 RX ORDER — MORPHINE SULFATE 50 MG/1
4 CAPSULE, EXTENDED RELEASE ORAL ONCE
Refills: 0 | Status: DISCONTINUED | OUTPATIENT
Start: 2020-01-01 | End: 2020-01-01

## 2020-01-01 RX ORDER — ALBUTEROL 90 UG/1
2 AEROSOL, METERED ORAL EVERY 4 HOURS
Refills: 0 | Status: DISCONTINUED | OUTPATIENT
Start: 2020-01-01 | End: 2020-01-01

## 2020-01-01 RX ORDER — SODIUM CHLORIDE 0.65 %
1 AEROSOL, SPRAY (ML) NASAL
Refills: 0 | Status: DISCONTINUED | OUTPATIENT
Start: 2020-01-01 | End: 2020-01-01

## 2020-01-01 RX ORDER — ALPRAZOLAM 0.25 MG
0.25 TABLET ORAL DAILY
Refills: 0 | Status: DISCONTINUED | OUTPATIENT
Start: 2020-01-01 | End: 2020-01-01

## 2020-01-01 RX ORDER — ACETAMINOPHEN 500 MG
650 TABLET ORAL EVERY 6 HOURS
Refills: 0 | Status: DISCONTINUED | OUTPATIENT
Start: 2020-01-01 | End: 2020-01-01

## 2020-01-01 RX ADMIN — SODIUM CHLORIDE 60 MILLILITER(S): 9 INJECTION, SOLUTION INTRAVENOUS at 18:03

## 2020-01-01 RX ADMIN — Medication 3 MILLILITER(S): at 09:38

## 2020-01-01 RX ADMIN — ENOXAPARIN SODIUM 85 MILLIGRAM(S): 100 INJECTION SUBCUTANEOUS at 07:07

## 2020-01-01 RX ADMIN — ENOXAPARIN SODIUM 80 MILLIGRAM(S): 100 INJECTION SUBCUTANEOUS at 06:35

## 2020-01-01 RX ADMIN — Medication 20 MILLIGRAM(S): at 05:34

## 2020-01-01 RX ADMIN — Medication 204 MILLIGRAM(S): at 13:46

## 2020-01-01 RX ADMIN — Medication 0.25 MILLIGRAM(S): at 08:58

## 2020-01-01 RX ADMIN — FENTANYL CITRATE 1 PATCH: 50 INJECTION INTRAVENOUS at 08:51

## 2020-01-01 RX ADMIN — ENOXAPARIN SODIUM 80 MILLIGRAM(S): 100 INJECTION SUBCUTANEOUS at 05:54

## 2020-01-01 RX ADMIN — EXEMESTANE 25 MILLIGRAM(S): 25 TABLET, SUGAR COATED ORAL at 17:24

## 2020-01-01 RX ADMIN — ENOXAPARIN SODIUM 80 MILLIGRAM(S): 100 INJECTION SUBCUTANEOUS at 05:21

## 2020-01-01 RX ADMIN — ENOXAPARIN SODIUM 70 MILLIGRAM(S): 100 INJECTION SUBCUTANEOUS at 05:54

## 2020-01-01 RX ADMIN — ENOXAPARIN SODIUM 80 MILLIGRAM(S): 100 INJECTION SUBCUTANEOUS at 05:11

## 2020-01-01 RX ADMIN — Medication 3 MILLILITER(S): at 17:57

## 2020-01-01 RX ADMIN — PANTOPRAZOLE SODIUM 40 MILLIGRAM(S): 20 TABLET, DELAYED RELEASE ORAL at 06:18

## 2020-01-01 RX ADMIN — ENOXAPARIN SODIUM 70 MILLIGRAM(S): 100 INJECTION SUBCUTANEOUS at 06:02

## 2020-01-01 RX ADMIN — Medication 650 MILLIGRAM(S): at 11:00

## 2020-01-01 RX ADMIN — EXEMESTANE 25 MILLIGRAM(S): 25 TABLET, SUGAR COATED ORAL at 13:42

## 2020-01-01 RX ADMIN — ENOXAPARIN SODIUM 80 MILLIGRAM(S): 100 INJECTION SUBCUTANEOUS at 06:44

## 2020-01-01 RX ADMIN — EXEMESTANE 25 MILLIGRAM(S): 25 TABLET, SUGAR COATED ORAL at 07:59

## 2020-01-01 RX ADMIN — Medication 12.5 MILLIGRAM(S): at 05:48

## 2020-01-01 RX ADMIN — EXEMESTANE 25 MILLIGRAM(S): 25 TABLET, SUGAR COATED ORAL at 14:06

## 2020-01-01 RX ADMIN — Medication 300 MILLIGRAM(S): at 16:26

## 2020-01-01 RX ADMIN — ONDANSETRON 4 MILLIGRAM(S): 8 TABLET, FILM COATED ORAL at 17:16

## 2020-01-01 RX ADMIN — Medication 12.5 MILLIGRAM(S): at 05:21

## 2020-01-01 RX ADMIN — PANTOPRAZOLE SODIUM 40 MILLIGRAM(S): 20 TABLET, DELAYED RELEASE ORAL at 12:03

## 2020-01-01 RX ADMIN — ENOXAPARIN SODIUM 85 MILLIGRAM(S): 100 INJECTION SUBCUTANEOUS at 22:10

## 2020-01-01 RX ADMIN — Medication 20 MILLIGRAM(S): at 17:05

## 2020-01-01 RX ADMIN — ENOXAPARIN SODIUM 80 MILLIGRAM(S): 100 INJECTION SUBCUTANEOUS at 17:48

## 2020-01-01 RX ADMIN — Medication 1 APPLICATION(S): at 17:07

## 2020-01-01 RX ADMIN — PANTOPRAZOLE SODIUM 40 MILLIGRAM(S): 20 TABLET, DELAYED RELEASE ORAL at 12:53

## 2020-01-01 RX ADMIN — TAMOXIFEN CITRATE 140 MILLIGRAM(S): 20 TABLET, FILM COATED ORAL at 09:56

## 2020-01-01 RX ADMIN — MORPHINE SULFATE 2 MILLIGRAM(S): 50 CAPSULE, EXTENDED RELEASE ORAL at 21:59

## 2020-01-01 RX ADMIN — GEFITINIB 250 MILLIGRAM(S): 250 TABLET, FILM COATED ORAL at 11:56

## 2020-01-01 RX ADMIN — MORPHINE SULFATE 1 MILLIGRAM(S): 50 CAPSULE, EXTENDED RELEASE ORAL at 12:30

## 2020-01-01 RX ADMIN — SENNA PLUS 2 TABLET(S): 8.6 TABLET ORAL at 05:48

## 2020-01-01 RX ADMIN — Medication 12.5 MILLIGRAM(S): at 19:47

## 2020-01-01 RX ADMIN — SODIUM CHLORIDE 60 MILLILITER(S): 9 INJECTION INTRAMUSCULAR; INTRAVENOUS; SUBCUTANEOUS at 17:48

## 2020-01-01 RX ADMIN — Medication 3 MILLILITER(S): at 01:27

## 2020-01-01 RX ADMIN — ENOXAPARIN SODIUM 80 MILLIGRAM(S): 100 INJECTION SUBCUTANEOUS at 21:20

## 2020-01-01 RX ADMIN — PANTOPRAZOLE SODIUM 40 MILLIGRAM(S): 20 TABLET, DELAYED RELEASE ORAL at 14:06

## 2020-01-01 RX ADMIN — ENOXAPARIN SODIUM 70 MILLIGRAM(S): 100 INJECTION SUBCUTANEOUS at 23:27

## 2020-01-01 RX ADMIN — CHLORHEXIDINE GLUCONATE 1 APPLICATION(S): 213 SOLUTION TOPICAL at 10:44

## 2020-01-01 RX ADMIN — ENOXAPARIN SODIUM 75 MILLIGRAM(S): 100 INJECTION SUBCUTANEOUS at 00:01

## 2020-01-01 RX ADMIN — FENTANYL CITRATE 1 PATCH: 50 INJECTION INTRAVENOUS at 11:23

## 2020-01-01 RX ADMIN — Medication 3 MILLILITER(S): at 12:55

## 2020-01-01 RX ADMIN — EXEMESTANE 25 MILLIGRAM(S): 25 TABLET, SUGAR COATED ORAL at 11:24

## 2020-01-01 RX ADMIN — Medication 12.5 MILLIGRAM(S): at 17:41

## 2020-01-01 RX ADMIN — Medication 12.5 MILLIGRAM(S): at 21:26

## 2020-01-01 RX ADMIN — Medication 12.5 MILLIGRAM(S): at 17:49

## 2020-01-01 RX ADMIN — EXEMESTANE 25 MILLIGRAM(S): 25 TABLET, SUGAR COATED ORAL at 06:33

## 2020-01-01 RX ADMIN — ENOXAPARIN SODIUM 70 MILLIGRAM(S): 100 INJECTION SUBCUTANEOUS at 17:15

## 2020-01-01 RX ADMIN — ENOXAPARIN SODIUM 80 MILLIGRAM(S): 100 INJECTION SUBCUTANEOUS at 17:25

## 2020-01-01 RX ADMIN — ENOXAPARIN SODIUM 70 MILLIGRAM(S): 100 INJECTION SUBCUTANEOUS at 13:20

## 2020-01-01 RX ADMIN — CHLORHEXIDINE GLUCONATE 1 APPLICATION(S): 213 SOLUTION TOPICAL at 21:23

## 2020-01-01 RX ADMIN — CHLORHEXIDINE GLUCONATE 1 APPLICATION(S): 213 SOLUTION TOPICAL at 12:07

## 2020-01-01 RX ADMIN — FENTANYL CITRATE 1 PATCH: 50 INJECTION INTRAVENOUS at 07:48

## 2020-01-01 RX ADMIN — FENTANYL CITRATE 1 PATCH: 50 INJECTION INTRAVENOUS at 19:14

## 2020-01-01 RX ADMIN — POLYETHYLENE GLYCOL 3350 17 GRAM(S): 17 POWDER, FOR SOLUTION ORAL at 11:38

## 2020-01-01 RX ADMIN — SENNA PLUS 2 TABLET(S): 8.6 TABLET ORAL at 21:05

## 2020-01-01 RX ADMIN — Medication 12.5 MILLIGRAM(S): at 17:58

## 2020-01-01 RX ADMIN — ENOXAPARIN SODIUM 80 MILLIGRAM(S): 100 INJECTION SUBCUTANEOUS at 13:23

## 2020-01-01 RX ADMIN — Medication 20 MILLIGRAM(S): at 05:54

## 2020-01-01 RX ADMIN — ENOXAPARIN SODIUM 80 MILLIGRAM(S): 100 INJECTION SUBCUTANEOUS at 17:34

## 2020-01-01 RX ADMIN — ENOXAPARIN SODIUM 80 MILLIGRAM(S): 100 INJECTION SUBCUTANEOUS at 05:18

## 2020-01-01 RX ADMIN — MORPHINE SULFATE 4 MILLIGRAM(S): 50 CAPSULE, EXTENDED RELEASE ORAL at 03:24

## 2020-01-01 RX ADMIN — NALOXONE HYDROCHLORIDE 2 MILLIGRAM(S): 4 SPRAY NASAL at 16:15

## 2020-01-01 RX ADMIN — EXEMESTANE 25 MILLIGRAM(S): 25 TABLET, SUGAR COATED ORAL at 06:41

## 2020-01-01 RX ADMIN — Medication 0.25 MILLIGRAM(S): at 14:31

## 2020-01-01 RX ADMIN — Medication 10 MILLIGRAM(S): at 07:07

## 2020-01-01 RX ADMIN — Medication 3 MILLILITER(S): at 04:29

## 2020-01-01 RX ADMIN — ENOXAPARIN SODIUM 80 MILLIGRAM(S): 100 INJECTION SUBCUTANEOUS at 17:10

## 2020-01-01 RX ADMIN — PANTOPRAZOLE SODIUM 40 MILLIGRAM(S): 20 TABLET, DELAYED RELEASE ORAL at 14:23

## 2020-01-01 RX ADMIN — Medication 3 MILLILITER(S): at 09:50

## 2020-01-01 RX ADMIN — ENOXAPARIN SODIUM 80 MILLIGRAM(S): 100 INJECTION SUBCUTANEOUS at 17:02

## 2020-01-01 RX ADMIN — Medication 20 MILLIGRAM(S): at 18:40

## 2020-01-01 RX ADMIN — PANTOPRAZOLE SODIUM 40 MILLIGRAM(S): 20 TABLET, DELAYED RELEASE ORAL at 05:14

## 2020-01-01 RX ADMIN — Medication 0.5 MILLIGRAM(S): at 02:46

## 2020-01-01 RX ADMIN — Medication 12.5 MILLIGRAM(S): at 06:36

## 2020-01-01 RX ADMIN — MORPHINE SULFATE 4 MILLIGRAM(S): 50 CAPSULE, EXTENDED RELEASE ORAL at 03:45

## 2020-01-01 RX ADMIN — MORPHINE SULFATE 2 MILLIGRAM(S): 50 CAPSULE, EXTENDED RELEASE ORAL at 06:01

## 2020-01-01 RX ADMIN — ENOXAPARIN SODIUM 85 MILLIGRAM(S): 100 INJECTION SUBCUTANEOUS at 17:56

## 2020-01-01 RX ADMIN — Medication 650 MILLIGRAM(S): at 23:25

## 2020-01-01 RX ADMIN — Medication 10 MILLIGRAM(S): at 06:32

## 2020-01-01 RX ADMIN — SODIUM CHLORIDE 50 MILLILITER(S): 9 INJECTION INTRAMUSCULAR; INTRAVENOUS; SUBCUTANEOUS at 06:02

## 2020-01-01 RX ADMIN — CEFEPIME 100 MILLIGRAM(S): 1 INJECTION, POWDER, FOR SOLUTION INTRAMUSCULAR; INTRAVENOUS at 16:26

## 2020-01-01 RX ADMIN — Medication 12.5 MILLIGRAM(S): at 06:30

## 2020-01-01 RX ADMIN — TRAMADOL HYDROCHLORIDE 12.5 MILLIGRAM(S): 50 TABLET ORAL at 17:43

## 2020-01-01 RX ADMIN — ONDANSETRON 4 MILLIGRAM(S): 8 TABLET, FILM COATED ORAL at 16:57

## 2020-01-01 RX ADMIN — CHLORHEXIDINE GLUCONATE 1 APPLICATION(S): 213 SOLUTION TOPICAL at 08:21

## 2020-01-01 RX ADMIN — MORPHINE SULFATE 1 MILLIGRAM(S): 50 CAPSULE, EXTENDED RELEASE ORAL at 13:00

## 2020-01-01 RX ADMIN — Medication 3 MILLILITER(S): at 17:41

## 2020-01-01 RX ADMIN — ENOXAPARIN SODIUM 70 MILLIGRAM(S): 100 INJECTION SUBCUTANEOUS at 05:11

## 2020-01-01 RX ADMIN — Medication 40 MILLIGRAM(S): at 10:43

## 2020-01-01 RX ADMIN — Medication 0.5 MILLIGRAM(S): at 19:35

## 2020-01-01 RX ADMIN — ENOXAPARIN SODIUM 80 MILLIGRAM(S): 100 INJECTION SUBCUTANEOUS at 05:58

## 2020-01-01 RX ADMIN — ENOXAPARIN SODIUM 80 MILLIGRAM(S): 100 INJECTION SUBCUTANEOUS at 05:48

## 2020-01-01 RX ADMIN — Medication 40 MILLIGRAM(S): at 17:44

## 2020-01-01 RX ADMIN — Medication 650 MILLIGRAM(S): at 00:30

## 2020-01-01 RX ADMIN — GEFITINIB 250 MILLIGRAM(S): 250 TABLET, FILM COATED ORAL at 17:50

## 2020-01-01 RX ADMIN — Medication 40 MILLIGRAM(S): at 16:40

## 2020-01-01 RX ADMIN — ENOXAPARIN SODIUM 80 MILLIGRAM(S): 100 INJECTION SUBCUTANEOUS at 05:49

## 2020-01-01 RX ADMIN — Medication 40 MILLIGRAM(S): at 15:09

## 2020-01-01 RX ADMIN — ENOXAPARIN SODIUM 85 MILLIGRAM(S): 100 INJECTION SUBCUTANEOUS at 17:43

## 2020-01-01 RX ADMIN — Medication 40 MILLIGRAM(S): at 13:56

## 2020-01-01 RX ADMIN — FENTANYL CITRATE 1 PATCH: 50 INJECTION INTRAVENOUS at 19:00

## 2020-01-01 RX ADMIN — MORPHINE SULFATE 2 MILLIGRAM(S): 50 CAPSULE, EXTENDED RELEASE ORAL at 03:18

## 2020-01-01 RX ADMIN — Medication 0.5 MILLIGRAM(S): at 04:47

## 2020-01-01 RX ADMIN — FENTANYL CITRATE 1 PATCH: 50 INJECTION INTRAVENOUS at 19:49

## 2020-01-01 RX ADMIN — ENOXAPARIN SODIUM 85 MILLIGRAM(S): 100 INJECTION SUBCUTANEOUS at 06:00

## 2020-01-01 RX ADMIN — ENOXAPARIN SODIUM 70 MILLIGRAM(S): 100 INJECTION SUBCUTANEOUS at 17:05

## 2020-01-01 RX ADMIN — FENTANYL CITRATE 1 PATCH: 50 INJECTION INTRAVENOUS at 21:53

## 2020-01-01 RX ADMIN — ENOXAPARIN SODIUM 80 MILLIGRAM(S): 100 INJECTION SUBCUTANEOUS at 18:52

## 2020-01-01 RX ADMIN — Medication 0.5 MILLIGRAM(S): at 18:55

## 2020-01-01 RX ADMIN — Medication 12.5 MILLIGRAM(S): at 06:12

## 2020-01-01 RX ADMIN — MORPHINE SULFATE 2 MILLIGRAM(S): 50 CAPSULE, EXTENDED RELEASE ORAL at 05:40

## 2020-01-01 RX ADMIN — Medication 0.25 MILLIGRAM(S): at 11:32

## 2020-01-01 RX ADMIN — ENOXAPARIN SODIUM 85 MILLIGRAM(S): 100 INJECTION SUBCUTANEOUS at 21:36

## 2020-01-01 RX ADMIN — ENOXAPARIN SODIUM 80 MILLIGRAM(S): 100 INJECTION SUBCUTANEOUS at 17:44

## 2020-01-01 RX ADMIN — Medication 0.5 MILLIGRAM(S): at 00:05

## 2020-01-01 RX ADMIN — ENOXAPARIN SODIUM 70 MILLIGRAM(S): 100 INJECTION SUBCUTANEOUS at 05:33

## 2020-01-01 RX ADMIN — FENTANYL CITRATE 1 PATCH: 50 INJECTION INTRAVENOUS at 10:39

## 2020-01-01 RX ADMIN — EXEMESTANE 25 MILLIGRAM(S): 25 TABLET, SUGAR COATED ORAL at 11:37

## 2020-01-01 RX ADMIN — TAMOXIFEN CITRATE 140 MILLIGRAM(S): 20 TABLET, FILM COATED ORAL at 11:56

## 2020-01-01 RX ADMIN — FENTANYL CITRATE 1 PATCH: 50 INJECTION INTRAVENOUS at 21:15

## 2020-01-01 RX ADMIN — MORPHINE SULFATE 2 MILLIGRAM(S): 50 CAPSULE, EXTENDED RELEASE ORAL at 19:26

## 2020-01-01 RX ADMIN — SENNA PLUS 2 TABLET(S): 8.6 TABLET ORAL at 21:21

## 2020-01-01 RX ADMIN — ZOLEDRONIC ACID 400 MILLIGRAM(S): 5 INJECTION, SOLUTION INTRAVENOUS at 14:25

## 2020-01-01 RX ADMIN — Medication 3 MILLILITER(S): at 03:25

## 2020-01-01 RX ADMIN — MORPHINE SULFATE 2 MILLIGRAM(S): 50 CAPSULE, EXTENDED RELEASE ORAL at 19:59

## 2020-01-01 RX ADMIN — Medication 12.5 MILLIGRAM(S): at 11:36

## 2020-01-01 RX ADMIN — Medication 80 MILLIGRAM(S): at 15:33

## 2020-01-01 RX ADMIN — Medication 12.5 MILLIGRAM(S): at 17:23

## 2020-01-01 RX ADMIN — EXEMESTANE 25 MILLIGRAM(S): 25 TABLET, SUGAR COATED ORAL at 05:49

## 2020-01-01 RX ADMIN — SODIUM CHLORIDE 50 MILLILITER(S): 9 INJECTION, SOLUTION INTRAVENOUS at 17:39

## 2020-01-01 RX ADMIN — ENOXAPARIN SODIUM 70 MILLIGRAM(S): 100 INJECTION SUBCUTANEOUS at 12:42

## 2020-01-01 RX ADMIN — PANTOPRAZOLE SODIUM 40 MILLIGRAM(S): 20 TABLET, DELAYED RELEASE ORAL at 12:33

## 2020-01-01 RX ADMIN — PANTOPRAZOLE SODIUM 40 MILLIGRAM(S): 20 TABLET, DELAYED RELEASE ORAL at 13:06

## 2020-01-01 RX ADMIN — MORPHINE SULFATE 2 MILLIGRAM(S): 50 CAPSULE, EXTENDED RELEASE ORAL at 23:19

## 2020-01-01 RX ADMIN — Medication 1 APPLICATION(S): at 15:38

## 2020-01-01 RX ADMIN — Medication 0.5 MILLIGRAM(S): at 07:58

## 2020-01-01 RX ADMIN — PANTOPRAZOLE SODIUM 40 MILLIGRAM(S): 20 TABLET, DELAYED RELEASE ORAL at 11:45

## 2020-01-01 RX ADMIN — Medication 12.5 MILLIGRAM(S): at 05:16

## 2020-01-01 RX ADMIN — EXEMESTANE 25 MILLIGRAM(S): 25 TABLET, SUGAR COATED ORAL at 14:23

## 2020-01-01 RX ADMIN — GEFITINIB 250 MILLIGRAM(S): 250 TABLET, FILM COATED ORAL at 09:17

## 2020-01-01 RX ADMIN — Medication 20 MILLIGRAM(S): at 05:48

## 2020-01-01 RX ADMIN — PANTOPRAZOLE SODIUM 40 MILLIGRAM(S): 20 TABLET, DELAYED RELEASE ORAL at 07:29

## 2020-01-01 RX ADMIN — Medication 1 APPLICATION(S): at 05:07

## 2020-01-01 RX ADMIN — EXEMESTANE 25 MILLIGRAM(S): 25 TABLET, SUGAR COATED ORAL at 12:32

## 2020-01-01 RX ADMIN — MORPHINE SULFATE 2 MILLIGRAM(S): 50 CAPSULE, EXTENDED RELEASE ORAL at 19:45

## 2020-01-01 RX ADMIN — Medication 0.25 MILLIGRAM(S): at 13:41

## 2020-01-01 RX ADMIN — SENNA PLUS 2 TABLET(S): 8.6 TABLET ORAL at 21:26

## 2020-01-01 RX ADMIN — FENTANYL CITRATE 1 PATCH: 50 INJECTION INTRAVENOUS at 13:45

## 2020-01-01 RX ADMIN — ENOXAPARIN SODIUM 85 MILLIGRAM(S): 100 INJECTION SUBCUTANEOUS at 10:07

## 2020-01-01 RX ADMIN — ENOXAPARIN SODIUM 70 MILLIGRAM(S): 100 INJECTION SUBCUTANEOUS at 11:45

## 2020-01-01 RX ADMIN — Medication 20 MILLIGRAM(S): at 05:53

## 2020-01-01 RX ADMIN — Medication 20 MILLIGRAM(S): at 17:15

## 2020-01-01 RX ADMIN — Medication 10 MILLIGRAM(S): at 15:11

## 2020-01-01 RX ADMIN — FENTANYL CITRATE 1 PATCH: 50 INJECTION INTRAVENOUS at 08:18

## 2020-01-01 RX ADMIN — MORPHINE SULFATE 2 MILLIGRAM(S): 50 CAPSULE, EXTENDED RELEASE ORAL at 13:39

## 2020-01-01 RX ADMIN — Medication 0.5 MILLIGRAM(S): at 02:39

## 2020-01-01 RX ADMIN — TAMOXIFEN CITRATE 140 MILLIGRAM(S): 20 TABLET, FILM COATED ORAL at 17:41

## 2020-01-01 RX ADMIN — CEFTRIAXONE 100 MILLIGRAM(S): 500 INJECTION, POWDER, FOR SOLUTION INTRAMUSCULAR; INTRAVENOUS at 13:56

## 2020-01-01 RX ADMIN — Medication 20 MILLIGRAM(S): at 17:51

## 2020-01-01 RX ADMIN — Medication 12.5 MILLIGRAM(S): at 05:18

## 2020-01-01 RX ADMIN — ENOXAPARIN SODIUM 80 MILLIGRAM(S): 100 INJECTION SUBCUTANEOUS at 23:58

## 2020-01-01 RX ADMIN — NALOXONE HYDROCHLORIDE 4 MILLIGRAM(S): 4 SPRAY NASAL at 16:15

## 2020-01-01 RX ADMIN — ENOXAPARIN SODIUM 70 MILLIGRAM(S): 100 INJECTION SUBCUTANEOUS at 06:24

## 2020-01-01 RX ADMIN — Medication 3 MILLILITER(S): at 22:10

## 2020-01-01 RX ADMIN — Medication 3 MILLILITER(S): at 19:05

## 2020-01-01 RX ADMIN — ENOXAPARIN SODIUM 70 MILLIGRAM(S): 100 INJECTION SUBCUTANEOUS at 05:20

## 2020-01-01 RX ADMIN — Medication 12.5 MILLIGRAM(S): at 17:16

## 2020-01-01 RX ADMIN — PANTOPRAZOLE SODIUM 40 MILLIGRAM(S): 20 TABLET, DELAYED RELEASE ORAL at 11:23

## 2020-01-01 RX ADMIN — Medication 20 MILLIGRAM(S): at 05:11

## 2020-01-01 RX ADMIN — SODIUM CHLORIDE 500 MILLILITER(S): 9 INJECTION, SOLUTION INTRAVENOUS at 17:12

## 2020-01-01 RX ADMIN — ENOXAPARIN SODIUM 80 MILLIGRAM(S): 100 INJECTION SUBCUTANEOUS at 17:43

## 2020-01-01 RX ADMIN — Medication 3 MILLILITER(S): at 21:37

## 2020-01-01 RX ADMIN — Medication 3 MILLILITER(S): at 23:25

## 2020-01-01 RX ADMIN — Medication 650 MILLIGRAM(S): at 17:58

## 2020-01-01 RX ADMIN — Medication 0.5 MILLIGRAM(S): at 21:56

## 2020-01-01 RX ADMIN — FENTANYL CITRATE 1 PATCH: 50 INJECTION INTRAVENOUS at 07:50

## 2020-01-01 RX ADMIN — HEPARIN SODIUM 5000 UNIT(S): 5000 INJECTION INTRAVENOUS; SUBCUTANEOUS at 15:38

## 2020-01-01 RX ADMIN — GEFITINIB 250 MILLIGRAM(S): 250 TABLET, FILM COATED ORAL at 07:16

## 2020-01-01 RX ADMIN — FENTANYL CITRATE 1 PATCH: 50 INJECTION INTRAVENOUS at 19:47

## 2020-01-01 RX ADMIN — Medication 40 MILLIGRAM(S): at 17:39

## 2020-01-01 RX ADMIN — ENOXAPARIN SODIUM 80 MILLIGRAM(S): 100 INJECTION SUBCUTANEOUS at 05:45

## 2020-01-01 RX ADMIN — CHLORHEXIDINE GLUCONATE 1 APPLICATION(S): 213 SOLUTION TOPICAL at 23:24

## 2020-01-01 RX ADMIN — Medication 3 MILLILITER(S): at 04:34

## 2020-01-01 RX ADMIN — ONDANSETRON 165 MILLIGRAM(S): 8 TABLET, FILM COATED ORAL at 13:20

## 2020-01-01 RX ADMIN — Medication 12.5 MILLIGRAM(S): at 06:34

## 2020-01-01 RX ADMIN — Medication 1 APPLICATION(S): at 17:43

## 2020-01-01 RX ADMIN — Medication 650 MILLIGRAM(S): at 23:38

## 2020-01-01 RX ADMIN — ENOXAPARIN SODIUM 70 MILLIGRAM(S): 100 INJECTION SUBCUTANEOUS at 18:39

## 2020-01-01 RX ADMIN — Medication 3 MILLILITER(S): at 21:49

## 2020-01-01 RX ADMIN — Medication 650 MILLIGRAM(S): at 10:07

## 2020-01-01 RX ADMIN — ENOXAPARIN SODIUM 80 MILLIGRAM(S): 100 INJECTION SUBCUTANEOUS at 17:39

## 2020-01-01 RX ADMIN — ENOXAPARIN SODIUM 70 MILLIGRAM(S): 100 INJECTION SUBCUTANEOUS at 23:29

## 2020-01-01 RX ADMIN — Medication 12.5 MILLIGRAM(S): at 06:00

## 2020-01-01 RX ADMIN — Medication 3 MILLILITER(S): at 15:12

## 2020-01-01 RX ADMIN — Medication 12.5 MILLIGRAM(S): at 08:56

## 2020-01-01 RX ADMIN — CHLORHEXIDINE GLUCONATE 1 APPLICATION(S): 213 SOLUTION TOPICAL at 08:46

## 2020-01-01 RX ADMIN — VINORELBINE 110.6 MILLIGRAM(S): 10 INJECTION, SOLUTION INTRAVENOUS at 14:06

## 2020-01-01 RX ADMIN — FENTANYL CITRATE 1 PATCH: 50 INJECTION INTRAVENOUS at 17:23

## 2020-01-01 RX ADMIN — CHLORHEXIDINE GLUCONATE 1 APPLICATION(S): 213 SOLUTION TOPICAL at 11:44

## 2020-01-01 RX ADMIN — Medication 10 MILLIGRAM(S): at 06:12

## 2020-01-01 RX ADMIN — ENOXAPARIN SODIUM 80 MILLIGRAM(S): 100 INJECTION SUBCUTANEOUS at 17:16

## 2020-01-01 RX ADMIN — ENOXAPARIN SODIUM 80 MILLIGRAM(S): 100 INJECTION SUBCUTANEOUS at 21:11

## 2020-01-01 RX ADMIN — Medication 40 MILLIGRAM(S): at 05:16

## 2020-01-01 RX ADMIN — ENOXAPARIN SODIUM 70 MILLIGRAM(S): 100 INJECTION SUBCUTANEOUS at 11:15

## 2020-01-01 RX ADMIN — ENOXAPARIN SODIUM 80 MILLIGRAM(S): 100 INJECTION SUBCUTANEOUS at 06:45

## 2020-01-01 RX ADMIN — PANTOPRAZOLE SODIUM 40 MILLIGRAM(S): 20 TABLET, DELAYED RELEASE ORAL at 13:43

## 2020-01-01 RX ADMIN — SODIUM CHLORIDE 50 MILLILITER(S): 9 INJECTION INTRAMUSCULAR; INTRAVENOUS; SUBCUTANEOUS at 17:25

## 2020-01-01 RX ADMIN — SENNA PLUS 2 TABLET(S): 8.6 TABLET ORAL at 21:55

## 2020-01-01 RX ADMIN — CHLORHEXIDINE GLUCONATE 1 APPLICATION(S): 213 SOLUTION TOPICAL at 09:01

## 2020-01-01 RX ADMIN — ENOXAPARIN SODIUM 80 MILLIGRAM(S): 100 INJECTION SUBCUTANEOUS at 09:01

## 2020-01-01 RX ADMIN — Medication 20 MILLIGRAM(S): at 17:40

## 2020-01-01 RX ADMIN — ENOXAPARIN SODIUM 80 MILLIGRAM(S): 100 INJECTION SUBCUTANEOUS at 17:19

## 2020-01-01 RX ADMIN — MORPHINE SULFATE 2 MILLIGRAM(S): 50 CAPSULE, EXTENDED RELEASE ORAL at 21:24

## 2020-01-01 RX ADMIN — SODIUM CHLORIDE 60 MILLILITER(S): 9 INJECTION, SOLUTION INTRAVENOUS at 05:06

## 2020-01-01 RX ADMIN — MORPHINE SULFATE 2 MILLIGRAM(S): 50 CAPSULE, EXTENDED RELEASE ORAL at 21:40

## 2020-01-01 RX ADMIN — MORPHINE SULFATE 2 MILLIGRAM(S): 50 CAPSULE, EXTENDED RELEASE ORAL at 13:09

## 2020-01-01 RX ADMIN — FENTANYL CITRATE 1 PATCH: 50 INJECTION INTRAVENOUS at 11:24

## 2020-01-01 RX ADMIN — ENOXAPARIN SODIUM 80 MILLIGRAM(S): 100 INJECTION SUBCUTANEOUS at 14:06

## 2020-01-01 RX ADMIN — ENOXAPARIN SODIUM 80 MILLIGRAM(S): 100 INJECTION SUBCUTANEOUS at 17:30

## 2020-01-01 RX ADMIN — SODIUM CHLORIDE 50 MILLILITER(S): 9 INJECTION, SOLUTION INTRAVENOUS at 14:16

## 2020-01-01 RX ADMIN — Medication 0.25 MILLIGRAM(S): at 10:39

## 2020-01-01 RX ADMIN — Medication 3 MILLILITER(S): at 17:53

## 2020-01-01 RX ADMIN — CEFTRIAXONE 100 MILLIGRAM(S): 500 INJECTION, POWDER, FOR SOLUTION INTRAMUSCULAR; INTRAVENOUS at 16:45

## 2020-01-01 RX ADMIN — ENOXAPARIN SODIUM 80 MILLIGRAM(S): 100 INJECTION SUBCUTANEOUS at 18:16

## 2020-01-01 RX ADMIN — Medication 12.5 MILLIGRAM(S): at 05:11

## 2020-01-01 RX ADMIN — ENOXAPARIN SODIUM 80 MILLIGRAM(S): 100 INJECTION SUBCUTANEOUS at 06:22

## 2020-01-01 RX ADMIN — ENOXAPARIN SODIUM 85 MILLIGRAM(S): 100 INJECTION SUBCUTANEOUS at 23:24

## 2020-01-01 RX ADMIN — Medication 40 MILLIGRAM(S): at 11:43

## 2020-01-01 RX ADMIN — GEFITINIB 250 MILLIGRAM(S): 250 TABLET, FILM COATED ORAL at 14:21

## 2020-01-01 RX ADMIN — TRAMADOL HYDROCHLORIDE 12.5 MILLIGRAM(S): 50 TABLET ORAL at 14:48

## 2020-01-01 RX ADMIN — Medication 0.5 MILLIGRAM(S): at 13:15

## 2020-01-01 RX ADMIN — SODIUM CHLORIDE 50 MILLILITER(S): 9 INJECTION INTRAMUSCULAR; INTRAVENOUS; SUBCUTANEOUS at 16:22

## 2020-01-01 RX ADMIN — Medication 20 MILLIGRAM(S): at 05:20

## 2020-01-01 RX ADMIN — ENOXAPARIN SODIUM 70 MILLIGRAM(S): 100 INJECTION SUBCUTANEOUS at 11:43

## 2020-01-01 RX ADMIN — Medication 1 APPLICATION(S): at 05:59

## 2020-01-01 RX ADMIN — FENTANYL CITRATE 1 PATCH: 50 INJECTION INTRAVENOUS at 09:03

## 2020-01-01 RX ADMIN — FENTANYL CITRATE 1 PATCH: 50 INJECTION INTRAVENOUS at 05:30

## 2020-01-01 RX ADMIN — Medication 100 MILLIGRAM(S): at 15:58

## 2020-01-01 RX ADMIN — ENOXAPARIN SODIUM 80 MILLIGRAM(S): 100 INJECTION SUBCUTANEOUS at 05:17

## 2020-01-01 RX ADMIN — Medication 40 MILLIGRAM(S): at 17:30

## 2020-01-01 RX ADMIN — FENTANYL CITRATE 1 PATCH: 50 INJECTION INTRAVENOUS at 19:19

## 2020-01-01 RX ADMIN — Medication 12.5 MILLIGRAM(S): at 07:07

## 2020-01-01 RX ADMIN — MORPHINE SULFATE 2 MILLIGRAM(S): 50 CAPSULE, EXTENDED RELEASE ORAL at 00:05

## 2020-01-01 RX ADMIN — ENOXAPARIN SODIUM 85 MILLIGRAM(S): 100 INJECTION SUBCUTANEOUS at 06:33

## 2020-01-01 RX ADMIN — FENTANYL CITRATE 1 PATCH: 50 INJECTION INTRAVENOUS at 10:10

## 2020-01-01 RX ADMIN — Medication 3 MILLILITER(S): at 09:53

## 2020-01-01 RX ADMIN — ENOXAPARIN SODIUM 70 MILLIGRAM(S): 100 INJECTION SUBCUTANEOUS at 17:52

## 2020-01-01 RX ADMIN — PANTOPRAZOLE SODIUM 40 MILLIGRAM(S): 20 TABLET, DELAYED RELEASE ORAL at 11:24

## 2020-01-01 RX ADMIN — ENOXAPARIN SODIUM 80 MILLIGRAM(S): 100 INJECTION SUBCUTANEOUS at 05:35

## 2020-01-01 RX ADMIN — Medication 650 MILLIGRAM(S): at 11:07

## 2020-01-01 RX ADMIN — ENOXAPARIN SODIUM 85 MILLIGRAM(S): 100 INJECTION SUBCUTANEOUS at 09:50

## 2020-01-01 RX ADMIN — Medication 650 MILLIGRAM(S): at 18:58

## 2020-01-01 RX ADMIN — CHLORHEXIDINE GLUCONATE 1 APPLICATION(S): 213 SOLUTION TOPICAL at 06:06

## 2020-01-01 RX ADMIN — FENTANYL CITRATE 1 PATCH: 50 INJECTION INTRAVENOUS at 08:55

## 2020-01-01 RX ADMIN — SODIUM CHLORIDE 500 MILLILITER(S): 9 INJECTION INTRAMUSCULAR; INTRAVENOUS; SUBCUTANEOUS at 19:44

## 2020-01-01 RX ADMIN — Medication 12.5 MILLIGRAM(S): at 17:25

## 2020-01-01 RX ADMIN — TAMOXIFEN CITRATE 140 MILLIGRAM(S): 20 TABLET, FILM COATED ORAL at 07:13

## 2020-01-01 RX ADMIN — EXEMESTANE 25 MILLIGRAM(S): 25 TABLET, SUGAR COATED ORAL at 11:23

## 2020-01-01 RX ADMIN — ENOXAPARIN SODIUM 70 MILLIGRAM(S): 100 INJECTION SUBCUTANEOUS at 17:40

## 2020-01-01 RX ADMIN — EXEMESTANE 25 MILLIGRAM(S): 25 TABLET, SUGAR COATED ORAL at 12:03

## 2020-01-01 RX ADMIN — ENOXAPARIN SODIUM 80 MILLIGRAM(S): 100 INJECTION SUBCUTANEOUS at 05:16

## 2020-01-01 RX ADMIN — TAMOXIFEN CITRATE 140 MILLIGRAM(S): 20 TABLET, FILM COATED ORAL at 17:49

## 2020-01-01 RX ADMIN — CHLORHEXIDINE GLUCONATE 1 APPLICATION(S): 213 SOLUTION TOPICAL at 23:05

## 2020-01-01 RX ADMIN — Medication 3 MILLILITER(S): at 10:16

## 2020-01-01 RX ADMIN — ENOXAPARIN SODIUM 80 MILLIGRAM(S): 100 INJECTION SUBCUTANEOUS at 17:24

## 2020-01-01 RX ADMIN — TAMOXIFEN CITRATE 140 MILLIGRAM(S): 20 TABLET, FILM COATED ORAL at 14:21

## 2020-01-01 RX ADMIN — Medication 40 MILLIGRAM(S): at 05:13

## 2020-01-01 RX ADMIN — ENOXAPARIN SODIUM 80 MILLIGRAM(S): 100 INJECTION SUBCUTANEOUS at 17:57

## 2020-01-01 RX ADMIN — ENOXAPARIN SODIUM 70 MILLIGRAM(S): 100 INJECTION SUBCUTANEOUS at 23:06

## 2020-01-01 RX ADMIN — Medication 3 MILLILITER(S): at 06:50

## 2020-01-01 RX ADMIN — MORPHINE SULFATE 2 MILLIGRAM(S): 50 CAPSULE, EXTENDED RELEASE ORAL at 22:00

## 2020-01-01 RX ADMIN — ENOXAPARIN SODIUM 80 MILLIGRAM(S): 100 INJECTION SUBCUTANEOUS at 05:13

## 2020-01-01 RX ADMIN — Medication 12.5 MILLIGRAM(S): at 17:19

## 2020-01-01 RX ADMIN — PANTOPRAZOLE SODIUM 40 MILLIGRAM(S): 20 TABLET, DELAYED RELEASE ORAL at 11:36

## 2020-01-01 RX ADMIN — Medication 650 MILLIGRAM(S): at 18:48

## 2020-01-01 RX ADMIN — SODIUM CHLORIDE 2000 MILLILITER(S): 9 INJECTION, SOLUTION INTRAVENOUS at 15:59

## 2020-01-01 RX ADMIN — Medication 12.5 MILLIGRAM(S): at 17:34

## 2020-01-01 RX ADMIN — Medication 20 MILLIGRAM(S): at 06:24

## 2020-01-01 RX ADMIN — HEPARIN SODIUM 1500 UNIT(S)/HR: 5000 INJECTION INTRAVENOUS; SUBCUTANEOUS at 19:05

## 2020-01-01 RX ADMIN — GEFITINIB 250 MILLIGRAM(S): 250 TABLET, FILM COATED ORAL at 17:41

## 2020-01-01 RX ADMIN — FENTANYL CITRATE 1 PATCH: 50 INJECTION INTRAVENOUS at 21:16

## 2020-01-01 RX ADMIN — Medication 12.5 MILLIGRAM(S): at 17:24

## 2020-01-01 RX ADMIN — ENOXAPARIN SODIUM 80 MILLIGRAM(S): 100 INJECTION SUBCUTANEOUS at 16:46

## 2020-01-01 RX ADMIN — CHLORHEXIDINE GLUCONATE 1 APPLICATION(S): 213 SOLUTION TOPICAL at 11:58

## 2020-01-01 RX ADMIN — Medication 650 MILLIGRAM(S): at 23:27

## 2020-01-01 RX ADMIN — FENTANYL CITRATE 1 PATCH: 50 INJECTION INTRAVENOUS at 13:42

## 2020-07-15 NOTE — RADRPT
Chief Complaint   Patient presents with     RECHECK     4 mth f/u     Blood pressure (!) 148/89, pulse 100, temperature 97  F (36.1  C), temperature source Temporal, weight 69.4 kg (153 lb), SpO2 98 %, not currently breastfeeding.    Senait Pretty LPN     EXAM DATE:  6/5/2018 12:02 AM EDT

AGE/SEX:        53 years / Female



INDICATIONS:  Abdomen pain.



CLINICAL DATA:  This is the patient's initial encounter. Patient reports that signs and symptoms have
 been present for 1 day and indicates a pain score of 3/10. 

                                                                          

MEDICAL/SURGICAL HISTORY:       Carcinoma, breast. Mastectomy, left.



ORAL CONTRAST:   No oral contrast ingested.



RADIATION DOSE:  8.93 CTDI (mGy)









COMPARISON:      No prior [facility] exams available for comparison. 





TECHNIQUE:  Multiple contiguous axial images were obtained through the abdomen and pelvis following b
olus infusion of  75 ml Omnipaque 350 (iohexol)  nonionic water-soluble contrast as a cumulative dose
 for multiple exams. No oral contrast ingested.  Using automated exposure control and adjustment of t
he mA and/or kV according to patient size, the radiation dose was kept as low as reasonably achievabl
e to obtain optimal diagnostic quality images.



FINDINGS: 

Lower Lungs: There are infiltrates in the right lung base. There are small bilateral effusions. There
 is a pericardial effusion.

Liver: The liver has a homogeneous density without space-occupying lesion. There is no dilation of th
e biliary tree. There is some thickening of the gallbladder wall. There is a trace of fluid adjacent 
to the liver.

Spleen:  Homogeneous density without enlargement.

Pancreas:  Unremarkable without mass or calcification.

Kidneys:  Normal in size and shape. No evidence of mass or hydronephrosis.

Adrenal Glands:   Unremarkable.

Aorta:   The aorta and proximal iliac vessels are grossly unremarkable without aneurysmal dilation.

Bowel/Mesentery:  The bowel loops are grossly unremarkable. The cecum and sigmoid colon have a normal
 configuration. The appendix is unremarkable.

Abdominal Wall:  Intact.

Retroperitoneum:  No evidence of adenopathy in the retrocrural, para-aortic, or deep pelvic regions.

Bladder:  Contours are smooth.

Reproductive Organs:  The uterus is bulky with a prominent mass along the left fundus measuring 7.0 x
 5.9 cm. This is most likely a prominent fibroid.

Inguinal:  The inguinal region is unremarkable without evidence of adenopathy.

Bony Structures:  Primary bony degenerative changes.



CONCLUSION:

1.  There is a trace of fluid adjacent to the liver.

2.  Gallbladder wall thickening.

3.  Bulky fibroid uterus.



Electronically signed by: Tomasz Vazquez MD  6/5/2018 12:51 AM EDT

## 2020-09-11 NOTE — ED PROVIDER NOTE - MUSCULOSKELETAL, MLM
Spine appears normal, range of motion is not limited, no muscle or joint tenderness. trace edema. bilateral mastectomy scars on chest

## 2020-09-11 NOTE — H&P ADULT - PROBLEM SELECTOR PLAN 1
History of PE.   - CTA revealed evidence of PE, no right heart strain   - saturating well on 4L NC (>95%), c/w supplemental O2 as needed   - c/w Lovenox 85 mg IV BID for PE treatment   - f/u Dr. Watt recommendations

## 2020-09-11 NOTE — ED PROVIDER NOTE - PROGRESS NOTE DETAILS
case discussed with Dr. Watt. Has not met her in person yet. Has first appointment on Monday. If needs admission, admit to [facility] on consult for pulm.

## 2020-09-11 NOTE — H&P ADULT - HISTORY OF PRESENT ILLNESS
HPI:   55F with PMH of metastatic breast cancer (to lung and bone) s/p bilateral mastectomy, previously on chemo/radiation but currently not undergoing treatment, with history of pleural effusions, who presented with SOB x 1 day. Patient describes that she often feels SOB but that it was worse than normal prompting her to come into the ED. Denies denies nausea, vomiting, diarrhea, constipation, fever, chills, night sweats, cough, wheezing, chest pain, hemoptysis, dysuria, or increased urinary frequency. States that she has had pleural/pericardial effusions that have required drainage in the past, last about a month ago, and feels fluid is back around her lungs.  at bedside and states that Dr. Watt is her primary doctor. She was advised by him to come into the ED, where she was subsequently found to have a PE and started on Lovenox. Patient's  states that Dr. Watt relayed that patient may need to have fluid removed once again. In ED, labs largely unremarkable aside from d-dimer elevated to 475. Patient afebrile, HR 84, /84, RR 24, but saturating 98% on 4L NC. Chest x-ray showing b/l pleural effusions.

## 2020-09-11 NOTE — ED PROVIDER NOTE - OBJECTIVE STATEMENT
history of metastatic breast cancer (lung, bone), s/p bilateral mastectomy, previously chemo/radiation, currently not undergoing treatment, presents with increased shortness of breath. Notes some pain in back. Denies fever, chills, chest pain, cough. Reports history of pleural/pericardial effusion requiring drainage, last about a month ago, and feels fluid is back around her lungs. Says she saw Dr. Watt and referred to ED.

## 2020-09-11 NOTE — H&P ADULT - NSHPLABSRESULTS_GEN_ALL_CORE
LABS:                         11.5   9.09  )-----------( 324      ( 11 Sep 2020 16:32 )             40.5     09-11    141  |  96  |  17  ----------------------------<  101<H>  4.3   |  34<H>  |  0.53    Ca    12.0<H>      11 Sep 2020 16:32  Mg     2.2     09-11    TPro  8.2  /  Alb  4.3  /  TBili  0.5  /  DBili  x   /  AST  30  /  ALT  18  /  AlkPhos  163<H>  09-11    PT/INR - ( 11 Sep 2020 16:32 )   PT: 12.2 sec;   INR: 1.02          PTT - ( 11 Sep 2020 16:32 )  PTT:26.8 sec    CARDIAC MARKERS ( 11 Sep 2020 16:32 )  x     / <0.01 ng/mL / x     / x     / x          Serum Pro-Brain Natriuretic Peptide: 176 pg/mL (09-11 @ 16:32)        RADIOLOGY, EKG & ADDITIONAL TESTS: Reviewed.

## 2020-09-11 NOTE — ED PROVIDER NOTE - CONSTITUTIONAL, MLM
normal... Well appearing, awake, alert, oriented to person, place, time/situation and in apparent respiratory distress.

## 2020-09-11 NOTE — H&P ADULT - NSHPPHYSICALEXAM_GEN_ALL_CORE
VITAL SIGNS:  T(C): 36.6 (09-11-20 @ 17:01), Max: 37.1 (09-11-20 @ 16:28)  T(F): 97.9 (09-11-20 @ 17:01), Max: 98.7 (09-11-20 @ 16:28)  HR: 87 (09-11-20 @ 18:34) (79 - 92)  BP: 134/83 (09-11-20 @ 18:34) (134/83 - 175/102)  BP(mean): --  RR: 24 (09-11-20 @ 18:34) (22 - 24)  SpO2: 99% (09-11-20 @ 18:34) (98% - 100%)  Wt(kg): --    PHYSICAL EXAM:  Constitutional: WDWN, lying comfortably in bed, NAD  Head: Nc/At  Eyes: PERRL, EOMI, clear conjunctiva  ENT: no nasal discharge; uvula midline, no oropharyngeal erythema or exudates; MMM  Neck: supple; no JVD or thyromegaly  Respiratory: CTA b/l, no wheezes, rales, or rhonchi, decreased breath sound b/l, breathing comfortably (no accessory muscle use) on 4L NC   Cardiac: +S1/S2, +RRR, no murmurs, rubs, or gallops  Gastrointestinal: soft, non-tender, non-distended, no rebound/guarding, no palpable masses, normoactive bowel sounds x4  Back: spine midline, no bony tenderness or step-offs; no CVAT B/L  Extremities: WWP, no clubbing or cyanosis, no peripheral edema  Musculoskeletal: NROM x4; no joint swelling, tenderness or erythema  Vascular: 2+ radial and DP pulses b/l  Dermatologic: skin warm, dry and intact, no rashes, wounds, or scars  Lymphatic: no submandibular or cervical LAD  Neurologic: AAOx3, CNII-XII grossly intact, no focal deficits, UE and LE strength 5/5 b/l   Psychiatric: affect and characteristics of appearance, verbalizations, behaviors are appropriate

## 2020-09-11 NOTE — ED ADULT NURSE NOTE - OBJECTIVE STATEMENT
Pt is a 54 y/o female A&Ox4 c/o SOB and left-sided back pain. Pt talking in 2-3 word sentences, unable to provide much information, noted to be tachypneic and use of abdominal muscles to breath. Pt upgraded to MD [facility], brought into [facility], placed on CCM, PIV placed, labs sent. Pt 99% O2 on 4L NC. Pt's  at bedside, denies fever/chills, chest pain, N/V/D. Pt's  endorses hx of stage 4 metastatic breast cancer not currently on chemotherapy, states "she has fluid in her lung and got it drained before."

## 2020-09-11 NOTE — H&P ADULT - PROBLEM SELECTOR PLAN 2
History of breast cancer with mets to the bone and lung, which has resulted in recurrent pleural effusions.   - takes Exemestane 25 mg PO daily at home, not on formulary here   - c/w home torsemide 10 mg BID   - f/u recommendations from Dr. Watt regarding if effusions need to be drained

## 2020-09-11 NOTE — ED ADULT TRIAGE NOTE - CHIEF COMPLAINT QUOTE
Patient presents to ED with c/o difficulty breathing. Patient speaking in 1-2 word sentences on arrival

## 2020-09-11 NOTE — H&P ADULT - PROBLEM SELECTOR PLAN 5
F: none   E: replete as needed  N: regular diet     DVT ppx: Lovenox  GI ppx: none  Code status: full code

## 2020-09-11 NOTE — ED PROVIDER NOTE - CLINICAL SUMMARY MEDICAL DECISION MAKING FREE TEXT BOX
metastatic cancer with history of effusion (pleural/ pericardial). no fever or signs of infection but given respiratory distress, labs/ cultures obtained on arrival. cxr done showing loculated left pleural effusion. labs with normal troponin, normal bnp. cta done showing left sided pe without evidence of heart strain. started lovenox. case discussed with Dr. Watt, Dr. [facility]. admit for further management. known mets to spine, no focal neuro deficit or signs of cauda equina at this time but may need additional spinal imaging

## 2020-09-12 NOTE — DISCHARGE NOTE PROVIDER - CARE PROVIDER_API CALL
Phuc Watt  INTERNAL MEDICINE  [address] [place] [address], [place] [facility]  Phone: [phone]  Fax: [phone]  Established Patient  Follow Up Time: 2 weeks   Phuc Watt.  HEMATOLOGY/ONCOLOGY  [address] [place] [address]  Phone: [phone]  Fax: [phone]  Scheduled Appointment: 10/05/2020 10:45 AM   Phuc Watt  HEMATOLOGY/ONCOLOGY  [address] [place] [id number]  Phone: [phone]  Fax: [phone]  Scheduled Appointment: 10/05/2020 10:45 AM    Raul eDlarosa  [facility]  [address] [place] [address]  Phone: [phone]  Fax: [phone]  Follow Up Time:

## 2020-09-12 NOTE — PROGRESS NOTE ADULT - SUBJECTIVE AND OBJECTIVE BOX
Internal Medicine Progress Note    OVERNIGHT EVENTS:  Admitted overnight    SUBJECTIVE:     Briefly Ms. Heard is a 54y/o woman w/ hx of metastatic breast cancer (to bones and lungs) w/ b/l mastectomy and hx of chem/rads, recurrent malignant pleffs, presenting for worsening shortness of breath x2 days.  Pt has baseline shortness of breath which she reports has worsened since febuary.  She has had an acute change in her dyspnea starting two days ago, and is unable to take a deep breath without pain.  She denies any cough, tightness, hemoptysis.      She reports hx of pleffs, presumed malignant 2/2 her Ca, and has undergone 3 prior thoras for drainage.  Most recent drainage 1 month ago.  Unclear prior testing.    On exam today she reports difficulty breathing which she states gets worse if she does not eat every two hours.  She also reports feeling very cold, but needing to be cold to breath.  She denies all other complaints.    Pt denies headache, dysphagia, sore throat, palpitations, cough, N/V/D, abd pn, changes in urine, and changes in bowel movements.    CURRENT MEDICATIONS:  MEDICATIONS  (STANDING):  influenza   Vaccine 0.5 milliLiter(s) IntraMuscular once  metoprolol tartrate 12.5 milliGRAM(s) Oral daily  torsemide 10 milliGRAM(s) Oral two times a day    MEDICATIONS  (PRN):  clonazePAM  Tablet 0.5 milliGRAM(s) Oral daily PRN Anxiety    VITAL SIGNS:  T(C): 36.6 (09-11-20 @ 17:01), Max: 37.1 (09-11-20 @ 16:28)  T(F): 97.9 (09-11-20 @ 17:01), Max: 98.7 (09-11-20 @ 16:28)  HR: 87 (09-11-20 @ 18:34) (79 - 92)  BP: 134/83 (09-11-20 @ 18:34) (134/83 - 175/102)  BP(mean): --  RR: 24 (09-11-20 @ 18:34) (22 - 24)  SpO2: 99% (09-11-20 @ 18:34) (98% - 100%)  Wt(kg): --    PHYSICAL EXAM:  Constitutional:  Clear increased work of breathing, sitting in bed with fan blowing on face  HEENT: Atraumatic, PERRLA, EOMI, moist mucosa, no rhinorrhea, uvela midline, good skin turgor  Neck: supple; no JVD  Respiratory: Even excursion, diminished inspiration, diminished at bases.  Cardiac: RRR; no M/R/G  Gastrointestinal: +BS, abdomen soft, obese, dull to percussion, non tender to percussion, no guarding, no hepatosplenomegaly  Extremities: well perfused, no clubbing or cyanosis; R calf > L calf  Vascular: 2+ radial, DP/PT pulses B/L  Neurologic: A&Ox4/4  Psych: Anxious affect    LABS:                         11.1   5.95  )-----------( 269      ( 12 Sep 2020 07:31 )             39.1     09-12    139  |  96  |  17  ----------------------------<  111<H>  5.4<H>   |  36<H>  |  0.55    Ca    11.5<H>      12 Sep 2020 07:31  Phos  4.2     09-12  Mg     2.1     09-12    TPro  8.1  /  Alb  4.2  /  TBili  0.4  /  DBili  x   /  AST  28  /  ALT  18  /  AlkPhos  155<H>  09-12    PT/INR - ( 12 Sep 2020 07:31 )   PT: 12.7 sec;   INR: 1.06          PTT - ( 12 Sep 2020 07:31 )  PTT:34.7 sec  CARDIAC MARKERS ( 11 Sep 2020 16:32 )  x     / <0.01 ng/mL / x     / x     / x          vBlood Gas Profile - Venous (09.11.20 @ 16:28)   pH, Venous: 7.32   pCO2, Venous: 76 mmHg   pO2, Venous: 50 mmHg   HCO3, Venous: 38 mmol/L   Base Excess, Venous: 9.3 mmol/L   Oxygen Saturation, Venous: 76 %       RADIOLOGY, EKG & ADDITIONAL TESTS:    < from: CT Angio Chest PE Protocol w/ IV Cont (09.11.20 @ 18:09) >  FINDINGS: Somewhat suboptimal motion degraded study. Multiple filling defects in left upper lobe segmental pulmonary arteries and left lower lobe segmental/subsegmental pulmonary arteries (7:78, 73, 54). Limited evaluation of right lobar/segmental/subsegmental pulmonary arteries due to artifact.. The thoracic aorta is normal in appearance. Cardiomegaly. No evidence of right heart strain. No pericardial effusion is seen. No mediastinal, hilar or axillary lymphadenopathy is seen.    Bilateral moderate pleural effusions. Airspace consolidation versus passive atelectasis in the right middle and lower lobe, however limited evaluation on current study. Passive atelectasis in the left lower lobe. Branching nodule in the superior left lower lobe (8:104), may represent mucous plugging. There is diffuse interlobular septal thickening and mild groundglass opacity. Some areas of interlobular septal thickening demonstrates somewhat nodular appearance. A nodular hyperdensity in the right lower lobe (7:75), may be focal calcification versus focal enhancement.    Limited evaluation of the upper abdomen demonstrates diffuse thickening of left adrenal gland may reflect adrenal hyperplasia. Limited evaluation of the liver due to bolus timing..    Evaluation of the osseous structures demonstrates extensive mixed sclerotic and lytic osseous metastases involving the axial and appendicular skeleton. Multiple vertebral bodies impending pathologic fractures in the thoracic spine. Right-sided MediPort in place.      IMPRESSION:  1.  Motion degraded study. Pulmonary embolism in the left upper and lower lobe segmental/subsegmental pulmonary arteries. Limited evaluation of right pulmonary arteries. No evidence of right heart strain.  2.  Focal nodular hyperdensity in the right lower lobe, which may reflect focal calcification versus focal active contrast extravasation. Clinical correlation and follow-up with CT with andwithout intravenous contrast will be of benefit.  3.  Airspace consolidation versus atelectasis in the right middle and lower lobe, limited evaluation on current study. Underlying mass/pleural abnormality also cannot be excluded.  4.  Diffuse interlobular septal thickening, may represent pulmonary edema, however lymphangitic metastases cannot be excluded. Follow-up with chest CT is recommended once pleural effusion and pulmonary edema is subsided.  5.  Mucous plugging, mild pulmonary edema and bilateral pleural effusions.  6.  Extensive osseous metastatic disease, with probable impending vertebral pathologic fractures.    < end of copied text >   Internal Medicine Progress Note    OVERNIGHT EVENTS:  Admitted overnight    SUBJECTIVE:     Briefly Ms. Heard is a 56y/o woman w/ hx of metastatic breast cancer (to bones and lungs) w/ b/l mastectomy and hx of chem/rads, recurrent malignant pleffs, presenting for worsening shortness of breath x2 days.  Pt has baseline shortness of breath which she reports has worsened since febuary.  She has had an acute change in her dyspnea starting two days ago, and is unable to take a deep breath without pain.  She denies any cough, tightness, hemoptysis.      She reports hx of pleffs, presumed malignant 2/2 her Ca, and has undergone 3 prior thoras for drainage.  Most recent drainage 1 month ago.  Unclear prior testing.    On exam today she reports difficulty breathing which she states gets worse if she does not eat every two hours.  She also reports feeling very cold, but needing to be cold to breath.  She denies all other complaints.    Pt denies headache, dysphagia, sore throat, palpitations, cough, N/V/D, abd pn, changes in urine, and changes in bowel movements.    CURRENT MEDICATIONS:  MEDICATIONS  (STANDING):  influenza   Vaccine 0.5 milliLiter(s) IntraMuscular once  metoprolol tartrate 12.5 milliGRAM(s) Oral daily  torsemide 10 milliGRAM(s) Oral two times a day    MEDICATIONS  (PRN):  clonazePAM  Tablet 0.5 milliGRAM(s) Oral daily PRN Anxiety    VITAL SIGNS:  T(C): 36.6 (09-11-20 @ 17:01), Max: 37.1 (09-11-20 @ 16:28)  T(F): 97.9 (09-11-20 @ 17:01), Max: 98.7 (09-11-20 @ 16:28)  HR: 87 (09-11-20 @ 18:34) (79 - 92)  BP: 134/83 (09-11-20 @ 18:34) (134/83 - 175/102)  BP(mean): --  RR: 24 (09-11-20 @ 18:34) (22 - 24)  SpO2: 99% (09-11-20 @ 18:34) (98% - 100%)  Wt(kg): --    PHYSICAL EXAM:  Constitutional:  Clear increased work of breathing, sitting in bed with fan blowing on face  HEENT: Atraumatic, PERRLA, EOMI, moist mucosa, no rhinorrhea, uvela midline, good skin turgor  Neck: supple; no JVD  Respiratory: Even excursion, diminished inspiration, diminished at bases.  Cardiac: RRR; no M/R/G  Gastrointestinal: +BS, abdomen soft, obese, dull to percussion, non tender to percussion, no guarding, no hepatosplenomegaly  Extremities: well perfused, no clubbing or cyanosis; R calf > L calf  Vascular: 2+ radial, DP/PT pulses B/L  Neurologic: A&Ox4/4  Psych: Anxious affect, preoccupied/perseverating/ruminating over her shortness of breath and possible relations to her food.      LABS:                         11.1   5.95  )-----------( 269      ( 12 Sep 2020 07:31 )             39.1     09-12    139  |  96  |  17  ----------------------------<  111<H>  5.4<H>   |  36<H>  |  0.55    Ca    11.5<H>      12 Sep 2020 07:31  Phos  4.2     09-12  Mg     2.1     09-12    TPro  8.1  /  Alb  4.2  /  TBili  0.4  /  DBili  x   /  AST  28  /  ALT  18  /  AlkPhos  155<H>  09-12    PT/INR - ( 12 Sep 2020 07:31 )   PT: 12.7 sec;   INR: 1.06          PTT - ( 12 Sep 2020 07:31 )  PTT:34.7 sec  CARDIAC MARKERS ( 11 Sep 2020 16:32 )  x     / <0.01 ng/mL / x     / x     / x          vBlood Gas Profile - Venous (09.11.20 @ 16:28)   pH, Venous: 7.32   pCO2, Venous: 76 mmHg   pO2, Venous: 50 mmHg   HCO3, Venous: 38 mmol/L   Base Excess, Venous: 9.3 mmol/L   Oxygen Saturation, Venous: 76 %       RADIOLOGY, EKG & ADDITIONAL TESTS:    < from: CT Angio Chest PE Protocol w/ IV Cont (09.11.20 @ 18:09) >  FINDINGS: Somewhat suboptimal motion degraded study. Multiple filling defects in left upper lobe segmental pulmonary arteries and left lower lobe segmental/subsegmental pulmonary arteries (7:78, 73, 54). Limited evaluation of right lobar/segmental/subsegmental pulmonary arteries due to artifact.. The thoracic aorta is normal in appearance. Cardiomegaly. No evidence of right heart strain. No pericardial effusion is seen. No mediastinal, hilar or axillary lymphadenopathy is seen.    Bilateral moderate pleural effusions. Airspace consolidation versus passive atelectasis in the right middle and lower lobe, however limited evaluation on current study. Passive atelectasis in the left lower lobe. Branching nodule in the superior left lower lobe (8:104), may represent mucous plugging. There is diffuse interlobular septal thickening and mild groundglass opacity. Some areas of interlobular septal thickening demonstrates somewhat nodular appearance. A nodular hyperdensity in the right lower lobe (7:75), may be focal calcification versus focal enhancement.    Limited evaluation of the upper abdomen demonstrates diffuse thickening of left adrenal gland may reflect adrenal hyperplasia. Limited evaluation of the liver due to bolus timing..    Evaluation of the osseous structures demonstrates extensive mixed sclerotic and lytic osseous metastases involving the axial and appendicular skeleton. Multiple vertebral bodies impending pathologic fractures in the thoracic spine. Right-sided MediPort in place.      IMPRESSION:  1.  Motion degraded study. Pulmonary embolism in the left upper and lower lobe segmental/subsegmental pulmonary arteries. Limited evaluation of right pulmonary arteries. No evidence of right heart strain.  2.  Focal nodular hyperdensity in the right lower lobe, which may reflect focal calcification versus focal active contrast extravasation. Clinical correlation and follow-up with CT with andwithout intravenous contrast will be of benefit.  3.  Airspace consolidation versus atelectasis in the right middle and lower lobe, limited evaluation on current study. Underlying mass/pleural abnormality also cannot be excluded.  4.  Diffuse interlobular septal thickening, may represent pulmonary edema, however lymphangitic metastases cannot be excluded. Follow-up with chest CT is recommended once pleural effusion and pulmonary edema is subsided.  5.  Mucous plugging, mild pulmonary edema and bilateral pleural effusions.  6.  Extensive osseous metastatic disease, with probable impending vertebral pathologic fractures.    < end of copied text >

## 2020-09-12 NOTE — DISCHARGE NOTE PROVIDER - PROVIDER TOKENS
PROVIDER:[TOKEN:[[id number]],FOLLOWUP:[2 weeks],ESTABLISHEDPATIENT:[T]] FREE:[LAST:[Alysa],FIRST:[Phuc RINALDI],PHONE:[phone]],FAX:[phone]],ADDRESS:[HEMATOLOGY/ONCOLOGY  [address] [place] [address]],SCHEDULEDAPPT:[10/05/2020],SCHEDULEDAPPTTIME:[10:45 AM]] FREE:[LAST:[Alysa],FIRST:[Phuc RINALDI],PHONE:[phone]:[phone]],ADDRESS:[HEMATOLOGY/ONCOLOGY  [address] [place] [address]],SCHEDULEDAPPT:[10/05/2020],SCHEDULEDAPPTTIME:[10:45 AM]],PROVIDER:[TOKEN:[[id number]]]

## 2020-09-12 NOTE — PROGRESS NOTE ADULT - SUBJECTIVE AND OBJECTIVE BOX
PUD FOR DR. 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 Patient describes that she often feels SOB but that it was worse than normal prompting her to come into the ED.   Denies nausea, vomiting, diarrhea, constipation, fever, chills, night sweats, cough, wheezing, chest pain, hemoptysis, dysuria, or increased urinary frequency. States that she has had pleural/pericardial effusions that have required drainage in the past, last about a month ago, and feels fluid is back around her lungs.  at bedside again and states that Dr. Watt is her primary doctor. She was advised by him to come into the ED, where she was subsequently found to have a PE and started on Lovenox. Patient's  states that Dr. Watt relayed that patient may need to have fluid removed once again. In ED, labs largely unremarkable aside from d-dimer elevated to 475. Patient afebrile, HR 84, /84, RR 24, but saturating 98% on 4L NC. She has had oxygen at home for 2 months , at around 4-5 L/minute. Chest x-ray showing b/l pleural effusions.       Allergies and Intolerances:        Allergies:  	Allergy Status Unknown:     Home Medications:   * Patient Currently Takes Medications as of 11-Sep-2020 21:39 documented in Structured Notes  · 	Metoprolol Tartrate 25 mg oral tablet: 0.5  orally once a day  · 	torsemide 5 mg oral tablet: 2 tab(s) orally 2 times a day, As Needed  · 	exemestane 25 mg oral tablet: 1 tab(s) orally once a day  · 	clonazePAM 0.5 mg oral tablet: 1 tab(s) orally once a day, As Needed    Patient History:    Tobacco Screening:  · Core Measure Site	No  · Has the patient used tobacco in the past 30 days?	No    Risk Assessment:    Present on Admission:  Deep Venous Thrombosis	no  Pulmonary Embolus	no  Urinary Catheter	no  Central Venous Catheter/PICC Line	no  Surgical Site Incision	no  Pressure Ulcer(s)	no     Heart Failure:  Does this patient have a history of or has been diagnosed with heart failure? no.    HIV Screen (per [facility] Department of Health, HIV screening must be offered to every individual between ages 13 and 64)	Unable to offer due to clinical condition    Physical Exam:   Physical Exam: VITAL SIGNS:  	T(C): 36.6 (09-11-20 @ 17:01), Max: 37.1 (09-11-20 @ 16:28)  	T(F): 97.9 (09-11-20 @ 17:01), Max: 98.7 (09-11-20 @ 16:28)  	HR: 87 (09-11-20 @ 18:34) (79 - 92)  	BP: 134/83 (09-11-20 @ 18:34) (134/83 - 175/102)  	BP(mean): --  	RR: 24 (09-11-20 @ 18:34) (22 - 24)  	SpO2: 99% (09-11-20 @ 18:34) (98% - 100%)  	Wt(kg): --    	PHYSICAL EXAM:  	Constitutional: in bed, very weak and tired; on 4 L NC  	Head: Nc/At  	Eyes: PERRL, EOMI, clear conjunctiva  	ENT: no nasal discharge; uvula midline, no oropharyngeal erythema or exudates; MMM  	Neck: supple; no JVD or thyromegaly  	Respiratory: breath sounds are bilaterally decreased, no wheezes, rales, or rhonchi, rapid and shallow breathing (no accessory muscle use) on 4L NC   	Cardiac: +S1/S2, +RRR, 2/6 systolic murmurs, rubs, or gallops  	Gastrointestinal: soft, non-tender, non-distended, no rebound/guarding, no palpable masses, normoactive bowel sounds x4  	Back: spine midline, no bony tenderness or step-offs; no CVAT B/L  	Extremities: WWP, no clubbing or cyanosis, mild peripheral edema  	Musculoskeletal: NROM x4; no joint swelling, tenderness or erythema  	Vascular: 2+ radial and DP pulses b/l  	Dermatologic: skin warm, dry and intact, no rashes, wounds, or scars  	Lymphatic: no submandibular or cervical LAD  	Neurologic: AAOx3, CNII-XII grossly intact, no focal deficits, UE and LE strength 5/5 b/l     Psychiatric: affect and characteristics of appearance, verbalizations, behaviors are appropriate       Labs and Results:  Labs, Radiology, Cardiology, and Other Results: LABS:   	          	           11.5   	9.09  )-----------( 324      ( 11 Sep 2020 16:32 )  	           40.5     	09-11    	141  |  96  |  17  	----------------------------<  101<H>  	4.3   |  34<H>  |  0.53    	Ca    12.0<H>      11 Sep 2020 16:32  	Mg     2.2     09-11    	TPro  8.2  /  Alb  4.3  /  TBili  0.5  /  DBili  x   /  AST  30  /  ALT  18  /  AlkPhos  163<H>  09-11    	PT/INR - ( 11 Sep 2020 16:32 )   PT: 12.2 sec;   INR: 1.02     	PTT - ( 11 Sep 2020 16:32 )  PTT:26.8 sec    	CARDIAC MARKERS ( 11 Sep 2020 16:32 )  	x     / <0.01 ng/mL / x     / x     / x      	Serum Pro-Brain Natriuretic Peptide: 176 pg/mL (09-11 @ 16:32)  	RADIOLOGY, EKG & ADDITIONAL TESTS: Reviewed.    t    EXAM:  CT ANGIO CHEST PE PROTOCOL IC                          PROCEDURE DATE:  09/11/2020          INTERPRETATION:  CTA (CT angiography) of the CHEST dated 9/11/2020 6:09 PM    INDICATION: Breast cancer with osseous metastases. Short of breath. Assess for pulmonary embolism.    TECHNIQUE: CT angiography of the chest was performed during bolus injection of intravenous contrast.  Post-processing including the production of axial, coronal and sagittal multiplanar reformatted images and axial and coronal maximum intensity projections (MIPs) was performed.    PRIOR STUDY: None.    FINDINGS: Somewhat suboptimal motion degraded study. Multiple filling defects in left upper lobe segmental pulmonary arteries and left lower lobe segmental/subsegmental pulmonary arteries (7:78, 73, 54). Limited evaluation of right lobar/segmental/subsegmental pulmonary arteries due to artifact.. The thoracic aorta is normal in appearance. Cardiomegaly. No evidence of right heart strain. No pericardial effusion is seen. No mediastinal, hilar or axillary lymphadenopathy is seen.    Bilateral moderate pleural effusions. Airspace consolidation versus passive atelectasis in the right middle and lower lobe, however limited evaluation on current study. Passive atelectasis in the left lower lobe. Branching nodule in the superior left lower lobe (8:104), may represent mucous plugging. There is diffuse interlobular septal thickening and mild groundglass opacity. Some areas of interlobular septal thickening demonstrates somewhat nodular appearance. A nodular hyperdensity in the right lower lobe (7:75), may be focal calcification versus focal enhancement.    Limited evaluation of the upper abdomen demonstrates diffuse thickening of left adrenal gland may reflect adrenal hyperplasia. Limited evaluation of the liver due to bolus timing..    Evaluation of the osseous structures demonstrates extensive mixed sclerotic and lytic osseous metastases involving the axial and appendicular skeleton. Multiple vertebral bodies impending pathologic fractures in the thoracic spine. Right-sided MediPort in place.    IMPRESSION:  1.  Motion degraded study. Pulmonary embolism in the left upper and lower lobe segmental/subsegmental pulmonary arteries. Limited evaluation of right pulmonary arteries. No evidence of right heart strain.  2.  Focal nodular hyperdensity in the right lower lobe, which may reflect focal calcification versus focal active contrast extravasation.   3.  Airspace consolidation versus atelectasis in the right middle and lower lobe, limited evaluation on current study. Underlying mass/pleural abnormality also cannot be excluded.  4.  Diffuse interlobular septal thickening, may represent pulmonary edema, however lymphangitic metastases cannot be excluded. Follow-up with chest CT is recommended once pleural effusion and pulmonary edema is subsided.  5.  Mucous plugging, mild pulmonary edema and bilateral pleural effusions.  6.  Extensive osseous metastatic disease, with probable impending vertebral pathologic fractures.     Problem/Plan - 1:  ·  Problem: Pulmonary embolism.  - CTA revealed evidence of PE, no right heart strain   - saturating well on 4L NC (>95%), c/w supplemental O2 as needed   - c/w Lovenox 85 mg IV BID for PE treatment      Problem/Plan - 2:  ·  Problem: Metastatic breast cancer.  Plan: History of breast cancer with mets to the bone and lung, which has resulted in recurrent pleural effusions.   - takes Exemestane 25 mg PO daily at home, not on formulary here   - c/w home torsemide 10 mg BID       Problem/Plan - 3:  ·  Problem: Pleural effusion.  Plan: - c/w home torsemide 10 mg BID       Problem/Plan - 4:  ·  Problem: HTN (hypertension).  Plan: - c/w home Metoprolol 12.5 mg PO daily.      Problem/Plan - 5:  ·  Problem: atelectasis and mucus plugging.  Continue with inhalers for pulmonary clearance.    DVT ppx/Rx: Lovenox  GI ppx: none  Code status: full code.

## 2020-09-12 NOTE — DISCHARGE NOTE PROVIDER - NSDCCPCAREPLAN_GEN_ALL_CORE_FT
PRINCIPAL DISCHARGE DIAGNOSIS  Diagnosis: Pulmonary embolism  Assessment and Plan of Treatment: You have a blood clot in your lungs called a pulmonary embolus.  This clot likely came from your right leg.  This clot was promoted by your recent lack of physical activity as well as your cancer.  We started you on blood thinners to prevent the clot from getting worse and to allow your body to heal.  Please take:   Lovenox 85mg injection twice a day      SECONDARY DISCHARGE DIAGNOSES  Diagnosis: Pleural effusion  Assessment and Plan of Treatment: You have fluid around your lungs called a pleural effusion.  Please discuss further treatments outside the the hospital with Dr. Watt.    Diagnosis: Metastatic cancer  Assessment and Plan of Treatment: Please continue to take your Exemestane as prescribed     PRINCIPAL DISCHARGE DIAGNOSIS  Diagnosis: Pulmonary embolism  Assessment and Plan of Treatment: You have a blood clot in your lungs called a pulmonary embolus.  This clot likely came from your right leg.  This clot was promoted by your recent lack of physical activity as well as your cancer.  We started you on blood thinners to prevent the clot from getting worse and to allow your body to heal.  Please take:   Lovenox 85mg injection twice a day      SECONDARY DISCHARGE DIAGNOSES  Diagnosis: Pleural effusion  Assessment and Plan of Treatment: You have fluid around your lungs called a pleural effusion. This effusion is very likely in the setting of your cancer around that area. Please discuss further treatments outside the the hospital with Dr. Watt.    Diagnosis: Metastatic cancer  Assessment and Plan of Treatment: Please continue to take your Exemestane as prescribed     PRINCIPAL DISCHARGE DIAGNOSIS  Diagnosis: Pulmonary embolism  Assessment and Plan of Treatment: You have a blood clot in your lungs called a pulmonary embolus.  This clot likely came from your right leg.  This clot was promoted by your recent lack of physical activity as well as your cancer.  We started you on blood thinners to prevent the clot from getting worse and to allow your body to heal.  Please take:   Lovenox 85mg injection twice a day      SECONDARY DISCHARGE DIAGNOSES  Diagnosis: Pleural effusion  Assessment and Plan of Treatment: You have fluid around your lungs called a pleural effusion. This effusion is very likely in the setting of your cancer around that area. Please discuss further treatments outside the the hospital with Dr. Watt. You are being discharged with a catheter draining fluid from your lung. You will have a nurse that comes to your house and is instructed to drain 200cc per week as needed from your catheter.    Diagnosis: Metastatic cancer  Assessment and Plan of Treatment: Please continue to take your Exemestane as prescribed     PRINCIPAL DISCHARGE DIAGNOSIS  Diagnosis: Pulmonary embolism  Assessment and Plan of Treatment: You have a blood clot in your lungs called a pulmonary embolus.  This clot likely came from your right leg.  This clot was promoted by your recent lack of physical activity as well as your cancer.  We started you on blood thinners to prevent the clot from getting worse and to allow your body to heal.  Please take:   Lovenox 85mg injection twice a day  We made you an appointment with the pulmonary doctor who saw you in the hospital, please see "follow up" for more details.      SECONDARY DISCHARGE DIAGNOSES  Diagnosis: Pleural effusion  Assessment and Plan of Treatment: You have fluid around your lungs called a pleural effusion. This effusion is very likely in the setting of your cancer around that area. Please discuss further treatments outside the the hospital with Dr. Watt. You are being discharged with a catheter draining fluid from your lung. You will have a nurse that comes to your house and is instructed to drain 200cc per week as needed from your catheter.  If the catheter comes out or you start to get a lot of chest pain, please call your doctor and/or come to the emergency department.   We made you an appointment with the pulmonary doctor who saw you in the hospital, please see "follow up" for more details.    Diagnosis: Metastatic cancer  Assessment and Plan of Treatment: You started cancer treatment under Dr. Watt at the end of your hospitalization and got a few doses of medication. I discussed with you and your  the medications and some of common side effects. Please call Dr. Watt if you have any significant side effects. You have an appointment scheduled with him, please see "follow up" for more details.        PRINCIPAL DISCHARGE DIAGNOSIS  Diagnosis: Pulmonary embolism  Assessment and Plan of Treatment: You have a blood clot in your lungs called a pulmonary embolus.  This clot likely came from your right leg.  This clot was promoted by your recent lack of physical activity as well as your cancer.  We started you on blood thinners to prevent the clot from getting worse and to allow your body to heal.  Please take:   Lovenox 85mg injection twice a day  We made you an appointment with the pulmonary doctor who saw you in the hospital, please see "follow up" for more details.      SECONDARY DISCHARGE DIAGNOSES  Diagnosis: Pleural effusion  Assessment and Plan of Treatment: You have fluid around your lungs called a pleural effusion. This effusion is very likely in the setting of your cancer around that area. Please discuss further treatments outside the the hospital with Dr. Watt. You are being discharged with a catheter draining fluid from your lung. You will have a nurse that comes to your house and is instructed to drain 200cc per week as needed from your catheter.  If the catheter comes out or you start to get a lot of chest pain, please call your doctor and/or come to the emergency department.   We made you an appointment with the pulmonary doctor who saw you in the hospital, please see "follow up" for more details.    Diagnosis: Metastatic cancer  Assessment and Plan of Treatment: You started cancer treatment under Dr. Watt at the end of your hospitalization and got a few doses of medication. I discussed with you and your  the medications and some of common side effects. Please call Dr. Watt if you have any significant side effects. You have an appointment scheduled with him, please see "follow up" for more details.   Gefitinib ((information found online for some side effects:)  -Abdominal pain, headache, nausea/vomiting, fever, decreased appetite, yellow eyes/skin, dark urine, etc.  Navelbine: (information found online for some severe side effects:)  Temporary hair loss may occur. Normal hair growth should return after treatment with Navelbine has ended. Tell your doctor if you have serious side effects of Navelbine including:  numbness/tingling/pain in the hands or feet, decreased reflexes, mouth sores, easy bruising or bleeding, weakness, new or increased trouble breathing, cough, severe constipation, stomach or abdominal pain, blood in the urine, or mental/mood changes.

## 2020-09-12 NOTE — PROGRESS NOTE ADULT - PROBLEM SELECTOR PLAN 2
Hx of recurrent effusions, most likely 2/2 malignancy.  x3 prior thoras, unclear results or by which pulmonologist outpatient.  On POCUS appear:  - Continue home torsemide

## 2020-09-12 NOTE — PROGRESS NOTE ADULT - PROBLEM SELECTOR PLAN 1
Presenting with acute shortness of breath over the last two days.  + Unilateral R leg swelling.  CTA shows multiple subsegmental PE, w/o signs of R heart strain.  No major ECG changes  - On 4L NC with sats >95%, will wean as tolerated  - Continue Lovenox 85mg IV BID for full AC  - Given PE and apparent R leg swelling, DVT scan will provide no change in management, low indication at this time. Presenting with acute shortness of breath over the last two days.  + Unilateral R leg swelling.  CTA shows multiple subsegmental PE, w/o signs of R heart strain.  No major ECG changes  - On 4L NC with sats >95%, will wean as tolerated  - Continue Lovenox 85mg IV BID for full AC  - DVT scan per Dr. Ramirez

## 2020-09-12 NOTE — PROGRESS NOTE ADULT - PROBLEM SELECTOR PLAN 5
F: PO  E: As needed  N: Dash shonda  DVT: On full AC lovenox  GI: none    Dispo [facility]F F: PO  E: As needed  N: Dash kosher  DVT: On full AC lovenox  GI: none    Dispo RMF.  Further dispo pending DVT scan.

## 2020-09-12 NOTE — PROGRESS NOTE ADULT - SUBJECTIVE AND OBJECTIVE BOX
Pt seen and examined     sitting on edge of bed, sl tachypnea    REVIEW OF SYSTEMS:  Constitutional: No fever,   RESP: sob more on exertion  Cardiovascular: No chest pain, palpitations, dizziness no leg swelling  Gastrointestinal: No abdominal or epigastric pain. No nausea, no vomiting.  Skin: No itching, burning, rashes or lesions       MEDICATIONS:  MEDICATIONS  (STANDING):  influenza   Vaccine 0.5 milliLiter(s) IntraMuscular once  metoprolol tartrate 12.5 milliGRAM(s) Oral daily  torsemide 10 milliGRAM(s) Oral two times a day    MEDICATIONS  (PRN):  clonazePAM  Tablet 0.5 milliGRAM(s) Oral daily PRN Anxiety      Allergies    Allergy Status Unknown    Intolerances        Vital Signs Last 24 Hrs  T(C): 36.6 (11 Sep 2020 17:01), Max: 37.1 (11 Sep 2020 16:28)  T(F): 97.9 (11 Sep 2020 17:01), Max: 98.7 (11 Sep 2020 16:28)  HR: 87 (11 Sep 2020 18:34) (79 - 92)  BP: 134/83 (11 Sep 2020 18:34) (134/83 - 175/102)  BP(mean): --  RR: 24 (11 Sep 2020 18:34) (22 - 24)  SpO2: 99% (11 Sep 2020 18:34) (98% - 100%)    09-12 @ 07:01  -  09-12 @ 12:53  --------------------------------------------------------  IN: 0 mL / OUT: 0 mL / NET: 0 mL        PHYSICAL EXAM:    General: ; mild tachypnea  HEENT: MMM, conjunctiva and sclera clear  Lungs: dec BS  Heart: regular  Gastrointestinal: Soft non-tender non-distended; Normal bowel sounds;   Skin: Warm and dry. No obvious rash    LABS:      CBC Full  -  ( 12 Sep 2020 07:31 )  WBC Count : 5.95 K/uL  RBC Count : 4.89 M/uL  Hemoglobin : 11.1 g/dL  Hematocrit : 39.1 %  Platelet Count - Automated : 269 K/uL  Mean Cell Volume : 80.0 fl  Mean Cell Hemoglobin : 22.7 pg  Mean Cell Hemoglobin Concentration : 28.4 gm/dL  Auto Neutrophil # : 4.35 K/uL  Auto Lymphocyte # : 0.89 K/uL  Auto Monocyte # : 0.52 K/uL  Auto Eosinophil # : 0.10 K/uL  Auto Basophil # : 0.04 K/uL  Auto Neutrophil % : 73.1 %  Auto Lymphocyte % : 15.0 %  Auto Monocyte % : 8.7 %  Auto Eosinophil % : 1.7 %  Auto Basophil % : 0.7 %    09-12    139  |  96  |  17  ----------------------------<  111<H>  5.4<H>   |  36<H>  |  0.55    Ca    11.5<H>      12 Sep 2020 07:31  Phos  4.2     09-12  Mg     2.1     09-12    TPro  8.1  /  Alb  4.2  /  TBili  0.4  /  DBili  x   /  AST  28  /  ALT  18  /  AlkPhos  155<H>  09-12    PT/INR - ( 12 Sep 2020 07:31 )   PT: 12.7 sec;   INR: 1.06          PTT - ( 12 Sep 2020 07:31 )  PTT:34.7 sec                  RADIOLOGY & ADDITIONAL STUDIES (The following images were personally reviewed):

## 2020-09-12 NOTE — PROGRESS NOTE ADULT - PROBLEM SELECTOR PLAN 3
Hx of metastatic breast cancer to bones and lungs leading to recurrent Pleffs.  Currently not on chemo or radiation  - Takes Exemestane 25mg PO daily, not on formulary

## 2020-09-12 NOTE — DISCHARGE NOTE PROVIDER - NSDCMRMEDTOKEN_GEN_ALL_CORE_FT
clonazePAM 0.5 mg oral tablet: 1 tab(s) orally once a day, As Needed  exemestane 25 mg oral tablet: 1 tab(s) orally once a day  Metoprolol Tartrate 25 mg oral tablet: 0.5  orally once a day  torsemide 5 mg oral tablet: 2 tab(s) orally 2 times a day, As Needed   clonazePAM 0.5 mg oral tablet: 1 tab(s) orally once a day, As Needed  enoxaparin 100 mg/mL injectable solution: 0.85 milliliter(s) subcutaneous 2 times a day   exemestane 25 mg oral tablet: 1 tab(s) orally once a day  Metoprolol Tartrate 25 mg oral tablet: 0.5  orally once a day  torsemide 5 mg oral tablet: 2 tab(s) orally 2 times a day, As Needed   clonazePAM 0.5 mg oral tablet: 1 tab(s) orally once a day, As Needed  enoxaparin 100 mg/mL injectable solution: 0.85 milliliter(s) subcutaneous 2 times a day   Metoprolol Tartrate 25 mg oral tablet: 0.5  orally once a day  torsemide 5 mg oral tablet: 2 tab(s) orally 2 times a day, As Needed   clonazePAM 0.5 mg oral tablet: 1 tab(s) orally once a day, As Needed  enoxaparin 100 mg/mL injectable solution: 0.85 milliliter(s) subcutaneous 2 times a day   exemestane 25 mg oral tablet: 1 tab(s) orally   Metoprolol Tartrate 25 mg oral tablet: 0.5 tab(s) orally 2 times a day  torsemide 5 mg oral tablet: 2 tab(s) orally 2 times a day, As Needed

## 2020-09-12 NOTE — DISCHARGE NOTE PROVIDER - NSDCFUADDAPPT_GEN_ALL_CORE_FT
Please follow up with your Hematology/Oncology Provider, Dr. Phuc Watt at [address] [place] [address] on 10/05/2020 at 10:45am.    Please ensure to bring your hospital discharge paperwork with you.    Follow up appointment was scheduled by Ms. ANNELIESE Campos, Referral Coordinator. Please follow up with your Hematology/Oncology Provider, Dr. Phuc Watt at [address] [place] [address] on 10/05/2020 at 10:45am.  Please ensure to bring your hospital discharge paperwork with you.  Follow up appointment was scheduled by Ms. ANNELIESE Campos, Referral Coordinator.    We scheduled you an appointment with a new primary doctor in our system, Dr. MARTINEZ, your appointment is scheduled for September 29th (Tuesday) at 2 PM  [address] [place] [address]    We scheduled you an pulmonologist appointment with Dr. Blanc- *** Please follow up with your Hematology/Oncology Provider, Dr. Phuc Watt at [address] [place] [address] on 10/05/2020 at 10:45am.  Please ensure to bring your hospital discharge paperwork with you.  Follow up appointment was scheduled by Ms. ANNELIESE Campos, Referral Coordinator.    We scheduled you an appointment with a new primary doctor in our system, Dr. MARTINEZ, your appointment is scheduled for September 29th (Tuesday) at 2 PM  [address] [place] [address]

## 2020-09-12 NOTE — PROGRESS NOTE ADULT - ASSESSMENT
Ms. Heard is a 54y/o woman w/ hx of metastatic breast cancer (to bones and lungs) w/ b/l mastectomy and hx of chem/rads, recurrent malignant pleffs, presenting for worsening shortness of breath x2 days. Found to have PE w/o R heart strain, and small b/l pleffs.    Still with some respiratory distress, however, stable.  Started AC, will taper off O2 as tolerated. Ms. Heard is a 54y/o woman w/ hx of metastatic breast cancer (to bones and lungs) w/ b/l mastectomy and hx of chem/rads, recurrent malignant pleffs, presenting for worsening shortness of breath x2 days. Found to have PE w/o R heart strain, and small b/l pleffs.    Still with some respiratory distress, however, stable.  Started AC, will taper off O2 as tolerated.    Plan pending further discussion with Dr. Watt and Dr. Delarosa for further management of Pleffs Ms. Heard is a 56y/o woman w/ hx of metastatic breast cancer (to bones and lungs) w/ b/l mastectomy and hx of chem/rads, recurrent malignant pleffs, presenting for worsening shortness of breath x2 days. Found to have PE w/o R heart strain, and small b/l pleffs.    Still with some respiratory distress, however, stable.  Started AC, will taper off O2 as tolerated.  Resistant to O2 getting weaned, currently 100% on 4L, insists she cannot breath as O2 lowered below that level, though sats remain unchanged.    Plan pending further discussion with Dr. Watt and Dr. Delarosa for further management of Pleffs

## 2020-09-12 NOTE — DISCHARGE NOTE PROVIDER - HOSPITAL COURSE
Ms. Heard is a 54y/o woman w/ hx of metastatic breast cancer (to bones and lungs) w/ b/l mastectomy and hx of chem/rads, recurrent malignant pleffs, presenting for worsening shortness of breath x2 days. Found to have PE w/o R heart strain, and small b/l pleffs.    Problem List/Main Diagnoses (system-based):   PE: Presenting with acute shortness of breath over the last two days w/ unilateral R leg swelling.  CTA shows multiple subsegmental PE, w/o signs of R heart strain.  Duplex scan showed __________.  No major ECG changes.  Started on Lovenox 85mg BID for therapeutic AC    Pleff: Hx of recurrent malignant effusions.  x3 prior thoras for symptomatic tx.  Continue torsemide 10mg BID for management.    Metastatic breast cancer: Metastatic breast cancer to bones and lungs leading to recurrent Pleffs.  Currently not on chemo or radiation.  Continue Exemestane 25mg PO daily,    Inpatient treatment course: See above    New medications:   Enoxaparin 85mg SubQ BID    Labs to be followed outpatient:   None    Exam to be followed outpatient:   Pulmonary exam Ms. Heard is a 56y/o woman w/ hx of metastatic breast cancer (to bones and lungs) w/ b/l mastectomy and hx of chem/rads, recurrent malignant pleffs, presenting for worsening shortness of breath x2 days. Found to have PE w/o R heart strain, and small b/l pleffs, now on [facility] per Dr. Watt for resumption of chemotherapy.    Problem List/Main Diagnoses (system-based):   PE: Presenting with acute shortness of breath over the last two days w/ unilateral R leg swelling.  CTA shows multiple subsegmental PE, w/o signs of R heart strain.  Duplex scan showed __________.  No major ECG changes.  Started on Lovenox 85mg BID for therapeutic AC    Pleff: Hx of recurrent malignant effusions.  x3 prior thoras for symptomatic tx.  Continue torsemide 10mg BID for management.    #Multiple subsegmental pulmonary emboli without acute cor pulmonale.  Plan: Presented w/ acute shortness of breath over the last two days. + Unilateral R leg swelling. CTA showed multiple subsegmental PE, w/o signs of R heart strain.  No major ECG changes. DVT scan negative b/l  - On 4L NC with sats >95%, will wean as tolerated (Note: Resistant to O2 getting weaned, currently 100% on 4L, insists she cannot breath as O2 lowered below that level, though sats remain unchanged)  - Continue Lovenox 85mg IV BID for full AC.    #Metastatic breast cancer.  Plan: Hx of metastatic breast cancer to bones and lungs leading to recurrent Pleffs.  Was not on chemo or radiation prior to this hospitalization.   - Continue Exemestane 25mg PO daily  - Continue gefitinib 250 milliGRAM(s)   - Continue tamoxifen 140 milliGRAM(s)  - On [facility] for chemo- per Dr. Watt's note, possible d/c tomorrow.    #Chronic respiratory failure with hypoxia and hypercapnia.  Plan: Decreased lung function, substantial atelectasis, scarring and damage to R. lung.  On home O2 at 5L. Current increased bicarb levels suggestive of hypercarbia.  Earlier blood gas shows elevated bicarb above level of chronic compensation, suggesting concurrent metabolic alkalosis further supported by hypochloremia.  Unclear etiology of alkalosis  - Encourage increased breathing depth, pt observed to take many shallow breaths.     #Hypercalcemia of malignancy:   - Increased Ca levels with normal phos in the setting of metastatic breast cancer.  - Received IV Zoledronic acid.    #Hypertension, unspecified type:  - Hx of HTN, on metoprolol  - Continue home Metoprolol Succinate 12.5mg QD.     #Pleural effusion:  -Hx of recurrent effusions, most likely 2/2 malignancy.  x3 prior thoras, unclear results or by which pulmonologist outpatient.  On POCUS appear: mild to moderate volume on L side, none/mild noted on R.  S/p pleurex placement with IR  - Monitor pleurex output.    Inpatient treatment course: See above    New medications:   Enoxaparin 85mg SubQ BID    Labs to be followed outpatient:   None    Exam to be followed outpatient:   Pulmonary exam Ms. Heard is a 54y/o woman w/ hx of metastatic breast cancer (to bones and lungs) w/ b/l mastectomy and hx of chem/rads, recurrent malignant pleffs, presenting for worsening shortness of breath x2 days. Found to have PE w/o R heart strain, and small b/l pleffs, now on 7[facility] per Dr. Watt for resumption of chemotherapy.    Problem List/Main Diagnoses:   #Multiple subsegmental pulmonary emboli without acute cor pulmonale. Presented w/ acute shortness of breath over the last two days. + Unilateral R leg swelling. CTA showed multiple subsegmental PE, w/o signs of R heart strain.  No major ECG changes. DVT scan negative b/l  - On 4L NC with sats >95%, weaned as tolerated (Note: Resistant to O2 getting weaned, currently 100% on 4L, insists she cannot breath as O2 lowered below that level, though sats remain unchanged)  - Continue Lovenox 85mg IV BID for full AC.    #Pleural effusion:  -Hx of recurrent effusions, most likely 2/2 malignancy.  x3 prior thoras, unclear results or by which pulmonologist outpatient.  On POCUS appear: mild to moderate volume on L side, none/mild noted on R. S/p pleurex placement with IR  - Monitor pleurex output.  - Continue torsemide 10mg BID for management.    #Metastatic breast cancer:   -Hx of metastatic breast cancer to bones and lungs leading to recurrent Pleffs. Was not on chemo or radiation prior to this hospitalization.   -During end of hospitalization under Dr. Watt's care while on oncology floor, received gefitinib 250 mg, tamoxifen 140 milliGRAMs and Navelbine.   -Continue Exemestane 25mg PO daily    #Chronic respiratory failure with hypoxia and hypercapnia:  -Decreased lung function, substantial atelectasis, scarring and damage to R. lung. On home O2 at 5L. Current increased bicarb levels suggestive of hypercarbia.  Earlier blood gas shows elevated bicarb above level of chronic compensation, suggesting concurrent metabolic alkalosis further supported by hypochloremia. Unclear etiology of alkalosis  - Encouraged increased breathing depth, pt observed to take many shallow breaths.     #Hypercalcemia of malignancy:   - Increased Ca levels with normal phos in the setting of metastatic breast cancer.  - Received IV Zoledronic acid.    #Hypertension, unspecified type:  - Hx of HTN, on metoprolol  - Continue home Metoprolol Succinate 12.5mg QD.       Inpatient treatment course: See above    New medications:   Enoxaparin 85mg SubQ BID    Labs to be followed outpatient:   None    Exam to be followed outpatient:   Pulmonary exam Ms. Heard is a 54y/o woman w/ hx of metastatic breast cancer (to bones and lungs) w/ b/l mastectomy and hx of chem/rads, recurrent malignant pleffs, presenting for worsening shortness of breath x2 days. Found to have PE w/o R heart strain, and small b/l pleffs, now on 7[facility] per Dr. Watt for resumption of chemotherapy.    Problem List/Main Diagnoses:   #Multiple subsegmental pulmonary emboli without acute cor pulmonale. Presented w/ acute shortness of breath over the last two days. + Unilateral R leg swelling. CTA showed multiple subsegmental PE, w/o signs of R heart strain.  No major ECG changes. DVT scan negative b/l  - On 4L NC with sats >95%, weaned as tolerated (Note: Resistant to O2 getting weaned, currently 100% on 4L, insists she cannot breath as O2 lowered below that level, though sats remain unchanged)  - Continue Lovenox 85mg IV BID for full AC.    #Pleural effusion:  -Hx of recurrent effusions, most likely 2/2 malignancy.  x3 prior thoras, unclear results or by which pulmonologist outpatient.  On POCUS appear: mild to moderate volume on L side, none/mild noted on R. S/p pleurex placement with IR  - Monitor pleurex output.  - Continue torsemide 10mg BID for management.    #Metastatic breast cancer:   -Hx of metastatic breast cancer to bones and lungs leading to recurrent Pleffs. Was not on chemo or radiation prior to this hospitalization.   -During end of hospitalization under Dr. Watt's care while on oncology floor, received gefitinib 250 mg, tamoxifen 140 milliGRAMs and Navelbine (chemotherapy initiated in hospital was given while at hospital, pt will now receive in outpatient setting when she sees Dr. Watt.    -Continue Exemestane 25mg PO daily    #Chronic respiratory failure with hypoxia and hypercapnia:  -Decreased lung function, substantial atelectasis, scarring and damage to R. lung. On home O2 at 5L. Current increased bicarb levels suggestive of hypercarbia.  Earlier blood gas shows elevated bicarb above level of chronic compensation, suggesting concurrent metabolic alkalosis further supported by hypochloremia. Unclear etiology of alkalosis  - Encouraged increased breathing depth, pt observed to take many shallow breaths.     #Hypercalcemia of malignancy:   - Increased Ca levels with normal phos in the setting of metastatic breast cancer.  - Received IV Zoledronic acid.    #Hypertension, unspecified type:  - Hx of HTN, on metoprolol  - Continue home Metoprolol Succinate 12.5mg QD.       Inpatient treatment course: See above    New medications:   Enoxaparin 85mg SubQ BID    Labs to be followed outpatient:   None    Exam to be followed outpatient:   Pulmonary exam Ms. Heard is a 54y/o woman w/ hx of metastatic breast cancer (to bones and lungs) w/ b/l mastectomy and hx of chem/rads, recurrent malignant pleffs, presenting for worsening shortness of breath x2 days. Found to have PE w/o R heart strain, and small b/l pleffs, now on 7[facility] per Dr. Watt for resumption of chemotherapy.    Problem List/Main Diagnoses:   #Multiple subsegmental pulmonary emboli without acute cor pulmonale. Presented w/ acute shortness of breath over the last two days. + Unilateral R leg swelling. CTA showed multiple subsegmental PE, w/o signs of R heart strain.  No major ECG changes. DVT scan negative b/l  - On 4L NC with sats >95%, weaned as tolerated (Note: Resistant to O2 getting weaned, currently 100% on 4L, insists she cannot breath as O2 lowered below that level, though sats remain unchanged)  - Continue Lovenox 85mg IV BID for full AC.     #Pleural effusion:  -Hx of recurrent effusions, most likely 2/2 malignancy.  x3 prior thoras, unclear results or by which pulmonologist outpatient.  On POCUS appear: mild to moderate volume on L side, none/mild noted on R. S/p pleurex placement with IR  - Monitor pleurex output.  - Continue torsemide 10mg BID for management.    #Metastatic breast cancer:   -Hx of metastatic breast cancer to bones and lungs leading to recurrent Pleffs. Was not on chemo or radiation prior to this hospitalization.   -During end of hospitalization under Dr. Watt's care while on oncology floor, received gefitinib 250 mg, tamoxifen 140 milliGRAMs and Navelbine (chemotherapy initiated in hospital was given while at hospital, pt will now receive in outpatient setting when she sees Dr. Watt.    -Continue Exemestane 25mg PO daily    #Chronic respiratory failure with hypoxia and hypercapnia:  -Decreased lung function, substantial atelectasis, scarring and damage to R. lung. On home O2 at 5L. Current increased bicarb levels suggestive of hypercarbia.  Earlier blood gas shows elevated bicarb above level of chronic compensation, suggesting concurrent metabolic alkalosis further supported by hypochloremia. Unclear etiology of alkalosis  - Encouraged increased breathing depth, pt observed to take many shallow breaths.     #Hypercalcemia of malignancy:   - Increased Ca levels with normal phos in the setting of metastatic breast cancer.  - Received IV Zoledronic acid.    #Hypertension, unspecified type:  - Hx of HTN, on metoprolol  - Continue home Metoprolol Succinate 12.5mg QD.       Inpatient treatment course: See above    New medications:   Enoxaparin 85mg SubQ BID    Labs to be followed outpatient:   None    Exam to be followed outpatient:   Pulmonary exam

## 2020-09-13 NOTE — PROGRESS NOTE ADULT - ASSESSMENT
continue lovenox  continue torsemide  patient raises question about  her port and was told it may need to be changed  also request if she can sleep sitting instead of in bed because of dyspnea

## 2020-09-13 NOTE — PROGRESS NOTE ADULT - SUBJECTIVE AND OBJECTIVE BOX
PUD FOR DR. 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 Patient describes that she often feels SOB but that it was worse than normal prompting her to come into the ED.   Denies nausea, vomiting, diarrhea, constipation, fever, chills, night sweats, cough, wheezing, chest pain, hemoptysis, dysuria, or increased urinary frequency. States that she has had pleural/pericardial effusions that have required drainage in the past, last about a month ago, and feels fluid is back around her lungs.  at bedside again and states that Dr. Watt is her primary doctor. She was advised by him to come into the ED, where she was subsequently found to have a PE and started on Lovenox. Patient's  states that Dr. Watt relayed that patient may need to have fluid removed once again. In ED, labs largely unremarkable aside from d-dimer elevated to 475. Patient afebrile, HR 84, /84, RR 24, but saturating 98% on 4L NC. She has had oxygen at home for 2 months , at around 4-5 L/minute. Chest x-ray showing b/l pleural effusions.     All new data reviewed, including VS, lab, imaging, Rx and documentation. Note written at bedside.      Allergies and Intolerances:        Allergies:  	Allergy Status Unknown:     Home Medications:   * Patient Currently Takes Medications as of 11-Sep-2020 21:39 documented in Structured Notes  · 	Metoprolol Tartrate 25 mg oral tablet: 0.5  orally once a day  · 	torsemide 5 mg oral tablet: 2 tab(s) orally 2 times a day, As Needed  · 	exemestane 25 mg oral tablet: 1 tab(s) orally once a day  · 	clonazePAM 0.5 mg oral tablet: 1 tab(s) orally once a day, As Needed    Patient History:    Tobacco Screening:  · Core Measure Site	No  · Has the patient used tobacco in the past 30 days?	No    Risk Assessment:    Present on Admission:  Deep Venous Thrombosis	no  Pulmonary Embolus	no  Urinary Catheter	no  Central Venous Catheter/PICC Line	no  Surgical Site Incision	no  Pressure Ulcer(s)	no     Heart Failure:  Does this patient have a history of or has been diagnosed with heart failure? no.    HIV Screen (per [facility] Department of Health, HIV screening must be offered to every individual between ages 13 and 64)	Unable to offer due to clinical condition    Physical Exam:   Physical Exam: VITAL SIGNS:  	T(C): 36.6 (09-11-20 @ 17:01), Max: 37.1 (09-11-20 @ 16:28)  	T(F): 97.9 (09-11-20 @ 17:01), Max: 98.7 (09-11-20 @ 16:28)  	HR: 87 (09-11-20 @ 18:34) (79 - 92)  	BP: 134/83 (09-11-20 @ 18:34) (134/83 - 175/102)  	BP(mean): --  	RR: 24 (09-11-20 @ 18:34) (22 - 24)  	SpO2: 99% (09-11-20 @ 18:34) (98% - 100%)  	Wt(kg): --    	PHYSICAL EXAM:  is present  	Constitutional: in bathroom for BM, weak and tired; on 4 L NC  	Head: Nc/At  	Eyes: PERRL, EOMI, clear conjunctiva  	ENT: no nasal discharge; uvula midline, no oropharyngeal erythema or exudates; MMM  	Neck: supple; no JVD or thyromegaly  	Respiratory: breath sounds are bilaterally decreased, no wheezes, rales, or rhonchi, improved breathing (no accessory muscle use) on 4L NC   	Cardiac: +S1/S2, +RRR, 2/6 systolic murmurs, rubs, or gallops  	Gastrointestinal: soft, non-tender, non-distended, no rebound/guarding, no palpable masses, normoactive bowel sounds x4, BM +  	Back: spine midline, no bony tenderness or step-offs; no CVAT B/L  	Extremities: WWP, no clubbing or cyanosis, mild peripheral edema  	Musculoskeletal: NROM x4; no joint swelling, tenderness or erythema  	Vascular: 2+ radial and DP pulses b/l  	Dermatologic: skin warm, dry and intact, no rashes, wounds, or scars  	Lymphatic: no submandibular or cervical LAD  	Neurologic: AAOx3, CNII-XII grossly intact, no focal deficits, UE and LE strength 5/5 b/l     Psychiatric: affect and characteristics of appearance, verbalizations, behaviors are appropriate       Labs and Results:  Labs, Radiology, Cardiology, and Other Results: LABS:   	          	           11.5   	9.09  )-----------( 324      ( 11 Sep 2020 16:32 )  	           40.5     	09-11    	141  |  96  |  17  	----------------------------<  101<H>  	4.3   |  34<H>  |  0.53    	Ca    12.0<H>      11 Sep 2020 16:32  	Mg     2.2     09-11    	TPro  8.2  /  Alb  4.3  /  TBili  0.5  /  DBili  x   /  AST  30  /  ALT  18  /  AlkPhos  163<H>  09-11    	PT/INR - ( 11 Sep 2020 16:32 )   PT: 12.2 sec;   INR: 1.02     	PTT - ( 11 Sep 2020 16:32 )  PTT:26.8 sec    	CARDIAC MARKERS ( 11 Sep 2020 16:32 )  	x     / <0.01 ng/mL / x     / x     / x      	Serum Pro-Brain Natriuretic Peptide: 176 pg/mL (09-11 @ 16:32)  	RADIOLOGY, EKG & ADDITIONAL TESTS: Reviewed.    EXAM:  CT ANGIO CHEST PE PROTOCOL IC                          PROCEDURE DATE:  09/11/2020      INTERPRETATION:  CTA (CT angiography) of the CHEST dated 9/11/2020 6:09 PM    INDICATION: Breast cancer with osseous metastases. Short of breath. Assess for pulmonary embolism.    TECHNIQUE: CT angiography of the chest was performed during bolus injection of intravenous contrast.  Post-processing including the production of axial, coronal and sagittal multiplanar reformatted images and axial and coronal maximum intensity projections (MIPs) was performed.    PRIOR STUDY: None.    FINDINGS: Somewhat suboptimal motion degraded study. Multiple filling defects in left upper lobe segmental pulmonary arteries and left lower lobe segmental/subsegmental pulmonary arteries (7:78, 73, 54). Limited evaluation of right lobar/segmental/subsegmental pulmonary arteries due to artifact.. The thoracic aorta is normal in appearance. Cardiomegaly. No evidence of right heart strain. No pericardial effusion is seen. No mediastinal, hilar or axillary lymphadenopathy is seen.    Bilateral moderate pleural effusions. Airspace consolidation versus passive atelectasis in the right middle and lower lobe, however limited evaluation on current study. Passive atelectasis in the left lower lobe. Branching nodule in the superior left lower lobe (8:104), may represent mucous plugging. There is diffuse interlobular septal thickening and mild groundglass opacity. Some areas of interlobular septal thickening demonstrates somewhat nodular appearance. A nodular hyperdensity in the right lower lobe (7:75), may be focal calcification versus focal enhancement.    Limited evaluation of the upper abdomen demonstrates diffuse thickening of left adrenal gland may reflect adrenal hyperplasia. Limited evaluation of the liver due to bolus timing..    Evaluation of the osseous structures demonstrates extensive mixed sclerotic and lytic osseous metastases involving the axial and appendicular skeleton. Multiple vertebral bodies impending pathologic fractures in the thoracic spine. Right-sided MediPort in place.    IMPRESSION:  1.  Motion degraded study. Pulmonary embolism in the left upper and lower lobe segmental/subsegmental pulmonary arteries. Limited evaluation of right pulmonary arteries. No evidence of right heart strain.  2.  Focal nodular hyperdensity in the right lower lobe, which may reflect focal calcification versus focal active contrast extravasation.   3.  Airspace consolidation versus atelectasis in the right middle and lower lobe, limited evaluation on current study. Underlying mass/pleural abnormality also cannot be excluded.  4.  Diffuse interlobular septal thickening, may represent pulmonary edema, however lymphangitic metastases cannot be excluded. Follow-up with chest CT is recommended once pleural effusion and pulmonary edema is subsided.  5.  Mucous plugging, mild pulmonary edema and bilateral pleural effusions.  6.  Extensive osseous metastatic disease, with probable impending vertebral pathologic fractures.     Problem/Plan - 1:  ·  Problem: Pulmonary embolism.  - CTA revealed evidence of PE, no right heart strain   - saturating well on 4L NC (>95%), c/w supplemental O2 as needed   - c/w Lovenox 85 mg IV BID for PE treatment      Problem/Plan - 2:  ·  Problem: Metastatic breast cancer.  Plan: History of breast cancer with mets to the bone and lung, which has resulted in recurrent pleural effusions.   - takes Exemestane 25 mg PO daily at home, not on formulary here   - c/w home torsemide 10 mg BID       Problem/Plan - 3:  ·  Problem: Pleural effusion.  Plan: - c/w home torsemide 10 mg BID       Problem/Plan - 4:  ·  Problem: HTN (hypertension).  Plan: - c/w home Metoprolol 12.5 mg PO daily.      Problem/Plan - 5:  ·  Problem: atelectasis and mucus plugging.  Continue with inhalers for pulmonary clearance.    DVT ppx/Rx: Lovenox  GI ppx: none  Code status: full code.       ADD: DVT negative

## 2020-09-13 NOTE — PROGRESS NOTE ADULT - SUBJECTIVE AND OBJECTIVE BOX
Pt seen and examined   sitting in chair  seems better    REVIEW OF SYSTEMS:  Constitutional: No fever,   Cardiovascular: No chest pain, palpitations, dizziness + leg swelling  Gastrointestinal: No abdominal or epigastric pain. No nausea, vomiting No diarrhea or constipation..  Skin: No itching, burning, rashes or lesions       MEDICATIONS:  MEDICATIONS  (STANDING):  albuterol/ipratropium for Nebulization 3 milliLiter(s) Nebulizer every 6 hours  budesonide 160 MICROgram(s)/formoterol 4.5 MICROgram(s) Inhaler 2 Puff(s) Inhalation two times a day  influenza   Vaccine 0.5 milliLiter(s) IntraMuscular once  metoprolol tartrate 12.5 milliGRAM(s) Oral daily  tiotropium 18 MICROgram(s) Capsule 1 Capsule(s) Inhalation daily  torsemide 10 milliGRAM(s) Oral two times a day    MEDICATIONS  (PRN):  ALBUTerol    90 MICROgram(s) HFA Inhaler 2 Puff(s) Inhalation every 4 hours PRN Shortness of Breath  clonazePAM  Tablet 0.5 milliGRAM(s) Oral daily PRN Anxiety      Allergies    Allergy Status Unknown    Intolerances        Vital Signs Last 24 Hrs  T(C): 37.1 (12 Sep 2020 21:03), Max: 37.1 (12 Sep 2020 21:03)  T(F): 98.7 (12 Sep 2020 21:03), Max: 98.7 (12 Sep 2020 21:03)  HR: 75 (12 Sep 2020 21:03) (75 - 75)  BP: 121/69 (12 Sep 2020 21:03) (121/69 - 121/69)  BP(mean): --  RR: 18 (12 Sep 2020 21:03) (18 - 18)  SpO2: 99% (12 Sep 2020 21:03) (99% - 99%)    09-12 @ 07:01  -  09-13 @ 07:00  --------------------------------------------------------  IN: 0 mL / OUT: 0 mL / NET: 0 mL        PHYSICAL EXAM:    General:  in no acute distress  HEENT: MMM, conjunctiva and sclera clear  Lungs: clear apices  Heart: regular  Gastrointestinal: Soft non-tender non-distended; Normal bowel sounds;  Ext: ankle edema    LABS:      CBC Full  -  ( 13 Sep 2020 06:59 )  WBC Count : 6.40 K/uL  RBC Count : 4.76 M/uL  Hemoglobin : 10.9 g/dL  Hematocrit : 38.5 %  Platelet Count - Automated : 292 K/uL  Mean Cell Volume : 80.9 fl  Mean Cell Hemoglobin : 22.9 pg  Mean Cell Hemoglobin Concentration : 28.3 gm/dL  Auto Neutrophil # : 4.37 K/uL  Auto Lymphocyte # : 1.15 K/uL  Auto Monocyte # : 0.63 K/uL  Auto Eosinophil # : 0.16 K/uL  Auto Basophil # : 0.04 K/uL  Auto Neutrophil % : 68.3 %  Auto Lymphocyte % : 18.0 %  Auto Monocyte % : 9.8 %  Auto Eosinophil % : 2.5 %  Auto Basophil % : 0.6 %    09-13    138  |  92<L>  |  18  ----------------------------<  114<H>  4.3   |  34<H>  |  0.59    Ca    11.5<H>      13 Sep 2020 06:59  Phos  3.8     09-13  Mg     2.0     09-13    TPro  7.7  /  Alb  4.2  /  TBili  0.5  /  DBili  x   /  AST  28  /  ALT  15  /  AlkPhos  148<H>  09-13    PT/INR - ( 12 Sep 2020 07:31 )   PT: 12.7 sec;   INR: 1.06          PTT - ( 12 Sep 2020 07:31 )  PTT:34.7 sec                  RADIOLOGY & ADDITIONAL STUDIES (The following images were personally reviewed):

## 2020-09-14 NOTE — PROGRESS NOTE ADULT - PROBLEM SELECTOR PLAN 3
Hx of metastatic breast cancer to bones and lungs leading to recurrent Pleffs.  Currently not on chemo or radiation  - Takes Exemestane 25mg PO daily, not on formulary Hx of metastatic breast cancer to bones and lungs leading to recurrent Pleffs.  Currently not on chemo or radiation  - Takes Exemestane 25mg PO daily, not on formulary, will have her bring in pills  - Start Zoledronic acid 4mg IV Hx of metastatic breast cancer to bones and lungs leading to recurrent Pleffs.  Currently not on chemo or radiation  - START Exemestane 25mg PO daily  - Start Zoledronic acid 4mg IV

## 2020-09-14 NOTE — PROGRESS NOTE ADULT - PROBLEM SELECTOR PLAN 1
Presenting with acute shortness of breath over the last two days.  + Unilateral R leg swelling.  CTA shows multiple subsegmental PE, w/o signs of R heart strain.  No major ECG changes.  DVT scan negative b/l  - On 4L NC with sats >95%, will wean as tolerated  - Continue Lovenox 85mg IV BID for full AC

## 2020-09-14 NOTE — PROGRESS NOTE ADULT - SUBJECTIVE AND OBJECTIVE BOX
CC/ HPI Patient is a 55 year old female with metastatic breast cancer, s/p bilateral mastectomy, previously on chemo/radiation but currently not undergoing treatment, with history of pleural effusions, presenting c/o dyspnea, admitted with pulmonary emboli, seen this morning feels less dyspneic, no acute complaint.    PAST MEDICAL & SURGICAL HISTORY:  Metastatic breast cancer  H/O bilateral mastectomy  Pleural effusions  Hypertension    SOCHX:  - tobacco,  -  alcohol        FAMILY HISTORY: FA/MO  - contributory     ROS reviewed below with positive findings marked (+) :  GEN:  fever, chills ENT: tracheostomy,   epistaxis,  sinusitis COR: CAD, CHF,  +HTN, dysrhythmia PUL: COPD, ILD, asthma, pneumonia GI: PEG, dysphagia, hemorrhage, other SHAN: kidney disease, electrolyte disorder HEM:  anemia, thrombus, coagulopathy, +cancer ENDO:  thyroid disease, diabetes mellitus CNS:  dementia, stroke, seizure, PSY:  depression, anxiety, other      MEDICATIONS  (STANDING):  albuterol/ipratropium for Nebulization 3 milliLiter(s) Nebulizer every 6 hours  budesonide 160 MICROgram(s)/formoterol 4.5 MICROgram(s) Inhaler 2 Puff(s) Inhalation two times a day  influenza   Vaccine 0.5 milliLiter(s) IntraMuscular once  metoprolol tartrate 12.5 milliGRAM(s) Oral daily  tiotropium 18 MICROgram(s) Capsule 1 Capsule(s) Inhalation daily  torsemide 10 milliGRAM(s) Oral two times a day    MEDICATIONS  (PRN):  acetaminophen   Tablet .. 650 milliGRAM(s) Oral every 6 hours PRN Mild Pain (1 - 3)  ALBUTerol    90 MICROgram(s) HFA Inhaler 2 Puff(s) Inhalation every 4 hours PRN Shortness of Breath  clonazePAM  Tablet 0.5 milliGRAM(s) Oral daily PRN Anxiety      Vital Signs Last 24 Hrs  T(C): 36.8 (14 Sep 2020 07:26), Max: 36.8 (14 Sep 2020 07:26)  T(F): 98.2 (14 Sep 2020 07:26), Max: 98.2 (14 Sep 2020 07:26)  HR: 83 (14 Sep 2020 07:26) (76 - 83)  BP: 134/62 (14 Sep 2020 07:26) (115/71 - 134/62)  RR: 18 (14 Sep 2020 07:26) (18 - 18)  SpO2: 97% (14 Sep 2020 07:26) (97% - 100%)    GENERAL:         comfortable,  - distress.  HEENT:            - trauma,  - icterus,  - injection,  - nasal discharge.  NECK:              - jugular venous distention, - thyromegaly.  LYMPH:           - lymphadenopathy, - masses.  RESP:              Decreased bases, few crackles  - rhonchi/ wheezes.   COR:                S1S2   - gallops,  - rubs.  ABD:                bowel sounds,   soft, - tender, - distended.  EXT/MSC:         - cyanosis,  - clubbing,  +edema.    NEURO:            + alert and oriented                          11.1   6.68  )-----------( 305      ( 14 Sep 2020 06:47 )             39.8     09-14    138  |  92<L>  |  20  ----------------------------<  129<H>  3.7   |  38<H>  |  0.56      CT Angio Chest PE Protocol w/ IV Cont (09.11.20)    Somewhat suboptimal motion degraded study. Multiple filling defects in left upper lobe segmental pulmonary arteries and left lower lobe segmental/subsegmental pulmonary arteries (7:78, 73, 54). Limited evaluation of right lobar/segmental/subsegmental pulmonary arteries due to artifact.. The thoracic aorta is normal in appearance. Cardiomegaly. No evidence of right heart strain. No pericardial effusion is seen. No mediastinal, hilar or axillary lymphadenopathy is seen.    Bilateral moderate pleural effusions. Airspace consolidation versus passive atelectasis in the right middle and lower lobe, however limited evaluation on current study. Passive atelectasis in the left lower lobe. Branching nodule in the superior left lower lobe (8:104), may represent mucous plugging. There is diffuse interlobular septal thickening and mild groundglass opacity. Some areas of interlobular septal thickening demonstrates somewhat nodular appearance. A nodular hyperdensity in the right lower lobe (7:75), may be focal calcification versus focal enhancement.    Limited evaluation of the upper abdomen demonstrates diffuse thickening of left adrenal gland may reflect adrenal hyperplasia. Limited evaluation of the liver due to bolus timing..    Evaluation of the osseous structures demonstrates extensive mixed sclerotic and lytic osseous metastases involving the axial and appendicular skeleton. Multiple vertebral bodies impending pathologic fractures in the thoracic spine. Right-sided MediPort in place.      IMPRESSION:  1.  Pulmonary embolism in the left upper and lower lobe segmental/subsegmental pulmonary arteries. Limited evaluation of right pulmonary arteries. No evidence of right heart strain.  2.  Focal nodular hyperdensity in the right lower lobe, which may reflect focal calcification versus focal active contrast extravasation. Clinical correlation and follow-up with CT with and without intravenous contrast will be of benefit.  3.  Airspace consolidation versus atelectasis in the right middle and lower lobe, limited evaluation on current study. Underlying mass/pleural abnormality also cannot be excluded.  4.  Diffuse interlobular septal thickening, may represent pulmonary edema, however lymphangitic metastases cannot be excluded. Follow-up with chest CT is recommended once pleural effusion and pulmonary edema is subsided.  5.  Mucous plugging, mild pulmonary edema and bilateral pleural effusions.  6.  Extensive osseous metastatic disease, with probable impending vertebral pathologic fractures.          ASSESSMENT/PLAN    1)  Pulmonary embolus  2)  Metastatic breast cancer  3)  Pleural effusion  4)  Mucous plugging with atelectasis    Oxygen as needed for a satisfactory SpO2  IR contacted for drainage of pleural effusion  Atrovent/ albuterol q 4 – 6 hours as needed,   Budesonide 160 mcg/formoterol  Cardiac/HTN: echocardiogram  GI: Rx/ prophylaxis c PPI/H2B  Heme: Rx with AC  Discussed with Dr. Denise

## 2020-09-14 NOTE — PROGRESS NOTE ADULT - ASSESSMENT
Ms. Heard is a 54y/o woman w/ hx of metastatic breast cancer (to bones and lungs) w/ b/l mastectomy and hx of chem/rads, recurrent malignant pleffs, presenting for worsening shortness of breath x2 days. Found to have PE w/o R heart strain, and small b/l pleffs.    Still with some respiratory distress, however, stable.  Started AC, will taper off O2 as tolerated.  Resistant to O2 getting weaned, currently 100% on 4L, insists she cannot breath as O2 lowered below that level, though sats remain unchanged.    Plan pending further discussion with Dr. Watt and Dr. Delarosa for further management of Pleffs

## 2020-09-14 NOTE — CONSULT NOTE ADULT - ATTENDING COMMENTS
Zometa 4 mg IV piggyback in 250 mL of normal saline over 1 hour.    Interventional radiology should place a PleurX catheter in the right chest with the fluid that is removed being sent for analysis and cytometric testing.  This should could be done later this morning  Or this afternoon.  The patient should be NPO for this procedure.

## 2020-09-14 NOTE — PROGRESS NOTE ADULT - PROBLEM SELECTOR PLAN 2
Hx of recurrent effusions, most likely 2/2 malignancy.  x3 prior thoras, unclear results or by which pulmonologist outpatient.  On POCUS appear:  - Possible pleurex catheter per Dr. Watt?  Unclear as pt on AC for PE, will confirm with Dr. Watt + IR Hx of recurrent effusions, most likely 2/2 malignancy.  x3 prior thoras, unclear results or by which pulmonologist outpatient.  On POCUS appear:  - Dr. Watt requesting R. Pleurx due to recurrent Pleffs, has discussed with Dr. Louis, will need to discuss with IR as patient on full AC Hx of recurrent effusions, most likely 2/2 malignancy.  x3 prior thoras, unclear results or by which pulmonologist outpatient.  On POCUS appear:  - R Pleurex on 9/15 for palliative tx of effusions.  NPO at midnight, will transition to heparin drip this evening. Hx of recurrent effusions, most likely 2/2 malignancy.  x3 prior thoras, unclear results or by which pulmonologist outpatient.  On POCUS appear:  - R Pleurex on 9/15 at 0800 for palliative tx of effusions.       - NPO at midnight,      - Heparin drip from 1800hrs to 0500 hrs to bridge w/ PE, will not bolus. Hx of recurrent effusions, most likely 2/2 malignancy.  x3 prior thoras, unclear results or by which pulmonologist outpatient.  On POCUS appear: mild to moderate volume on L side, none/mild noted on R.  - IR at 0800 for palliative tx of effusions.       - NPO at midnight,      - Heparin drip from 1800hrs to 0500 hrs to bridge w/ PE, will not bolus.

## 2020-09-14 NOTE — CONSULT NOTE ADULT - SUBJECTIVE AND OBJECTIVE BOX
the patient was admitted on schedule for increasing shortness of breath and found on CT angiogram to have multiple pulmonary emboli.  The patient was admitted and placed on anticoagulation.      The patient has a long history of breast cancer which has been neglected as she did not do her recommended treatments.History of Present Illness    in  the patient found a large lump in her breast which measured over 3 cm.  She eventually did a biopsy and was recommended for a  lumpectomy but the patient refused any action on her breast.  Eventually the tumor began to grow and in  she went to [facility] where she was given neoadjuvant chemotherapy which had very little effect on the cancer and she underwent  mastectomy.  The patient did not receive any additional hormone or chemotherapy and in  the patient began to have cough and  shortness of breath.  It was found that she had a pleural effusion in the ipsilateral chest which was operated on to control the accumulation  of fluid.  More recently fluid has gone into the contralateral chest and this has been removed on 1 occasion giving her some relief.  The  patient currently is oxygen dependent but wants to have some treatment done as she is getting more and more debilitated by the  accumulation of cancer in her chest.  Past medical History    The patient was not able to give a significant past medical history other than relating the course of her breast cancer many of the details  such as it is hormonal status, it is HER2 Jeremiah status and other treatment scenarios she could not recall.  Social History  Patient is . Patient lives with spouse. Non­Smoker. Denies any prior alcohol use. Denies any illicit drug use. Patient has had no  occupational exposure.  Family History    The patient was too breathless to give a complete family history.  GYN History  Estrogen Use: Patient denies any prior use of estrogen.  Allergies  No allergies have been entered for this patient.  Review Of Systems  Constitutional: Pt reports no loss of appetite. Patient denies night sweats. Fever : No Fever. Chills No Rigors. No Chills. Patient reports a  recent weight loss of %% lbs. Pt reports feeling fatigued at times.  Eyes: Pt reports no blurred vision. Pt reports no double vision. Patient denies any changes in vision.   Ears, Nose, Mouth, Throat: Pt reports no hearing loss. Pt reports no ringing in ears. Pt reports no sinus trouble. Pt reports no trouble  swallowing. Pt reports no sore throat. Pt reports no recent episodes of epistaxis. Pt reports no hoarseness.   Cardiac: Pt reports no chest pains. Pt reports no heart palpitations. Pt reports no light headedness. Pt reports no swelling in legs. Pt  reports no episodes of passing out. Pt reports sputum production. Pt reports shortness of breath.  The patient is on 2­4 L of oxygen by  nasal cannula.  the patient has  not slept flat in 3 months.  Gastrointestinal: Pt reports no nausea. Pt reports no vomiting. Pt reports no heartburn. Pt reports no diarrhea. Pt reports no abdominal  pain. Pt reports no rectal bleeding. Pt reports no bowel incontinence. Pt reports constipation. Pt reports frequent urination.  Genito­Urinary:   MusculoSkeletal: Pt reports no muscle pain. Pt reports no stiffness. Pt reports no joint pains. Patient denies any joint swelling. Pt  reports no back pains.   JENN SERRANO : 1965 ([id number]) Page 1 of 2  Skin:Denies any skin rashes.   Neurological: Pt reports no headaches. Pt reports no seizures. Pt reports no dizziness. Pt reports no loss of balance. Pt reports no  weakness of limbs. Pt reports no loss of sensation. Pt reports no tingling sensations. Pt reports no thinking difficulty. Pt reports memory  loss. Pt reports nervousness. Pt reports depression.  Hematologic/Lymphatic/Immunologic: Patient denies bruising Patient denies any bleeding. Pt reports no lumps in arm­pits. Pt reports  no lumps in neck. Pt reports no lumps in groin.    Physical examination   the patient is sitting in a chair on oxygen  By nasal cannula at 4 L a minute.    Examination of the head ears eyes nose and throat demonstrates the nasal cannula and new mild central cyanosis off oxygen.    Examination of the neck reveals no palpable lymphadenopathy, jugular venous distention or thyromegaly.    There is dullness to percussion at both lung bases but greater on the left than right.  The breath sounds are somewhat distant but there is no adventitious sounds or audible wheeze.  The patient has percussion tenderness over the low cervical and upper thoracic spines.    The heart rate is regular at 86 beats per minute with the hint of a systolic ejection murmur over the aortic valve and no reciprocal  mitral regurgitant murmur.    The patient has had a left mastectomy and there are no axillary or contralateral breast pathologies.    The patient has truncal obesity but there is no palpable hepatomegaly or splenomegaly.    The patient has bilateral chronic venous stasis changes both lower extremities with lymphedema of both lower extremities to the knees.    Neurologically the patient is intact in higher cortical function, cerebellar function, motor and sensory function with the exception of weakness of pelvic and shoulder girdle muscles.    The patient has no rash.

## 2020-09-14 NOTE — CONSULT NOTE ADULT - ASSESSMENT
The patient has life-limiting pulmonary disease.  The patient has a left pleural effusion which can be drained for cytometric testing and for increasing her comfort and a PleurX catheter may be appropriate.    The patient has bone metastases in addition has hypercalcemia and this should be addressed with bisphosphonate therapy.

## 2020-09-14 NOTE — PROGRESS NOTE ADULT - SUBJECTIVE AND OBJECTIVE BOX
Internal Medicine Progress Note    OVERNIGHT EVENTS:    SUBJECTIVE:    Pt denies headache, dysphagia, sore throat, chest pain, palpitations, SOB, cough, N/V/D, abd pn, changes in urine, and changes in bowel movements.    CURRENT MEDICATIONS:  MEDICATIONS  (STANDING):  albuterol/ipratropium for Nebulization 3 milliLiter(s) Nebulizer every 6 hours  budesonide 160 MICROgram(s)/formoterol 4.5 MICROgram(s) Inhaler 2 Puff(s) Inhalation two times a day  influenza   Vaccine 0.5 milliLiter(s) IntraMuscular once  metoprolol tartrate 12.5 milliGRAM(s) Oral daily  tiotropium 18 MICROgram(s) Capsule 1 Capsule(s) Inhalation daily  torsemide 10 milliGRAM(s) Oral two times a day    MEDICATIONS  (PRN):  acetaminophen   Tablet .. 650 milliGRAM(s) Oral every 6 hours PRN Mild Pain (1 - 3)  ALBUTerol    90 MICROgram(s) HFA Inhaler 2 Puff(s) Inhalation every 4 hours PRN Shortness of Breath  clonazePAM  Tablet 0.5 milliGRAM(s) Oral daily PRN Anxiety        VITAL SIGNS:  T(C): 36.8 (09-14-20 @ 07:26), Max: 36.8 (09-14-20 @ 07:26)  T(F): 98.2 (09-14-20 @ 07:26), Max: 98.2 (09-14-20 @ 07:26)  HR: 83 (09-14-20 @ 07:26) (76 - 83)  BP: 134/62 (09-14-20 @ 07:26) (115/71 - 134/62)  BP(mean): --  RR: 18 (09-14-20 @ 07:26) (18 - 18)  SpO2: 97% (09-14-20 @ 07:26) (97% - 100%)  Wt(kg): --        PHYSICAL EXAM:  Constitutional:  resting comfortably in bed; no acute distress  HEENT: Atraumatic, PERRLA, EOMI, anicteric sclera, no pallor, moist mucosa, no rhinorrhea, uvela midline, good skin turgor  Neck: supple; no JVD, no lymphadenopathy  Respiratory: Even excursion, CTA B/L  Cardiac: RRR; no M/R/G  Gastrointestinal: +BS, abdomen soft non distended, ___ to percussion, non tender to percussion, no guarding, no hepatosplenomegaly  Back: spine midline, no bony tenderness or step-offs; no CVAT B/L  Extremities: warm and well perfused, no clubbing or cyanosis; no peripheral edema, no joint swelling, tenderness or erythema  Vascular: 2+ radial, DP/PT pulses B/L  Neurologic: A&Ox4/4; CNII-XII grossly intact; no focal deficits, sensation even b/l proximally and distally.    LABS:                         11.1   6.68  )-----------( 305      ( 14 Sep 2020 06:47 )             39.8     09-14    138  |  92<L>  |  20  ----------------------------<  129<H>  3.7   |  38<H>  |  0.56    Ca    11.7<H>      14 Sep 2020 06:47  Phos  3.8     09-13  Mg     2.2     09-14    TPro  7.7  /  Alb  4.2  /  TBili  0.5  /  DBili  x   /  AST  28  /  ALT  15  /  AlkPhos  148<H>  09-13                    Culture - Blood (collected 12 Sep 2020 00:51)  Source: .Blood Blood-Peripheral  Preliminary Report (14 Sep 2020 01:00):    No growth at 2 days.    Culture - Blood (collected 12 Sep 2020 00:51)  Source: .Blood Blood-Peripheral  Preliminary Report (14 Sep 2020 01:00):    No growth at 2 days.          RADIOLOGY, EKG & ADDITIONAL TESTS: Internal Medicine Progress Note    OVERNIGHT EVENTS: DIRK    SUBJECTIVE:  Seen in AM, reports continuation of dyspnea and frustration over hearing different plans from different members of the team.  States that she feels weak, but slightly improved compared to last exam.  Normal appetite, no changes in bowel/bladder function.    Pt denies headache, dysphagia, sore throat, chest pain, palpitations, N/V/D, abd pn, changes in urine, and changes in bowel movements.    CURRENT MEDICATIONS:  MEDICATIONS  (STANDING):  albuterol/ipratropium for Nebulization 3 milliLiter(s) Nebulizer every 6 hours  budesonide 160 MICROgram(s)/formoterol 4.5 MICROgram(s) Inhaler 2 Puff(s) Inhalation two times a day  influenza   Vaccine 0.5 milliLiter(s) IntraMuscular once  metoprolol tartrate 12.5 milliGRAM(s) Oral daily  tiotropium 18 MICROgram(s) Capsule 1 Capsule(s) Inhalation daily  torsemide 10 milliGRAM(s) Oral two times a day    MEDICATIONS  (PRN):  acetaminophen   Tablet .. 650 milliGRAM(s) Oral every 6 hours PRN Mild Pain (1 - 3)  ALBUTerol    90 MICROgram(s) HFA Inhaler 2 Puff(s) Inhalation every 4 hours PRN Shortness of Breath  clonazePAM  Tablet 0.5 milliGRAM(s) Oral daily PRN Anxiety        VITAL SIGNS:  T(C): 36.8 (09-14-20 @ 07:26), Max: 36.8 (09-14-20 @ 07:26)  T(F): 98.2 (09-14-20 @ 07:26), Max: 98.2 (09-14-20 @ 07:26)  HR: 83 (09-14-20 @ 07:26) (76 - 83)  BP: 134/62 (09-14-20 @ 07:26) (115/71 - 134/62)  BP(mean): --  RR: 18 (09-14-20 @ 07:26) (18 - 18)  SpO2: 97% (09-14-20 @ 07:26) (97% - 100%)  Wt(kg): --        PHYSICAL EXAM:  Constitutional:  Sitting in chair increase work of breathing  HEENT: Atraumatic, PERRLA, EOMI, anicteric sclera, mild pallor, moist mucosa.  Neck: supple; no JVD   Respiratory: Even excursion, diminished at bases  Cardiac: RRR; no M/R/G  Gastrointestinal: +BS, abdomen soft distended, dull to percussion, non tender to percussion  Back: spine midline  Extremities: warm and well perfused, no clubbing or cyanosis; mild LE edema, even RLE and LLE size, no joint swelling  Vascular: 2+ radial, DP/PT pulses B/L  Neurologic: A&Ox4/4  Psych: anxious affect some reactivity, ruminations/preoccupations over her illness.    LABS:                         11.1   6.68  )-----------( 305      ( 14 Sep 2020 06:47 )             39.8     09-14    138  |  92<L>  |  20  ----------------------------<  129<H>  3.7   |  38<H>  |  0.56    Ca    11.7<H>      14 Sep 2020 06:47  Phos  3.8     09-13  Mg     2.2     09-14    TPro  7.7  /  Alb  4.2  /  TBili  0.5  /  DBili  x   /  AST  28  /  ALT  15  /  AlkPhos  148<H>  09-13    Culture - Blood (collected 12 Sep 2020 00:51)  Source: .Blood Blood-Peripheral  Preliminary Report (14 Sep 2020 01:00):    No growth at 2 days.    Culture - Blood (collected 12 Sep 2020 00:51)  Source: .Blood Blood-Peripheral  Preliminary Report (14 Sep 2020 01:00):    No growth at 2 days.    RADIOLOGY, EKG & ADDITIONAL TESTS: Internal Medicine Progress Note    OVERNIGHT EVENTS: DIRK    SUBJECTIVE:  Seen in AM, reports continuation of dyspnea and frustration over hearing different plans from different members of the team.  States that she feels weak, but slightly improved compared to last exam.  Normal appetite, no changes in bowel/bladder function.    Pt denies headache, dysphagia, sore throat, chest pain, palpitations, N/V/D, abd pn, changes in urine, and changes in bowel movements.    CURRENT MEDICATIONS:  MEDICATIONS  (STANDING):  albuterol/ipratropium for Nebulization 3 milliLiter(s) Nebulizer every 6 hours  budesonide 160 MICROgram(s)/formoterol 4.5 MICROgram(s) Inhaler 2 Puff(s) Inhalation two times a day  influenza   Vaccine 0.5 milliLiter(s) IntraMuscular once  metoprolol tartrate 12.5 milliGRAM(s) Oral daily  tiotropium 18 MICROgram(s) Capsule 1 Capsule(s) Inhalation daily  torsemide 10 milliGRAM(s) Oral two times a day    MEDICATIONS  (PRN):  acetaminophen   Tablet .. 650 milliGRAM(s) Oral every 6 hours PRN Mild Pain (1 - 3)  ALBUTerol    90 MICROgram(s) HFA Inhaler 2 Puff(s) Inhalation every 4 hours PRN Shortness of Breath  clonazePAM  Tablet 0.5 milliGRAM(s) Oral daily PRN Anxiety        VITAL SIGNS:  T(C): 36.8 (09-14-20 @ 07:26), Max: 36.8 (09-14-20 @ 07:26)  T(F): 98.2 (09-14-20 @ 07:26), Max: 98.2 (09-14-20 @ 07:26)  HR: 83 (09-14-20 @ 07:26) (76 - 83)  BP: 134/62 (09-14-20 @ 07:26) (115/71 - 134/62)  BP(mean): --  RR: 18 (09-14-20 @ 07:26) (18 - 18)  SpO2: 97% (09-14-20 @ 07:26) (97% - 100%)  Wt(kg): --        PHYSICAL EXAM:  Constitutional:  Sitting in chair increase work of breathing, severely hunched  HEENT: Atraumatic, PERRLA, EOMI, anicteric sclera, mild pallor, moist mucosa.  Neck: supple; no JVD   Respiratory: Even excursion, diminished at bases, dull on right side to mid.  Cardiac: RRR; no M/R/G  Gastrointestinal: +BS, abdomen soft distended, dull to percussion, non tender to percussion  Back: spine midline  Extremities: warm and well perfused, no clubbing or cyanosis; mild LE edema, even RLE and LLE size, no joint swelling  Vascular: 2+ radial, DP/PT pulses B/L  Neurologic: A&Ox4/4  Psych: anxious affect some reactivity, ruminations/preoccupations over her illness.    LABS:                         11.1   6.68  )-----------( 305      ( 14 Sep 2020 06:47 )             39.8     09-14    138  |  92<L>  |  20  ----------------------------<  129<H>  3.7   |  38<H>  |  0.56    Ca    11.7<H>      14 Sep 2020 06:47  Phos  3.8     09-13  Mg     2.2     09-14    TPro  7.7  /  Alb  4.2  /  TBili  0.5  /  DBili  x   /  AST  28  /  ALT  15  /  AlkPhos  148<H>  09-13    Culture - Blood (collected 12 Sep 2020 00:51)  Source: .Blood Blood-Peripheral  Preliminary Report (14 Sep 2020 01:00):    No growth at 2 days.    Culture - Blood (collected 12 Sep 2020 00:51)  Source: .Blood Blood-Peripheral  Preliminary Report (14 Sep 2020 01:00):    No growth at 2 days.    RADIOLOGY, EKG & ADDITIONAL TESTS: Internal Medicine Progress Note    OVERNIGHT EVENTS: DIRK    SUBJECTIVE:  Seen in AM, reports continuation of dyspnea and frustration over hearing different plans from different members of the team.  States that she feels weak, but slightly improved compared to last exam.  Normal appetite, no changes in bowel/bladder function.    Pt denies headache, dysphagia, sore throat, chest pain, palpitations, N/V/D, abd pn, changes in urine, and changes in bowel movements.    CURRENT MEDICATIONS:  MEDICATIONS  (STANDING):  albuterol/ipratropium for Nebulization 3 milliLiter(s) Nebulizer every 6 hours  budesonide 160 MICROgram(s)/formoterol 4.5 MICROgram(s) Inhaler 2 Puff(s) Inhalation two times a day  influenza   Vaccine 0.5 milliLiter(s) IntraMuscular once  metoprolol tartrate 12.5 milliGRAM(s) Oral daily  tiotropium 18 MICROgram(s) Capsule 1 Capsule(s) Inhalation daily  torsemide 10 milliGRAM(s) Oral two times a day    MEDICATIONS  (PRN):  acetaminophen   Tablet .. 650 milliGRAM(s) Oral every 6 hours PRN Mild Pain (1 - 3)  ALBUTerol    90 MICROgram(s) HFA Inhaler 2 Puff(s) Inhalation every 4 hours PRN Shortness of Breath  clonazePAM  Tablet 0.5 milliGRAM(s) Oral daily PRN Anxiety        VITAL SIGNS:  T(C): 36.8 (09-14-20 @ 07:26), Max: 36.8 (09-14-20 @ 07:26)  T(F): 98.2 (09-14-20 @ 07:26), Max: 98.2 (09-14-20 @ 07:26)  HR: 83 (09-14-20 @ 07:26) (76 - 83)  BP: 134/62 (09-14-20 @ 07:26) (115/71 - 134/62)  BP(mean): --  RR: 18 (09-14-20 @ 07:26) (18 - 18)  SpO2: 97% (09-14-20 @ 07:26) (97% - 100%)  Wt(kg): --        PHYSICAL EXAM:  Constitutional:  Sitting in chair increase work of breathing, severely hunched  HEENT: Atraumatic, PERRLA, EOMI, anicteric sclera, mild pallor, moist mucosa.  Neck: supple; no JVD   Respiratory: Even excursion, diminished at bases, dull on right side to mid.  Cardiac: RRR; no M/R/G  Gastrointestinal: +BS, abdomen soft distended, dull to percussion, non tender to percussion  Back: spine midline, soft nodular mass noted around R axilla.  Extremities: warm and well perfused, no clubbing or cyanosis; mild LE edema, even RLE and LLE size, no joint swelling  Vascular: 2+ radial, DP/PT pulses B/L  Neurologic: A&Ox4/4  Psych: anxious affect some reactivity, ruminations/preoccupations over her illness.    POCUS:  Lungs:  - L: Small to moderate volume pleff noted at base.  A line predominance noted in lung fields  - R: Dense atelectatic lung noted in majority of posterior fields with some hyperechoic changes noted.  No Pleff noted.    LABS:                         11.1   6.68  )-----------( 305      ( 14 Sep 2020 06:47 )             39.8     09-14    138  |  92<L>  |  20  ----------------------------<  129<H>  3.7   |  38<H>  |  0.56    Ca    11.7<H>      14 Sep 2020 06:47  Phos  3.8     09-13  Mg     2.2     09-14    TPro  7.7  /  Alb  4.2  /  TBili  0.5  /  DBili  x   /  AST  28  /  ALT  15  /  AlkPhos  148<H>  09-13    Culture - Blood (collected 12 Sep 2020 00:51)  Source: .Blood Blood-Peripheral  Preliminary Report (14 Sep 2020 01:00):    No growth at 2 days.    Culture - Blood (collected 12 Sep 2020 00:51)  Source: .Blood Blood-Peripheral  Preliminary Report (14 Sep 2020 01:00):    No growth at 2 days.    RADIOLOGY, EKG & ADDITIONAL TESTS:

## 2020-09-14 NOTE — PROGRESS NOTE ADULT - PROBLEM SELECTOR PLAN 5
F: PO  E: As needed  N: Satya merchant, NPO for procedure?  DVT: On full AC lovenox  GI: none    Dispo [facility] F: PO ad cayetano.  NPO at midnight  E: As needed  N: Satya merchant, NPO for procedure at midnight  DVT: On full AC lovenox  GI: none    Dispo [facility]

## 2020-09-15 NOTE — PROGRESS NOTE ADULT - ATTENDING COMMENTS
PleurX catheter placement by interventional radiology with the fluid being sent for cytometric testing as well as the standard testing.    Chemotherapy to start tomorrow on the oncology floor.

## 2020-09-15 NOTE — PROGRESS NOTE ADULT - SUBJECTIVE AND OBJECTIVE BOX
Internal Medicine Progress Note    TRANSFER NOTE 7Ur --> 7Wo    Hospital course:   Ms. Heard is a 56y/o woman w/ hx of metastatic breast cancer (to bones and lungs) w/ b/l mastectomy and hx of chem/rads, recurrent malignant pleffs, presenting for worsening shortness on .  On admission (9/11) CT noted to have multiple subsegmental PEs w/o evidence of cor pulmonale on ECG or CT.   CT also noted atalectatic/fiberous/malignant changes of the R lung w/ innumerable bone mets and small/moderate L effusion.  Started on Lovenox 85 BID for full AC.  DVT scan done on 9/13 showing no acute clots.  Dr Watt requesting patient get sent for thoracentesis on 9/14, received 9/15 after heparin bridging  Now being transferred to [address] [place] [address] to start chemotherapy.        OVERNIGHT EVENTS:  Bridged on heparin for procedure in AM, no acute complaints.    SUBJECTIVE:  Denies any acute complaints.  Breathing reportedly improving.  Eager    Pt denies headache, dysphagia, sore throat, chest pain, palpitations, SOB, cough, N/V/D, abd pn, changes in urine, and changes in bowel movements.    CURRENT MEDICATIONS:  MEDICATIONS  (STANDING):  albuterol/ipratropium for Nebulization 3 milliLiter(s) Nebulizer every 6 hours  budesonide 160 MICROgram(s)/formoterol 4.5 MICROgram(s) Inhaler 2 Puff(s) Inhalation two times a day  exemestane 25 milliGRAM(s) Oral every 24 hours  influenza   Vaccine 0.5 milliLiter(s) IntraMuscular once  metoprolol tartrate 12.5 milliGRAM(s) Oral daily  tiotropium 18 MICROgram(s) Capsule 1 Capsule(s) Inhalation daily  torsemide 10 milliGRAM(s) Oral two times a day  zoledronic acid IVPB (ZOMETA) (Non - oncologic) 4 milliGRAM(s) IV Intermittent daily    MEDICATIONS  (PRN):  acetaminophen   Tablet .. 650 milliGRAM(s) Oral every 6 hours PRN Mild Pain (1 - 3)  ALBUTerol    90 MICROgram(s) HFA Inhaler 2 Puff(s) Inhalation every 4 hours PRN Shortness of Breath  clonazePAM  Tablet 0.5 milliGRAM(s) Oral daily PRN Anxiety        VITAL SIGNS:  T(C): 36.8 (09-15-20 @ 05:59), Max: 36.8 (09-15-20 @ 05:59)  T(F): 98.2 (09-15-20 @ 05:59), Max: 98.2 (09-15-20 @ 05:59)  HR: 78 (09-15-20 @ 05:59) (72 - 78)  BP: 133/81 (09-15-20 @ 05:59) (133/81 - 137/74)  BP(mean): --  RR: 19 (09-15-20 @ 05:59) (18 - 19)  SpO2: 97% (09-15-20 @ 05:59) (97% - 100%)  Wt(kg): --    09-14-20 @ 07:01  -  09-15-20 @ 07:00  --------------------------------------------------------  IN: 15 mL / OUT: 0 mL / NET: 15 mL          PHYSICAL EXAM:  Constitutional:  resting comfortably in bed; no acute distress  HEENT: Atraumatic, PERRLA, EOMI, anicteric sclera, no pallor, moist mucosa, no rhinorrhea, uvela midline, good skin turgor  Neck: supple; no JVD, no lymphadenopathy  Respiratory: Even excursion, CTA B/L  Cardiac: RRR; no M/R/G  Gastrointestinal: +BS, abdomen soft non distended, ___ to percussion, non tender to percussion, no guarding, no hepatosplenomegaly  Back: spine midline, no bony tenderness or step-offs; no CVAT B/L  Extremities: warm and well perfused, no clubbing or cyanosis; no peripheral edema, no joint swelling, tenderness or erythema  Vascular: 2+ radial, DP/PT pulses B/L  Neurologic: A&Ox4/4; CNII-XII grossly intact; no focal deficits, sensation even b/l proximally and distally.    LABS:                         11.3   6.17  )-----------( 293      ( 15 Sep 2020 08:08 )             39.9     09-15    138  |  92<L>  |  17  ----------------------------<  107<H>  4.1   |  36<H>  |  0.56    Ca    12.0<H>      15 Sep 2020 08:08  Mg     2.2     09-15      PTT - ( 14 Sep 2020 22:51 )  PTT:61.5 sec                    RADIOLOGY, EKG & ADDITIONAL TESTS: Internal Medicine Progress Note    TRANSFER NOTE 7Ur --> 7Wo    Hospital course:   Ms. Heard is a 56y/o woman w/ hx of metastatic breast cancer (to bones and lungs) w/ b/l mastectomy and hx of chem/rads, recurrent malignant pleffs, presenting for worsening shortness on .  On admission (9/11) CT noted to have multiple subsegmental PEs w/o evidence of cor pulmonale on ECG or CT.   CT also noted atalectatic/fiberous/malignant changes of the R lung w/ innumerable bone mets and small/moderate L effusion.  Started on Lovenox 85 BID for full AC.  DVT scan done on 9/13 showing no acute clots.  Dr Watt requesting patient get sent for thoracentesis on 9/14, received 9/15 after heparin bridging  Now being transferred to [address] [place] [address] to start chemotherapy.        OVERNIGHT EVENTS:  Bridged on heparin for procedure in AM, no acute complaints.    SUBJECTIVE:  Denies any acute complaints.  Breathing reportedly improving.  Eager to come off oxygen, but states hard to breathe when off oxygen.    Pt denies headache, dysphagia, sore throat, chest pain, palpitations, N/V/D, abd pn, changes in urine, and changes in bowel movements.    CURRENT MEDICATIONS:  MEDICATIONS  (STANDING):  albuterol/ipratropium for Nebulization 3 milliLiter(s) Nebulizer every 6 hours  budesonide 160 MICROgram(s)/formoterol 4.5 MICROgram(s) Inhaler 2 Puff(s) Inhalation two times a day  exemestane 25 milliGRAM(s) Oral every 24 hours  influenza   Vaccine 0.5 milliLiter(s) IntraMuscular once  metoprolol tartrate 12.5 milliGRAM(s) Oral daily  tiotropium 18 MICROgram(s) Capsule 1 Capsule(s) Inhalation daily  torsemide 10 milliGRAM(s) Oral two times a day  zoledronic acid IVPB (ZOMETA) (Non - oncologic) 4 milliGRAM(s) IV Intermittent daily    MEDICATIONS  (PRN):  acetaminophen   Tablet .. 650 milliGRAM(s) Oral every 6 hours PRN Mild Pain (1 - 3)  ALBUTerol    90 MICROgram(s) HFA Inhaler 2 Puff(s) Inhalation every 4 hours PRN Shortness of Breath  clonazePAM  Tablet 0.5 milliGRAM(s) Oral daily PRN Anxiety        VITAL SIGNS:  T(C): 36.8 (09-15-20 @ 05:59), Max: 36.8 (09-15-20 @ 05:59)  T(F): 98.2 (09-15-20 @ 05:59), Max: 98.2 (09-15-20 @ 05:59)  HR: 78 (09-15-20 @ 05:59) (72 - 78)  BP: 133/81 (09-15-20 @ 05:59) (133/81 - 137/74)  BP(mean): --  RR: 19 (09-15-20 @ 05:59) (18 - 19)  SpO2: 97% (09-15-20 @ 05:59) (97% - 100%)  Wt(kg): --    09-14-20 @ 07:01  -  09-15-20 @ 07:00  --------------------------------------------------------  IN: 15 mL / OUT: 0 mL / NET: 15 mL          PHYSICAL EXAM:  Constitutional:  resting comfortably in bed; no acute distress  HEENT: Atraumatic, PERRLA, EOMI, anicteric sclera, no pallor, moist mucosa, no rhinorrhea, uvela midline, good skin turgor  Neck: supple; no JVD, no lymphadenopathy  Respiratory: Even excursion, CTA B/L  Cardiac: RRR; no M/R/G  Gastrointestinal: +BS, abdomen soft non distended, ___ to percussion, non tender to percussion, no guarding, no hepatosplenomegaly  Back: spine midline, no bony tenderness or step-offs; no CVAT B/L  Extremities: warm and well perfused, no clubbing or cyanosis; no peripheral edema, no joint swelling, tenderness or erythema  Vascular: 2+ radial, DP/PT pulses B/L  Neurologic: A&Ox4/4; CNII-XII grossly intact; no focal deficits, sensation even b/l proximally and distally.    LABS:                         11.3   6.17  )-----------( 293      ( 15 Sep 2020 08:08 )             39.9     09-15    138  |  92<L>  |  17  ----------------------------<  107<H>  4.1   |  36<H>  |  0.56    Ca    12.0<H>      15 Sep 2020 08:08  Mg     2.2     09-15      PTT - ( 14 Sep 2020 22:51 )  PTT:61.5 sec                    RADIOLOGY, EKG & ADDITIONAL TESTS: Internal Medicine Progress Note    TRANSFER NOTE 7Ur --> 7Wo    Hospital course:   Ms. Heard is a 54y/o woman w/ hx of metastatic breast cancer (to bones and lungs) w/ b/l mastectomy and hx of chem/rads, recurrent malignant pleffs, presenting for worsening shortness on .  On admission (9/11) CT noted to have multiple subsegmental PEs w/o evidence of cor pulmonale on ECG or CT.   CT also noted atalectatic/fiberous/malignant changes of the R lung w/ innumerable bone mets and small/moderate L effusion.  Started on Lovenox 85 BID for full AC.  DVT scan done on 9/13 showing no acute clots.  Dr Watt requesting patient get sent for thoracentesis on 9/14, received 9/15 after heparin bridging  Now being transferred to [address] [place] [address] to start chemotherapy.        OVERNIGHT EVENTS:  Bridged on heparin for procedure in AM, no acute complaints.    SUBJECTIVE:  Denies any acute complaints.  Breathing reportedly improving.  Eager to come off oxygen, but states hard to breathe when off oxygen.    Pt denies headache, dysphagia, sore throat, chest pain, palpitations, N/V/D, abd pn, changes in urine, and changes in bowel movements.    CURRENT MEDICATIONS:  MEDICATIONS  (STANDING):  albuterol/ipratropium for Nebulization 3 milliLiter(s) Nebulizer every 6 hours  budesonide 160 MICROgram(s)/formoterol 4.5 MICROgram(s) Inhaler 2 Puff(s) Inhalation two times a day  exemestane 25 milliGRAM(s) Oral every 24 hours  influenza   Vaccine 0.5 milliLiter(s) IntraMuscular once  metoprolol tartrate 12.5 milliGRAM(s) Oral daily  tiotropium 18 MICROgram(s) Capsule 1 Capsule(s) Inhalation daily  torsemide 10 milliGRAM(s) Oral two times a day  zoledronic acid IVPB (ZOMETA) (Non - oncologic) 4 milliGRAM(s) IV Intermittent daily    MEDICATIONS  (PRN):  acetaminophen   Tablet .. 650 milliGRAM(s) Oral every 6 hours PRN Mild Pain (1 - 3)  ALBUTerol    90 MICROgram(s) HFA Inhaler 2 Puff(s) Inhalation every 4 hours PRN Shortness of Breath  clonazePAM  Tablet 0.5 milliGRAM(s) Oral daily PRN Anxiety        VITAL SIGNS:  T(C): 36.8 (09-15-20 @ 05:59), Max: 36.8 (09-15-20 @ 05:59)  T(F): 98.2 (09-15-20 @ 05:59), Max: 98.2 (09-15-20 @ 05:59)  HR: 78 (09-15-20 @ 05:59) (72 - 78)  BP: 133/81 (09-15-20 @ 05:59) (133/81 - 137/74)  BP(mean): --  RR: 19 (09-15-20 @ 05:59) (18 - 19)  SpO2: 97% (09-15-20 @ 05:59) (97% - 100%)  Wt(kg): --    09-14-20 @ 07:01  -  09-15-20 @ 07:00  --------------------------------------------------------  IN: 15 mL / OUT: 0 mL / NET: 15 mL          PHYSICAL EXAM:  Constitutional:  Appears much older than age, frail, hunched over, increase work of breathing, wrapped in numerous blankets, tired appearing  HEENT: Atraumatic, anicteric, no pallor, moist mucosa  Neck: supple;  Respiratory: Even excursion, Decreased in right lung to mid w/ dullness to percussion, clear in anterior R fields.  L clear to ascultation.  Cardiac: RRR; no M/R/G  Gastrointestinal: +BS, abdomen soft non distended, dull to percussion, non tender to percussion  Back: spine midline, Catheter in place on L back, some tenderness inferior to dressing.  Extremities: warm and well perfused, 2+ edema  Vascular: 2+ radial  Neurologic: A&Ox4/4  Psych: Anxious affect, some reactivity, ruminations over her care and the necessity of being in a cold room to breathe    LABS:                         11.3   6.17  )-----------( 293      ( 15 Sep 2020 08:08 )             39.9     09-15    138  |  92<L>  |  17  ----------------------------<  107<H>  4.1   |  36<H>  |  0.56    Ca    12.0<H>      15 Sep 2020 08:08  Mg     2.2     09-15      PTT - ( 14 Sep 2020 22:51 )  PTT:61.5 sec                    RADIOLOGY, EKG & ADDITIONAL TESTS:

## 2020-09-15 NOTE — PROGRESS NOTE ADULT - PROBLEM SELECTOR PLAN 3
Decreased lung function, substantial atalectasis, scarring and damage to R. lung.  On home O2 at 5L.  Current increased bicarb levels suggestive of hypercarbia.  Earlier blood gas shows elevated bicarb above level of chronic compensation, suggesting concurrent metabolic alkalosis  - Encourage increased breathing depth Decreased lung function, substantial atalectasis, scarring and damage to R. lung.  On home O2 at 5L.  Current increased bicarb levels suggestive of hypercarbia.  Earlier blood gas shows elevated bicarb above level of chronic compensation, suggesting concurrent metabolic alkalosis further supported by hypochloremia.  Unclear etiology of alkalosis  - Encourage increased breathing depth

## 2020-09-15 NOTE — PROGRESS NOTE ADULT - ASSESSMENT
Ms. Heard is a 56y/o woman w/ hx of metastatic breast cancer (to bones and lungs) w/ b/l mastectomy and hx of chem/rads, recurrent malignant pleffs, presenting for worsening shortness of breath x2 days. Found to have PE w/o R heart strain, and small b/l pleffs.    Still with some respiratory distress, however, stable.  Started AC, will taper off O2 as tolerated.  Resistant to O2 getting weaned, currently 100% on 4L, insists she cannot breath as O2 lowered below that level, though sats remain unchanged.    Plan to transfer to [address] [place] [address] per Dr. Watt for resumption of chemotherapy.

## 2020-09-15 NOTE — DIETITIAN INITIAL EVALUATION ADULT. - OTHER INFO
54y/o female with history of Breast CA b/l mastectomy with mets to bones and lungs admitted due to SOB/PE/Pulmonary effusion and respiratory distress .S/P PleurX cath placement .Plans for transfer to [address] [place] [address] for chemo .Patient stated she did think she may have lost a few lbs, Could not quantify UBW of recent. No N/V/D/or pain Skin intact. Complaints of constipation(per patient needs enema).As per patient, prefers to eat low sodium/low sugar and lower CHO in diet due to those foods cause her to not feel well .Unable to quantify diet specifics at this time .Eating some food brought in by . Suspected about 60% intake per visual review of tray.Patient did not have any specific food requests.Covering RD will follow at high risk to monitor po progression with Chemo and any additional ONS needs.

## 2020-09-15 NOTE — DIETITIAN INITIAL EVALUATION ADULT. - PROBLEM SELECTOR PLAN 3
- c/w home torsemide 10 mg BID   - f/u recommendations from Dr. Watt regarding if effusions need to be drained

## 2020-09-15 NOTE — PROGRESS NOTE ADULT - ASSESSMENT
The patient will be sent today for PleurX catheter with the fluid being sent for analysis and for cytometric testing by Interventional Radiology.  The patient will then be transferred to [facility] for chemotherapy.

## 2020-09-15 NOTE — PROGRESS NOTE ADULT - SUBJECTIVE AND OBJECTIVE BOX
patient feels more comfortable today.  She is awaiting a placement of a PleurX catheter.    Physical examination   the patient is sitting in a chair on oxygen  By nasal cannula at 4 L a minute.    Examination of the head ears eyes nose and throat demonstrates the nasal cannula and new mild central cyanosis off oxygen.    Examination of the neck reveals no palpable lymphadenopathy, jugular venous distention or thyromegaly.    There is dullness to percussion at both lung bases but greater on the left than right.  The breath sounds are somewhat distant but there is no adventitious sounds or audible wheeze.  The patient has percussion tenderness over the low cervical and upper thoracic spines.    The heart rate is regular at 82 beats per minute with the hint of a systolic ejection murmur over the aortic valve and no reciprocal  mitral regurgitant murmur.    The patient has had a left mastectomy and there are no axillary or contralateral breast pathologies.    The patient has truncal obesity but there is no palpable hepatomegaly or splenomegaly.    The patient has bilateral chronic venous stasis changes both lower extremities with lymphedema of both lower extremities to the knees.    Neurologically the patient is intact in higher cortical function, cerebellar function, motor and sensory function with the exception of weakness of pelvic and shoulder girdle muscles.    The patient has no rash.

## 2020-09-15 NOTE — PROGRESS NOTE ADULT - SUBJECTIVE AND OBJECTIVE BOX
Ms. Heard is a 56y/o woman w/ hx of metastatic breast cancer (to bones and lungs) w/ b/l mastectomy and hx of chem/rads, recurrent malignant pleffs, presenting for worsening shortness on .  On admission (9/11) CT noted to have multiple subsegmental PEs w/o evidence of cor pulmonale on ECG or CT.   CT also noted atalectatic/fiberous/malignant changes of the R lung w/ innumerable bone mets and small/moderate L effusion.  Started on Lovenox 85 BID for full AC.  DVT scan done on 9/13 showing no acute clots.  Dr Watt requesting patient get sent for thoracentesis on 9/14, received 9/15 after heparin bridging  Now being transferred to [address] [place] [address] to start chemotherapy.            	  MEDICATIONS:  metoprolol tartrate 12.5 milliGRAM(s) Oral daily  torsemide 10 milliGRAM(s) Oral two times a day      ALBUTerol    90 MICROgram(s) HFA Inhaler 2 Puff(s) Inhalation every 4 hours PRN  albuterol/ipratropium for Nebulization 3 milliLiter(s) Nebulizer every 6 hours  budesonide 160 MICROgram(s)/formoterol 4.5 MICROgram(s) Inhaler 2 Puff(s) Inhalation two times a day  tiotropium 18 MICROgram(s) Capsule 1 Capsule(s) Inhalation daily    acetaminophen   Tablet .. 650 milliGRAM(s) Oral every 6 hours PRN  clonazePAM  Tablet 0.5 milliGRAM(s) Oral daily PRN        enoxaparin Injectable 85 milliGRAM(s) SubCutaneous every 12 hours  exemestane 25 milliGRAM(s) Oral <User Schedule>  influenza   Vaccine 0.5 milliLiter(s) IntraMuscular once      Complaint:     Otherwise 12 point review of systems is normal.    PHYSICAL EXAM:    Constitutional: Frail  Eyes: PERRL, EOMI, sclera non-icteric  Neck: supple, no masses, no JVD  Respiratory: decrease BS  Cardiovascular: RRR, normal S1S2, no M/R/G  Gastrointestinal: soft, NTND, no masses palpable, BS normal in all four quadrants,   Extremities:  no c/c  2+e  Neurological: AAOx3  Back: spine midline, Catheter in place on L back, some tenderness inferior to dressing.  ICU Vital Signs Last 24 Hrs  T(C): 36.8 (15 Sep 2020 14:03), Max: 36.8 (15 Sep 2020 05:59)  T(F): 98.2 (15 Sep 2020 14:03), Max: 98.2 (15 Sep 2020 05:59)  HR: 72 (15 Sep 2020 14:03) (72 - 78)  BP: 148/79 (15 Sep 2020 14:03) (133/81 - 148/79)  BP(mean): --  ABP: --  ABP(mean): --  RR: 19 (15 Sep 2020 14:03) (18 - 19)  SpO2: 100% (15 Sep 2020 14:03) (97% - 100%)    LABS:	 	  CARDIAC MARKERS:                              11.3   6.17  )-----------( 293      ( 15 Sep 2020 08:08 )             39.9     09-15    138  |  92<L>  |  17  ----------------------------<  107<H>  4.1   |  36<H>  |  0.56    Ca    12.0<H>      15 Sep 2020 08:08  Mg     2.2     09-15        ASSESSMENT/PLAN: 	     56y/o woman w/ hx of metastatic breast cancer (to bones and lungs) w/ b/l mastectomy and hx of chem/rads, recurrent malignant pleffs, presenting for worsening shortness of breath x2 days. Found to have PE w/o R heart strain, and small b/l pleffs.    Still with some respiratory distress, however, stable.  Started AC, will taper off O2 as tolerated.  Resistant to O2 getting weaned, currently 100% on 4L, insists she cannot breath as O2 lowered below that level, though sats remain unchanged.  Plan to transfer to [address] [place] [address] per Dr. Watt for resumption of chemotherapy.      Problem/Plan - 1:  ·  Problem: Multiple subsegmental pulmonary emboli without acute cor pulmonale.  Plan: Presenting with acute shortness of breath over the last two days.  + Unilateral R leg swelling.  CTA shows multiple subsegmental PE, w/o signs of R heart strain.  No major ECG changes.  DVT scan negative b/l  - On 4L NC with sats >95%, will wean as tolerated  - Continue Lovenox 85mg IV BID for full AC (Resume at 1800).     Problem/Plan - 2:  ·  Problem: Metastatic breast cancer.  Plan: Hx of metastatic breast cancer to bones and lungs leading to recurrent Pleffs.  Currently not on chemo or radiation  - Continue Exemestane 25mg PO daily  - One time dose of Zoledronic acid 4mg IV  - Transfer to [address] [place] [address] for chemo.  Problem/Plan - 3:  ·  Problem: Chronic respiratory failure with hypoxia and hypercapnia.  Plan: Decreased lung function, substantial atalectasis, scarring and damage to R. lung.  On home O2 at 5L.  Current increased bicarb levels suggestive of hypercarbia.  Earlier blood gas shows elevated bicarb above level of chronic compensation, suggesting concurrent metabolic alkalosis further supported by hypochloremia.  Unclear etiology of alkalosis  - Encourage increased breathing depth.     Problem/Plan - 4:  ·  Problem: Hypercalcemia of malignancy.  Plan: Increased Ca levels with normal phos in the setting of metastatic breast cancer.  - Zoledronic acid as above.     Problem/Plan - 5:  ·  Problem: Hypertension, unspecified type.  Plan: Hx of HTN, on metoprolol  - Continue home Metoprolol Succinate 12.5mg QD.     Problem/Plan - 6:  Problem: Pleural effusion. Plan: Hx of recurrent effusions, most likely 2/2 malignancy.  x3 prior thoras, unclear results or by which pulmonologist outpatient.  On POCUS appear: mild to moderate volume on L side, none/mild noted on R.  S/p pleurex placement with IR  - Monitor pleurex output.

## 2020-09-15 NOTE — PROGRESS NOTE ADULT - PROBLEM SELECTOR PLAN 6
F: PO ad cayetano  E: As needed  N: Dash shonda  DVT: On full AC lovenox  GI: none    Dispo [facility]F Hx of recurrent effusions, most likely 2/2 malignancy.  x3 prior thoras, unclear results or by which pulmonologist outpatient.  On POCUS appear: mild to moderate volume on L side, none/mild noted on R.  S/p pleurex placement with IR  - Monitor pleurex output

## 2020-09-15 NOTE — PROGRESS NOTE ADULT - PROBLEM SELECTOR PLAN 2
Hx of metastatic breast cancer to bones and lungs leading to recurrent Pleffs.  Currently not on chemo or radiation  - Continue Exemestane 25mg PO daily  - One time dose of Zoledronic acid 4mg IV  - Transfer to [address] [place] [address] for chemo Hx of metastatic breast cancer to bones and lungs leading to recurrent Pleffs.  Currently not on chemo or radiation  - Continue Exemestane 25mg PO daily  - One time dose of Zoledronic acid 4mg IV  - Transfer to [address] [place] [address] for chemo

## 2020-09-15 NOTE — PROGRESS NOTE ADULT - PROBLEM SELECTOR PLAN 1
Presenting with acute shortness of breath over the last two days.  + Unilateral R leg swelling.  CTA shows multiple subsegmental PE, w/o signs of R heart strain.  No major ECG changes.  DVT scan negative b/l  - On 4L NC with sats >95%, will wean as tolerated  - Continue Lovenox 85mg IV BID for full AC (Resume at 1800)

## 2020-09-15 NOTE — PROGRESS NOTE ADULT - PROBLEM SELECTOR PLAN 5
Hx of recurrent effusions, most likely 2/2 malignancy.  x3 prior thoras, unclear results or by which pulmonologist outpatient.  On POCUS appear: mild to moderate volume on L side, none/mild noted on R.  S/p pleurex placement with IR  - Monitor pleurex output Hx of HTN, on metoprolol  - Continue home Metoprolol Succinate 12.5mg QD

## 2020-09-15 NOTE — PROGRESS NOTE ADULT - PROBLEM SELECTOR PLAN 4
Hx of HTN, on metoprolol  - Continue home Metoprolol Succinate 12.5mg QD Increased Ca levels with normal phos in the setting of metastatic breast cancer.  - Zoledronic acid as above.

## 2020-09-16 NOTE — PROGRESS NOTE ADULT - PROBLEM SELECTOR PLAN 2
Hx of metastatic breast cancer to bones and lungs leading to recurrent Pleffs.  Currently not on chemo or radiation  - Continue Exemestane 25mg PO daily  - One time dose of Zoledronic acid 4mg IV  - Transfer to [address] [place] [address] for chemo Hx of metastatic breast cancer to bones and lungs leading to recurrent Pleffs.  Was not on chemo or radiation prior to this hospitalization.   - Continue Exemestane 25mg PO daily  - One time dose of Zoledronic acid 4mg IV  - On [facility] for chemo- per Dr. Watt's note, to start tamoxifen, gefitinib, navelbine.

## 2020-09-16 NOTE — PROGRESS NOTE ADULT - PROBLEM SELECTOR PLAN 1
Presenting with acute shortness of breath over the last two days.  + Unilateral R leg swelling.  CTA shows multiple subsegmental PE, w/o signs of R heart strain.  No major ECG changes.  DVT scan negative b/l  - On 4L NC with sats >95%, will wean as tolerated  - Continue Lovenox 85mg IV BID for full AC (Resume at 1800) Presented w/ acute shortness of breath over the last two days. + Unilateral R leg swelling. CTA showed multiple subsegmental PE, w/o signs of R heart strain.  No major ECG changes. DVT scan negative b/l  - On 4L NC with sats >95%, will wean as tolerated (Note: Resistant to O2 getting weaned, currently 100% on 4L, insists she cannot breath as O2 lowered below that level, though sats remain unchanged)  - Continue Lovenox 85mg IV BID for full AC, resumed last night following IR procedure

## 2020-09-16 NOTE — PROGRESS NOTE ADULT - PROBLEM SELECTOR PLAN 4
Increased Ca levels with normal phos in the setting of metastatic breast cancer.  - Zoledronic acid as above.

## 2020-09-16 NOTE — PROGRESS NOTE ADULT - REASON FOR ADMISSION
PE
PE
SOB/PE
respiratory distress from pulmonary emboli and metastatic cancer
respiratory failure with metastatic breast cancer.
PE
PE

## 2020-09-16 NOTE — PROGRESS NOTE ADULT - ASSESSMENT
the patient is ready to start her chemotherapy she emphasizes that it is important that she have chemotherapy that does not interfere with the quality of her life which is already quite limited.

## 2020-09-16 NOTE — PROGRESS NOTE ADULT - ASSESSMENT
Ms. Heard is a 56y/o woman w/ hx of metastatic breast cancer (to bones and lungs) w/ b/l mastectomy and hx of chem/rads, recurrent malignant pleffs, presenting for worsening shortness of breath x2 days. Found to have PE w/o R heart strain, and small b/l pleffs.    Still with some respiratory distress, however, stable.  Started AC, will taper off O2 as tolerated.  Resistant to O2 getting weaned, currently 100% on 4L, insists she cannot breath as O2 lowered below that level, though sats remain unchanged.    Plan to transfer to [address] [place] [address] per Dr. Watt for resumption of chemotherapy. Ms. Heard is a 56y/o woman w/ hx of metastatic breast cancer (to bones and lungs) w/ b/l mastectomy and hx of chem/rads, recurrent malignant pleffs, presenting for worsening shortness of breath x2 days. Found to have PE w/o R heart strain, and small b/l pleffs, now on [facility] per Dr. Watt for resumption of chemotherapy.

## 2020-09-16 NOTE — PROGRESS NOTE ADULT - PROBLEM SELECTOR PLAN 3
Decreased lung function, substantial atalectasis, scarring and damage to R. lung.  On home O2 at 5L.  Current increased bicarb levels suggestive of hypercarbia.  Earlier blood gas shows elevated bicarb above level of chronic compensation, suggesting concurrent metabolic alkalosis further supported by hypochloremia.  Unclear etiology of alkalosis  - Encourage increased breathing depth Decreased lung function, substantial atelectasis, scarring and damage to R. lung.  On home O2 at 5L. Current increased bicarb levels suggestive of hypercarbia.  Earlier blood gas shows elevated bicarb above level of chronic compensation, suggesting concurrent metabolic alkalosis further supported by hypochloremia.  Unclear etiology of alkalosis  - Encourage increased breathing depth, pt observed to take many shallow breaths

## 2020-09-16 NOTE — PROGRESS NOTE ADULT - PROBLEM SELECTOR PLAN 6
Hx of recurrent effusions, most likely 2/2 malignancy.  x3 prior thoras, unclear results or by which pulmonologist outpatient.  On POCUS appear: mild to moderate volume on L side, none/mild noted on R.  S/p pleurex placement with IR  - Monitor pleurex output

## 2020-09-16 NOTE — PROGRESS NOTE ADULT - ATTENDING COMMENTS
The patient will be started on high-dose tamoxifen gefitinib and Navelbine.  [facility] was aware need for chief it to [facility].

## 2020-09-16 NOTE — PROGRESS NOTE ADULT - PROBLEM SELECTOR PLAN 7
F: PO ad cayetano  E: As needed  N: Dash shonda  DVT: On full AC lovenox  GI: none    Dispo [facility] F: PO ad cayetano  E: As needed  N: Dash shonda  DVT: On full AC lovenox  GI: none    Dispo:

## 2020-09-16 NOTE — PROGRESS NOTE ADULT - SUBJECTIVE AND OBJECTIVE BOX
The patient is comfortable sitting in a recliner with oxygen at 4 L.  Her sentences appear to be somewhat longer today.  She has curiosity the upcoming chemotherapy.       the patient is sitting in a chair on oxygen  By nasal cannula at 4 L a minute.    Examination of the head ears eyes nose and throat demonstrates the nasal cannula and new mild central cyanosis off oxygen.    Examination of the neck reveals no palpable lymphadenopathy, jugular venous distention or thyromegaly.    There is dullness to percussion at both lung bases but greater on the left than right.  The breath sounds are somewhat distant but there is no adventitious sounds or audible wheeze.  The patient has percussion tenderness over the low cervical and upper thoracic spines.    The heart rate is regular at 80  The patient was seen and examined at 6:00 a.m. in the morning and the consult written and the and the consult written beats per minute with the hint of a systolic ejection murmur over the aortic valve and no reciprocal  mitral regurgitant murmur.    The patient has had a left mastectomy and there are no axillary or contralateral breast pathologies.    The patient has truncal obesity but there is no palpable hepatomegaly or splenomegaly.    The patient has bilateral chronic venous stasis changes both lower extremities with lymphedema of both lower extremities to the knees.    Neurologically the patient is intact in higher cortical function, cerebellar function, motor and sensory function with the exception of weakness of pelvic and shoulder girdle muscles.    The patient has no rash.

## 2020-09-16 NOTE — PROGRESS NOTE ADULT - SUBJECTIVE AND OBJECTIVE BOX
INCOMPLETE   INCOMPLETE  Feeling a little tired after getting clonepin O/N, says felt mildly SOB, which resolved after a leak in her oxygen was addressed. Otherwise, denied f/c/CP/palpitations/abd pain/n/v/changes in bowel/bladder habits.     Constitutional:  Appears much older than age, frail, hunched over, increase work of breathing, wrapped in numerous blankets, tired appearing  HEENT: Atraumatic, anicteric, no pallor, moist mucosa  Neck: supple;  Respiratory: Even excursion, Decreased in right lung to mid w/ dullness to percussion, clear in anterior R fields.  L clear to ascultation.  Cardiac: RRR; no M/R/G  Gastrointestinal: +BS, abdomen soft non distended, dull to percussion, non tender to percussion  Back: spine midline, Catheter in place on L back, some tenderness inferior to dressing.  Extremities: warm and well perfused, 2+ edema  Vascular: 2+ radial  Neurologic: A&Ox4/4  Psych: Anxious affect, some reactivity, ruminations over her care and the necessity of being in a cold room to breathe OVERNIGHT EVENTS: Per nurse, pt had some anxiety O/N, was given Clonepin for anxiety symptoms.     SUBJECTIVE / INTERVAL HPI: Patient seen and examined at bedside. Feeling a little tired after getting clonepin O/N, says felt mildly SOB, which resolved after a leak in her oxygen was addressed. Otherwise, denied f/c/CP/palpitations/abd pain/n/v/changes in bowel/bladder habits.     VITAL SIGNS:  Vital Signs Last 24 Hrs  T(C): 36.5 (16 Sep 2020 05:01), Max: 36.8 (15 Sep 2020 14:03)  T(F): 97.7 (16 Sep 2020 05:01), Max: 98.2 (15 Sep 2020 14:03)  HR: 82 (16 Sep 2020 05:01) (70 - 82)  BP: 136/81 (16 Sep 2020 05:01) (130/79 - 148/79)  BP(mean): --  RR: 18 (16 Sep 2020 05:01) (18 - 19)  SpO2: 100% (16 Sep 2020 05:01) (100% - 100%)    PHYSICAL EXAM:  Constitutional: Appears much older than age, frail, hunched over, wrapped in numerous blankets, tired appearing  HEENT: Atraumatic, anicteric, no pallor, moist mucosa  Neck: supple;  Respiratory: Increased work of breathing ,Even excursion, decreased BS throughout lung fields; slightly more. Decreased in right lung to mid w/ dullness to percussion, clear in anterior R fields.  Cardiac: RRR; no M/R/G  Gastrointestinal: +BS, abdomen soft non distended, dull to percussion, non tender to percussion  Back: spine midline, Catheter in place on L back, some tenderness inferior to dressing. No erythema/swelling around dressing.   Extremities: warm and well perfused, 2+ edema  Vascular: 2+ radial  Neurologic: A&Ox4/4. Appeared sleepy this morning but was conversant with me and answering questions appropriately.   Psych: Anxious affect, some reactivity, ruminations over her care and the necessity of being in a cold room to breathe    MEDICATIONS:  MEDICATIONS  (STANDING):  albuterol/ipratropium for Nebulization 3 milliLiter(s) Nebulizer every 6 hours  budesonide 160 MICROgram(s)/formoterol 4.5 MICROgram(s) Inhaler 2 Puff(s) Inhalation two times a day  enoxaparin Injectable 85 milliGRAM(s) SubCutaneous every 12 hours  exemestane 25 milliGRAM(s) Oral <User Schedule>  influenza   Vaccine 0.5 milliLiter(s) IntraMuscular once  metoprolol tartrate 12.5 milliGRAM(s) Oral daily  tiotropium 18 MICROgram(s) Capsule 1 Capsule(s) Inhalation daily  torsemide 10 milliGRAM(s) Oral two times a day    MEDICATIONS  (PRN):  acetaminophen   Tablet .. 650 milliGRAM(s) Oral every 6 hours PRN Mild Pain (1 - 3)  ALBUTerol    90 MICROgram(s) HFA Inhaler 2 Puff(s) Inhalation every 4 hours PRN Shortness of Breath  clonazePAM  Tablet 0.5 milliGRAM(s) Oral daily PRN Anxiety    ALLERGIES:  Allergies    Allergy Status Unknown    Intolerances      LABS                        10.5   6.52  )-----------( 241      ( 16 Sep 2020 07:31 )             36.9     09-16    137  |  94<L>  |  18  ----------------------------<  119<H>  4.4   |  35<H>  |  0.49<L>    Ca    11.3<H>      16 Sep 2020 07:31  Phos  3.3     09-16  Mg     2.2     09-16    PTT - ( 14 Sep 2020 22:51 )  PTT:61.5 sec    CAPILLARY BLOOD GLUCOSE    RADIOLOGY & ADDITIONAL TESTS: Reviewed.

## 2020-09-17 NOTE — PROGRESS NOTE ADULT - SUBJECTIVE AND OBJECTIVE BOX
CC/ HPI Patient is a 55 year old female with metastatic breast cancer, s/p bilateral mastectomy, previously on chemo/radiation but currently not undergoing treatment, with history of pleural effusions, presenting c/o dyspnea, admitted with pulmonary emboli, status post left pleural catheter placement, seen this morning sitting in a chair the patient feels better, denies acute respiratory complaint.      PAST MEDICAL & SURGICAL HISTORY:  Metastatic breast cancer  H/O bilateral mastectomy  Pleural effusions  Hypertension    SOCHX:  - tobacco,  -  alcohol        FAMILY HISTORY: FA/MO  - contributory     ROS reviewed below with positive findings marked (+) :  GEN:  fever, chills ENT: tracheostomy,   epistaxis,  sinusitis COR: CAD, CHF,  +HTN, dysrhythmia PUL: COPD, ILD, asthma, pneumonia GI: PEG, dysphagia, hemorrhage, other SHAN: kidney disease, electrolyte disorder HEM:  anemia, thrombus, coagulopathy, +cancer ENDO:  thyroid disease, diabetes mellitus CNS:  dementia, stroke, seizure, PSY:  depression, anxiety, other          MEDICATIONS  (STANDING):  albuterol/ipratropium for Nebulization 3 milliLiter(s) Nebulizer every 6 hours  budesonide 160 MICROgram(s)/formoterol 4.5 MICROgram(s) Inhaler 2 Puff(s) Inhalation two times a day  enoxaparin Injectable 85 milliGRAM(s) SubCutaneous every 12 hours  exemestane 25 milliGRAM(s) Oral <User Schedule>  gefitinib 250 milliGRAM(s) Oral <User Schedule>  influenza   Vaccine 0.5 milliLiter(s) IntraMuscular once  metoprolol tartrate 12.5 milliGRAM(s) Oral every 12 hours  tamoxifen 140 milliGRAM(s) Oral <User Schedule>  tiotropium 18 MICROgram(s) Capsule 1 Capsule(s) Inhalation daily  torsemide 10 milliGRAM(s) Oral two times a day    MEDICATIONS  (PRN):  acetaminophen   Tablet .. 650 milliGRAM(s) Oral every 6 hours PRN Mild Pain (1 - 3)  ALBUTerol    90 MICROgram(s) HFA Inhaler 2 Puff(s) Inhalation every 4 hours PRN Shortness of Breath  clonazePAM  Tablet 0.5 milliGRAM(s) Oral daily PRN Anxiety      Vital Signs Last 24 Hrs  T(C): 37.1 (17 Sep 2020 15:24), Max: 37.1 (16 Sep 2020 21:12)  T(F): 98.7 (17 Sep 2020 15:24), Max: 98.8 (16 Sep 2020 21:12)  HR: 67 (17 Sep 2020 15:24) (67 - 81)  BP: 126/80 (17 Sep 2020 15:24) (102/53 - 126/80)  RR: 16 (17 Sep 2020 15:24) (16 - 18)  SpO2: 100% (17 Sep 2020 15:24) (98% - 100%)    GENERAL:         comfortable,  - distress.  HEENT:            - trauma,  - icterus,  - injection,  - nasal discharge.  NECK:              - jugular venous distention, - thyromegaly.  LYMPH:           - lymphadenopathy, - masses.  RESP:              + clear,   - rales,   - rhonchi,   - wheezes.   COR:                S1S2   - gallops,  - rubs.  ABD:                bowel sounds,   soft, - tender, - distended.  EXT/MSC:         - cyanosis,  - clubbing, + edema.    NEURO:           + alert and oriented                             10.5   4.95  )-----------( 243      ( 17 Sep 2020 07:00 )             36.8     09-17    135  |  88<L>  |  14  ----------------------------<  103<H>  4.1   |  34<H>  |  0.51    Ca    9.7      17 Sep 2020 07:00  Phos  2.4     09-17  Mg     2.1     09-17      X-ray Chest (09.15.20)  Left chest tube in place. Decreased left pleural effusion. No pneumothorax.    CT Angio Chest PE Protocol w/ IV Cont (09.11.20)    Somewhat suboptimal motion degraded study. Multiple filling defects in left upper lobe segmental pulmonary arteries and left lower lobe segmental/subsegmental pulmonary arteries (7:78, 73, 54). Limited evaluation of right lobar/segmental/subsegmental pulmonary arteries due to artifact.. The thoracic aorta is normal in appearance. Cardiomegaly. No evidence of right heart strain. No pericardial effusion is seen. No mediastinal, hilar or axillary lymphadenopathy is seen.    Bilateral moderate pleural effusions. Airspace consolidation versus passive atelectasis in the right middle and lower lobe, however limited evaluation on current study. Passive atelectasis in the left lower lobe. Branching nodule in the superior left lower lobe (8:104), may represent mucous plugging. There is diffuse interlobular septal thickening and mild groundglass opacity. Some areas of interlobular septal thickening demonstrates somewhat nodular appearance. A nodular hyperdensity in the right lower lobe (7:75), may be focal calcification versus focal enhancement.    Limited evaluation of the upper abdomen demonstrates diffuse thickening of left adrenal gland may reflect adrenal hyperplasia. Limited evaluation of the liver due to bolus timing..    Evaluation of the osseous structures demonstrates extensive mixed sclerotic and lytic osseous metastases involving the axial and appendicular skeleton. Multiple vertebral bodies impending pathologic fractures in the thoracic spine. Right-sided MediPort in place.      IMPRESSION:  1.  Pulmonary embolism in the left upper and lower lobe segmental/subsegmental pulmonary arteries. Limited evaluation of right pulmonary arteries. No evidence of right heart strain.  2.  Focal nodular hyperdensity in the right lower lobe, which may reflect focal calcification versus focal active contrast extravasation. Clinical correlation and follow-up with CT with and without intravenous contrast will be of benefit.  3.  Airspace consolidation versus atelectasis in the right middle and lower lobe, limited evaluation on current study. Underlying mass/pleural abnormality also cannot be excluded.  4.  Diffuse interlobular septal thickening, may represent pulmonary edema, however lymphangitic metastases cannot be excluded. Follow-up with chest CT is recommended once pleural effusion and pulmonary edema is subsided.  5.  Mucous plugging, mild pulmonary edema and bilateral pleural effusions.  6.  Extensive osseous metastatic disease, with probable impending vertebral pathologic fractures.          ASSESSMENT/PLAN    1)  Pulmonary embolus  2)  Metastatic breast cancer  3)  Pleural effusion  4)  Mucous plugging with atelectasis    Oxygen as needed for a satisfactory SpO2  Status post Pleurex catheter placement left chest.  Atrovent/ albuterol q 4 – 6 hours as needed,   Budesonide 160 mcg/formoterol  Cardiac/HTN: normotensive  GI: Rx/ prophylaxis c PPI/H2B  Heme: Rx with AC  Discussed with the medical team.

## 2020-09-17 NOTE — PROGRESS NOTE ADULT - ASSESSMENT
The patient starts her chemotherapy today and if there is going to be of benefit in the be seen in the weeks ahead.  The patient should  anticipate discharge tomorrow

## 2020-09-17 NOTE — PROGRESS NOTE ADULT - SUBJECTIVE AND OBJECTIVE BOX
INCOMPLETE  OVERNIGHT EVENTS:    SUBJECTIVE / INTERVAL HPI: Patient seen and examined at bedside.     VITAL SIGNS:  Vital Signs Last 24 Hrs  T(C): 36.7 (17 Sep 2020 06:47), Max: 37.1 (16 Sep 2020 21:12)  T(F): 98.1 (17 Sep 2020 06:47), Max: 98.8 (16 Sep 2020 21:12)  HR: 79 (17 Sep 2020 06:47) (77 - 86)  BP: 117/65 (17 Sep 2020 06:47) (102/53 - 121/71)  BP(mean): --  RR: 18 (17 Sep 2020 06:47) (17 - 18)  SpO2: 98% (17 Sep 2020 06:47) (98% - 100%)    PHYSICAL EXAM:    General: WDWN  HEENT: NC/AT; PERRL, anicteric sclera; MMM  Neck: supple  Cardiovascular: +S1/S2; RRR  Respiratory: CTA B/L; no W/R/R  Gastrointestinal: soft, NT/ND; +BSx4  Extremities: WWP; no edema, clubbing or cyanosis  Vascular: 2+ radial, DP/PT pulses B/L  Neurological: AAOx3; no focal deficits    MEDICATIONS:  MEDICATIONS  (STANDING):  albuterol/ipratropium for Nebulization 3 milliLiter(s) Nebulizer every 6 hours  budesonide 160 MICROgram(s)/formoterol 4.5 MICROgram(s) Inhaler 2 Puff(s) Inhalation two times a day  enoxaparin Injectable 85 milliGRAM(s) SubCutaneous every 12 hours  exemestane 25 milliGRAM(s) Oral <User Schedule>  gefitinib 250 milliGRAM(s) Oral <User Schedule>  influenza   Vaccine 0.5 milliLiter(s) IntraMuscular once  metoprolol tartrate 12.5 milliGRAM(s) Oral every 12 hours  tamoxifen 140 milliGRAM(s) Oral <User Schedule>  tiotropium 18 MICROgram(s) Capsule 1 Capsule(s) Inhalation daily  torsemide 10 milliGRAM(s) Oral two times a day    MEDICATIONS  (PRN):  acetaminophen   Tablet .. 650 milliGRAM(s) Oral every 6 hours PRN Mild Pain (1 - 3)  ALBUTerol    90 MICROgram(s) HFA Inhaler 2 Puff(s) Inhalation every 4 hours PRN Shortness of Breath  clonazePAM  Tablet 0.5 milliGRAM(s) Oral daily PRN Anxiety    ALLERGIES:  Allergies    Allergy Status Unknown    Intolerances    LABS:                        10.5   4.95  )-----------( 243      ( 17 Sep 2020 07:00 )             36.8     09-17    135  |  88<L>  |  14  ----------------------------<  103<H>  4.1   |  34<H>  |  0.51    Ca    9.7      17 Sep 2020 07:00  Phos  2.4     09-17  Mg     2.1     09-17    CAPILLARY BLOOD GLUCOSE    RADIOLOGY & ADDITIONAL TESTS: Reviewed.   OVERNIGHT EVENTS: NAEO    SUBJECTIVE / INTERVAL HPI: Patient seen and examined at bedside. This am, says that while she previously had some pain after her IR previous IR procedure, she is no longer experiencing that pain; has had no nausea since yesterday. Feels good on her oxygen.   Denies f/c/CP/palpitations/abd pain/v/changes in bowel/bladder habits.     VITAL SIGNS:  Vital Signs Last 24 Hrs  T(C): 36.7 (17 Sep 2020 06:47), Max: 37.1 (16 Sep 2020 21:12)  T(F): 98.1 (17 Sep 2020 06:47), Max: 98.8 (16 Sep 2020 21:12)  HR: 79 (17 Sep 2020 06:47) (77 - 86)  BP: 117/65 (17 Sep 2020 06:47) (102/53 - 121/71)  BP(mean): --  RR: 18 (17 Sep 2020 06:47) (17 - 18)  SpO2: 98% (17 Sep 2020 06:47) (98% - 100%)    PHYSICAL EXAM:  Constitutional: Appears much older than age, frail, sitting in chair this am, alert and oriented  HEENT: Atraumatic, anicteric, no pallor, moist mucosa  Neck: supple;  Respiratory: Increased work of breathing ,Even excursion, decreased BS throughout lung fields; slightly more. Decreased in right lung to mid w/ dullness to percussion, clear in anterior R fields.  Cardiac: RRR; no M/R/G  Gastrointestinal: +BS, abdomen soft non distended, dull to percussion, non tender to percussion  Back: spine midline, Catheter in place on L back, some tenderness inferior to dressing. No erythema/swelling around dressing.   Extremities: warm and well perfused, 2+ edema  Vascular: 2+ radial  Neurologic: A&Ox4/4. This am, was conversant with me and answering questions appropriately.   Psych: Anxious affect, some reactivity, ruminations over her care and the necessity of being in a cold room to breathe    MEDICATIONS:  MEDICATIONS  (STANDING):  albuterol/ipratropium for Nebulization 3 milliLiter(s) Nebulizer every 6 hours  budesonide 160 MICROgram(s)/formoterol 4.5 MICROgram(s) Inhaler 2 Puff(s) Inhalation two times a day  enoxaparin Injectable 85 milliGRAM(s) SubCutaneous every 12 hours  exemestane 25 milliGRAM(s) Oral <User Schedule>  gefitinib 250 milliGRAM(s) Oral <User Schedule>  influenza   Vaccine 0.5 milliLiter(s) IntraMuscular once  metoprolol tartrate 12.5 milliGRAM(s) Oral every 12 hours  tamoxifen 140 milliGRAM(s) Oral <User Schedule>  tiotropium 18 MICROgram(s) Capsule 1 Capsule(s) Inhalation daily  torsemide 10 milliGRAM(s) Oral two times a day    MEDICATIONS  (PRN):  acetaminophen   Tablet .. 650 milliGRAM(s) Oral every 6 hours PRN Mild Pain (1 - 3)  ALBUTerol    90 MICROgram(s) HFA Inhaler 2 Puff(s) Inhalation every 4 hours PRN Shortness of Breath  clonazePAM  Tablet 0.5 milliGRAM(s) Oral daily PRN Anxiety    ALLERGIES:  Allergies    Allergy Status Unknown    Intolerances    LABS:                        10.5   4.95  )-----------( 243      ( 17 Sep 2020 07:00 )             36.8     09-17    135  |  88<L>  |  14  ----------------------------<  103<H>  4.1   |  34<H>  |  0.51    Ca    9.7      17 Sep 2020 07:00  Phos  2.4     09-17  Mg     2.1     09-17    CAPILLARY BLOOD GLUCOSE    RADIOLOGY & ADDITIONAL TESTS: Reviewed.

## 2020-09-17 NOTE — PROGRESS NOTE ADULT - PROBLEM SELECTOR PLAN 2
Hx of metastatic breast cancer to bones and lungs leading to recurrent Pleffs.  Was not on chemo or radiation prior to this hospitalization.   - Continue Exemestane 25mg PO daily  - One time dose of Zoledronic acid 4mg IV  - On 7 [facility] for chemo- per Dr. Watt's note, to start tamoxifen, gefitinib, navelbine. Hx of metastatic breast cancer to bones and lungs leading to recurrent Pleffs.  Was not on chemo or radiation prior to this hospitalization.   - Continue Exemestane 25mg PO daily  - Continue gefitinib 250 milliGRAM(s)   - Continue tamoxifen 140 milliGRAM(s)  - On 7 [facility] for chemo- per Dr. Watt's note, possible d/c tomorrow

## 2020-09-17 NOTE — PROGRESS NOTE ADULT - PROBLEM SELECTOR PROBLEM 3
Chronic respiratory failure with hypoxia and hypercapnia
Metastatic breast cancer
Metastatic breast cancer
Chronic respiratory failure with hypoxia and hypercapnia
Chronic respiratory failure with hypoxia and hypercapnia

## 2020-09-17 NOTE — PROGRESS NOTE ADULT - PROBLEM SELECTOR PROBLEM 2
Metastatic breast cancer
Pleural effusion
Pleural effusion
Metastatic breast cancer
Metastatic breast cancer

## 2020-09-17 NOTE — PROGRESS NOTE ADULT - PROBLEM SELECTOR PLAN 4
Increased Ca levels with normal phos in the setting of metastatic breast cancer.  - Zoledronic acid as above. Increased Ca levels with normal phos in the setting of metastatic breast cancer.  - Received IV Zoledronic acid

## 2020-09-17 NOTE — PROGRESS NOTE ADULT - PROBLEM SELECTOR PLAN 3
Decreased lung function, substantial atelectasis, scarring and damage to R. lung.  On home O2 at 5L. Current increased bicarb levels suggestive of hypercarbia.  Earlier blood gas shows elevated bicarb above level of chronic compensation, suggesting concurrent metabolic alkalosis further supported by hypochloremia.  Unclear etiology of alkalosis  - Encourage increased breathing depth, pt observed to take many shallow breaths

## 2020-09-17 NOTE — PROGRESS NOTE ADULT - PROBLEM SELECTOR PLAN 1
Presented w/ acute shortness of breath over the last two days. + Unilateral R leg swelling. CTA showed multiple subsegmental PE, w/o signs of R heart strain.  No major ECG changes. DVT scan negative b/l  - On 4L NC with sats >95%, will wean as tolerated (Note: Resistant to O2 getting weaned, currently 100% on 4L, insists she cannot breath as O2 lowered below that level, though sats remain unchanged)  - Continue Lovenox 85mg IV BID for full AC, resumed last night following IR procedure Presented w/ acute shortness of breath over the last two days. + Unilateral R leg swelling. CTA showed multiple subsegmental PE, w/o signs of R heart strain.  No major ECG changes. DVT scan negative b/l  - On 4L NC with sats >95%, will wean as tolerated (Note: Resistant to O2 getting weaned, currently 100% on 4L, insists she cannot breath as O2 lowered below that level, though sats remain unchanged)  - Continue Lovenox 85mg IV BID for full AC

## 2020-09-17 NOTE — PROGRESS NOTE ADULT - ASSESSMENT
Ms. Heard is a 56y/o woman w/ hx of metastatic breast cancer (to bones and lungs) w/ b/l mastectomy and hx of chem/rads, recurrent malignant pleffs, presenting for worsening shortness of breath x2 days. Found to have PE w/o R heart strain, and small b/l pleffs, now on [facility] per Dr. Watt for resumption of chemotherapy.

## 2020-09-17 NOTE — PROGRESS NOTE ADULT - PROBLEM SELECTOR PROBLEM 1
Multiple subsegmental pulmonary emboli without acute cor pulmonale

## 2020-09-18 PROBLEM — Z00.00 ENCOUNTER FOR PREVENTIVE HEALTH EXAMINATION: Status: ACTIVE | Noted: 2020-01-01

## 2020-09-18 NOTE — PROGRESS NOTE ADULT - PROVIDER SPECIALTY LIST ADULT
Heme/Onc
Internal Medicine
Pulmonology
Internal Medicine

## 2020-09-18 NOTE — PROGRESS NOTE ADULT - ASSESSMENT
The patient felt that she did not have any adverse reaction to the chemotherapy to date.  She does not feel that it made her worse and she had certainly more energy yesterday, likely from the corticosteroids given for anti emetic FX.  The patient will be discharged today after receiving her last dose of gefitinib and high-dose tamoxifen.  The patient will need to have a CBC next week in [place] and this can be sent to my office if it is in a [place] blood and [facility] office.  I have been told by my staff the nearest 1 to [place] is my office or in [place], [place] or  [place], [place].

## 2020-09-18 NOTE — DISCHARGE NOTE NURSING/CASE MANAGEMENT/SOCIAL WORK - PATIENT PORTAL LINK FT
You can access the FollowMyHealth Patient Portal offered by [facility] by registering at the following website: [email or web address]. By joining [facility]’s FollowMyHealth portal, you will also be able to view your health information using other applications (apps) compatible with our system.

## 2020-09-18 NOTE — DISCHARGE NOTE NURSING/CASE MANAGEMENT/SOCIAL WORK - NSDCFUADDAPPT_GEN_ALL_CORE_FT
Please follow up with your Hematology/Oncology Provider, Dr. Phuc Watt at [address] [place] [address] on 10/05/2020 at 10:45am.  Please ensure to bring your hospital discharge paperwork with you.  Follow up appointment was scheduled by Ms. ANNELIESE Campos, Referral Coordinator.    We scheduled you an appointment with a new primary doctor in our system, Dr. MARTINEZ, your appointment is scheduled for September 29th (Tuesday) at 2 PM  [address] [place] [address]

## 2020-09-18 NOTE — PROGRESS NOTE ADULT - NSHPATTENDINGPLANDISCUSS_GEN_ALL_CORE
Daphney/Guanaco   S
The patient and the house staff and person.
The patient in person
The patient person and the nursing staff in person.

## 2020-09-18 NOTE — PROGRESS NOTE ADULT - SUBJECTIVE AND OBJECTIVE BOX
the patient is sitting comfortably in a chair with oxygen at 6 liters/minute and appears to be  comfortable and involved in her discharge plans.     the patient is sitting in a chair on oxygen  By nasal cannula at 6 L a minute.    Examination of the head ears eyes nose and throat demonstrates the nasal cannula and new mild central cyanosis off oxygen.    Examination of the neck reveals no palpable lymphadenopathy, jugular venous distention or thyromegaly.    There is dullness to percussion at both lung bases but greater on the left than right.  The breath sounds are somewhat distant but there is no adventitious sounds or audible wheeze.  The patient has percussion tenderness over the low cervical and upper thoracic spines.    The heart rate is regular at 80  The patient was seen and examined at 6:00 a.m. in the morning and the consult written and the and the consult written beats per minute with the hint of a systolic ejection murmur over the aortic valve and no reciprocal  mitral regurgitant murmur.    The patient has had a left mastectomy and there are no axillary or contralateral breast pathologies.    The patient has truncal obesity but there is no palpable hepatomegaly or splenomegaly.    The patient has bilateral chronic venous stasis changes both lower extremities with lymphedema of both lower extremities to the knees.    Neurologically the patient is intact in higher cortical function, cerebellar function, motor and sensory function with the exception of weakness of pelvic and shoulder girdle muscles.    The patient has no rash.

## 2020-09-18 NOTE — PROGRESS NOTE ADULT - SUBJECTIVE AND OBJECTIVE BOX
CC/ HPI Patient is a 55 year old female with metastatic breast cancer, s/p bilateral mastectomy, previously on chemo/radiation but currently not undergoing treatment, with history of pleural effusions, presenting c/o dyspnea, admitted with pulmonary emboli, status post left pleural catheter placement, seen this morning sitting in a chair the patient denies acute complaint.      PAST MEDICAL & SURGICAL HISTORY:  Metastatic breast cancer  H/O bilateral mastectomy  Pleural effusions  Hypertension    SOCHX:  - tobacco,  -  alcohol        FAMILY HISTORY: FA/MO  - contributory     ROS reviewed below with positive findings marked (+) :  GEN:  fever, chills ENT: tracheostomy,   epistaxis,  sinusitis COR: CAD, CHF,  +HTN, dysrhythmia PUL: COPD, ILD, asthma, pneumonia GI: PEG, dysphagia, hemorrhage, other SHAN: kidney disease, electrolyte disorder HEM:  anemia, thrombus, coagulopathy, +cancer ENDO:  thyroid disease, diabetes mellitus CNS:  dementia, stroke, seizure, PSY:  depression, anxiety, other        MEDICATIONS  (STANDING):  albuterol/ipratropium for Nebulization 3 milliLiter(s) Nebulizer every 6 hours  budesonide 160 MICROgram(s)/formoterol 4.5 MICROgram(s) Inhaler 2 Puff(s) Inhalation two times a day  enoxaparin Injectable 85 milliGRAM(s) SubCutaneous every 12 hours  exemestane 25 milliGRAM(s) Oral <User Schedule>  gefitinib 250 milliGRAM(s) Oral <User Schedule>  influenza   Vaccine 0.5 milliLiter(s) IntraMuscular once  metoprolol tartrate 12.5 milliGRAM(s) Oral every 12 hours  tamoxifen 140 milliGRAM(s) Oral <User Schedule>  tiotropium 18 MICROgram(s) Capsule 1 Capsule(s) Inhalation daily  torsemide 10 milliGRAM(s) Oral two times a day    MEDICATIONS  (PRN):  acetaminophen   Tablet .. 650 milliGRAM(s) Oral every 6 hours PRN Mild Pain (1 - 3)  ALBUTerol    90 MICROgram(s) HFA Inhaler 2 Puff(s) Inhalation every 4 hours PRN Shortness of Breath  clonazePAM  Tablet 0.5 milliGRAM(s) Oral daily PRN Anxiety      Vital Signs Last 24 Hrs  T(C): 36.6 (18 Sep 2020 05:22), Max: 37.1 (17 Sep 2020 15:24)  T(F): 97.8 (18 Sep 2020 05:22), Max: 98.7 (17 Sep 2020 15:24)  HR: 69 (18 Sep 2020 05:22) (63 - 79)  BP: 151/92 (18 Sep 2020 05:22) (107/68 - 151/92)  RR: 17 (18 Sep 2020 05:22) (16 - 18)  SpO2: 100% (18 Sep 2020 05:22) (100% - 100%)    GENERAL:         comfortable,  - distress.  HEENT:            - trauma,  - icterus,  - injection,  - nasal discharge.  NECK:              - jugular venous distention, - thyromegaly.  LYMPH:           - lymphadenopathy, - masses.  RESP:               + clear,   - rales,   - rhonchi,   - wheezes.   COR:                S1S2   - gallops,  - rubs.  ABD:                bowel sounds,   soft, - tender, - distended.  EXT/MSC:         - cyanosis,  - clubbing, + edema.    NEURO:           + alert and oriented                             10.5   4.95  )-----------( 243      ( 17 Sep 2020 07:00 )             36.8     09-17    135  |  88<L>  |  14  ----------------------------<  103<H>  4.1   |  34<H>  |  0.51          X-ray Chest (09.15.20)  Left chest tube in place. Decreased left pleural effusion. No pneumothorax.    CT Angio Chest PE Protocol w/ IV Cont (09.11.20)    Somewhat suboptimal motion degraded study. Multiple filling defects in left upper lobe segmental pulmonary arteries and left lower lobe segmental/subsegmental pulmonary arteries (7:78, 73, 54). Limited evaluation of right lobar/segmental/subsegmental pulmonary arteries due to artifact.. The thoracic aorta is normal in appearance. Cardiomegaly. No evidence of right heart strain. No pericardial effusion is seen. No mediastinal, hilar or axillary lymphadenopathy is seen.    Bilateral moderate pleural effusions. Airspace consolidation versus passive atelectasis in the right middle and lower lobe, however limited evaluation on current study. Passive atelectasis in the left lower lobe. Branching nodule in the superior left lower lobe (8:104), may represent mucous plugging. There is diffuse interlobular septal thickening and mild groundglass opacity. Some areas of interlobular septal thickening demonstrates somewhat nodular appearance. A nodular hyperdensity in the right lower lobe (7:75), may be focal calcification versus focal enhancement.    Limited evaluation of the upper abdomen demonstrates diffuse thickening of left adrenal gland may reflect adrenal hyperplasia. Limited evaluation of the liver due to bolus timing..    Evaluation of the osseous structures demonstrates extensive mixed sclerotic and lytic osseous metastases involving the axial and appendicular skeleton. Multiple vertebral bodies impending pathologic fractures in the thoracic spine. Right-sided MediPort in place.      IMPRESSION:  1.  Pulmonary embolism in the left upper and lower lobe segmental/subsegmental pulmonary arteries. Limited evaluation of right pulmonary arteries. No evidence of right heart strain.  2.  Focal nodular hyperdensity in the right lower lobe, which may reflect focal calcification versus focal active contrast extravasation. Clinical correlation and follow-up with CT with and without intravenous contrast will be of benefit.  3.  Airspace consolidation versus atelectasis in the right middle and lower lobe, limited evaluation on current study. Underlying mass/pleural abnormality also cannot be excluded.  4.  Diffuse interlobular septal thickening, may represent pulmonary edema, however lymphangitic metastases cannot be excluded. Follow-up with chest CT is recommended once pleural effusion and pulmonary edema is subsided.  5.  Mucous plugging, mild pulmonary edema and bilateral pleural effusions.  6.  Extensive osseous metastatic disease, with probable impending vertebral pathologic fractures.          ASSESSMENT/PLAN    1)  Pulmonary embolus  2)  Metastatic breast cancer  3)  Pleural effusion  4)  Mucous plugging with atelectasis    Oxygen as needed for a satisfactory SpO2  Status post Pleurex catheter placement left chest.  Atrovent/ albuterol q 4 – 6 hours as needed,   Budesonide 160 mcg/formoterol  Cardiac/HTN: normotensive  GI: Rx/ prophylaxis c PPI/H2B  Heme: Rx with AC  Discussed with the medical team.

## 2020-09-18 NOTE — PHYSICAL THERAPY INITIAL EVALUATION ADULT - PERTINENT HX OF CURRENT PROBLEM, REHAB EVAL
55F with PMH of metastatic breast cancer (to lung and bone) s/p bilateral mastectomy, previously on chemo/radiation but currently not undergoing treatment, with history of pleural effusions, who presented with SOB x 1 day. Patient describes that she often feels SOB but that it was worse than normal prompting her to come into the ED.

## 2020-09-18 NOTE — PROGRESS NOTE ADULT - ATTENDING COMMENTS
The patient will need to come to my office next week for blood counts and to arrange for the purchase of gefitinib on the Internet.

## 2020-09-30 PROBLEM — C50.919 MALIGNANT NEOPLASM OF UNSPECIFIED SITE OF UNSPECIFIED FEMALE BREAST: Chronic | Status: ACTIVE | Noted: 2020-01-01

## 2020-09-30 NOTE — PROGRESS NOTE ADULT - ASSESSMENT
56 yo F, history of metastatic breast ca (original dx in 2001), now with mets to lungs, bones, sp L sided pigtail for drainage of recurrent L sided malignant effusion, diagnosed with PE 9/11/20, currently on lovenox 85 mg SC BID. Patient is here for worsening chest pain, dyspnea.       # Acute on 56 yo F, history of metastatic breast ca (original dx in 2001), now with mets to lungs, bones, sp L sided pigtail for drainage of recurrent L sided malignant effusion, diagnosed with PE 9/11/20, currently on lovenox 85 mg SC BID. Patient is here for worsening chest pain, dyspnea.       # Acute on chronic hypoxic and hypercapnic resp failure likely secondary to metastatic breast cancer to lungs  home oxygen 6 L   HOB @ 45 degrees  Aspiration precautions.   AVAPS 4hrs on/off as tolerated.  hs and PRN.  Repeat ABG after 1hr on AVAPs. Monitor end tidal CO2, if increases, put AVAPS back on. Repeat  daily CXR to evaluate for possible effusion.   Possible lymphangitic spread seen on [place] CT      # Recent PE on Lovenox      # HTN  cw home meds      # Anxiety  Clonazepam prn daily        DVT ppx: lovenox  GI ppx: ppi  Diet: npo while on avaps  Activity: as tolerated

## 2020-09-30 NOTE — PROGRESS NOTE ADULT - SUBJECTIVE AND OBJECTIVE BOX
SUBJECTIVE:    Patient is a 55y old Female who presents with a chief complaint of respiratory distress (30 Sep 2020 09:53)    Currently admitted to medicine with the primary diagnosis of Metastatic breast cancer       Today is hospital day . This morning she is resting comfortably in bed and reports no new issues or overnight events.   CCU/ICU day  Intubated:  Central Line:  Lanza:  NG:  Drips:  Iv Lines:    PAST MEDICAL & SURGICAL HISTORY  Metastatic breast cancer    H/O bilateral mastectomy      SOCIAL HISTORY:  Negative for smoking/alcohol/drug use.     ALLERGIES:  tamoxifen (Hives)    MEDICATIONS:  STANDING MEDICATIONS  chlorhexidine 4% Liquid 1 Application(s) Topical <User Schedule>  clonazePAM  Tablet 0.5 milliGRAM(s) Oral once  enoxaparin Injectable 80 milliGRAM(s) SubCutaneous every 12 hours  fentaNYL   Patch  25 MICROgram(s)/Hr 1 Patch Transdermal every 72 hours  metoprolol tartrate 12.5 milliGRAM(s) Oral two times a day  pantoprazole  Injectable 40 milliGRAM(s) IV Push daily    PRN MEDICATIONS  morphine  - Injectable 4 milliGRAM(s) IV Push every 2 hours PRN    Home Medications:  clonazePAM 0.5 mg oral tablet: 1 tab(s) orally once a day, As Needed (30 Sep 2020 10:37)  exemestane 25 mg oral tablet: 1 tab(s) orally  (30 Sep 2020 10:37)  Metoprolol Tartrate 25 mg oral tablet: 0.5 tab(s) orally 2 times a day (30 Sep 2020 10:37)  torsemide 5 mg oral tablet: 2 tab(s) orally 2 times a day, As Needed (30 Sep 2020 10:37)    Vital Signs Last 24 Hrs  T(C): 37.1 (30 Sep 2020 06:58), Max: 37.1 (30 Sep 2020 06:58)  T(F): 98.7 (30 Sep 2020 06:58), Max: 98.7 (30 Sep 2020 06:58)  HR: 70 (30 Sep 2020 08:26) (52 - 75)  BP: 108/59 (30 Sep 2020 08:26) (108/59 - 168/89)  BP(mean): 78 (30 Sep 2020 08:26) (78 - 120)  RR: 18 (30 Sep 2020 11:00) (18 - 40)  SpO2: 100% (30 Sep 2020 08:26) (99% - 100%)      LABS:                        10.0   4.33  )-----------( 430      ( 30 Sep 2020 03:30 )             34.1     09-30    122<L>  |  83<L>  |  13  ----------------------------<  113<H>  5.1<H>   |  33<H>  |  0.5<L>    Ca    9.6      30 Sep 2020 03:30  Mg     2.1     09-30    TPro  7.1  /  Alb  4.1  /  TBili  0.3  /  DBili  x   /  AST  80<H>  /  ALT  184<H>  /  AlkPhos  118<H>  09-30      Urinalysis Basic - ( 30 Sep 2020 05:00 )    Color: Yellow / Appearance: Clear / SG: >=1.030 / pH: x  Gluc: x / Ketone: 15  / Bili: Small / Urobili: 1.0 mg/dL   Blood: x / Protein: 30 mg/dL / Nitrite: Negative   Leuk Esterase: Negative / RBC: x / WBC 3-5 /HPF   Sq Epi: x / Non Sq Epi: Moderate /HPF / Bacteria: Few      ABG - ( 30 Sep 2020 06:07 )  pH, Arterial: 7.33  pH, Blood: x     /  pCO2: 71    /  pO2: 125   / HCO3: 37    / Base Excess: 9.3   /  SaO2: 98                Troponin T, Serum: <0.01 ng/mL (09-30-20 @ 03:30)      CARDIAC MARKERS ( 30 Sep 2020 03:30 )  x     / <0.01 ng/mL / x     / x     / x          RADIOLOGY:    PHYSICAL EXAM:  GEN: No acute distress  LUNGS: Clear to auscultation bilaterally   HEART: S1/S2 present. RRR.   ABD: Soft, non-tender, non-distended. Bowel sounds present  EXT: NC/NC/NE/2+PP/DANIELS/Skin Intact.   NEURO: AAOX3       SUBJECTIVE:  Patient is a 55y old Female who presents with a chief complaint of respiratory distress (30 Sep 2020 09:53)  Currently admitted to medicine with the primary diagnosis of Metastatic breast cancer       Today is hospital day . This morning she is resting in bed, on AVAPS, was lethargic on my exam this morning, became more awake after sometime    PAST MEDICAL & SURGICAL HISTORY  Metastatic breast cancer    H/O bilateral mastectomy      SOCIAL HISTORY:  Negative for smoking/alcohol/drug use.     ALLERGIES:  tamoxifen (Hives)    MEDICATIONS:  STANDING MEDICATIONS  chlorhexidine 4% Liquid 1 Application(s) Topical <User Schedule>  clonazePAM  Tablet 0.5 milliGRAM(s) Oral once  enoxaparin Injectable 80 milliGRAM(s) SubCutaneous every 12 hours  fentaNYL   Patch  25 MICROgram(s)/Hr 1 Patch Transdermal every 72 hours  metoprolol tartrate 12.5 milliGRAM(s) Oral two times a day  pantoprazole  Injectable 40 milliGRAM(s) IV Push daily    PRN MEDICATIONS  morphine  - Injectable 4 milliGRAM(s) IV Push every 2 hours PRN    Home Medications:  clonazePAM 0.5 mg oral tablet: 1 tab(s) orally once a day, As Needed (30 Sep 2020 10:37)  exemestane 25 mg oral tablet: 1 tab(s) orally  (30 Sep 2020 10:37)  Metoprolol Tartrate 25 mg oral tablet: 0.5 tab(s) orally 2 times a day (30 Sep 2020 10:37)  torsemide 5 mg oral tablet: 2 tab(s) orally 2 times a day, As Needed (30 Sep 2020 10:37)    Vital Signs Last 24 Hrs  T(C): 37.1 (30 Sep 2020 06:58), Max: 37.1 (30 Sep 2020 06:58)  T(F): 98.7 (30 Sep 2020 06:58), Max: 98.7 (30 Sep 2020 06:58)  HR: 70 (30 Sep 2020 08:26) (52 - 75)  BP: 108/59 (30 Sep 2020 08:26) (108/59 - 168/89)  BP(mean): 78 (30 Sep 2020 08:26) (78 - 120)  RR: 18 (30 Sep 2020 11:00) (18 - 40)  SpO2: 100% (30 Sep 2020 08:26) (99% - 100%)      LABS:                        10.0   4.33  )-----------( 430      ( 30 Sep 2020 03:30 )             34.1     09-30    122<L>  |  83<L>  |  13  ----------------------------<  113<H>  5.1<H>   |  33<H>  |  0.5<L>    Ca    9.6      30 Sep 2020 03:30  Mg     2.1     09-30    TPro  7.1  /  Alb  4.1  /  TBili  0.3  /  DBili  x   /  AST  80<H>  /  ALT  184<H>  /  AlkPhos  118<H>  09-30      Urinalysis Basic - ( 30 Sep 2020 05:00 )    Color: Yellow / Appearance: Clear / SG: >=1.030 / pH: x  Gluc: x / Ketone: 15  / Bili: Small / Urobili: 1.0 mg/dL   Blood: x / Protein: 30 mg/dL / Nitrite: Negative   Leuk Esterase: Negative / RBC: x / WBC 3-5 /HPF   Sq Epi: x / Non Sq Epi: Moderate /HPF / Bacteria: Few      ABG - ( 30 Sep 2020 06:07 )  pH, Arterial: 7.33  pH, Blood: x     /  pCO2: 71    /  pO2: 125   / HCO3: 37    / Base Excess: 9.3   /  SaO2: 98          Troponin T, Serum: <0.01 ng/mL (09-30-20 @ 03:30)      CARDIAC MARKERS ( 30 Sep 2020 03:30 )  x     / <0.01 ng/mL / x     / x     / x              PHYSICAL EXAM:  GEN: No acute distress  LUNGS: decreased breath sounds bilaterally  HEART: S1/S2 present. RRR.   ABD: Soft, non-tender, non-distended. Bowel sounds present  NEURO: AAOX3

## 2020-09-30 NOTE — ED PROVIDER NOTE - OBJECTIVE STATEMENT
54 yo F, history of metastatic breast ca (original dx in 2001), now with mets to lungs, bones, sp L sided pigtail for drainage of recurrent L sided malignant effusion, diagnosed with PE 9/11/20, currently on lovenox 85 mg SC BID.     Patient is here for worsening chest pain, dyspnea.   Since discharge 9/18 has restarted chemo under care of Heme/Onc Dr. Watt. Over the last 4 days has had progressively increasing chest pain, described as squeezing ever 1-2 minutes. Per , has been "breathing with her belly" since she was in the hospital and while at home.    Last drained 400 cc from her pleural effusion yesterday.     No fever, chills, vomiting. Patient also reports chronic, persistent head, neck, back pain. Unchanged from admission on 9/11.

## 2020-09-30 NOTE — H&P ADULT - HISTORY OF PRESENT ILLNESS
·  54 yo F, history of metastatic breast ca (original dx in 2001), now with mets to lungs, bones, sp L sided pigtail for drainage of recurrent L sided malignant effusion, diagnosed with PE 9/11/20, currently on lovenox 85 mg SC BID.     	Patient is here for worsening chest pain, dyspnea.   	Since discharge 9/18 has restarted chemo under care of Heme/Onc Dr. Watt. Over the last 4 days has had progressively increasing chest pain, described as squeezing ever 1-2 minutes. Per , has been "breathing with her belly" since she was in the hospital and while at home.    	Last drained 400 cc from her pleural effusion yesterday.     	No fever, chills, vomiting. Patient also reports chronic, persistent head, neck, back pain. Unchanged from admission on 9/11.

## 2020-09-30 NOTE — ED PROVIDER NOTE - CARE PLAN
Principal Discharge DX:	Metastatic breast cancer  Secondary Diagnosis:	Dyspnea  Secondary Diagnosis:	Pleural effusion  Secondary Diagnosis:	Hyponatremia  Secondary Diagnosis:	Transaminitis

## 2020-09-30 NOTE — ED PROVIDER NOTE - NS ED ROS FT
Constitutional: no fever, chills  Cardiac: see HPI  Respiratory: see HPI  GI: markedly decreased appetite, no vomiting, diarrhrea  : No dysuria, frequency, urgency or hematuria  MS: see HPI  Neuro: see HPI  Skin: No skin rash.  Except as documented in the HPI, all other systems are negative.

## 2020-09-30 NOTE — CONSULT NOTE ADULT - SUBJECTIVE AND OBJECTIVE BOX
Patient is a 55y old  Female who presents with a chief complaint of respiratory distress (30 Sep 2020 06:06)      HPI:  ·  56 yo F, history of metastatic breast ca (original dx in 2001), now with mets to lungs, bones, sp L sided pigtail for drainage of recurrent L sided malignant effusion, diagnosed with PE 9/11/20, currently on lovenox 85 mg SC BID.     	Patient is here for worsening chest pain, dyspnea.   	Since discharge 9/18 has restarted chemo under care of Heme/Onc Dr. Watt. Over the last 4 days has had progressively increasing chest pain, described as squeezing ever 1-2 minutes. Per , has been "breathing with her belly" since she was in the hospital and while at home.    	Last drained 400 cc from her pleural effusion yesterday.     	No fever, chills, vomiting. Patient also reports chronic, persistent head, neck, back pain. Unchanged from admission on 9/11.   (30 Sep 2020 06:06)      PAST MEDICAL & SURGICAL HISTORY:  Metastatic breast cancer    H/O bilateral mastectomy      FAMILY HISTORY:  :  No known cardiovacular family hisotry     Review Of Systems:     All ROS are negative except per HPI       Allergies    tamoxifen (Hives)    Intolerances      PHYSICAL EXAM    ICU Vital Signs Last 24 Hrs  T(C): 37.1 (30 Sep 2020 06:58), Max: 37.1 (30 Sep 2020 06:58)  T(F): 98.7 (30 Sep 2020 06:58), Max: 98.7 (30 Sep 2020 06:58)  HR: 70 (30 Sep 2020 08:26) (52 - 75)  BP: 108/59 (30 Sep 2020 08:26) (108/59 - 168/89)  BP(mean): 78 (30 Sep 2020 08:26) (78 - 120)  ABP: --  ABP(mean): --  RR: 18 (30 Sep 2020 08:26) (18 - 40)  SpO2: 100% (30 Sep 2020 08:26) (99% - 100%)      CONSTITUTIONAL:  Well nourished.  NAD    ENT:   Airway patent,   Mouth with normal mucosa.   No thrush    EYES:   pupils equal,   round and reactive to light.    CARDIAC:   Normal rate,   Regular rhythm.    Heart sounds S1, S2.   No edema    Vascular:   normal systolic impulse  no bruits    RESPIRATORY:   BIPAP  decreased breath sounds  No wheezing   Normal chest expansion  No use of accessory muscles    GASTROINTESTINAL:  Abdomen soft   Non-tender,   No guarding,   + BS    MUSCULOSKELETAL:   Range of motion is not limited,  No clubbing, cyanosis    NEUROLOGICAL:   Lethargic  Alert and oriented   No motor or sensory deficits.  Pertinent DTRs normal    SKIN:   Skin normal color for race,   Warm and dry  No evidence of rash.    PSYCHIATRIC:   Normal mood and affect.   No apparent risk to self or others.    HEME LYMPH:   No cervical  lymphadenopathy.  No inguinal lymphadenopathy        LABS:                          10.0   4.33  )-----------( 430      ( 30 Sep 2020 03:30 )             34.1                                               09-30    122<L>  |  83<L>  |  13  ----------------------------<  113<H>  5.1<H>   |  33<H>  |  0.5<L>    Ca    9.6      30 Sep 2020 03:30  Mg     2.1     09-30    TPro  7.1  /  Alb  4.1  /  TBili  0.3  /  DBili  x   /  AST  80<H>  /  ALT  184<H>  /  AlkPhos  118<H>  09-30                                             Urinalysis Basic - ( 30 Sep 2020 05:00 )    Color: Yellow / Appearance: Clear / SG: >=1.030 / pH: x  Gluc: x / Ketone: 15  / Bili: Small / Urobili: 1.0 mg/dL   Blood: x / Protein: 30 mg/dL / Nitrite: Negative   Leuk Esterase: Negative / RBC: x / WBC 3-5 /HPF   Sq Epi: x / Non Sq Epi: Moderate /HPF / Bacteria: Few        CARDIAC MARKERS ( 30 Sep 2020 03:30 )  x     / <0.01 ng/mL / x     / x     / x                                                LIVER FUNCTIONS - ( 30 Sep 2020 03:30 )  Alb: 4.1 g/dL / Pro: 7.1 g/dL / ALK PHOS: 118 U/L / ALT: 184 U/L / AST: 80 U/L / GGT: x                                                                                                                                   ABG - ( 30 Sep 2020 06:07 )  pH, Arterial: 7.33  pH, Blood: x     /  pCO2: 71    /  pO2: 125   / HCO3: 37    / Base Excess: 9.3   /  SaO2: 98                  X-Rays reviewed:                                                                                    ECHO    CXR interpreted by me:    MEDICATIONS  (STANDING):  chlorhexidine 4% Liquid 1 Application(s) Topical <User Schedule>  enoxaparin Injectable 80 milliGRAM(s) SubCutaneous every 12 hours  pantoprazole  Injectable 40 milliGRAM(s) IV Push daily    MEDICATIONS  (PRN):  morphine  - Injectable 4 milliGRAM(s) IV Push every 2 hours PRN Moderate Pain (4 - 6)         Patient is a 55y old  Female who presents with a chief complaint of respiratory distress (30 Sep 2020 06:06)      HPI:  ·  54 yo F, history of metastatic breast ca (original dx in 2001), now with mets to lungs, bones, sp L sided pigtail for drainage of recurrent L sided malignant effusion, diagnosed with PE 9/11/20, currently on lovenox 85 mg SC BID.     	Patient is here for worsening chest pain, dyspnea.   	Since discharge 9/18 has restarted chemo under care of Heme/Onc Dr. Watt. Over the last 4 days has had progressively increasing chest pain, described as squeezing ever 1-2 minutes. Per , has been "breathing with her belly" since she was in the hospital and while at home.    	Last drained 400 cc from her pleural effusion yesterday.     	No fever, chills, vomiting. Patient also reports chronic, persistent head, neck, back pain. Unchanged from admission on 9/11.   (30 Sep 2020 06:06)      PAST MEDICAL & SURGICAL HISTORY:  Metastatic breast cancer    H/O bilateral mastectomy      FAMILY HISTORY:  :  No known cardiovacular family hisotry     Review Of Systems:     All ROS are negative except per HPI       Allergies    tamoxifen (Hives)    Intolerances      PHYSICAL EXAM    ICU Vital Signs Last 24 Hrs  T(C): 37.1 (30 Sep 2020 06:58), Max: 37.1 (30 Sep 2020 06:58)  T(F): 98.7 (30 Sep 2020 06:58), Max: 98.7 (30 Sep 2020 06:58)  HR: 70 (30 Sep 2020 08:26) (52 - 75)  BP: 108/59 (30 Sep 2020 08:26) (108/59 - 168/89)  BP(mean): 78 (30 Sep 2020 08:26) (78 - 120)  ABP: --  ABP(mean): --  RR: 18 (30 Sep 2020 08:26) (18 - 40)  SpO2: 100% (30 Sep 2020 08:26) (99% - 100%)      CONSTITUTIONAL:  appears chronically ill  NAD    ENT:   Airway patent,   Mouth with normal mucosa.   No thrush    EYES:   pupils equal,   round and reactive to light.    CARDIAC:   Normal rate,   Regular rhythm.    Heart sounds S1, S2.   No edema    Vascular:   normal systolic impulse  no bruits    RESPIRATORY:   BIPAP  decreased breath sounds  No wheezing   Normal chest expansion  No use of accessory muscles    GASTROINTESTINAL:  Abdomen soft   Non-tender,   No guarding,   + BS    MUSCULOSKELETAL:   Range of motion is not limited,  No clubbing, cyanosis    NEUROLOGICAL:   Lethargic  Alert and oriented   No motor or sensory deficits.  Pertinent DTRs normal    SKIN:   Skin normal color for race,   Warm and dry  No evidence of rash.    PSYCHIATRIC:   Normal mood and affect.   No apparent risk to self or others.    HEME LYMPH:   No cervical  lymphadenopathy.  No inguinal lymphadenopathy        LABS:                          10.0   4.33  )-----------( 430      ( 30 Sep 2020 03:30 )             34.1                                               09-30    122<L>  |  83<L>  |  13  ----------------------------<  113<H>  5.1<H>   |  33<H>  |  0.5<L>    Ca    9.6      30 Sep 2020 03:30  Mg     2.1     09-30    TPro  7.1  /  Alb  4.1  /  TBili  0.3  /  DBili  x   /  AST  80<H>  /  ALT  184<H>  /  AlkPhos  118<H>  09-30                                             Urinalysis Basic - ( 30 Sep 2020 05:00 )    Color: Yellow / Appearance: Clear / SG: >=1.030 / pH: x  Gluc: x / Ketone: 15  / Bili: Small / Urobili: 1.0 mg/dL   Blood: x / Protein: 30 mg/dL / Nitrite: Negative   Leuk Esterase: Negative / RBC: x / WBC 3-5 /HPF   Sq Epi: x / Non Sq Epi: Moderate /HPF / Bacteria: Few        CARDIAC MARKERS ( 30 Sep 2020 03:30 )  x     / <0.01 ng/mL / x     / x     / x                                                LIVER FUNCTIONS - ( 30 Sep 2020 03:30 )  Alb: 4.1 g/dL / Pro: 7.1 g/dL / ALK PHOS: 118 U/L / ALT: 184 U/L / AST: 80 U/L / GGT: x                                                                                                                                   ABG - ( 30 Sep 2020 06:07 )  pH, Arterial: 7.33  pH, Blood: x     /  pCO2: 71    /  pO2: 125   / HCO3: 37    / Base Excess: 9.3   /  SaO2: 98                  X-Rays reviewed:                                                                                    ECHO    CXR interpreted by me:    MEDICATIONS  (STANDING):  chlorhexidine 4% Liquid 1 Application(s) Topical <User Schedule>  enoxaparin Injectable 80 milliGRAM(s) SubCutaneous every 12 hours  pantoprazole  Injectable 40 milliGRAM(s) IV Push daily    MEDICATIONS  (PRN):  morphine  - Injectable 4 milliGRAM(s) IV Push every 2 hours PRN Moderate Pain (4 - 6)

## 2020-09-30 NOTE — ED PROVIDER NOTE - CLINICAL SUMMARY MEDICAL DECISION MAKING FREE TEXT BOX
Patient accepted to ICU for further management. Family and patient still wish for full code status. Patient now in AVAP with same respiratory status.   Likely chronically in CO2 retention, apparently from poor respiratory mechanics possibly from extensive lung mets. Will hold off on hydration despite hyponatremia as patient already has large effusions, instead will fluid restrict.

## 2020-09-30 NOTE — ED PROVIDER NOTE - PROGRESS NOTE DETAILS
Marely: after morphine patient is much more comfortable, RR now 24-28, pain improved. pH 7.25 with CO2 retention, likely chronic given pH and elevated bicarb. Discussed goals of care with patient, she is currently full code but would prefer a trial of BiPAP prior to intubation. DiMare: patient much more comfortable on BiPAP DiMare: patient has hyponatremia, elevated LFTs, possible from congestion vs mets. ICU consult in progress

## 2020-09-30 NOTE — ED PROVIDER NOTE - CRITICAL CARE PROVIDED
direct patient care (not related to procedure)/additional history taking/interpretation of diagnostic studies/consultation with other physicians/consult w/ pt's family directly relating to pts condition/documentation/conducted a detailed discussion of DNR status

## 2020-09-30 NOTE — H&P ADULT - ASSESSMENT
1. hypercapnic respiratory failure secondary to metastatic breast Ca ( w/ lung and bone involvement)  2. metastatic breast Ca      Case was discussed w/ ER MD and Intensivist, Sindy  Pt is awake alert oriented x 3  but lethargic.  Pt's grave condition and probable need for intubation was explained to her and . Pt still wants to be intubated if needed and also wants to be resuscitated if she has cardiac arrest.   pt 's placed on avaps ventilation as PCO2  was still elevated  f/u repeat abg   hospice consultation  pt's condition is grave w/ poor prognosis to functional recovery      1. hypercapnic respiratory failure secondary to metastatic breast Ca ( w/ lung and bone involvement)  2. severe hyponatremia, ? SIADH  3. metastatic breast Ca      Case was discussed w/ ER MD and Intensivist, Sindy  Pt is awake alert oriented x 3  but lethargic.  Pt's grave condition and probable need for intubation was explained to her and . Pt still wants to be intubated if needed and also wants to be resuscitated if she has cardiac arrest.   pt 's placed on avaps ventilation as PCO2  was still elevated  f/u repeat abg   hospice consultation  pt's condition is grave w/ poor prognosis to functional recovery      1. hypercapnic respiratory failure secondary to metastatic breast Ca ( w/ lung and bone involvement)  2. severe hyponatremia, ? SIADH  3. hx- metastatic breast Ca, pulmonary embolus       Case was discussed w/ ER MD and Intensivist, Sindy  Pt is awake alert oriented x 3  but lethargic.  Pt's grave condition and probable need for intubation was explained to her and . Pt still wants to be intubated if needed and also wants to be resuscitated if she has cardiac arrest.   pt 's placed on avaps ventilation as PCO2  was still elevated  f/u repeat abg   cont lovonox full dose for PE  hospice consultation  pt's condition is grave w/ poor prognosis to functional recovery

## 2020-09-30 NOTE — CONSULT NOTE ADULT - ASSESSMENT
IMPRESSION:  SOB 2/2 Breast Ca w/ mets to Lung and bone (on gefitinib and high-dose tamoxifen)  H/o PE (09/2020)  Hyponatremia  6L home oxygen    PLAN:    CNS: avoid CNS depressants    HEENT: Oral care    PULMONARY:  HOB @ 45 degrees.  Aspiration precautions. AVAPS 4hrs on/off hs and PRN. Repeat ABG after 1hr on AVAPs. Monitor end tidal CO2, if increases, put AVAPS back on. Repeat CXR to evaluate for possible effusion.     CARDIOVASCULAR: ECHO. 3 sets of cardiac enzymes. Check EKG. avoid volume overload.     GI: GI prophylaxis. Feeding as tolerated when atleast 30min off AVAPS. Bowel regimen.     RENAL:  Follow up lytes.  Correct as needed. K-5.1, start low K diet. Check SOsm, UOsm, Klaus.    INFECTIOUS DISEASE: No abx for now. PanCx. Procalcitonin.     HEMATOLOGICAL:  Therapeutic lovenox. FU heme/onc eval.     ENDOCRINE:  Follow up FS.  Insulin protocol if needed.    MUSCULOSKELETAL: bedrest for now    GOC with family, needs MOLST form     Overall extremely poor prognosis     IMPRESSION:  SOB 2/2 Breast Ca w/ mets to Lung and bone (on gefitinib and high-dose tamoxifen)  Dr. Juares spoke with her Oncologist who does not expect any further response to chemo in this patient. He suggests palliative care.  H/o PE (09/2020)  Hyponatremia  6L home oxygen    PLAN:    CNS: avoid CNS depressants    HEENT: Oral care    PULMONARY:  HOB @ 45 degrees.  Aspiration precautions.   AVAPS 4hrs on/off as tolerated.  hs and PRN.   Repeat ABG after 1hr on AVAPs. Monitor end tidal CO2, if increases, put AVAPS back on. Repeat daily CXR to evaluate for possible effusion.   Possible lymphangitic spread seen on [place] CT    CARDIOVASCULAR: ECHO. 3 sets of cardiac enzymes. Check EKG. avoid volume overload.     GI: GI prophylaxis. Feeding as tolerated when at least 30min off AVAPS. Bowel regimen.     RENAL:  Follow up lytes.  Correct as needed. K-5.1, start low K diet. Check SOsm, UOsm, Klaus.    INFECTIOUS DISEASE: No abx for now. PanCx. Procalcitonin.     HEMATOLOGICAL:  Therapeutic lovenox. FU heme/onc eval.     ENDOCRINE:  Follow up FS.  Insulin protocol if needed.    MUSCULOSKELETAL: bedrest for now    GOC with family, needs MOLST form. For time being pt wants everything done.     Overall extremely poor prognosis

## 2020-09-30 NOTE — PROGRESS NOTE ADULT - SUBJECTIVE AND OBJECTIVE BOX
Patient reports no pain now, but had severe pain and breathlessness  yesterday which was relieved with morphine      T(F): 98.7 (09-30-20 @ 06:58), Max: 98.7 (09-30-20 @ 06:58)  HR: 70 (09-30-20 @ 08:26)  BP: 108/59 (09-30-20 @ 08:26)  RR: 18 (09-30-20 @ 11:00)  SpO2: 100% (09-30-20 @ 08:26) (99% - 100%)    PHYSICAL EXAM:  GENERAL: NAD  HEAD:  Atraumatic, Normocephalic  NERVOUS SYSTEM:  Alert & Oriented X3, no focal deficits   CHEST/LUNG: Clear to percussion bilaterally; No rales, rhonchi, wheezing, or rubs  HEART: Regular rate and rhythm; No murmurs, rubs, or gallops  ABDOMEN: Soft, Nontender, Nondistended; Bowel sounds present  EXTREMITIES:  2+ Peripheral Pulses, No clubbing, cyanosis, or edema  LYMPH: No lymphadenopathy noted  SKIN: No rashes or lesions    LABS  09-30    122<L>  |  83<L>  |  13  ----------------------------<  113<H>  5.1<H>   |  33<H>  |  0.5<L>    Ca    9.6      30 Sep 2020 03:30  Mg     2.1     09-30    TPro  7.1  /  Alb  4.1  /  TBili  0.3  /  DBili  x   /  AST  80<H>  /  ALT  184<H>  /  AlkPhos  118<H>  09-30                          10.0   4.33  )-----------( 430      ( 30 Sep 2020 03:30 )             34.1       Culture Results:   No growth at 5 days. (09-12-20)  Culture Results:   No growth at 5 days. (09-12-20)    RADIOLOGY  < from: Xray Chest 1 View-PORTABLE IMMEDIATE (09.30.20 @ 03:47) >  Support devices: There is a port-a-catheter terminating at the SVC. The left basilar pleural drainage catheter.    Cardiac/mediastinum/hilum: Obscured and not well evaluated.    Lung parenchyma/Pleura: The right apex is obscured by patient's head. There are bibasilar opacity/effusions without significant change. No definite evidence of pneumothorax.      < end of copied text >    MEDICATIONS  (STANDING):  chlorhexidine 4% Liquid 1 Application(s) Topical <User Schedule>  clonazePAM  Tablet 0.5 milliGRAM(s) Oral once  enoxaparin Injectable 80 milliGRAM(s) SubCutaneous every 12 hours  fentaNYL   Patch  25 MICROgram(s)/Hr 1 Patch Transdermal every 72 hours  metoprolol tartrate 12.5 milliGRAM(s) Oral two times a day  pantoprazole  Injectable 40 milliGRAM(s) IV Push daily    MEDICATIONS  (PRN):  morphine  - Injectable 4 milliGRAM(s) IV Push every 2 hours PRN Moderate Pain (4 - 6)

## 2020-09-30 NOTE — H&P ADULT - NSHPLABSRESULTS_GEN_ALL_CORE
10.0   4.33  )-----------( 430      ( 30 Sep 2020 03:30 )             34.1       09-30    122<L>  |  83<L>  |  13  ----------------------------<  113<H>  5.1<H>   |  33<H>  |  0.5<L>    Ca    9.6      30 Sep 2020 03:30  Mg     2.1     09-30    TPro  7.1  /  Alb  4.1  /  TBili  0.3  /  DBili  x   /  AST  80<H>  /  ALT  184<H>  /  AlkPhos  118<H>  09-30              Urinalysis Basic - ( 30 Sep 2020 05:00 )    Color: Yellow / Appearance: Clear / SG: >=1.030 / pH: x  Gluc: x / Ketone: 15  / Bili: Small / Urobili: 1.0 mg/dL   Blood: x / Protein: 30 mg/dL / Nitrite: Negative   Leuk Esterase: Negative / RBC: x / WBC 3-5 /HPF   Sq Epi: x / Non Sq Epi: Moderate /HPF / Bacteria: Few            Lactate Trend      CARDIAC MARKERS ( 30 Sep 2020 03:30 )  x     / <0.01 ng/mL / x     / x     / x            CAPILLARY BLOOD GLUCOSE            Culture Results:   No growth at 5 days. (09-12 @ 00:51)  Culture Results:   No growth at 5 days. (09-12 @ 00:51)

## 2020-09-30 NOTE — ED PROVIDER NOTE - PHYSICAL EXAMINATION
VITAL SIGNS: I have reviewed nursing notes and confirm.  CONSTITUTIONAL: chronically ill, in obvious pain, moderate distress  SKIN: Skin exam is warm and dry, no acute rash.  HEAD: Normocephalic; atraumatic.  EYES: PERRL, EOM intact; conjunctiva and sclera clear.  CARD: RRR, no murmur  RESP: tachypnea, poor breath sounds bilaterally, occasional rales  ABD: Normal bowel sounds; soft; mildly distended, non tender  EXT: Normal ROM.   NEURO: Alert, oriented. Grossly unremarkable. No focal deficits.  PSYCH: Cooperative, appropriate.

## 2020-09-30 NOTE — CONSULT NOTE ADULT - ATTENDING COMMENTS
Attending Statement: I have personally performed a face to face diagnostic evaluation on this patient. The patient is suffering from metastatic cancer. I have reviewed the above note and agree with the history, exam and suggestions for care, except as I have noted in the text.

## 2020-09-30 NOTE — PROGRESS NOTE ADULT - ASSESSMENT
56 yo F, history of metastatic breast ca (original dx in 2001), now with mets to lungs, bones, sp L sided pigtail for drainage of recurrent L sided malignant effusion, diagnosed with PE 9/11/20, currently on lovenox 85 mg SC BID.     	Acute hypercapnic respiratory failure secondary to metastatic lung disease from breast cancer / pulmonary embolism / hyponatremia / b/l malignant pleural effusions /      - place fentanyl patch   - continue Lovenox    - morphine and Klonopin prn   - NIPPV 4hrs on then 4 hrs off    -  pt is considering DNR / DNI but is full code for now   - pt understands grave prognosis    - I spoke with pt's oncologist Dr Watt who confirms that pt should be on hospice care

## 2020-10-01 NOTE — PROGRESS NOTE ADULT - ASSESSMENT
56 yo F, history of metastatic breast ca (original dx in 2001), now with mets to lungs, bones, sp L sided pigtail for drainage of recurrent L sided malignant effusion, diagnosed with PE 9/11/20, currently on lovenox 85 mg SC BID.     	Acute hypercapnic respiratory failure secondary to metastatic lung disease from breast cancer / pulmonary embolism / hyponatremia / b/l malignant pleural effusions /      - improved respiratory status with NIPPV   - consider home AVAPS   - continue  fentanyl patch   - continue Lovenox    - morphine and Klonopin prn   - NIPPV 4hrs on / 4 hrs off    -  pt is still considering DNR / DNI but wants to remain full code for now   - pt understands grave prognosis    - I spoke with pt's oncologist Dr Watt who confirms that pt should be on hospice care

## 2020-10-01 NOTE — PROGRESS NOTE ADULT - ASSESSMENT
56 yo F, history of metastatic breast ca (original dx in 2001), now with mets to lungs, bones, sp L sided pigtail for drainage of recurrent L sided malignant effusion, diagnosed with PE 9/11/20, currently on lovenox 85 mg SC BID. Patient is here for worsening chest pain, dyspnea.       # Acute on chronic hypoxic and hypercapnic resp failure likely secondary to metastatic breast cancer to lungs: at baseline 6 L NC now  home oxygen 6 L   HOB @ 45 degrees  Aspiration precautions.   AVAPS 8 off and 4 on  daily CXR       # Recent PE on Lovenox      # HTN  cw home meds      # Anxiety  Clonazepam prn daily    # Microcytic anemia  outpt GI eval for colonoscopy?  no active bleed      # Thrombocytosis  likely reactive  monitor      # Hyponatremia hypo-osmolar hypovolemic  improving  continue to monitor      # Transaminitis  USG RUQ  R factor 4.2: mixed pattern        DVT ppx: lovenox  GI ppx: ppi  Diet: kosher diet low K  Activity: as tolerated  FULL code

## 2020-10-01 NOTE — PROGRESS NOTE ADULT - SUBJECTIVE AND OBJECTIVE BOX
Patient is awake and alert this am in no respiratory distress       T(F): 97 (10-01-20 @ 07:11), Max: 97.6 (09-30-20 @ 15:05)  HR: 87 (10-01-20 @ 09:05)  BP: 132/86 (10-01-20 @ 09:05)  RR: 22  SpO2: 99% (10-01-20 @ 09:05) (99% - 100%)    PHYSICAL EXAM:  GENERAL: NAD  HEAD:  Atraumatic, Normocephalic  NERVOUS SYSTEM:  Alert & Oriented X3, no focal deficits   CHEST/LUNG:  bilateral rales  HEART: Regular rate and rhythm; No murmurs, rubs, or gallops  ABDOMEN: Soft, Nontender, Nondistended; Bowel sounds present  EXTREMITIES:  2+ Peripheral Pulses, No clubbing, cyanosis, or edema  LYMPH: No lymphadenopathy noted  SKIN: No rashes or lesions    LABS  10-01    132<L>  |  89<L>  |  11  ----------------------------<  75  4.6   |  37<H>  |  <0.5<L>    Ca    10.3<H>      01 Oct 2020 06:55  Mg     2.2     10-01    TPro  6.9  /  Alb  3.9  /  TBili  0.5  /  DBili  x   /  AST  94<H>  /  ALT  202<H>  /  AlkPhos  123<H>  10-01                          10.2   4.21  )-----------( 425      ( 01 Oct 2020 06:55 )             35.1       CARDIAC ENZYMES    Troponin T, Serum: <0.01 ng/mL (09-30-20 @ 03:30)    Culture Results:   No growth at 5 days. (09-12-20)  Culture Results:   No growth at 5 days. (09-12-20)    RADIOLOGY    < end of copied text >  < from: Xray Chest 1 View- PORTABLE-Routine (Xray Chest 1 View- PORTABLE-Routine in AM.) (10.01.20 @ 06:31) >  Bilateral pleural effusion/opacity unchanged. No air leak.    < end of copied text >    MEDICATIONS  (STANDING):  chlorhexidine 4% Liquid 1 Application(s) Topical <User Schedule>  enoxaparin Injectable 80 milliGRAM(s) SubCutaneous every 12 hours  fentaNYL   Patch  25 MICROgram(s)/Hr 1 Patch Transdermal every 72 hours  metoprolol tartrate 12.5 milliGRAM(s) Oral two times a day  pantoprazole  Injectable 40 milliGRAM(s) IV Push daily  sodium chloride 0.9%. 1000 milliLiter(s) (50 mL/Hr) IV Continuous <Continuous>    MEDICATIONS  (PRN):  ALPRAZolam 0.25 milliGRAM(s) Oral daily PRN anxiety or/and shortness of breath  morphine  - Injectable 4 milliGRAM(s) IV Push every 2 hours PRN Moderate Pain (4 - 6)

## 2020-10-01 NOTE — PROGRESS NOTE ADULT - ASSESSMENT
IMPRESSION:  SOB 2/2 Breast Ca w/ mets to Lung and bone (on gefitinib and high-dose tamoxifen)  Dr. Juares spoke with her Oncologist who does not expect any further response to chemo in this patient. He suggests palliative care.  H/o PE (09/2020)  Hypo-osmolar hypovolemic Hyponatremia- resolving  6L home oxygen    PLAN:    CNS: avoid CNS depressants, at baseline mental status    HEENT: Oral care    PULMONARY:  HOB @ 30 degrees.  Aspiration precautions.   AVAPS 8hrs off/ 4hrs on as tolerated. hs and PRN.  Monitor end tidal CO2, if increases, put AVAPS back on.   Possible lymphangitic spread seen on [place] CT    CARDIOVASCULAR: FU ECHO. 3 sets of cardiac enzymes. avoid volume overload.     GI: GI prophylaxis. Feeding as tolerated when at least 30min off AVAPS. Bowel regimen.     RENAL:  Follow up lytes. Correct as needed. K-5.1, low K diet.     INFECTIOUS DISEASE: No abx for now. PanCx. FU Procalcitonin.     HEMATOLOGICAL:  Therapeutic lovenox.     ENDOCRINE:  Follow up FS.  Insulin protocol if needed.    MUSCULOSKELETAL: bedrest for now    GOC with family, needs MOLST form. For time being pt wants everything done.     Overall extremely poor prognosis

## 2020-10-01 NOTE — PROGRESS NOTE ADULT - SUBJECTIVE AND OBJECTIVE BOX
Patient is a 55y old  Female who presents with a chief complaint of respiratory distress (30 Sep 2020 12:21)        Over Night Events:  No overnight events. Tolerating off AVAPS. On 6L home oxygen. Afebrile. Never on pressors.       ROS:     All pertinent ROS are negative except HPI         PHYSICAL EXAM    ICU Vital Signs Last 24 Hrs  T(C): 36.1 (01 Oct 2020 07:11), Max: 37.3 (30 Sep 2020 11:00)  T(F): 97 (01 Oct 2020 07:11), Max: 99.1 (30 Sep 2020 11:00)  HR: 87 (01 Oct 2020 09:05) (70 - 87)  BP: 132/86 (01 Oct 2020 09:05) (102/51 - 150/77)  BP(mean): 105 (01 Oct 2020 09:05) (72 - 105)  ABP: --  ABP(mean): --  RR: 42 (01 Oct 2020 09:05) (16 - 61)  SpO2: 99% (01 Oct 2020 09:05) (99% - 100%)      CONSTITUTIONAL:   Ill appearing.  Well nourished.  NAD    ENT:   Airway patent,   Mouth with normal mucosa.   No thrush    EYES:   Pupils equal,   Round and reactive to light.    CARDIAC:   Normal rate,   Regular rhythm.    No edema    Vascular:  Normal systolic impulse  No Carotid bruits    RESPIRATORY:   on 6L NC  b/l dec breath sounds  No wheezing  Bilateral BS  Normal chest expansion  Not tachypneic,  No use of accessory muscles    GASTROINTESTINAL:  Abdomen soft,   Non-tender,   No guarding,   + BS    MUSCULOSKELETAL:   Range of motion is not limited,  No clubbing, cyanosis    NEUROLOGICAL:   AOx4  Alert and oriented   No motor  deficits.  pertinent DTRs normal    SKIN:   Skin normal color for race,   Warm and dry  No evidence of rash.    PSYCHIATRIC:   Normal mood and affect.   No apparent risk to self or others.      09-30-20 @ 07:01  -  10-01-20 @ 07:00  --------------------------------------------------------  IN:    sodium chloride 0.9%: 500 mL  Total IN: 500 mL    OUT:    Voided (mL): 1600 mL  Total OUT: 1600 mL    Total NET: -1100 mL          LABS:                            10.2   4.21  )-----------( 425      ( 01 Oct 2020 06:55 )             35.1                                               10-01    132<L>  |  89<L>  |  11  ----------------------------<  75  4.6   |  37<H>  |  <0.5<L>    Ca    10.3<H>      01 Oct 2020 06:55  Mg     2.2     10-01    TPro  6.9  /  Alb  3.9  /  TBili  0.5  /  DBili  x   /  AST  94<H>  /  ALT  202<H>  /  AlkPhos  123<H>  10-01                                             Urinalysis Basic - ( 30 Sep 2020 05:00 )    Color: Yellow / Appearance: Clear / SG: >=1.030 / pH: x  Gluc: x / Ketone: 15  / Bili: Small / Urobili: 1.0 mg/dL   Blood: x / Protein: 30 mg/dL / Nitrite: Negative   Leuk Esterase: Negative / RBC: x / WBC 3-5 /HPF   Sq Epi: x / Non Sq Epi: Moderate /HPF / Bacteria: Few        CARDIAC MARKERS ( 30 Sep 2020 03:30 )  x     / <0.01 ng/mL / x     / x     / x                                                LIVER FUNCTIONS - ( 01 Oct 2020 06:55 )  Alb: 3.9 g/dL / Pro: 6.9 g/dL / ALK PHOS: 123 U/L / ALT: 202 U/L / AST: 94 U/L / GGT: x                                                                                                                                   ABG - ( 01 Oct 2020 06:40 )  pH, Arterial: 7.38  pH, Blood: x     /  pCO2: 63    /  pO2: 118   / HCO3: 37    / Base Excess: 9.8   /  SaO2: 98                  MEDICATIONS  (STANDING):  chlorhexidine 4% Liquid 1 Application(s) Topical <User Schedule>  enoxaparin Injectable 80 milliGRAM(s) SubCutaneous every 12 hours  fentaNYL   Patch  25 MICROgram(s)/Hr 1 Patch Transdermal every 72 hours  metoprolol tartrate 12.5 milliGRAM(s) Oral two times a day  pantoprazole  Injectable 40 milliGRAM(s) IV Push daily  sodium chloride 0.9%. 1000 milliLiter(s) (50 mL/Hr) IV Continuous <Continuous>    MEDICATIONS  (PRN):  ALPRAZolam 0.25 milliGRAM(s) Oral daily PRN anxiety or/and shortness of breath  morphine  - Injectable 4 milliGRAM(s) IV Push every 2 hours PRN Moderate Pain (4 - 6)      New X-rays reviewed:                                                                                  ECHO    CXR interpreted by me:

## 2020-10-01 NOTE — PROGRESS NOTE ADULT - SUBJECTIVE AND OBJECTIVE BOX
SUBJECTIVE:  Patient is a 55y old Female who presents with a chief complaint of respiratory distress (01 Oct 2020 11:02)  Currently admitted to medicine with the primary diagnosis of acute on chronic hypoxic and hypercapnic resp failure likely 2/2 mets breast cancer to lung       Today is hospital day 1d. This morning she is resting comfortably in bed and reports no new issues or overnight events.       PAST MEDICAL & SURGICAL HISTORY  Metastatic breast cancer    H/O bilateral mastectomy      SOCIAL HISTORY:  Negative for smoking/alcohol/drug use.     ALLERGIES:  tamoxifen (Hives)    MEDICATIONS:  STANDING MEDICATIONS  chlorhexidine 4% Liquid 1 Application(s) Topical <User Schedule>  enoxaparin Injectable 80 milliGRAM(s) SubCutaneous every 12 hours  fentaNYL   Patch  25 MICROgram(s)/Hr 1 Patch Transdermal every 72 hours  metoprolol tartrate 12.5 milliGRAM(s) Oral two times a day  pantoprazole  Injectable 40 milliGRAM(s) IV Push daily  sodium chloride 0.9%. 1000 milliLiter(s) IV Continuous <Continuous>    PRN MEDICATIONS  ALPRAZolam 0.25 milliGRAM(s) Oral daily PRN  morphine  - Injectable 4 milliGRAM(s) IV Push every 2 hours PRN    Home Medications:  clonazePAM 0.5 mg oral tablet: 1 tab(s) orally once a day, As Needed (30 Sep 2020 10:37)  exemestane 25 mg oral tablet: 1 tab(s) orally  (30 Sep 2020 10:37)  Metoprolol Tartrate 25 mg oral tablet: 0.5 tab(s) orally 2 times a day (30 Sep 2020 10:37)  torsemide 5 mg oral tablet: 2 tab(s) orally 2 times a day, As Needed (30 Sep 2020 10:37)    Vital Signs Last 24 Hrs  T(C): 36.6 (01 Oct 2020 11:00), Max: 36.6 (01 Oct 2020 11:00)  T(F): 97.9 (01 Oct 2020 11:00), Max: 97.9 (01 Oct 2020 11:00)  HR: 79 (01 Oct 2020 11:00) (70 - 87)  BP: 132/86 (01 Oct 2020 09:05) (102/51 - 140/67)  BP(mean): 105 (01 Oct 2020 09:05) (72 - 105)  RR: 20 (01 Oct 2020 11:00) (16 - 61)  SpO2: 100% (01 Oct 2020 11:00) (99% - 100%)      LABS:                        10.2   4.21  )-----------( 425      ( 01 Oct 2020 06:55 )             35.1     10-01    132<L>  |  89<L>  |  11  ----------------------------<  75  4.6   |  37<H>  |  <0.5<L>    Ca    10.3<H>      01 Oct 2020 06:55  Mg     2.2     10-01    TPro  6.9  /  Alb  3.9  /  TBili  0.5  /  DBili  x   /  AST  94<H>  /  ALT  202<H>  /  AlkPhos  123<H>  10-01      Urinalysis Basic - ( 30 Sep 2020 05:00 )    Color: Yellow / Appearance: Clear / SG: >=1.030 / pH: x  Gluc: x / Ketone: 15  / Bili: Small / Urobili: 1.0 mg/dL   Blood: x / Protein: 30 mg/dL / Nitrite: Negative   Leuk Esterase: Negative / RBC: x / WBC 3-5 /HPF   Sq Epi: x / Non Sq Epi: Moderate /HPF / Bacteria: Few      ABG - ( 01 Oct 2020 06:40 )  pH, Arterial: 7.38  pH, Blood: x     /  pCO2: 63    /  pO2: 118   / HCO3: 37    / Base Excess: 9.8   /  SaO2: 98        CARDIAC MARKERS ( 30 Sep 2020 03:30 )  x     / <0.01 ng/mL / x     / x     / x            PHYSICAL EXAM:  GEN: No acute distress, on 6 L baseline NC  LUNGS: BL decreased breath sounds  HEART: S1/S2 present. RRR.   ABD: Soft, non-tender, non-distended. Bowel sounds present  NEURO: AAOX3

## 2020-10-02 NOTE — PROGRESS NOTE ADULT - SUBJECTIVE AND OBJECTIVE BOX
Patient is a 55y old  Female who presents with a chief complaint of respiratory distress (01 Oct 2020 11:16)        Over Night Events:  No events overnight. Afebrile. Never on pressors. On home oxygen, 6L. Lying comfortable in bed.       ROS:     All pertinent ROS are negative except HPI         PHYSICAL EXAM    ICU Vital Signs Last 24 Hrs  T(C): 36.4 (02 Oct 2020 07:14), Max: 36.6 (01 Oct 2020 11:00)  T(F): 97.5 (02 Oct 2020 07:14), Max: 97.9 (01 Oct 2020 11:00)  HR: 84 (02 Oct 2020 09:00) (70 - 85)  BP: 164/91 (02 Oct 2020 09:00) (85/54 - 164/91)  BP(mean): 84 (02 Oct 2020 09:00) (65 - 115)  ABP: --  ABP(mean): --  RR: 21 (02 Oct 2020 09:06) (16 - 37)  SpO2: 100% (02 Oct 2020 09:00) (92% - 100%)      CONSTITUTIONAL:   Ill appearing.  Well nourished.  NAD    ENT:   Airway patent,   Mouth with normal mucosa.   No thrush    EYES:   Pupils equal,   Round and reactive to light.    CARDIAC:   Normal rate,   Regular rhythm.    No edema    Vascular:  Normal systolic impulse  No Carotid bruits    RESPIRATORY:   On 6L home ocygen  Bilateral BS  Normal chest expansion  Not tachypneic,  No use of accessory muscles    GASTROINTESTINAL:  Abdomen soft,   Non-tender,   No guarding,   + BS    MUSCULOSKELETAL:   Range of motion is not limited,  No clubbing, cyanosis    NEUROLOGICAL:   AOx4  Alert and oriented   No motor  deficits.  pertinent DTRs normal    SKIN:   Skin normal color for race,   Warm and dry  No evidence of rash.    PSYCHIATRIC:   Normal mood and affect.   No apparent risk to self or others.      10-01-20 @ 07:01  -  10-02-20 @ 07:00  --------------------------------------------------------  IN:    Oral Fluid: 480 mL  Total IN: 480 mL    OUT:    Voided (mL): 1200 mL  Total OUT: 1200 mL    Total NET: -720 mL          LABS:                            10.4   3.78  )-----------( 438      ( 02 Oct 2020 06:21 )             35.4                                               10-02    135  |  92<L>  |  14  ----------------------------<  93  4.7   |  37<H>  |  0.5<L>    Ca    10.2<H>      02 Oct 2020 06:21  Mg     2.3     10-02    TPro  7.0  /  Alb  4.1  /  TBili  0.3  /  DBili  x   /  AST  69<H>  /  ALT  171<H>  /  AlkPhos  124<H>  10-02                                                                                           LIVER FUNCTIONS - ( 02 Oct 2020 06:21 )  Alb: 4.1 g/dL / Pro: 7.0 g/dL / ALK PHOS: 124 U/L / ALT: 171 U/L / AST: 69 U/L / GGT: x                                                                                                                                   ABG - ( 01 Oct 2020 06:40 )  pH, Arterial: 7.38  pH, Blood: x     /  pCO2: 63    /  pO2: 118   / HCO3: 37    / Base Excess: 9.8   /  SaO2: 98                  MEDICATIONS  (STANDING):  chlorhexidine 4% Liquid 1 Application(s) Topical <User Schedule>  enoxaparin Injectable 80 milliGRAM(s) SubCutaneous every 12 hours  exemestane 25 milliGRAM(s) Oral daily  fentaNYL   Patch  25 MICROgram(s)/Hr 1 Patch Transdermal every 72 hours  metoprolol tartrate 12.5 milliGRAM(s) Oral two times a day  pantoprazole  Injectable 40 milliGRAM(s) IV Push daily    MEDICATIONS  (PRN):  ALPRAZolam 0.5 milliGRAM(s) Oral two times a day PRN anxiety  morphine  - Injectable 4 milliGRAM(s) IV Push every 2 hours PRN Moderate Pain (4 - 6)      New X-rays reviewed:                                                                                  ECHO    CXR interpreted by me:

## 2020-10-02 NOTE — PROGRESS NOTE ADULT - ATTENDING COMMENTS
Attending Statement: I have personally performed a face to face diagnostic evaluation on this patient. The patient is suffering from metastatic breast ca. I have reviewed the above note and agree with the history, exam and suggestions for care, except as I have noted in the text.
Attending Statement: I have personally performed a face to face diagnostic evaluation on this patient. The patient is suffering from metastatic breast Ca. I have reviewed the above note and agree with the history, exam and suggestions for care, except as I have noted in the text.

## 2020-10-02 NOTE — PROGRESS NOTE ADULT - ASSESSMENT
54 yo F PMHx metastatic breast ca (original dx in 2001), now with mets to lungs, bones, s/p L sided pigtail for drainage of recurrent L sided malignant effusion, diagnosed with PE 9/11/2020, currently on lovenox 85 mg SC BID. Patient is here for worsening chest pain, dyspnea.       Acute on chronic hypoxic and hypercapnic resp failure likely secondary to metastatic breast cancer to lungs: at baseline 6 L NC now  - home oxygen 6 L   - HOB @ 45 degrees  - Aspiration precautions.   - AVAPS 8 off and 4 on  - daily CXR   - c/w pigtail drainage    Recent PE   - due to malignancy  - c/w Lovenox    HTN  - cw home meds      #Anxiety  - c/w Clonazepam prn daily    Microcytic anemia  - given advanced disease colonoscopy would be of little utility  - monitor  - could be due to chronic disease      Thrombocytosis  - likely reactive  - monitor      Hyponatremia hypo-osmolar hypovolemic  - improving  - continue to monitor    Transaminitis  - outpt follow up    DVT ppx: lovenox  GI ppx: ppi  Diet: kosher diet low K  Activity: as tolerated  FULL code    #Progress Note Handoff:  Pending (specify):  Clinical improvement  Family discussion: discussed symptoms. GOC brought up but pt only answer questions by shaking head now. Will approach subject again  Disposition: Home___/SNF___/Other________/Unknown at this time______x__

## 2020-10-02 NOTE — CHART NOTE - NSCHARTNOTEFT_GEN_A_CORE
56 yo F, history of metastatic breast ca (original dx in 2001), now with mets to lungs, bones, sp L sided pigtail for drainage of recurrent L sided malignant effusion, diagnosed with PE 9/11/20, currently on lovenox 85 mg SC BID. Patient is here for worsening chest pain, dyspnea.   Since discharge 9/18 has restarted chemo under care of Heme/Onc Dr. Watt. Over the last 4 days has had progressively increasing chest pain, described as squeezing ever 1-2 minutes. Per , has been "breathing with her belly" since she was in the hospital and while at home.  Last drained 400 cc from her pleural effusion yesterday.     The pt was admitted to ICU for acute on chronic hypoxic and hypercapnic resp failure.  Initial ABGs showed PCO2 91 which did not improve on BIPAP.   The pt was then placed on AVAPS and the ABgs improved to PCO 64.   The pt has been tolerating avaps 4 on and 4 off well.  The patient gets short of breath and wants to go back to avaps as needed but never had a low Pulse O2.  The pt was never intubated nor was started on vasopressor support.  The pt is currently on 6 L NC (home Oxygen: at baseline)  echo is ordered.  The pt is being downgraded to regular medical floor

## 2020-10-02 NOTE — PHARMACOTHERAPY INTERVENTION NOTE - COMMENTS
Discussed with MD that patient has history of taking gefitinib and has potential to cause pulmonary toxicity

## 2020-10-02 NOTE — PROGRESS NOTE ADULT - ASSESSMENT
IMPRESSION:  SOB 2/2 Breast Ca w/ mets to Lung and bone (on gefitinib and high-dose tamoxifen)  Dr. Juares spoke with her Oncologist who does not expect any further response to chemo in this patient. He suggests palliative care.  H/o PE (09/2020)  Hypo-osmolar hypovolemic Hyponatremia- resolved  6L home oxygen    PLAN:    CNS: avoid CNS depressants, at baseline mental status    HEENT: Oral care    PULMONARY:  HOB @ 30 degrees.  Aspiration precautions.   AVAPS PRN and hs  Monitor end tidal CO2, if increases, put AVAPS back on.   Possible lymphangitic spread seen on [place] CT    CARDIOVASCULAR: FU ECHO. avoid volume overload.     GI: GI prophylaxis. Feeding as tolerated when at least 30min off AVAPS. Bowel regimen.     RENAL: Follow up lytes. Correct as needed. low K diet.     INFECTIOUS DISEASE: No abx for now. PanCx. FU Procalcitonin.     HEMATOLOGICAL:  Therapeutic lovenox.     ENDOCRINE:  Follow up FS.  Insulin protocol if needed.    MUSCULOSKELETAL: bedrest for now    Repeat GOC with family  FULL CODE for now    Overall extremely poor prognosis IMPRESSION:  SOB 2/2 Breast Ca w/ mets to Lung and bone (on gefitinib and high-dose tamoxifen)  Dr. Juares spoke with her Oncologist who does not expect any further response to chemo in this patient. He suggests palliative care.  H/o PE (09/2020)  Hypo-osmolar hypovolemic Hyponatremia- resolved  6L home oxygen    PLAN:    CNS: avoid CNS depressants, at baseline mental status    HEENT: Oral care    PULMONARY:  HOB @ 30 degrees.  Aspiration precautions.   AVAPS PRN and hs  Monitor end tidal CO2, if increases, put AVAPS back on.   Possible lymphangitic spread seen on [place] CT    CARDIOVASCULAR: FU ECHO. avoid volume overload.     GI: GI prophylaxis. Feeding as tolerated when at least 30min off AVAPS. Bowel regimen.     RENAL: Follow up lytes. Correct as needed. low K diet.     INFECTIOUS DISEASE: No abx for now. PanCx. FU Procalcitonin.     HEMATOLOGICAL:  Therapeutic lovenox.     ENDOCRINE:  Follow up FS.  Insulin protocol if needed.    MUSCULOSKELETAL: bedrest for now    Repeat GOC with family  FULL CODE for now    Overall extremely poor prognosis    Downgrade to general medical floor IMPRESSION:  SOB 2/2 Breast Ca w/ mets to Lung and bone (on gefitinib and high-dose tamoxifen)  Dr. Juares spoke with her Oncologist who does not expect any further response to chemo in this patient. He suggests palliative care.  H/o PE (09/2020)  Hypo-osmolar hypovolemic Hyponatremia- resolved  6L home oxygen    SUGGEST:      CNS: avoid CNS depressants, at baseline mental status    HEENT: Oral care    PULMONARY:  HOB @ 30 degrees.  Aspiration precautions.   AVAPS as an option for home use as this condition will only worsen  AVAPS off and on PRN  Possible lymphangitic spread seen on [place] CT    CARDIOVASCULAR: FU ECHO. avoid volume overload.     GI: GI prophylaxis. Feeding as tolerated when at least 30min off AVAPS. Bowel regimen.     RENAL: Follow up lytes. Correct as needed. low K diet.     INFECTIOUS DISEASE: No abx for now. PanCx. FU Procalcitonin.     HEMATOLOGICAL:  Therapeutic lovenox.     ENDOCRINE:  Follow up FS.  Insulin protocol if needed.    MUSCULOSKELETAL: bedrest for now    Repeat GOC with family  FULL CODE for now    Overall extremely poor prognosis    Downgrade to general medical floor

## 2020-10-02 NOTE — PROGRESS NOTE ADULT - SUBJECTIVE AND OBJECTIVE BOX
CHIEF COMPLAINT:    Patient is a 55y old  Female who presents with a chief complaint of respiratory distress     INTERVAL HPI/OVERNIGHT EVENTS:    Patient seen and examined at bedside. No acute overnight events occurred.    ROS: Reports SOB. All other systems are negative.    Vital Signs:    T(F): 97 (10-02-20 @ 12:01), Max: 97.9 (10-02-20 @ 03:00)  HR: 65 (10-02-20 @ 11:07) (65 - 85)  BP: 129/63 (10-02-20 @ 12:01) (85/54 - 164/91)  RR: 20 (10-02-20 @ 12:01) (16 - 37)  SpO2: 100% (10-02-20 @ 11:07) (92% - 100%)  I&O's Summary    01 Oct 2020 07:01  -  02 Oct 2020 07:00  --------------------------------------------------------  IN: 480 mL / OUT: 1200 mL / NET: -720 mL      Daily     Daily Weight in k.2 (02 Oct 2020 11:00)  CAPILLARY BLOOD GLUCOSE          PHYSICAL EXAM:  GENERAL:  NAD  SKIN: No rashes or lesions  HEENT: Atraumatic. Normocephalic. Anicteric  NECK:  No JVD.   PULMONARY: Clear to ausculation bilaterally. No wheezing. No rales  CVS: Normal S1, S2. Regular rate and rhythm. No murmurs.  ABDOMEN/GI: Soft, Nontender, Nondistended; Bowel sounds are present  EXTREMITIES:  No edema B/L LE.  NEUROLOGIC:  No motor deficit.  PSYCH: Alert & oriented x 3, normal affect    Consultant(s) Notes Reviewed:  [x ] YES  [ ] NO  Care Discussed with Consultants/Other Providers [ x] YES  [ ] NO    LABS:                        10.4   3.78  )-----------( 438      ( 02 Oct 2020 06:21 )             35.4     10    135  |  92<L>  |  14  ----------------------------<  93  4.7   |  37<H>  |  0.5<L>    Ca    10.2<H>      02 Oct 2020 06:21  Mg     2.3     10-02    TPro  7.0  /  Alb  4.1  /  TBili  0.3  /  DBili  x   /  AST  69<H>  /  ALT  171<H>  /  AlkPhos  124<H>  10-02      Serum Pro-Brain Natriuretic Peptide: 85 pg/mL (20 @ 03:30)    Trop <0.01, CKMB 1.8, CK 38, 10-02-20 @ 12:17  Trop <0.01, CKMB --, CK --, 20 @ 03:30        RADIOLOGY & ADDITIONAL TESTS:  CXR shows stable b/l effusions    Medications:  Standing  chlorhexidine 4% Liquid 1 Application(s) Topical <User Schedule>  enoxaparin Injectable 80 milliGRAM(s) SubCutaneous every 12 hours  exemestane 25 milliGRAM(s) Oral daily  fentaNYL   Patch  25 MICROgram(s)/Hr 1 Patch Transdermal every 72 hours  metoprolol tartrate 12.5 milliGRAM(s) Oral two times a day  pantoprazole  Injectable 40 milliGRAM(s) IV Push daily    PRN Meds  ALPRAZolam 0.5 milliGRAM(s) Oral two times a day PRN  morphine  - Injectable 4 milliGRAM(s) IV Push every 2 hours PRN

## 2020-10-02 NOTE — PHARMACOTHERAPY INTERVENTION NOTE - COMMENTS
Recommended to add bowel regimen due to constipation from fentanyl Recommended to add bowel regimen due to constipation as patient is on opioid.

## 2020-10-02 NOTE — GOALS OF CARE CONVERSATION - ADVANCED CARE PLANNING - CONVERSATION DETAILS
I spoke with  and pt at bedside. Pt wants to be DNR but not DNI. They are both aware that if she gets intubated she will likely not be able to be xtubated. They are comfortable with the idea of tracheotomy and long term intubation. SAPNA completed.
diagnosis of end stage metastatic breast cancer with b/l malignant pleural effusions  with very grave prognosis  pt and  understand that if she gets intubated she will likely never be extubated  pt is considering DNR / DNI but wants time to decide  pt wants to be full code for now

## 2020-10-03 NOTE — PROGRESS NOTE ADULT - SUBJECTIVE AND OBJECTIVE BOX
CHIEF COMPLAINT:    Patient is a 55y old  Female who presents with a chief complaint of respiratory distress     INTERVAL HPI/OVERNIGHT EVENTS:    Patient seen and examined at bedside. No acute overnight events occurred.    ROS: Reports persistent SOB. All other systems are negative.    Vital Signs:    T(F): 96.3 (10-03-20 @ 05:24), Max: 97.8 (10-02-20 @ 11:00)  HR: 73 (10-03-20 @ 05:24) (59 - 73)  BP: 128/81 (10-03-20 @ 07:01) (103/55 - 182/103)  RR: 20 (10-03-20 @ 05:24) (19 - 27)  SpO2: 100% (10-03-20 @ 05:24) (100% - 100%)     Daily Weight in k.2 (02 Oct 2020 11:00)  CAPILLARY BLOOD GLUCOSE      PHYSICAL EXAM:  GENERAL:  NAD  SKIN: No rashes or lesions  HEENT: Atraumatic. Normocephalic. Anicteric  NECK:  No JVD.   PULMONARY: Decreased breath sounds. Harsh transmitted breath sounds from BiPap  CVS: Normal S1, S2. Regular rate and rhythm. No murmurs.  ABDOMEN/GI: Soft, Nontender, Nondistended; Bowel sounds are present  EXTREMITIES:  No edema B/L LE.  NEUROLOGIC:  No motor deficit.  PSYCH: Alert & oriented x 3, normal affect      LABS:                        10.4   3.78  )-----------( 438      ( 02 Oct 2020 06:21 )             35.4     10    135  |  92<L>  |  14  ----------------------------<  93  4.7   |  37<H>  |  0.5<L>    Ca    10.2<H>      02 Oct 2020 06:21  Mg     2.3     10-02    TPro  7.0  /  Alb  4.1  /  TBili  0.3  /  DBili  x   /  AST  69<H>  /  ALT  171<H>  /  AlkPhos  124<H>  10      Serum Pro-Brain Natriuretic Peptide: 85 pg/mL (20 @ 03:30)    Trop <0.01, CKMB 1.8, CK 38, 10-02-20 @ 12:17        RADIOLOGY & ADDITIONAL TESTS:  No new images    Medications:  Standing  chlorhexidine 4% Liquid 1 Application(s) Topical <User Schedule>  enoxaparin Injectable 80 milliGRAM(s) SubCutaneous every 12 hours  exemestane 25 milliGRAM(s) Oral daily  fentaNYL   Patch  25 MICROgram(s)/Hr 1 Patch Transdermal every 72 hours  metoprolol tartrate 12.5 milliGRAM(s) Oral two times a day  pantoprazole  Injectable 40 milliGRAM(s) IV Push daily    PRN Meds  ALPRAZolam 0.5 milliGRAM(s) Oral two times a day PRN  morphine  - Injectable 4 milliGRAM(s) IV Push every 2 hours PRN

## 2020-10-03 NOTE — PROGRESS NOTE ADULT - ASSESSMENT
56 yo F PMHx metastatic breast ca (original dx in 2001), now with mets to lungs, bones, s/p L sided pigtail (9/2020)  for drainage of recurrent L sided malignant effusion, diagnosed with PE 9/11/2020, currently on lovenox 85 mg SC BID. Patient is here for worsening chest pain, dyspnea. Pt was admitted to the ICU for acute respiratory failure requiring continuos  AVAPs and downgraded on 10/2      Acute on chronic hypoxic and hypercapnic resp failure likely secondary to metastatic breast cancer to lungs  - remains AVAPS dependent  - pt does draining via pigtail twice weekly - monday and saturdays  - trial transition to high flow   - home oxygen 6 L   - HOB @ 45 degrees  - Aspiration precautions.     Recent PE   - due to malignancy  - c/w Lovenox    HTN  - cw home meds      Anxiety  - c/w Clonazepam prn daily    Microcytic anemia  - given advanced disease colonoscopy would be of little utility  - monitor  - could be due to chronic disease      Thrombocytosis  - likely reactive  - monitor      Hyponatremia hypo-osmolar hypovolemic  - improving  - continue to monitor    Transaminitis  - outpt follow up    DVT ppx: lovenox  GI ppx: ppi  Diet: kosher diet low K  Activity: as tolerated  DNR not DNI  Pt has extremely poor prognosis and is high risk of decompensation and readmission    #Progress Note Handoff:  Pending (specify):  Clinical improvement  Family discussion: I spoke with patient and her  yesterday and again with pt and her daughter this morning. They understand her SOB is due to malignancy and she will be very unlikely to recover. They understand she will likely need AVAPS to go home with even if she tolerates high flow. They are not interested in hospice. She remains DNR but NOT DNI  Disposition: Home___/SNF___/Other________/Unknown at this time______x__

## 2020-10-03 NOTE — CHART NOTE - NSCHARTNOTEFT_GEN_A_CORE
Pt tolerating high flow currently. If pt tolerates over next 24 hours pt would likely benefit from d/c home with high flow over avaps

## 2020-10-03 NOTE — PROVIDER CONTACT NOTE (OTHER) - SITUATION
Notified by Marce YANEZ that pts  had done a tap at bedside L lung. 250ml drained. MD Grajeda notified.

## 2020-10-04 NOTE — PHYSICAL THERAPY INITIAL EVALUATION ADULT - GENERAL OBSERVATIONS, REHAB EVAL
Pt encountered sitting in chair bedside, NAD, +primafit, on highflow 02 (40L/min) via NC, daughter bedside, ok to be seen by PT as confirmed by RN and MD. Pt agreeable and eager.

## 2020-10-04 NOTE — PROGRESS NOTE ADULT - ASSESSMENT
56 yo F PMHx metastatic breast ca (original dx in 2001), now with mets to lungs, bones, s/p L sided pigtail (9/2020)  for drainage of recurrent L sided malignant effusion, diagnosed with PE 9/11/2020, currently on lovenox 85 mg SC BID. Patient is here for worsening chest pain, dyspnea. Pt was admitted to the ICU for acute respiratory failure requiring continuos  AVAPs and downgraded on 10/2      Acute on chronic hypoxic and hypercapnic resp failure likely secondary to metastatic breast cancer to lungs  - remains AVAPS dependent  - pt does draining via pigtail twice weekly - monday and saturdays  - trial transition to high flow   - home oxygen 6 L   - HOB @ 45 degrees  - Aspiration precautions.     Recent PE   - due to malignancy  - c/w Lovenox    HTN  - cw home meds      Anxiety  - c/w Clonazepam prn daily    Microcytic anemia  - given advanced disease colonoscopy would be of little utility  - monitor  - could be due to chronic disease      Thrombocytosis  - likely reactive  - monitor      Hyponatremia hypo-osmolar hypovolemic  - improving  - continue to monitor    Transaminitis  - outpt follow up    DVT ppx: lovenox  GI ppx: ppi  Diet: kosher diet low K  Activity: as tolerated  DNR not DNI  Pt has extremely poor prognosis and is high risk of decompensation and readmission    #Progress Note Handoff:  Pending (specify):  Clinical improvement  Family discussion: I spoke with patient and her  yesterday and again with pt and her daughter this morning. They understand her SOB is due to malignancy and she will be very unlikely to recover. They understand she will likely need AVAPS to go home with even if she tolerates high flow. They are not interested in hospice. She remains DNR but NOT DNI  Disposition: Home___/SNF___/Other________/Unknown at this time______x__   54 yo F PMHx metastatic breast ca (original dx in 2001), now with mets to lungs, bones, s/p L sided pigtail (9/2020)  for drainage of recurrent L sided malignant effusion, diagnosed with PE 9/11/2020, currently on lovenox 85 mg SC BID. Patient is here for worsening chest pain, dyspnea. Pt was admitted to the ICU for acute respiratory failure requiring continuos  AVAPs and downgraded on 10/2      # Acute on chronic hypoxic and hypercapnic resp failure likely secondary to metastatic breast cancer to lungs  - remains AVAPS dependent  - pt does draining via pigtail twice weekly - monday and saturdays  - trial transition to high flow during day and AVAP at night   - home oxygen 6 L   - HOB @ 45 degrees  - Aspiration precautions.     # Recent PE   - due to malignancy  - c/w  therapeutic dosing of Lovenox    # HTN  - cw home meds    # Anxiety  - c/w Clonazepam prn daily    # Microcytic anemia, STABLE  - given advanced disease colonoscopy would be of little utility  - monitor  - could be due to chronic disease  - no transfusions required    # Thrombocytosis, IMPROVING  - likely reactive  - monitor    # Transaminitis, IMPROVING  - unsure etiology; RUQ US unremarkable  - outpt follow up    # Hyponatremia hypo-osmolar hypovolemic, RESOLVED  - continue to monitor    DVT ppx: lovenox  GI ppx: ppi  Diet: kosher diet low K  Activity: as tolerated  DNR not DNI  Pt has extremely poor prognosis and is high risk of decompensation and readmission    #Progress Note Handoff:  Pending (specify):  Clinical improvement  Family discussion: I spoke with patient and her  yesterday and again with pt and her daughter this morning. They understand her SOB is due to malignancy and she will be very unlikely to recover. They understand she will likely need AVAPS to go home with even if she tolerates high flow. They are not interested in hospice. She remains DNR but NOT DNI  Disposition: Home___/SNF___/Other________/Unknown at this time______x__   56 yo F PMHx metastatic breast ca (original dx in 2001), now with mets to lungs, bones, s/p L sided pigtail (9/2020)  for drainage of recurrent L sided malignant effusion, diagnosed with PE 9/11/2020, currently on lovenox 85 mg SC BID. Patient is here for worsening chest pain, dyspnea. Pt was admitted to the ICU for acute respiratory failure requiring continuos  AVAPs and downgraded on 10/2      # Acute on chronic hypoxic and hypercapnic resp failure likely secondary to metastatic breast cancer to lungs  - remains AVAPS dependent  - pt does draining via pigtail twice weekly - monday and saturdays  - trial transition to high flow during day and AVAP at night   - home oxygen 6 L   - HOB @ 45 degrees  - Aspiration precautions.     # Recent PE   - due to malignancy  - c/w  therapeutic dosing of Lovenox    # Consitpation  - starting Miralax 17g daily PRN  - starting Senna 2tabs qhs    # Functional quadraplegia  - profoundly deconditioned   - PT eval pending    # HTN  - cw home meds    # Anxiety  - c/w Clonazepam prn daily    # Microcytic anemia, STABLE  - given advanced disease colonoscopy would be of little utility  - monitor  - could be due to chronic disease  - no transfusions required    # Thrombocytosis, IMPROVING  - likely reactive  - monitor    # Transaminitis, IMPROVING  - unsure etiology; RUQ US unremarkable  - outpt follow up    # Hyponatremia hypo-osmolar hypovolemic, RESOLVED  - continue to monitor    DVT ppx: lovenox  GI ppx: ppi  Diet: kosher diet low K  Activity: as tolerated  DNR not DNI  Pt has extremely poor prognosis and is high risk of decompensation and readmission    #Progress Note Handoff:  Pending (specify):  Clinical improvement  Family discussion: I spoke with patient and her  yesterday and again with pt and her daughter this morning. They understand her SOB is due to malignancy and she will be very unlikely to recover. They understand she will likely need AVAPS to go home with even if she tolerates high flow. They are not interested in hospice. She remains DNR but NOT DNI  Disposition: Home___/SNF___/Other________/Unknown at this time______x__

## 2020-10-04 NOTE — PROGRESS NOTE ADULT - SUBJECTIVE AND OBJECTIVE BOX
JENN SERRANO  55y  Female      Patient is a 55y old  Female who presents with a chief complaint of respiratory distress (03 Oct 2020 09:57)      INTERVAL HPI/OVERNIGHT EVENTS:    VITALS  T(C): 35.6 (10-04-20 @ 05:00), Max: 36.1 (10-03-20 @ 21:36)  HR: 70 (10-04-20 @ 05:00) (65 - 76)  BP: 128/77 (10-04-20 @ 05:00) (100/63 - 128/77)  RR: 20 (10-04-20 @ 05:00) (20 - 24)  SpO2: 100% (10-03-20 @ 23:26) (98% - 100%)  Wt(kg): --Vital Signs Last 24 Hrs  T(C): 35.6 (04 Oct 2020 05:00), Max: 36.1 (03 Oct 2020 21:36)  T(F): 96 (04 Oct 2020 05:00), Max: 97 (03 Oct 2020 21:36)  HR: 70 (04 Oct 2020 05:00) (65 - 76)  BP: 128/77 (04 Oct 2020 05:00) (100/63 - 128/77)  BP(mean): --  RR: 20 (04 Oct 2020 05:00) (20 - 24)  SpO2: 100% (03 Oct 2020 23:26) (98% - 100%)      10-03-20 @ 07:01  -  10-04-20 @ 07:00  --------------------------------------------------------  IN: 0 mL / OUT: 1300 mL / NET: -1300 mL      PHYSICAL EXAM:  GENERAL: NAD, well-groomed, well-developed  PSYCH: no agitation, baseline mentation  NERVOUS SYSTEM:  Alert & Oriented X3, Motor Strength 5/5 B/L upper and lower extremities; Sensory intact; FTN WNL  PULMONARY: Clear to percussion bilaterally; No rales, rhonchi, wheezing, or rubs  CARDIOVASCULAR: Regular rate and rhythm; No murmurs, rubs, or gallops  GI: Soft, Nontender, Nondistended; Bowel sounds present  EXTREMITIES:  2+ Peripheral Pulses, No clubbing, cyanosis, or edema  LYMPH: No lymphadenopathy noted  SKIN: No rashes or lesions    Consultant(s) Notes Reviewed:  [x ] YES  [ ] NO    Discussed with Consultants/Other Providers [ x] YES     LABS                          11.2   4.25  )-----------( 391      ( 03 Oct 2020 11:00 )             38.7     10-03    137  |  93<L>  |  15  ----------------------------<  106<H>  4.8   |  36<H>  |  <0.5<L>    Ca    10.3<H>      03 Oct 2020 11:00  Mg     2.4     10-03    TPro  7.5  /  Alb  4.2  /  TBili  0.5  /  DBili  x   /  AST  60<H>  /  ALT  142<H>  /  AlkPhos  130<H>  10-03    Lactate Trend    CARDIAC MARKERS ( 02 Oct 2020 12:17 )  x     / <0.01 ng/mL / 38 U/L / x     / 1.8 ng/mL        RADIOLOGY & ADDITIONAL TESTS:  - no new images as of 10/4      HEALTH ISSUES - PROBLEM Dx:  Pulmonary thromboembolism      MEDICATIONS  (STANDING):  chlorhexidine 4% Liquid 1 Application(s) Topical <User Schedule>  enoxaparin Injectable 80 milliGRAM(s) SubCutaneous every 12 hours  exemestane 25 milliGRAM(s) Oral daily  fentaNYL   Patch  25 MICROgram(s)/Hr 1 Patch Transdermal every 72 hours  metoprolol tartrate 12.5 milliGRAM(s) Oral two times a day  pantoprazole  Injectable 40 milliGRAM(s) IV Push daily    MEDICATIONS  (PRN):  ALPRAZolam 0.5 milliGRAM(s) Oral two times a day PRN anxiety  morphine  - Injectable 4 milliGRAM(s) IV Push every 2 hours PRN Moderate Pain (4 - 6)           JENN SERRANO  55y  Female      Patient is a 55y old  Female who presents with a chief complaint of respiratory distress (03 Oct 2020 09:57)      INTERVAL HPI/OVERNIGHT EVENTS: No acute events. Daughter at bedside today. Reports frustration about having daily issues with having a family member spend the night even though they were told it was approved. Also notes no BMs in several days. Notes minimal ambulation as she feels week. Denies any subjective fevers, chills, n/v, chest pain, worsening SOB, abdominal pain, or changes in urination.    VITALS  T(C): 35.6 (10-04-20 @ 05:00), Max: 36.1 (10-03-20 @ 21:36)  HR: 70 (10-04-20 @ 05:00) (65 - 76)  BP: 128/77 (10-04-20 @ 05:00) (100/63 - 128/77)  RR: 20 (10-04-20 @ 05:00) (20 - 24)  SpO2: 100% (10-03-20 @ 23:26) (98% - 100%)  Wt(kg): --Vital Signs Last 24 Hrs  T(C): 35.6 (04 Oct 2020 05:00), Max: 36.1 (03 Oct 2020 21:36)  T(F): 96 (04 Oct 2020 05:00), Max: 97 (03 Oct 2020 21:36)  HR: 70 (04 Oct 2020 05:00) (65 - 76)  BP: 128/77 (04 Oct 2020 05:00) (100/63 - 128/77)  BP(mean): --  RR: 20 (04 Oct 2020 05:00) (20 - 24)  SpO2: 100% (03 Oct 2020 23:26) (98% - 100%)      10-03-20 @ 07:01  -  10-04-20 @ 07:00  --------------------------------------------------------  IN: 0 mL / OUT: 1300 mL / NET: -1300 mL      PHYSICAL EXAM:  GENERAL: appears older than stated age, chronically ill-appearing, soft voice  PSYCH: no agitation, baseline mentation  NERVOUS SYSTEM:  Alert & Oriented X3, CN 2-12 grossly intact. Spontaneously moving all 4-ext  PULMONARY: poor respiratory effort, diminished breath sounds in the right lung, no crackles or coarse breath sounds appreciated on left side. No accessory muscle use.  CARDIOVASCULAR: Regular rate and rhythm; No murmurs, rubs, or gallops  GI: Soft, Nontender, Nondistended; Bowel sounds present  EXTREMITIES:  2+ Peripheral Pulses, No clubbing, cyanosis, or edema  SKIN: No rashes or lesions    Consultant(s) Notes Reviewed:  [x ] YES  [ ] NO    Discussed with Consultants/Other Providers [ x] YES     LABS                          11.2   4.25  )-----------( 391      ( 03 Oct 2020 11:00 )             38.7     10-03    137  |  93<L>  |  15  ----------------------------<  106<H>  4.8   |  36<H>  |  <0.5<L>    Ca    10.3<H>      03 Oct 2020 11:00  Mg     2.4     10-03    TPro  7.5  /  Alb  4.2  /  TBili  0.5  /  DBili  x   /  AST  60<H>  /  ALT  142<H>  /  AlkPhos  130<H>  10-03    Lactate Trend    CARDIAC MARKERS ( 02 Oct 2020 12:17 )  x     / <0.01 ng/mL / 38 U/L / x     / 1.8 ng/mL        RADIOLOGY & ADDITIONAL TESTS:  - no new images as of 10/4      HEALTH ISSUES - PROBLEM Dx:  Pulmonary thromboembolism      MEDICATIONS  (STANDING):  chlorhexidine 4% Liquid 1 Application(s) Topical <User Schedule>  enoxaparin Injectable 80 milliGRAM(s) SubCutaneous every 12 hours  exemestane 25 milliGRAM(s) Oral daily  fentaNYL   Patch  25 MICROgram(s)/Hr 1 Patch Transdermal every 72 hours  metoprolol tartrate 12.5 milliGRAM(s) Oral two times a day  pantoprazole  Injectable 40 milliGRAM(s) IV Push daily    MEDICATIONS  (PRN):  ALPRAZolam 0.5 milliGRAM(s) Oral two times a day PRN anxiety  morphine  - Injectable 4 milliGRAM(s) IV Push every 2 hours PRN Moderate Pain (4 - 6)

## 2020-10-04 NOTE — PHYSICAL THERAPY INITIAL EVALUATION ADULT - RANGE OF MOTION EXAMINATION, REHAB EVAL
B LEs AROM grossly WFL; B UEs ROM limited 2* poor posture - very rounded shoulder with forward head, shoulder flexion less than 90 degrees

## 2020-10-04 NOTE — PHYSICAL THERAPY INITIAL EVALUATION ADULT - GAIT DISTANCE, PT EVAL
5 steps in front of chair - limited 2* high flow line, monitoring 02; maintained standing position for up to 7 mins

## 2020-10-04 NOTE — PHYSICAL THERAPY INITIAL EVALUATION ADULT - CRITERIA FOR SKILLED THERAPEUTIC INTERVENTIONS
functional limitations in following categories/rehab potential/impairments found/therapy frequency/predicted duration of therapy intervention/anticipated discharge recommendation

## 2020-10-04 NOTE — PHYSICAL THERAPY INITIAL EVALUATION ADULT - GAIT DEVIATIONS NOTED, PT EVAL
decreased remi/decreased weight-shifting ability/very forward flexed trunk, looking downwards, rounding shoulders

## 2020-10-04 NOTE — PHYSICAL THERAPY INITIAL EVALUATION ADULT - ADDITIONAL COMMENTS
Pt lives in private house with  - entrance in front of house with 6 steps but around back only 1 step in. Then once inside pt can stay on one level - usually sleeps in recliner. Minimal ambulator at baseline with rollator. Strong family support

## 2020-10-05 NOTE — PROGRESS NOTE ADULT - ASSESSMENT
56 yo F PMHx metastatic breast ca (original dx in 2001), now with mets to lungs, bones, s/p L sided pigtail (9/2020)  for drainage of recurrent L sided malignant effusion, diagnosed with PE 9/11/2020, currently on lovenox 85 mg SC BID. Patient is here for worsening chest pain, dyspnea. Pt was admitted to the ICU for acute respiratory failure requiring continuos  AVAPs and downgraded on 10/2      # Acute on chronic hypoxic and hypercapnic resp failure likely secondary to metastatic breast cancer to lungs  - pt does draining via pigtail twice weekly - monday and saturdays  - HOB @ 45 degrees  - Aspiration precautions.   - due to patients dx of chronic resp failure with hypercapneia, metastatic breast cancer and functional quadrapelgia, patient requires volumed augmented NIV. Standard BiPap and BiPAP ST have been ruled due to the patients complicated disease process. Without NIV, pt is at risk for further clinical decline.    # Recent PE   - due to malignancy  - c/w  therapeutic dosing of Lovenox    # Consitpation  - starting Miralax 17g daily PRN  - starting Senna 2tabs qhs    # Functional quadraplegia  - profoundly deconditioned   - PT eval pending    # HTN  - cw home meds    # Anxiety  - c/w Clonazepam prn daily    # Microcytic anemia, STABLE  - given advanced disease colonoscopy would be of little utility  - monitor  - could be due to chronic disease  - no transfusions required    # Thrombocytosis, IMPROVING  - likely reactive  - monitor    # Transaminitis, IMPROVING  - unsure etiology; RUQ US unremarkable  - outpt follow up    # Hyponatremia hypo-osmolar hypovolemic, RESOLVED  - continue to monitor    DVT ppx: lovenox  GI ppx: ppi  Diet: kosher diet low K  Activity: as tolerated  DNR not DNI  Pt has extremely poor prognosis and is high risk of decompensation and readmission    #Progress Note Handoff:  Pending (specify):  Clinical improvement  Family discussion: I spoke with patient and her  yesterday and again with pt and her daughter this morning. They understand her SOB is due to malignancy and she will be very unlikely to recover. They understand she will likely need AVAPS to go home with even if she tolerates high flow. They are not interested in hospice. She remains DNR but NOT DNI  Disposition: Home with HiFlo and AVAPs pending authorization    56 yo F PMHx metastatic breast ca (original dx in 2001), now with mets to lungs, bones, s/p L sided pigtail (9/2020)  for drainage of recurrent L sided malignant effusion, diagnosed with PE 9/11/2020, currently on lovenox 85 mg SC BID. Patient is here for worsening chest pain, dyspnea. Pt was admitted to the ICU for acute respiratory failure requiring continuos  AVAPs and downgraded on 10/2      # Acute on chronic hypoxic and hypercapnic resp failure likely secondary to metastatic breast cancer to lungs  - pt does draining via pigtail twice weekly - monday and saturdays  - HOB @ 45 degrees  - Aspiration precautions.   - due to patients dx of chronic resp failure with hypercapneia, metastatic breast cancer and functional quadrapelgia, patient requires volumed augmented NIV. Standard BiPap and BiPAP ST have been ruled out due to the patients complicated disease process. Without NIV, pt is at risk for further clinical decline.    # Recent PE   - due to malignancy  - c/w  therapeutic dosing of Lovenox    # Consitpation  - starting Miralax 17g daily PRN  - starting Senna 2tabs qhs    # Functional quadraplegia  - profoundly deconditioned   - PT eval pending    # HTN  - cw home meds    # Anxiety  - c/w Clonazepam prn daily    # Microcytic anemia, STABLE  - given advanced disease colonoscopy would be of little utility  - monitor  - could be due to chronic disease  - no transfusions required    # Thrombocytosis, IMPROVING  - likely reactive  - monitor    # Transaminitis, IMPROVING  - unsure etiology; RUQ US unremarkable  - outpt follow up    # Hyponatremia hypo-osmolar hypovolemic, RESOLVED  - continue to monitor    DVT ppx: lovenox  GI ppx: ppi  Diet: kosher diet low K  Activity: as tolerated  DNR not DNI  Pt has extremely poor prognosis and is high risk of decompensation and readmission    #Progress Note Handoff:  Pending (specify):  Clinical improvement  Family discussion: I spoke with patient and her  yesterday and again with pt and her daughter this morning. They understand her SOB is due to malignancy and she will be very unlikely to recover. They understand she will likely need AVAPS to go home with even if she tolerates high flow. They are not interested in hospice. She remains DNR but NOT DNI  Disposition: Home with HiFlo and AVAPs pending authorization

## 2020-10-05 NOTE — PROGRESS NOTE ADULT - SUBJECTIVE AND OBJECTIVE BOX
JENN SERRANO  55y  Female      Patient is a 55y old  Female who presents with a chief complaint of respiratory distress (04 Oct 2020 09:11)      INTERVAL HPI/OVERNIGHT EVENTS: no acute events overnight.  at bedside.  and patient voiced frustration again about not being able to have a visitor stay the night as she is anxious about staying alone. Explained hospital policy was in place in setting of COVID pandemic to protect patient's and other visitors in hospital. Conversation was escalated to nursing management.     VITALS  T(C): 36.3 (10-05-20 @ 14:13), Max: 36.4 (10-05-20 @ 06:00)  HR: 77 (10-05-20 @ 14:13) (71 - 77)  BP: 131/72 (10-05-20 @ 14:13) (131/72 - 156/76)  RR: 16 (10-05-20 @ 15:50) (16 - 20)  SpO2: 98% (10-05-20 @ 15:50) (98% - 110%)  Wt(kg): --Vital Signs Last 24 Hrs  T(C): 36.3 (05 Oct 2020 14:13), Max: 36.4 (05 Oct 2020 06:00)  T(F): 97.4 (05 Oct 2020 14:13), Max: 97.6 (05 Oct 2020 06:00)  HR: 77 (05 Oct 2020 14:13) (71 - 77)  BP: 131/72 (05 Oct 2020 14:13) (131/72 - 156/76)  BP(mean): --  RR: 16 (05 Oct 2020 15:50) (16 - 20)  SpO2: 98% (05 Oct 2020 15:50) (98% - 110%)      10-04-20 @ 07:01  -  10-05-20 @ 07:00  --------------------------------------------------------  IN: 0 mL / OUT: 800 mL / NET: -800 mL    10-05-20 @ 07:01  -  10-05-20 @ 18:12  --------------------------------------------------------  IN: 0 mL / OUT: 250 mL / NET: -250 mL        PHYSICAL EXAM:  GENERAL: appears older than stated age, chronically ill-appearing, soft voice  PSYCH: no agitation, baseline mentation  NERVOUS SYSTEM:  Alert & Oriented X3, CN 2-12 grossly intact. Spontaneously moving all 4-ext  PULMONARY: poor respiratory effort, diminished breath sounds in the right lung, no crackles or coarse breath sounds appreciated on left side. No accessory muscle use.  CARDIOVASCULAR: Regular rate and rhythm; No murmurs, rubs, or gallops  GI: Soft, Nontender, Nondistended; Bowel sounds present  EXTREMITIES:  2+ Peripheral Pulses, No clubbing, cyanosis, or edema  SKIN: No rashes or lesions    Consultant(s) Notes Reviewed:  [x ] YES  [ ] NO    Discussed with Consultants/Other Providers [ x] YES     LABS                          10.4   3.50  )-----------( 389      ( 05 Oct 2020 09:06 )             36.2     10-05    135  |  94<L>  |  15  ----------------------------<  111<H>  4.3   |  33<H>  |  <0.5<L>    Ca    10.1      05 Oct 2020 09:06        RADIOLOGY & ADDITIONAL TESTS:  - no images today 10/5      HEALTH ISSUES - PROBLEM Dx:  Pulmonary thromboembolism    MEDICATIONS  (STANDING):  chlorhexidine 4% Liquid 1 Application(s) Topical <User Schedule>  enoxaparin Injectable 80 milliGRAM(s) SubCutaneous every 12 hours  exemestane 25 milliGRAM(s) Oral daily  fentaNYL   Patch  25 MICROgram(s)/Hr 1 Patch Transdermal every 72 hours  metoprolol tartrate 12.5 milliGRAM(s) Oral two times a day  pantoprazole  Injectable 40 milliGRAM(s) IV Push daily  senna 2 Tablet(s) Oral at bedtime    MEDICATIONS  (PRN):  ALPRAZolam 0.5 milliGRAM(s) Oral two times a day PRN anxiety  morphine  - Injectable 4 milliGRAM(s) IV Push every 2 hours PRN Moderate Pain (4 - 6)  polyethylene glycol 3350 17 Gram(s) Oral daily PRN Constipation       JENN SERRANO  55y  Female      Patient is a 55y old  Female who presents with a chief complaint of respiratory distress (04 Oct 2020 09:11)      INTERVAL HPI/OVERNIGHT EVENTS: no acute events overnight.  at bedside.  and patient voiced frustration again about not being able to have a visitor stay the night as she is anxious about staying alone. Explained hospital policy was in place in setting of COVID pandemic to protect patient's and other visitors in hospital. Conversation was escalated to nursing management. At this time denies any fevers, chills, n/v, chest pain, headaches, worsening sob, abdominal pain, changes in BMs or urination.     VITALS  T(C): 36.3 (10-05-20 @ 14:13), Max: 36.4 (10-05-20 @ 06:00)  HR: 77 (10-05-20 @ 14:13) (71 - 77)  BP: 131/72 (10-05-20 @ 14:13) (131/72 - 156/76)  RR: 16 (10-05-20 @ 15:50) (16 - 20)  SpO2: 98% (10-05-20 @ 15:50) (98% - 110%)  Wt(kg): --Vital Signs Last 24 Hrs  T(C): 36.3 (05 Oct 2020 14:13), Max: 36.4 (05 Oct 2020 06:00)  T(F): 97.4 (05 Oct 2020 14:13), Max: 97.6 (05 Oct 2020 06:00)  HR: 77 (05 Oct 2020 14:13) (71 - 77)  BP: 131/72 (05 Oct 2020 14:13) (131/72 - 156/76)  BP(mean): --  RR: 16 (05 Oct 2020 15:50) (16 - 20)  SpO2: 98% (05 Oct 2020 15:50) (98% - 110%)      10-04-20 @ 07:01  -  10-05-20 @ 07:00  --------------------------------------------------------  IN: 0 mL / OUT: 800 mL / NET: -800 mL    10-05-20 @ 07:01  -  10-05-20 @ 18:12  --------------------------------------------------------  IN: 0 mL / OUT: 250 mL / NET: -250 mL        PHYSICAL EXAM:  GENERAL: appears older than stated age, chronically ill-appearing, soft voice  PSYCH: no agitation, baseline mentation  NERVOUS SYSTEM:  Alert & Oriented X3, CN 2-12 grossly intact. Spontaneously moving all 4-ext  PULMONARY: poor respiratory effort, diminished breath sounds in the right lung, no crackles or coarse breath sounds appreciated on left side. No accessory muscle use.  CARDIOVASCULAR: Regular rate and rhythm; No murmurs, rubs, or gallops  GI: Soft, Nontender, Nondistended; Bowel sounds present  EXTREMITIES:  2+ Peripheral Pulses, No clubbing, cyanosis, or edema  SKIN: No rashes or lesions    Consultant(s) Notes Reviewed:  [x ] YES  [ ] NO    Discussed with Consultants/Other Providers [ x] YES     LABS                          10.4   3.50  )-----------( 389      ( 05 Oct 2020 09:06 )             36.2     10-05    135  |  94<L>  |  15  ----------------------------<  111<H>  4.3   |  33<H>  |  <0.5<L>    Ca    10.1      05 Oct 2020 09:06        RADIOLOGY & ADDITIONAL TESTS:  - no images today 10/5      HEALTH ISSUES - PROBLEM Dx:  Pulmonary thromboembolism    MEDICATIONS  (STANDING):  chlorhexidine 4% Liquid 1 Application(s) Topical <User Schedule>  enoxaparin Injectable 80 milliGRAM(s) SubCutaneous every 12 hours  exemestane 25 milliGRAM(s) Oral daily  fentaNYL   Patch  25 MICROgram(s)/Hr 1 Patch Transdermal every 72 hours  metoprolol tartrate 12.5 milliGRAM(s) Oral two times a day  pantoprazole  Injectable 40 milliGRAM(s) IV Push daily  senna 2 Tablet(s) Oral at bedtime    MEDICATIONS  (PRN):  ALPRAZolam 0.5 milliGRAM(s) Oral two times a day PRN anxiety  morphine  - Injectable 4 milliGRAM(s) IV Push every 2 hours PRN Moderate Pain (4 - 6)  polyethylene glycol 3350 17 Gram(s) Oral daily PRN Constipation

## 2020-10-06 NOTE — DIETITIAN INITIAL EVALUATION ADULT. - ENERGY NEEDS
Energy: 0998-7107 kcal/day (MSJx1.2-1.3 AF)    Protein: 74-89 g/day (1-1.2 g/kg ABW)    Fluids: 1 mL/kcal or per LIP

## 2020-10-06 NOTE — PROGRESS NOTE ADULT - SUBJECTIVE AND OBJECTIVE BOX
JENN SERRANO  55y  Female      Patient is a 55y old  Female who presents with a chief complaint of respiratory distress (05 Oct 2020 18:11)      INTERVAL HPI/OVERNIGHT EVENTS: no acute events overnight. patient was moved to private room to accommodate a family  to stay overnight with patient. notes nasal cavity dryness but no other complaints. Denies any fevers, chills, n/v, chest pain, dizziness, abdominal pain, changes in BMs or urination.     VITALS  T(C): 36.3 (10-06-20 @ 05:46), Max: 36.3 (10-05-20 @ 14:13)  HR: 72 (10-06-20 @ 05:46) (60 - 86)  BP: 130/83 (10-06-20 @ 05:46) (123/68 - 131/72)  RR: 20 (10-06-20 @ 09:57) (16 - 20)  SpO2: 100% (10-06-20 @ 09:57) (98% - 100%)  Wt(kg): --Vital Signs Last 24 Hrs  T(C): 36.3 (06 Oct 2020 05:46), Max: 36.3 (05 Oct 2020 14:13)  T(F): 97.3 (06 Oct 2020 05:46), Max: 97.4 (05 Oct 2020 14:13)  HR: 72 (06 Oct 2020 05:46) (60 - 86)  BP: 130/83 (06 Oct 2020 05:46) (123/68 - 131/72)  BP(mean): --  RR: 20 (06 Oct 2020 09:57) (16 - 20)  SpO2: 100% (06 Oct 2020 09:57) (98% - 100%)      10-05-20 @ 07:01  -  10-06-20 @ 07:00  --------------------------------------------------------  IN: 0 mL / OUT: 250 mL / NET: -250 mL      PHYSICAL EXAM:  GENERAL: NAD, well-groomed, well-developed  PSYCH: no agitation, baseline mentation  NERVOUS SYSTEM:  Alert & Oriented X3, Motor Strength 5/5 B/L upper and lower extremities; Sensory intact; FTN WNL  PULMONARY: Clear to percussion bilaterally; No rales, rhonchi, wheezing, or rubs  CARDIOVASCULAR: Regular rate and rhythm; No murmurs, rubs, or gallops  GI: Soft, Nontender, Nondistended; Bowel sounds present  EXTREMITIES:  2+ Peripheral Pulses, No clubbing, cyanosis, or edema  LYMPH: No lymphadenopathy noted  SKIN: No rashes or lesions    Consultant(s) Notes Reviewed:  [x ] YES  [ ] NO    Discussed with Consultants/Other Providers [ x] YES     LABS                          10.4   3.50  )-----------( 389      ( 05 Oct 2020 09:06 )             36.2     10-05    135  |  94<L>  |  15  ----------------------------<  111<H>  4.3   |  33<H>  |  <0.5<L>    Ca    10.1      05 Oct 2020 09:06    RADIOLOGY & ADDITIONAL TESTS:  - no images 10/6    Imaging Personally Reviewed:  [ ] YES  [X ] NO    HEALTH ISSUES - PROBLEM Dx:  Pulmonary thromboembolism    MEDICATIONS  (STANDING):  chlorhexidine 4% Liquid 1 Application(s) Topical <User Schedule>  enoxaparin Injectable 80 milliGRAM(s) SubCutaneous every 12 hours  exemestane 25 milliGRAM(s) Oral daily  metoprolol tartrate 12.5 milliGRAM(s) Oral two times a day  pantoprazole  Injectable 40 milliGRAM(s) IV Push daily  senna 2 Tablet(s) Oral at bedtime    MEDICATIONS  (PRN):  ALPRAZolam 0.5 milliGRAM(s) Oral two times a day PRN anxiety  morphine  - Injectable 4 milliGRAM(s) IV Push every 2 hours PRN Moderate Pain (4 - 6)  polyethylene glycol 3350 17 Gram(s) Oral daily PRN Constipation

## 2020-10-06 NOTE — DIETITIAN INITIAL EVALUATION ADULT. - RD TO REMAIN AVAILABLE
yes/Nutrition Goals: Pt to maintain tolerance to diet order, with at least 75% po intake maintained over next 7 days. Nutrition Intervention: Meals & Snacks. Monitor: Energy intake, glucose profile, renal profile, nutrition focused physical findings, body composition.

## 2020-10-06 NOTE — PROGRESS NOTE ADULT - ASSESSMENT
56 yo F PMHx metastatic breast ca (original dx in 2001), now with mets to lungs, bones, s/p L sided pigtail (9/2020)  for drainage of recurrent L sided malignant effusion, diagnosed with PE 9/11/2020, currently on lovenox 85 mg SC BID. Patient is here for worsening chest pain, dyspnea. Pt was admitted to the ICU for acute respiratory failure requiring continuos  AVAPs and downgraded on 10/2      # Acute on chronic hypoxic and hypercapnic resp failure likely secondary to metastatic breast cancer to lungs  - pt does draining via pigtail twice weekly - monday and saturdays  - HOB @ 45 degrees  - Aspiration precautions.   - due to patients dx of chronic resp failure with hypercapneia, metastatic breast cancer and functional quadrapelgia, patient requires volumed augmented NIV. Standard BiPap and BiPAP ST have been ruled out due to the patients complicated disease process. Without NIV, pt is at risk for further clinical decline.    # Recent PE   - due to malignancy  - c/w  therapeutic dosing of Lovenox    # Consitpation  - starting Miralax 17g daily PRN  - starting Senna 2tabs qhs    # Functional quadraplegia  - profoundly deconditioned   - PT eval pending    # HTN  - cw home meds    # Anxiety  - c/w Clonazepam prn daily    # Microcytic anemia, STABLE  - given advanced disease colonoscopy would be of little utility  - monitor  - could be due to chronic disease  - no transfusions required    # Thrombocytosis, IMPROVING  - likely reactive  - monitor    # Transaminitis, IMPROVING  - unsure etiology; RUQ US unremarkable  - outpt follow up    # Hyponatremia hypo-osmolar hypovolemic, RESOLVED  - continue to monitor    DVT ppx: lovenox  GI ppx: ppi  Diet: kosher diet low K  Activity: as tolerated  DNR not DNI  Pt has extremely poor prognosis and is high risk of decompensation and readmission    #Progress Note Handoff:  Pending (specify):  Clinical improvement  Family discussion: awaiting insurance approval for AVAPs and Hiflow at home. She remains DNR but NOT DNI  Disposition: Home with HiFlo and AVAPs pending authorization

## 2020-10-06 NOTE — DIETITIAN INITIAL EVALUATION ADULT. - OTHER INFO
Pertinent Medical Information: p/w respiratory distress. Acute on chronic hypoxic and hypercapnic respiratory failure noted likely 2/2 metastatic breast cancer to lungs. Hyponatremia hypo-osmolar hypovolemic - noted resolved per progress notes.    Pertinent Subjective Information: Reports fair appetite & po intake PTP. Reportedly follows a regular diet, however will try to avoid too many fatty & sugar containing foods d/t discomfort caused by consuming them. Kosher noted. NKFA. No supplements. Unable to clarify UBW at this time. Discussed importance of maintaining adequate po intake to avoid unintentional wt loss.

## 2020-10-06 NOTE — DIETITIAN INITIAL EVALUATION ADULT. - PHYSICAL APPEARANCE
BMI: 28.9 (using dosing wt 74 kg). Alert. Last BM reported 10/4. No chewing/swallowing difficulty reported. No N/V/D/C reported at this time. Skin noted WDL.

## 2020-10-06 NOTE — DIETITIAN INITIAL EVALUATION ADULT. - DIET TYPE
DASH/TLC + no concentrated Potassium + Kosher. Reports fair appetite at this time - consuming 75% of meals on average meals provided from home.

## 2020-10-07 NOTE — PROGRESS NOTE ADULT - ASSESSMENT
54 yo F PMHx metastatic breast ca diagnosed in 2001 with mets to lungs, bones, L sided pigtail (9/2020) for recurrent L sided malignant effusion,  PE diagnosed 9/11/2020, presented for evaluation of worsening chest pain and dyspnea. Pt was admitted to the ICU for acute respiratory failure requiring continous AVAPs. She was downgraded on 10/2. Since then patient has been tolerating high flow.  Currently awaiting high flow set up at home.      Acute on chronic hypoxic and hypercapnic respiratory failure likely secondary to metastatic breast cancer to lungs  -  drains via pigtail twice weekly - monday and saturdays  - HOB @ 45 degrees  - Aspiration precautions.   - due to patients dx of chronic resp failure with hypercapnia metastatic breast cancer patient requires volumed augmented NIV. Standard BiPap and BiPAP ST have been ruled out due to the patients complicated disease process. Without NIV, pt is at risk for further clinical decline.    Recent provoked PE   - due to malignancy  - c/w  therapeutic dosing of Lovenox    Constipation  - c/w Miralax 17g daily PRN  - c/w Senna 2tabs qhs    HTN  - cw home meds    Anxiety  - c/w Clonazepam prn daily    Microcytic anemia  - given advanced disease colonoscopy would be of little utility  - could be due to chronic disease  - stable    Thrombocytosis  - unclear etiology  - could be reactive to anemia  - stable    # Transaminitis  - unclear etiology  - RUQ US unremarkable  - outpt follow up    Hyponatremia hypo-osmolar hypovolemic  - resolved    Patient IS NOT functionally paralplegic. She is able to walk around her room and feed herself independently.    DVT ppx: lovenox  GI ppx: ppi  Diet: kosher diet low K  Activity: as tolerated  DNR not DNI    Pt has extremely poor prognosis and is high risk of decompensation and readmission    #Progress Note Handoff:  Pending (specify):  Clinical improvement  Family discussion: awaiting insurance approval for AVAPs and Hiflow at home. She remains DNR but NOT DNI  Disposition: Home with HiFlo and AVAPs pending authorization      54 yo F PMHx metastatic breast ca diagnosed in 2001 with mets to lungs, bones, L sided pigtail (9/2020) for recurrent L sided malignant effusion,  PE diagnosed 9/11/2020, presented for evaluation of worsening chest pain and dyspnea. Pt was admitted to the ICU for acute respiratory failure requiring continous AVAPs. She was downgraded on 10/2. Since then patient has been tolerating high flow.  Currently awaiting high flow set up at home.      Acute on chronic hypoxic and hypercapnic respiratory failure likely secondary to metastatic breast cancer to lungs  -  drains via pigtail twice weekly - Mondays and Saturdays  - HOB @ 45 degrees  - Aspiration precautions.   - due to patients dx of chronic resp failure with hypercapnia metastatic breast cancer patient requires volumed augmented NIV. Standard BiPap and BiPAP ST have been ruled out due to the patients complicated disease process. Without NIV, pt is at risk for further clinical decline.  -  states pt does not have a PMD and she will be unable to leave the house, which I agree with as she is on continuous high flow and AVAPs. Spoke with CM to see if home visit program can be arranged    Recent provoked PE   - due to malignancy  - c/w  therapeutic dosing of Lovenox    Diffuse pain  - due to malignancy  - family requesting pain mgmt consult    Constipation  - c/w Miralax 17g daily PRN  - c/w Senna 2tabs qhs    HTN  - cw home meds    Anxiety  - c/w Clonazepam prn daily    Microcytic anemia  - given advanced disease colonoscopy would be of little utility  - could be due to chronic disease  - stable    Thrombocytosis  - unclear etiology  - could be reactive to anemia  - stable    Transaminitis  - unclear etiology  - RUQ US unremarkable  - outpt follow up    Hyponatremia hypo-osmolar hypovolemic  - resolved    Patient IS NOT functionally paralplegic. She is able to walk around her room and feed herself independently.    DVT ppx: lovenox  GI ppx: ppi  Diet: kosher diet low K  Activity: as tolerated  DNR not DNI    Pt has extremely poor prognosis and is high risk of decompensation and readmission    #Progress Note Handoff:  Pending (specify):  Clinical improvement  Family discussion: awaiting insurance approval for AVAPs and Hiflow at home. She remains DNR but NOT DNI  Disposition: Home with HiFlo and AVAPs pending authorization

## 2020-10-07 NOTE — PROGRESS NOTE ADULT - SUBJECTIVE AND OBJECTIVE BOX
CHIEF COMPLAINT:    Patient is a 55y old  Female who presents with a chief complaint of respiratory distress     INTERVAL HPI/OVERNIGHT EVENTS:    Patient seen and examined at bedside. No acute overnight events occurred.    ROS: States she does not feel SOB with high flow on. All other systems are negative.    Vital Signs:    T(F): 96.5 (10-07-20 @ 05:03), Max: 98 (10-06-20 @ 13:50)  HR: 70 (10-07-20 @ 05:03) (64 - 77)  BP: 130/80 (10-07-20 @ 05:03) (119/68 - 132/73)  RR: 22 (10-07-20 @ 09:46) (18 - 22)  SpO2: 98% (10-07-20 @ 09:46) (98% - 100%)  I&O's Summary    06 Oct 2020 07:01  -  07 Oct 2020 07:00  --------------------------------------------------------  IN: 0 mL / OUT: 400 mL / NET: -400 mL    POCT Blood Glucose.: 106 mg/dL (07 Oct 2020 06:19)      PHYSICAL EXAM:  GENERAL:  NAD  SKIN: No rashes or lesions  HEENT: Atraumatic. Normocephalic. Anicteric  NECK:  No JVD.   PULMONARY: Clear to ausculation bilaterally. No wheezing. No rales High flow o2 in place  CVS: Normal S1, S2. Regular rate and rhythm. No murmurs.  ABDOMEN/GI: Soft, Nontender, Nondistended; Bowel sounds are present  EXTREMITIES:  No edema B/L LE.  NEUROLOGIC:  No motor deficit.  PSYCH: Alert & oriented x 3, normal affect      LABS:      RADIOLOGY & ADDITIONAL TESTS:  No new images    Medications:  Standing  chlorhexidine 4% Liquid 1 Application(s) Topical <User Schedule>  enoxaparin Injectable 80 milliGRAM(s) SubCutaneous every 12 hours  exemestane 25 milliGRAM(s) Oral daily  metoprolol tartrate 12.5 milliGRAM(s) Oral two times a day  pantoprazole  Injectable 40 milliGRAM(s) IV Push daily  senna 2 Tablet(s) Oral at bedtime    PRN Meds  ALPRAZolam 0.5 milliGRAM(s) Oral two times a day PRN  morphine  - Injectable 4 milliGRAM(s) IV Push every 2 hours PRN  polyethylene glycol 3350 17 Gram(s) Oral daily PRN

## 2020-10-08 NOTE — PROGRESS NOTE ADULT - PROBLEM SELECTOR PLAN 1
Con't ALPRAZolam 0.5 milliGRAM(s) Oral two times a day PRN  Reorder Fentanyl patch 25mcg TD Q72hrs (May con't at home)  Start Oxycodone IR 10mg PO Q4hrs PRN (May con't the same at home x5 days)  Start Acetaminophen 650mg PO Q6hrs standing (May use at home as directed OTC)  Patient to follow up with Pain Mgmt at [facility] [place], [address].  Dr. Boogie

## 2020-10-08 NOTE — PROGRESS NOTE ADULT - ASSESSMENT
54 yo F PMHx metastatic breast ca diagnosed in 2001 with mets to lungs, bones, L sided pigtail (9/2020) for recurrent L sided malignant effusion,  PE diagnosed 9/11/2020, presented for evaluation of worsening chest pain and dyspnea. Pt was admitted to the ICU for acute respiratory failure requiring continous AVAPs. She was downgraded on 10/2. Since then patient has been tolerating high flow.  Currently awaiting high flow set up at home.      Acute on chronic hypoxic and hypercapnic respiratory failure likely secondary to metastatic breast cancer to lungs  -  drains via pigtail twice weekly - Mondays and Saturdays  - due to patients dx of chronic resp failure with hypercapnia metastatic breast cancer patient requires volumed augmented NIV. Standard BiPap and BiPAP ST have been ruled out due to the patients complicated disease process. Without NIV, pt is at risk for further clinical decline.  -  states pt does not have a PMD and she will be unable to leave the house, which I agree with as she is on continuous high flow and AVAPs. Spoke with CM to see if home visit program can be arranged  - I spoke with pt's oncologist yesterday. Pt previously had a drain in right lung but it eventually stopped draining. Her xray remains unchanged over past 3 years, therefore no utility in attempting pig tail.    Recent provoked PE   - due to malignancy  - c/w  therapeutic dosing of Lovenox    Diffuse pain  - due to malignancy  - family requesting pain mgmt consult    Constipation  - c/w Miralax 17g daily PRN  - c/w Senna 2tabs qhs    HTN  - cw home meds    Anxiety  - c/w Clonazepam prn daily    Microcytic anemia  - given advanced disease colonoscopy would be of little utility  - could be due to chronic disease  - stable    Thrombocytosis  - unclear etiology  - could be reactive to anemia  - stable    Transaminitis  - unclear etiology  - RUQ US unremarkable  - outpt follow up    Hyponatremia hypo-osmolar hypovolemic  - resolved    Patient IS NOT functionally paralplegic. She is able to walk around her room and feed herself independently.    DVT ppx: lovenox  GI ppx: ppi  Diet: kosher diet low K  Activity: as tolerated  DNR not DNI    Pt has extremely poor prognosis and is high risk of decompensation and readmission    #Progress Note Handoff:  Pending (specify):  Clinical improvement  Family discussion: awaiting insurance approval for AVAPs and Hiflow at home. She remains DNR but NOT DNI  Disposition: Home with HiFlo and AVAPs pending authorization

## 2020-10-08 NOTE — PROGRESS NOTE ADULT - SUBJECTIVE AND OBJECTIVE BOX
CHIEF COMPLAINT:    Patient is a 55y old  Female who presents with a chief complaint of respiratory distress     INTERVAL HPI/OVERNIGHT EVENTS:    Patient seen and examined at bedside. No acute overnight events occurred.    ROS: Reports okay work of breathing on high flow. All other systems are negative.    Vital Signs:    T(F): 96 (10-08-20 @ 05:00), Max: 97.4 (10-07-20 @ 21:33)  HR: 63 (10-08-20 @ 06:44) (63 - 75)  BP: 118/70 (10-08-20 @ 06:44) (118/70 - 171/92)  RR: 20 (10-08-20 @ 08:32) (16 - 20)  SpO2: 98% (10-08-20 @ 08:32) (98% - 100%)  I&O's Summary    07 Oct 2020 07:01  -  08 Oct 2020 07:00  --------------------------------------------------------  IN: 0 mL / OUT: 500 mL / NET: -500 mL    08 Oct 2020 07:01  -  08 Oct 2020 10:24  --------------------------------------------------------  IN: 0 mL / OUT: 400 mL / NET: -400 mL      PHYSICAL EXAM:  GENERAL:  NAD  SKIN: No rashes or lesions  HEENT: Atraumatic. Normocephalic. Anicteric  NECK:  No JVD.   PULMONARY: Clear to ausculation bilaterally. No wheezing. No rales  CVS: Normal S1, S2. Regular rate and rhythm. No murmurs.  ABDOMEN/GI: Soft, Nontender, Nondistended; Bowel sounds are present  EXTREMITIES:  No edema B/L LE.  NEUROLOGIC:  No motor deficit.  PSYCH: Alert & oriented x 3, normal affect    LABS:      RADIOLOGY & ADDITIONAL TESTS:  No new images    Medications:  Standing  chlorhexidine 4% Liquid 1 Application(s) Topical <User Schedule>  enoxaparin Injectable 80 milliGRAM(s) SubCutaneous every 12 hours  exemestane 25 milliGRAM(s) Oral daily  metoprolol tartrate 12.5 milliGRAM(s) Oral two times a day  pantoprazole  Injectable 40 milliGRAM(s) IV Push daily  senna 2 Tablet(s) Oral at bedtime    PRN Meds  ALPRAZolam 0.5 milliGRAM(s) Oral two times a day PRN  polyethylene glycol 3350 17 Gram(s) Oral daily PRN

## 2020-10-08 NOTE — CONSULT NOTE ADULT - SUBJECTIVE AND OBJECTIVE BOX
Chief Complaint:    HPI:  ·  54 yo F, history of metastatic breast ca (original dx in 2001), now with mets to lungs, bones, sp L sided pigtail for drainage of recurrent L sided malignant effusion, diagnosed with PE 9/11/20, currently on lovenox 85 mg SC BID.     	Patient is here for worsening chest pain, dyspnea.   	Since discharge 9/18 has restarted chemo under care of Heme/Onc Dr. Watt. Over the last 4 days has had progressively increasing chest pain, described as squeezing ever 1-2 minutes. Per , has been "breathing with her belly" since she was in the hospital and while at home.    	Last drained 400 cc from her pleural effusion yesterday.     	No fever, chills, vomiting. Patient also reports chronic, persistent head, neck, back pain. Unchanged from admission on 9/11.   (30 Sep 2020 06:06)      PAST MEDICAL & SURGICAL HISTORY:  Metastatic breast cancer    H/O bilateral mastectomy        FAMILY HISTORY:      SOCIAL HISTORY:  [ ] Denies Smoking, Alcohol, or Drug Use    Allergies    tamoxifen (Hives)    Intolerances        PAIN MEDICATIONS:  ALPRAZolam 0.5 milliGRAM(s) Oral two times a day PRN  fentaNYL   Patch  25 MICROgram(s)/Hr 1 Patch Transdermal every 72 hours  oxyCODONE    IR 10 milliGRAM(s) Oral every 4 hours PRN    Heme:  enoxaparin Injectable 80 milliGRAM(s) SubCutaneous every 12 hours    Antibiotics:    Cardiovascular:  metoprolol tartrate 12.5 milliGRAM(s) Oral two times a day    GI:  pantoprazole  Injectable 40 milliGRAM(s) IV Push daily  polyethylene glycol 3350 17 Gram(s) Oral daily PRN  senna 2 Tablet(s) Oral at bedtime    Endocrine:    All Other Medications:  chlorhexidine 4% Liquid 1 Application(s) Topical <User Schedule>  exemestane 25 milliGRAM(s) Oral daily      REVIEW OF SYSTEMS:    CONSTITUTIONAL: No fever, weight loss, or fatigue  EYES: No eye pain, visual disturbances, or discharge  ENMT:  No difficulty hearing, tinnitus, vertigo; No sinus or throat pain  NECK: No pain or stiffness  BREASTS: No pain, masses, or nipple discharge  RESPIRATORY: No cough, wheezing, chills or hemoptysis; No shortness of breath  CARDIOVASCULAR: No chest pain, palpitations, dizziness, or leg swelling  GASTROINTESTINAL: No abdominal or epigastric pain. No nausea, vomiting, or hematemesis; No diarrhea or constipation. No melena or hematochezia.  GENITOURINARY: No dysuria, frequency, hematuria, or incontinence  NEUROLOGICAL: No headaches, memory loss, loss of strength, numbness, or tremors  SKIN: No itching, burning, rashes, or lesions   LYMPH NODES: No enlarged glands  ENDOCRINE: No heat or cold intolerance; No hair loss  MUSCULOSKELETAL: No joint pain or swelling; No muscle, back, or extremity pain  PSYCHIATRIC: No depression, anxiety, mood swings, or difficulty sleeping  HEME/LYMPH: No easy bruising, or bleeding gums  ALLERY AND IMMUNOLOGIC: No hives or eczema      Vital Signs Last 24 Hrs  T(C): 36.4 (08 Oct 2020 13:44), Max: 36.4 (08 Oct 2020 13:44)  T(F): 97.6 (08 Oct 2020 13:44), Max: 97.6 (08 Oct 2020 13:44)  HR: 75 (08 Oct 2020 13:44) (63 - 75)  BP: 126/76 (08 Oct 2020 13:44) (118/70 - 171/92)  BP(mean): --  RR: 19 (08 Oct 2020 13:44) (16 - 20)  SpO2: 98% (08 Oct 2020 08:32) (98% - 100%)    PAIN SCORE:    3     SCALE USED: (1-10 VNRS)                           [ ]  [place]  Reviewed; no abberant behavior noted

## 2020-10-08 NOTE — DISCHARGE NOTE NURSING/CASE MANAGEMENT/SOCIAL WORK - PATIENT PORTAL LINK FT
[place]
You can access the FollowMyHealth Patient Portal offered by [facility] by registering at the following website: [email or web address]. By joining [facility]’s FollowMyHealth portal, you will also be able to view your health information using other applications (apps) compatible with our system.

## 2020-10-08 NOTE — CONSULT NOTE ADULT - ASSESSMENT
55 F w/ PMH metastatic breast cancer w/ mets to lungs, bone c/b malignant effusion, PE, presenting with worsening SOB currently on HFNC pain management for metastatic cancer pain     -Goals of care discussed with patient and family member At bedside  - Fentanyl patch 25mcg/hr q72 hours for around the clock pain;  currently the pain is well managed  - Oxycodone 10mg IR q4h PRN for severe breakthrough pain  - Acetaminophen 650mg q6h around the clock  - NSAIDS if not contraindicated

## 2020-10-09 NOTE — PROGRESS NOTE ADULT - ASSESSMENT
56 yo F PMHx metastatic breast ca diagnosed in 2001 with mets to lungs, bones, L sided pigtail (9/2020) for recurrent L sided malignant effusion,  PE diagnosed 9/11/2020, presented for evaluation of worsening chest pain and dyspnea. Pt was admitted to the ICU for acute respiratory failure requiring continous AVAPs. She was downgraded on 10/2. Since then patient has been tolerating high flow.  Currently awaiting high flow o2 and AVAPs set up.      Acute on chronic hypoxic and hypercapnic respiratory failure likely secondary to metastatic breast cancer to lungs  -  drains via pigtail twice weekly - Mondays and Saturdays  - due to patients dx of chronic resp failure with hypercapnia metastatic breast cancer patient requires volumed augmented NIV. Standard BiPap and BiPAP ST have been ruled out due to the patients complicated disease process. Without NIV, pt is at risk for further clinical decline.    Recent provoked PE   - due to malignancy  - c/w  therapeutic dosing of Lovenox    Diffuse pain  - due to malignancy  - I spoke with pain mgmt NP yesterday who recommended continuing the fentanyl patch, oxycodone q 4 hr prn and standing Tylenol BID. He alerted outpt office that she will need Telehealth visit.    Constipation  - c/w Miralax 17g daily PRN  - c/w Senna 2tabs qhs    HTN  - cw home meds    Anxiety  - c/w Clonazepam prn daily    Microcytic anemia  - given advanced disease colonoscopy would be of little utility  - could be due to chronic disease  - stable    Thrombocytosis  - unclear etiology  - likely reactive to anemia  - stable    Transaminitis  - unclear etiology  - RUQ US unremarkable  - outpt follow up    Hyponatremia hypo-osmolar hypovolemic  - resolved    Patient IS NOT functionally paralplegic. She is able to walk around her room and feed herself independently.    DVT ppx: lovenox  GI ppx: ppi  Diet: kosher diet low K  Activity: as tolerated  DNR not DNI    Pt has extremely poor prognosis and is high risk of decompensation and readmission    #Progress Note Handoff:  Pending (specify): home equipment approval  Family discussion: awaiting insurance approval for AVAPs and Hiflow at home. She remains DNR but NOT DNI  Disposition: Home with HiFlo and AVAPs pending authorization

## 2020-10-09 NOTE — PROGRESS NOTE ADULT - PROVIDER SPECIALTY LIST ADULT
Critical Care
Hospitalist
Internal Medicine
Pain Medicine
Pulmonology
Internal Medicine
Critical Care

## 2020-10-09 NOTE — DISCHARGE NOTE PROVIDER - NSDCCPCAREPLAN_GEN_ALL_CORE_FT
PRINCIPAL DISCHARGE DIAGNOSIS  Diagnosis: Metastatic breast cancer  Assessment and Plan of Treatment: Please follow up with Dr. Watt. Continue oxygen as needed      SECONDARY DISCHARGE DIAGNOSES  Diagnosis: Transaminitis  Assessment and Plan of Treatment:     Diagnosis: Hyponatremia  Assessment and Plan of Treatment:     Diagnosis: Pleural effusion  Assessment and Plan of Treatment:     Diagnosis: Dyspnea  Assessment and Plan of Treatment:

## 2020-10-09 NOTE — DISCHARGE NOTE PROVIDER - HOSPITAL COURSE
56 yo F PMHx metastatic breast ca diagnosed in 2001 with mets to lungs, bones, L sided pigtail (9/2020) for recurrent L sided malignant effusion,  PE diagnosed 9/11/2020, presented for evaluation of worsening chest pain and dyspnea. Pt was admitted to the ICU for acute respiratory failure requiring continous AVAPs. She was downgraded on 10/2. Since then patient has been tolerating high flow.  AVAPs and high flow set up for home.

## 2020-10-09 NOTE — PROGRESS NOTE ADULT - REASON FOR ADMISSION
respiratory distress

## 2020-10-09 NOTE — PROGRESS NOTE ADULT - SUBJECTIVE AND OBJECTIVE BOX
CHIEF COMPLAINT:    Patient is a 55y old  Female who presents with a chief complaint of respiratory distress     INTERVAL HPI/OVERNIGHT EVENTS:    Patient seen and examined at bedside. No acute overnight events occurred.    ROS: Denies SOB while on high flow. All other systems are negative.    Vital Signs:    T(F): 96.7 (10-09-20 @ 04:58), Max: 97.6 (10-08-20 @ 13:44)  HR: 77 (10-09-20 @ 04:58) (63 - 77)  BP: 145/82 (10-09-20 @ 04:58) (103/57 - 145/82)  RR: 18 (10-09-20 @ 08:20) (18 - 19)  SpO2: 100% (10-09-20 @ 08:20) (99% - 100%)  I&O's Summary    08 Oct 2020 07:01  -  09 Oct 2020 07:00  --------------------------------------------------------  IN: 0 mL / OUT: 2000 mL / NET: -2000 mL      PHYSICAL EXAM:  GENERAL:  NAD  SKIN: No rashes or lesions  HEENT: Atraumatic. Normocephalic. Anicteric  NECK:  No JVD.   PULMONARY: Clear to ausculation bilaterally. No wheezing. No rales  CVS: Normal S1, S2. Regular rate and rhythm. No murmurs.  ABDOMEN/GI: Soft, Nontender, Nondistended; Bowel sounds are present  EXTREMITIES:  No edema B/L LE.  NEUROLOGIC:  No motor deficit.  PSYCH: Alert & oriented x 3, normal affect    Consultant(s) Notes Reviewed:  [x ] YES  [ ] NO  Care Discussed with Consultants/Other Providers [ x] YES  [ ] NO - pulmonary team    LABS:    No new labs      RADIOLOGY & ADDITIONAL TESTS:  No new images    Medications:  Standing  chlorhexidine 4% Liquid 1 Application(s) Topical <User Schedule>  enoxaparin Injectable 80 milliGRAM(s) SubCutaneous every 12 hours  exemestane 25 milliGRAM(s) Oral daily  fentaNYL   Patch  25 MICROgram(s)/Hr 1 Patch Transdermal every 72 hours  metoprolol tartrate 12.5 milliGRAM(s) Oral two times a day  pantoprazole  Injectable 40 milliGRAM(s) IV Push daily  senna 2 Tablet(s) Oral at bedtime    PRN Meds  Mouth Kote 1 Spray(s) 1 Spray(s) Oral three times a day PRN  oxyCODONE    IR 10 milliGRAM(s) Oral every 4 hours PRN  polyethylene glycol 3350 17 Gram(s) Oral daily PRN

## 2020-10-09 NOTE — PROGRESS NOTE ADULT - SUBJECTIVE AND OBJECTIVE BOX
Patient is a 55y old  Female who presents with a chief complaint of respiratory distress (08 Oct 2020 17:43)      INTERVAL HISTORY/overnight events  still on high flow alternate with 5 liter NC and bipap at night       Vital Signs Last 24 Hrs  T(C): 35.9 (09 Oct 2020 04:58), Max: 36.4 (08 Oct 2020 13:44)  T(F): 96.7 (09 Oct 2020 04:58), Max: 97.6 (08 Oct 2020 13:44)  HR: 77 (09 Oct 2020 04:58) (63 - 77)  BP: 145/82 (09 Oct 2020 04:58) (103/57 - 145/82)  BP(mean): --  RR: 18 (09 Oct 2020 08:20) (18 - 19)  SpO2: 100% (09 Oct 2020 08:20) (99% - 100%)  Daily     Daily   I&O's Summary    08 Oct 2020 07:01  -  09 Oct 2020 07:00  --------------------------------------------------------  IN: 0 mL / OUT: 2000 mL / NET: -2000 mL        Physical Examination:    General: No acute distress.  Alert, oriented, interactive, nonfocal    HEENT: Pupils equal, reactive to light.  Symmetric.    PULM: decrease right lower     CVS: Regular rate and rhythm, no murmurs, rubs, or gallops    ABD: Soft, nondistended, nontender, normoactive bowel sounds, no masses    EXT: No edema, nontender    SKIN: Warm and well perfused, no rashes noted.    Neurology:    Musculoskeletal : Move all extremety         Lab Results:                    Microbiology      Medication:  MEDICATIONS  (STANDING):  chlorhexidine 4% Liquid 1 Application(s) Topical <User Schedule>  enoxaparin Injectable 80 milliGRAM(s) SubCutaneous every 12 hours  exemestane 25 milliGRAM(s) Oral daily  fentaNYL   Patch  25 MICROgram(s)/Hr 1 Patch Transdermal every 72 hours  metoprolol tartrate 12.5 milliGRAM(s) Oral two times a day  pantoprazole  Injectable 40 milliGRAM(s) IV Push daily  senna 2 Tablet(s) Oral at bedtime    MEDICATIONS  (PRN):  Mouth Kote 1 Spray(s) 1 Spray(s) Oral three times a day PRN Dry Mouth  oxyCODONE    IR 10 milliGRAM(s) Oral every 4 hours PRN Moderate Pain (4 - 6)  polyethylene glycol 3350 17 Gram(s) Oral daily PRN Constipation        IMAGING STUDIES:

## 2020-10-09 NOTE — DISCHARGE NOTE PROVIDER - CARE PROVIDER_API CALL
Alysa,   Phone: (   )    -  Fax: (   )    -  Follow Up Time:     Alfredo Boogie  [facility]  [address] [place] [address]  Request Telehealth visit  Phone: [phone]  Fax: (   )    -  Follow Up Time:

## 2020-10-09 NOTE — DISCHARGE NOTE PROVIDER - PROVIDER TOKENS
FREE:[LAST:[Alysa],PHONE:[(   )    -],FAX:[(   )    -]],FREE:[LAST:[Keagan],FIRST:[Alfredo],PHONE:[phone]],FAX:[(   )    -],ADDRESS:[[facility]  [address] [place] [address]  Request Telehealth visit]]

## 2020-10-09 NOTE — DISCHARGE NOTE PROVIDER - NSDCMRMEDTOKEN_GEN_ALL_CORE_FT
clonazePAM 0.5 mg oral tablet: 1 tab(s) orally once a day, As Needed  enoxaparin: 80 milligram(s) subcutaneous 2 times a day  exemestane 25 mg oral tablet: 1 tab(s) orally   Metoprolol Tartrate 25 mg oral tablet: 0.5 tab(s) orally 2 times a day  torsemide 5 mg oral tablet: 2 tab(s) orally 2 times a day, As Needed  Tylenol: 650 milligram(s) orally 2 times a day

## 2020-10-12 NOTE — CHART NOTE - NSCHARTNOTEFT_GEN_A_CORE
Patient requires hospital bed for home. Patient requires frequent position changes to prevent skin breakdown. Pillows and wedges have been tried and failed. Patient requires head of bed to be elevated greater than 30 degrees. Pt is at risk of aspiration.  Patient's diagnosis of C50.919, J90

## 2020-10-29 NOTE — ED PROVIDER NOTE - CLINICAL SUMMARY MEDICAL DECISION MAKING FREE TEXT BOX
pt presented in resp distress, placed on bipap with improvement in WOB, saturation improved, lasix given, discussion had with pt with family at bedside regarding advance directives, currenly pt reporting DNR/DNI, allowing bipap and medical management, discussion had with ICU due to hypercapnia, approved for ICU, hospitalist aware of pt, admitted as discussed and agreed with pt and family.

## 2020-10-29 NOTE — H&P ADULT - NSHPPHYSICALEXAM_GEN_ALL_CORE
PHYSICAL EXAM:  GENERAL: arousable   HEAD:  Atraumatic, Normocephalic  EYES: EOMI,    NECK: Supple, No JVD, Normal thyroid  NERVOUS SYSTEM:  Alert & Oriented X3,   CHEST/LUNG: poor effort , right pigtail   HEART: Regular rate and rhythm; No murmurs, rubs, or gallops  ABDOMEN: Soft, Nontender, Nondistended; Bowel sounds present  EXTREMITIES:  2+ edema  SKIN: No rashes or lesions

## 2020-10-29 NOTE — ED PROVIDER NOTE - NS ED ROS FT
Constitutional:  No fevers or chills.  Eyes:  No visual changes, eye pain, or discharge.  ENT:  No sore throat.  Neck:  No neck pain or stiffness.  Cardiac:  No CP or edema.  Resp:  +SOB. No hemoptysis.  GI:  No nausea, vomiting, diarrhea, or abdominal pain.  :  No dysuria, frequency, or hematuria.  MSK:  No myalgias or joint pain/swelling.  Neuro:  No headache, dizziness, or weakness.  Skin:  No skin rash.

## 2020-10-29 NOTE — H&P ADULT - ASSESSMENT
A/P:     55 year old woman with metastatic breast cancer with worsening hypercapnea     1. Hypercapnic respiratory failure  - c/w BIPAP   - repeat ABG   - pt is DNR/ DNI   - has right sided pleurx cath - minimal effusion  - chest x-ray reviewed , laly   - pulmonary embolism - c/w lovenox q12   - HTN - hold BP meds   - send tsh on am     DNR/DNI     pt and  allow pressors and central line

## 2020-10-29 NOTE — ED PROVIDER NOTE - PHYSICAL EXAMINATION
PHYSICAL EXAM: I have reviewed current vital signs.  GENERAL: Chronically ill appearing female sitting on stretcher with increased wob, improved on bipap. Cachectic.  HEAD:  Normocephalic, atraumatic.  EYES: Conjunctiva and sclera clear.  ENT: MMM, no erythema/exudates.  NECK: Supple, FROM. +JVD.  CHEST/LUNG: Clear to auscultation bilaterally; no wheezes, rales, or rhonchi. Mastectomy scars. L sided pigtail in place.  HEART: Regular rate and rhythm, normal S1 and S2; no murmurs, rubs, or gallops.  ABDOMEN: Soft, nontender, nondistended. Anasarca/generalized swelling.  EXTREMITIES:  2+ peripheral pulses; LE 3+ pitting edema.  NEUROLOGY: A&O x 3. Motor 5/5. No focal neurological deficits.  SKIN: Warm and dry.

## 2020-10-29 NOTE — ED PROVIDER NOTE - OBJECTIVE STATEMENT
Patient is normally on 40L HF during the day and CPAP during the night. 54yo F with PMH of metastatic breast cancer diagnosed in 2001 with mets to lungs/bones presenting to ED, s/p L sided pigtail (9/2020) for recurrent L sided malignant effusion, PE diagnosed 9/11/2020 on lovenox 80 mg twice a day, anasarca on lasix (unsure of dose), presenting to ED with 1-2 days of worsening diffuse swelling/anasarca x 1-2 days and worsening SOB. Patient has been taking lasix as needed per . Per  has been taking lasix as needed, and believes instead of taking it once a day was taking it twice a day these past few days. No fever, chills, n/v, cp, pleuritic cp, palpitations, diaphoresis, cough, ha/lh/dizziness, numbness/tingling, neck pain/stiffness, abd pain, diarrhea, constipation, melena/brbpr, urinary symptoms, trauma, recent travel or rash. Patient is normally on 40L HF during the day and CPAP during the night.

## 2020-10-29 NOTE — H&P ADULT - NSHPLABSRESULTS_GEN_ALL_CORE
labs  10-29    129<L>  |  80<L>  |  15  ----------------------------<  119<H>  4.9   |  39<H>  |  0.5<L>    Ca    10.1      29 Oct 2020 14:35    TPro  7.4  /  Alb  4.2  /  TBili  0.3  /  DBili  x   /  AST  56<H>  /  ALT  77<H>  /  AlkPhos  115  10-29                          11.4   9.42  )-----------( 389      ( 29 Oct 2020 14:35 )             39.5         PT/INR - ( 29 Oct 2020 14:35 )   PT: 12.00 sec;   INR: 1.04 ratio         PTT - ( 29 Oct 2020 14:35 )  PTT:32.2 sec

## 2020-10-29 NOTE — H&P ADULT - HISTORY OF PRESENT ILLNESS
55 year old woman with PMH of metastatic breast cancer diagnosed in 2001 with mets to lungs/bones presenting to ED, s/p L sided pigtail (9/2020) for recurrent L sided malignant effusion, PE diagnosed 9/11/2020 on lovenox 80 mg twice a day, anasarca on lasix (unsure of dose), presenting to ED with 1-2 days of worsening diffuse swelling/anasarca x 1-2 days and worsening SOB. Patient has been taking lasix as needed per . Per  has been taking lasix as needed, and believes instead of taking it once a day was taking it twice a day these past few days. No fever, chills, n/v, cp, pleuritic cp, palpitations, diaphoresis, cough, ha/lh/dizziness, numbness/tingling, neck pain/stiffness, abd pain, diarrhea, constipation, melena/brbpr, urinary symptoms, trauma, recent travel or rash. Patient is normally on 40L HF during the day and CPAP during the night.

## 2020-10-29 NOTE — ED PROVIDER NOTE - CARE PLAN
Principal Discharge DX:	Shortness of breath  Secondary Diagnosis:	Pleural effusion  Secondary Diagnosis:	Hyponatremia   Principal Discharge DX:	Shortness of breath  Secondary Diagnosis:	Hyponatremia  Secondary Diagnosis:	Hypercapnic respiratory failure  Secondary Diagnosis:	Pleural effusion

## 2020-10-29 NOTE — ED PROVIDER NOTE - CRITICAL CARE PROVIDED
consult w/ pt's family directly relating to pts condition/direct patient care (not related to procedure)/additional history taking/conducted a detailed discussion of DNR status/documentation/consultation with other physicians/interpretation of diagnostic studies

## 2020-10-29 NOTE — ED PROVIDER NOTE - PROGRESS NOTE DETAILS
ED Attending EMERSON Barry  Discussion had with pt with  at bedside, pt reported she does not want any ventilation or compressions, her wishes at this time are DNR/DNI.  would like to discuss with family and her as well. Xray reviewed with b/l effusions, L pig tail in place, saturation 100 on BiPAP, lasix to be ordered, pt and  aware. ED Attending EMERSON Barry  CO2 on vbg 109 prior to bipap, was 94 on 9/30, pt on bipap, will reassess. Dienes- patient dispo delayed 2/2 lab error with running covid test, will have to rerun test. Patient is DNR/DNI per her wishes. She does not want a longoria in place. Repeat gas showes CO2 105 (down from 109). Patient in BIPAP. Ramon- Signed out to TRISTAN Cisneros pending covid swab results to dispo patient. ED Attending EMERSON Barry  ICU team at bedside. ED Attending EMERSON Barry  ICU team evaluated pt and report approved for ICU.

## 2020-10-29 NOTE — ED PROVIDER NOTE - ATTENDING CONTRIBUTION TO CARE
56 y/o f w/ pmhx of metastatic breast ca diagnosed in 2001 with mets to lungs, bones, L sided pigtail (9/2020) for recurrent L sided malignant effusion,  PE diagnosed 9/11/2020, 56 y/o f w/ pmhx of metastatic breast ca diagnosed in 2001 with mets to lungs, bones, L sided pigtail (9/2020) for recurrent L sided malignant effusion,  PE diagnosed 9/11/2020 on lovenox 80 mg twice a day, (family discussing DNR/DNI status), anisocoria on lasix (unsure of dose), presents with ~ 1-2 days of worsening LE and generalized swelling, with sob. Per  has been taking lasix as needed, and believes instead of taking it once a day was taking it twice a day these past few days. No fever, chills, n/v, cp, pleuritic cp, palpitations, diaphoresis, cough, ha/lh/dizziness, numbness/tingling, neck pain/ stiffness, abd pain, diarrhea, constipation, melena/brbpr, urinary symptoms, trauma,  recent travel or rash.     Vital Signs: I have reviewed the initial vital signs. Constitutional: Chronically l ill appearing female sitting on stretcher with increased WOB, improved on BIPAP. Integumentary: No rash. ENT: MMM NECK: Supple, non-tender, no meningeal signs. Cardiovascular: RRR, radial pulses 2/4 b/l. (+) JVD. Respiratory: BS present b/l, crackles present b/l, poor air exchange,  no accessory muscle use, no stridor. L sided pigtail in place. Gastrointestinal: BS present throughout all 4 quadrants, soft, nd, nt, no rebound tenderness or guarding, no cvat. Musculoskeletal: (+) anisocoria. generalized swelling, worse to b/l LE.3+ pitting edema. Neurologic: AAOx3, No focal deficits. 56 y/o f w/ pmhx of metastatic breast ca diagnosed in 2001 with mets to lungs, bones, L sided pigtail (9/2020) for recurrent L sided malignant effusion,  PE diagnosed 9/11/2020 on lovenox 80 mg twice a day, (family discussing DNR/DNI status),  anasarca on lasix (unsure of dose), presents with ~ 1-2 days of worsening LE and generalized swelling, with sob. Per  has been taking lasix as needed, and believes instead of taking it once a day was taking it twice a day these past few days. No fever, chills, n/v, cp, pleuritic cp, palpitations, diaphoresis, cough, ha/lh/dizziness, numbness/tingling, neck pain/ stiffness, abd pain, diarrhea, constipation, melena/brbpr, urinary symptoms, trauma,  recent travel or rash.     Vital Signs: I have reviewed the initial vital signs. Constitutional: Chronically l ill appearing female sitting on stretcher with increased WOB, improved on BIPAP. Integumentary: No rash. ENT: MMM NECK: Supple, non-tender, no meningeal signs. Cardiovascular: RRR, radial pulses 2/4 b/l. (+) JVD. Respiratory: BS present b/l, crackles present b/l, poor air exchange,  no accessory muscle use, no stridor. L sided pigtail in place. Gastrointestinal: BS present throughout all 4 quadrants, soft, nd, nt, no rebound tenderness or guarding, no cvat. Musculoskeletal: (+)  anasarca. generalized swelling, worse to b/l LE.3+ pitting edema. Neurologic: AAOx3, No focal deficits.

## 2020-10-30 NOTE — PROGRESS NOTE ADULT - ASSESSMENT
55 year old woman with PMH of metastatic breast cancer diagnosed in 2001 with mets to lungs/bones presenting to ED, s/p L sided pigtail (9/2020) for recurrent L sided malignant effusion, PE diagnosed 9/11/2020 on lovenox 80 mg twice a day, anasarca on lasix (unsure of dose), presenting to ED with 1-2 days of worsening diffuse swelling/anasarca x 1-2 days and worsening SOB. Patient found with elevated co2 level     acute hypercapnic respiratory failure / metastatic breast ca - malignant effusion      - DNR - DNI   - hospice consult   - grave prognosis   - diurese    - continue fentanyl patch and opiates prn

## 2020-10-30 NOTE — CONSULT NOTE ADULT - ASSESSMENT
IMPRESSION:  metastatic breast CA  L malignant effusion with [place] cath  acute/chronic hypercapneic respiratory failure  element CHF    SUGGEST:  AVAPS  pt DNR/DNI  diuretics  consider hospice care  drain [place] as needed    prognosis grave

## 2020-10-30 NOTE — PROGRESS NOTE ADULT - SUBJECTIVE AND OBJECTIVE BOX
JENN SERRANO 55y Female  MRN#: [id number]   CODE STATUS: DNR/DNI       SUBJECTIVE  Patient is a 55y old Female who presents with a chief complaint of Hypercapenic respiratory failure (30 Oct 2020 07:11)  Currently admitted to medicine with the primary diagnosis of Shortness of breath    Hospital course has been complicated by hypercapnic resp failure.   Today is hospital day 1d, and this morning she is tachypneic, she has increased work of breathing ; spoke in length with patient and called  about code status and they confirmed the DNR DNI decision.        OBJECTIVE  PAST MEDICAL & SURGICAL HISTORY  Metastatic breast cancer    H/O bilateral mastectomy      ALLERGIES:  tamoxifen (Hives)    MEDICATIONS:  STANDING MEDICATIONS  chlorhexidine 4% Liquid 1 Application(s) Topical two times a day  enoxaparin Injectable 70 milliGRAM(s) SubCutaneous every 12 hours  pantoprazole  Injectable 40 milliGRAM(s) IV Push daily    PRN MEDICATIONS      Home Medications  Home Medications:  clonazePAM 0.5 mg oral tablet: 1 tab(s) orally once a day, As Needed (29 Oct 2020 21:46)  enoxaparin: 80 milligram(s) subcutaneous 2 times a day (29 Oct 2020 21:46)  exemestane 25 mg oral tablet: 1 tab(s) orally  (29 Oct 2020 21:46)  Metoprolol Tartrate 25 mg oral tablet: 0.5 tab(s) orally 2 times a day (29 Oct 2020 21:46)  torsemide 5 mg oral tablet: 2 tab(s) orally 2 times a day, As Needed (29 Oct 2020 21:46)      VITAL SIGNS: Last 24 Hours  T(C): 36.4 (30 Oct 2020 07:10), Max: 36.8 (29 Oct 2020 15:09)  T(F): 97.5 (30 Oct 2020 07:10), Max: 98.3 (29 Oct 2020 15:09)  HR: 85 (30 Oct 2020 09:15) (70 - 90)  BP: 131/63 (30 Oct 2020 09:15) (91/54 - 198/92)  BP(mean): 91 (30 Oct 2020 09:15) (68 - 105)  RR: 23 (30 Oct 2020 09:15) (15 - 39)  SpO2: 100% (30 Oct 2020 09:15) (99% - 100%)    LABS:                        10.8   6.70  )-----------( 378      ( 30 Oct 2020 05:23 )             38.0     10-30    130<L>  |  81<L>  |  15  ----------------------------<  95  4.3   |  45<HH>  |  <0.5<L>    Ca    10.1      30 Oct 2020 05:23  Phos  4.4     10-30  Mg     2.2     10-30    TPro  7.4  /  Alb  4.2  /  TBili  0.3  /  DBili  x   /  AST  56<H>  /  ALT  77<H>  /  AlkPhos  115  10-29    PT/INR - ( 29 Oct 2020 14:35 )   PT: 12.00 sec;   INR: 1.04 ratio         PTT - ( 29 Oct 2020 14:35 )  PTT:32.2 sec    ABG - ( 30 Oct 2020 05:47 )  pH, Arterial: x     pH, Blood: 7.26  /  pCO2: 107   /  pO2: 44    / HCO3: 48    / Base Excess: 17.1  /  SaO2: 83                Troponin T, Serum: <0.01 ng/mL (10-29-20 @ 14:35)      CARDIAC MARKERS ( 29 Oct 2020 14:35 )  x     / <0.01 ng/mL / x     / x     / x          RADIOLOGY:  < from: Xray Chest 1 View-PORTABLE IMMEDIATE (Xray Chest 1 View-PORTABLE IMMEDIATE .) (10.29.20 @ 14:53) >    Pulmonary vascular congestion and bilateral pleural effusions unchanged. No air leak.      < end of copied text >    PHYSICAL EXAM:    GENERAL: in respiratory distress, somnolent, responds to simple commands as opening eyes and answering commands   HEENT:  facial and periorbital edema   PULMONARY: decreased air entry, poor inspiratory effort  CARDIOVASCULAR: Regular rate and rhythm; No murmurs  GASTROINTESTINAL: Soft, Nontender  MUSCULOSKELETAL:  2+ Peripheral Pulses, +2 diffuse edema in lower and upper extremities   NEUROLOGY: non-focal  SKIN: No rashes or lesions

## 2020-10-30 NOTE — PROGRESS NOTE ADULT - ASSESSMENT
A/P:     55 year old woman with metastatic breast cancer with worsening hypercapnea     1. Hypercapnic respiratory failure  - worsening hypercapnia, patient still mentating but lethargic, switched to AVAPS, fu repeat ABG    - pt is DNR/ DNI, goals of care discussed with patient and confirmed with , MOLST form in chart    - has right sided pleurx cath - minimal effusion  - chest x-ray reviewed shows congestion extra lasix dose given , kypohosis   - hx of pulmonary embolism - c/w lovenox q12   -progression of disease  -no signs of superimposed infection     DNR/DNI     pt and  allow pressors and central line    A/P:     55 year old woman with metastatic breast cancer with worsening hypercapnea     # Hypercapnic respiratory failure  - worsening hypercapnia, patient still mentating but lethargic, switched to AVAPS, fu repeat ABG    - pt is DNR/ DNI, goals of care discussed with patient and confirmed with , MOLST form in chart    - has right sided pleurx cath - minimal effusion  - chest x-ray reviewed shows congestion extra lasix dose given , kypohosis   - hx of pulmonary embolism - c/w lovenox q12   -progression of disease  -no signs of superimposed infection     #Metastatic breast cancer   -follow up with hem/onc     GI ppx: Po protonix 40 mg QD  DVT ppx: Lovenox  Activity : ambulate as tolerated  Diet : NPO as on AVAps, when tolerated remove and start for now not feasible    DNR/DNI     pt and  allow pressors and central line

## 2020-10-30 NOTE — CONSULT NOTE ADULT - SUBJECTIVE AND OBJECTIVE BOX
HPI:  55 year old woman with PMH of metastatic breast cancer diagnosed in 2001 with mets to lungs/bones presenting to ED, s/p L sided pigtail (9/2020) for recurrent L sided malignant effusion, PE diagnosed 9/11/2020 on lovenox 80 mg twice a day, anasarca on lasix (unsure of dose), presenting to ED with 1-2 days of worsening diffuse swelling/anasarca x 1-2 days and worsening SOB. Patient has been taking lasix as needed per . Per  has been taking lasix as needed, and believes instead of taking it once a day was taking it twice a day these past few days. No fever, chills, n/v, cp, pleuritic cp, palpitations, diaphoresis, cough, ha/lh/dizziness, numbness/tingling, neck pain/stiffness, abd pain, diarrhea, constipation, melena/brbpr, urinary symptoms, trauma, recent travel or rash. Patient is normally on 40L HF during the day and CPAP during the night.     (29 Oct 2020 21:32)      CC/ HPI Patient is a 55y old  Female who presents with a chief complaint of Hypercapenic respiratory failure (29 Oct 2020 21:32)      SHORTNESS OF BREATH;HYPONATREMIA;HYPERCAPNIC RESPIRATORY FAILURE;PLEURAL    SHORTNESS OF BREATH;PLEURAL EFFUSION;HYPONATREMIA    ^SOB        Metastatic breast cancer    Shortness of breath    Pulmonary thromboembolism    Thrombophlebitis of iliac vein    H/O bilateral mastectomy    Hypercapnic respiratory failure    Hyponatremia    Pleural effusion      FAMILY HISTORY:      tamoxifen (Hives)        Marital Status:  ( x  )    (   ) Single    (   )    (  )   Occupation:   Lives with: (  ) alone  (  ) children   (  x) spouse   (  ) parents  (  ) other  Recent Travel:     Substance Use (street drugs): ( x ) never used  (  ) other:  Tobacco Usage:  ( x  ) never smoked   (   ) former smoker   (   ) current smoker  (     ) pack year  Alcohol Usage:        Home prescriptions  clonazePAM 0.5 mg oral tablet: 1 tab(s) orally once a day, As Needed  enoxaparin: 80 milligram(s) subcutaneous 2 times a day  exemestane 25 mg oral tablet: 1 tab(s) orally   fentaNYL 25 mcg/hr transdermal film, extended release: 1 patch transdermal every 72 hours MDD:1 patch every 72 hours  Metoprolol Tartrate 25 mg oral tablet: 0.5 tab(s) orally 2 times a day  oxyCODONE 10 mg oral tablet: 1 tab(s) orally every 4 hours, As needed, Moderate Pain (4 - 6) MDD:3 tabs  torsemide 5 mg oral tablet: 2 tab(s) orally 2 times a day, As Needed      MEDICATIONS  (STANDING):  chlorhexidine 4% Liquid 1 Application(s) Topical two times a day  enoxaparin Injectable 70 milliGRAM(s) SubCutaneous every 12 hours  pantoprazole  Injectable 40 milliGRAM(s) IV Push daily    MEDICATIONS  (PRN):          T(C): 36.4 (10-30-20 @ 06:00), Max: 36.8 (10-29-20 @ 15:09)  HR: 78 (10-30-20 @ 06:00) (70 - 87)  BP: 133/71 (10-30-20 @ 06:00) (115/59 - 198/92)  RR: 16 (10-30-20 @ 06:00) (15 - 39)  SpO2: 100% (10-30-20 @ 06:00) (99% - 100%)  ICU Vital Signs Last 24 Hrs  T(C): 36.4 (30 Oct 2020 06:00), Max: 36.8 (29 Oct 2020 15:09)  T(F): 97.5 (30 Oct 2020 06:00), Max: 98.3 (29 Oct 2020 15:09)  HR: 78 (30 Oct 2020 06:00) (70 - 87)  BP: 133/71 (30 Oct 2020 06:00) (115/59 - 198/92)  BP(mean): 97 (30 Oct 2020 06:00) (81 - 105)  RR: 16 (30 Oct 2020 06:00) (15 - 39)  SpO2: 100% (30 Oct 2020 06:00) (99% - 100%)      I&O's Summary    29 Oct 2020 07:01  -  30 Oct 2020 07:00  --------------------------------------------------------  IN: 0 mL / OUT: 1175 mL / NET: -1175 mL        I&O's Detail    29 Oct 2020 07:01  -  30 Oct 2020 07:00  --------------------------------------------------------  IN:  Total IN: 0 mL    OUT:    Voided (mL): 1175 mL  Total OUT: 1175 mL    Total NET: -1175 mL          Drug Dosing Weight  Height (cm): 154.9 (29 Oct 2020 22:40)  Weight (kg): 69.1 (29 Oct 2020 22:40)  BMI (kg/m2): 28.8 (29 Oct 2020 22:40)  BSA (m2): 1.68 (29 Oct 2020 22:40)    LABS:                          10.8   6.70  )-----------( 378      ( 30 Oct 2020 05:23 )             38.0       10-29    129<L>  |  80<L>  |  15  ----------------------------<  119<H>  4.9   |  39<H>  |  0.5<L>    Ca    10.1      29 Oct 2020 14:35    TPro  7.4  /  Alb  4.2  /  TBili  0.3  /  DBili  x   /  AST  56<H>  /  ALT  77<H>  /  AlkPhos  115  10-29      LIVER FUNCTIONS - ( 29 Oct 2020 14:35 )  Alb: 4.2 g/dL / Pro: 7.4 g/dL / ALK PHOS: 115 U/L / ALT: 77 U/L / AST: 56 U/L / GGT: x             CARDIAC MARKERS ( 29 Oct 2020 14:35 )  x     / <0.01 ng/mL / x     / x     / x                Serum Pro-Brain Natriuretic Peptide: 94 pg/mL (10-29-20 @ 14:35)        ABG - ( 30 Oct 2020 05:47 )  pH, Arterial: x     pH, Blood: 7.26  /  pCO2: 107   /  pO2: 44    / HCO3: 48    / Base Excess: 17.1  /  SaO2: 83          RADIOLOGY:  I have personally reviewed all chest and other pertinent radiology films.      REVIEW OF SYSTEMS:     · CONSTITUTIONAL:   no fever   no chills.      · EYES:   no discharge,   no irritation,    no visual changes.    · ENMT:   Ears: no ear pain and no hearing problems.  Nose: no nasal congestion and no nasal drainage.      · CARDIOVASCULAR:   no chest pain,   no swelling  no palpitations      · RESPIRATORY:  +SOB,  no wheezing ,  +respiratory difficulty  no sputum production    · GASTROINTESTINAL:   no abdominal pain,    no nausea   no vomiting.    · GENITOURINARY:  no dysuria,   no frequency,       · MUSCULOSKELETAL:   no back pain,   no neck pain,   no weakness.    · SKIN:   no pruritis,   no rashes.    · NEURO:   no loss of consciousness,   no headache,   no weakness.    · PSYCHIATRIC:   no known mental health issues  no anxiety  no depression        ALLERGIC/IMMUNOLOGIC:   No active allergic or immunologic issues    all other systems are negative        PHYSICAL EXAM:     · CONSTITUTIONAL:   not septic appearing,   well nourished,   NAD    · ENMT:   Airway patent,   Nasal mucosa clear.  Mouth with normal mucosa.   No thrush    · EYES:   Clear bilaterally,   pupils equal,   round and reactive to light.    · CARDIAC:   Normal rate,   regular rhythm.    Heart sounds S1, S2.   no thrills or bruits on palpitation  normal  cardiac impulse  No murmurs, no rubs or gallops on auscultation  no edema        CAROTID:   normal systolic impulse  no bruits    · RESPIRATORY:   rales  normal chest expansion  +tachypneic,  +retractions or use of accessory muscles  palpation of chest is normal with no fremitus  percussion of chest demonstrates no hyperresonance or dullness    · GASTROINTESTINAL:  Abdomen soft,   non-tender,   no guarding,   + BS  liver spleen not palpable      · MUSCULOSKELETAL:   range of motion is not limited,  no clubbing, cyanosis  no petechiae    · NEUROLOGICAL:   Alert and oriented   no obvious focal deficits in cranial nerve areas  no motor or sensory deficits.      · SKIN:   Skin normal color for race,   warm,   dry and intact.       · PSYCHIATRIC:   Alert and oriented to person,   place, time/situation.   normal mood and affect.   no apparent risk to self or others.    · HEME LYMPH:   no splenomegaly.  No cervical  lymphadenopathy.  no inguinal lymphadenopathy       HPI:  55 year old woman with PMH of metastatic breast cancer diagnosed in 2001 with mets to lungs/bones presenting to ED, s/p L sided pigtail (9/2020) for recurrent L sided malignant effusion, PE diagnosed 9/11/2020 on lovenox 80 mg twice a day, anasarca on lasix (unsure of dose), presenting to ED with 1-2 days of worsening diffuse swelling/anasarca x 1-2 days and worsening SOB. Patient has been taking lasix as needed per . Per  has been taking lasix as needed, and believes instead of taking it once a day was taking it twice a day these past few days. No fever, chills, n/v, cp, pleuritic cp, palpitations, diaphoresis, cough, ha/lh/dizziness, numbness/tingling, neck pain/stiffness, abd pain, diarrhea, constipation, melena/brbpr, urinary symptoms, trauma, recent travel or rash. Patient is normally on 40L HF during the day and CPAP during the night.     (29 Oct 2020 21:32)      CC/ HPI Patient is a 55y old  Female who presents with a chief complaint of Hypercapenic respiratory failure (29 Oct 2020 21:32)      SHORTNESS OF BREATH;HYPONATREMIA;HYPERCAPNIC RESPIRATORY FAILURE;PLEURAL    SHORTNESS OF BREATH;PLEURAL EFFUSION;HYPONATREMIA    ^SOB        Metastatic breast cancer    Shortness of breath    Pulmonary thromboembolism    Thrombophlebitis of iliac vein    H/O bilateral mastectomy    Hypercapnic respiratory failure    Hyponatremia    Pleural effusion      FAMILY HISTORY:      tamoxifen (Hives)        Marital Status:  ( x  )    (   ) Single    (   )    (  )   Occupation:   Lives with: (  ) alone  (  ) children   (  x) spouse   (  ) parents  (  ) other  Recent Travel:     Substance Use (street drugs): ( x ) never used  (  ) other:  Tobacco Usage:  ( x  ) never smoked   (   ) former smoker   (   ) current smoker  (     ) pack year  Alcohol Usage:        Home prescriptions  clonazePAM 0.5 mg oral tablet: 1 tab(s) orally once a day, As Needed  enoxaparin: 80 milligram(s) subcutaneous 2 times a day  exemestane 25 mg oral tablet: 1 tab(s) orally   fentaNYL 25 mcg/hr transdermal film, extended release: 1 patch transdermal every 72 hours MDD:1 patch every 72 hours  Metoprolol Tartrate 25 mg oral tablet: 0.5 tab(s) orally 2 times a day  oxyCODONE 10 mg oral tablet: 1 tab(s) orally every 4 hours, As needed, Moderate Pain (4 - 6) MDD:3 tabs  torsemide 5 mg oral tablet: 2 tab(s) orally 2 times a day, As Needed      MEDICATIONS  (STANDING):  chlorhexidine 4% Liquid 1 Application(s) Topical two times a day  enoxaparin Injectable 70 milliGRAM(s) SubCutaneous every 12 hours  pantoprazole  Injectable 40 milliGRAM(s) IV Push daily    MEDICATIONS  (PRN):          T(C): 36.4 (10-30-20 @ 06:00), Max: 36.8 (10-29-20 @ 15:09)  HR: 78 (10-30-20 @ 06:00) (70 - 87)  BP: 133/71 (10-30-20 @ 06:00) (115/59 - 198/92)  RR: 16 (10-30-20 @ 06:00) (15 - 39)  SpO2: 100% (10-30-20 @ 06:00) (99% - 100%)  ICU Vital Signs Last 24 Hrs  T(C): 36.4 (30 Oct 2020 06:00), Max: 36.8 (29 Oct 2020 15:09)  T(F): 97.5 (30 Oct 2020 06:00), Max: 98.3 (29 Oct 2020 15:09)  HR: 78 (30 Oct 2020 06:00) (70 - 87)  BP: 133/71 (30 Oct 2020 06:00) (115/59 - 198/92)  BP(mean): 97 (30 Oct 2020 06:00) (81 - 105)  RR: 16 (30 Oct 2020 06:00) (15 - 39)  SpO2: 100% (30 Oct 2020 06:00) (99% - 100%)      I&O's Summary    29 Oct 2020 07:01  -  30 Oct 2020 07:00  --------------------------------------------------------  IN: 0 mL / OUT: 1175 mL / NET: -1175 mL        I&O's Detail    29 Oct 2020 07:01  -  30 Oct 2020 07:00  --------------------------------------------------------  IN:  Total IN: 0 mL    OUT:    Voided (mL): 1175 mL  Total OUT: 1175 mL    Total NET: -1175 mL          Drug Dosing Weight  Height (cm): 154.9 (29 Oct 2020 22:40)  Weight (kg): 69.1 (29 Oct 2020 22:40)  BMI (kg/m2): 28.8 (29 Oct 2020 22:40)  BSA (m2): 1.68 (29 Oct 2020 22:40)    LABS:                          10.8   6.70  )-----------( 378      ( 30 Oct 2020 05:23 )             38.0       10-29    129<L>  |  80<L>  |  15  ----------------------------<  119<H>  4.9   |  39<H>  |  0.5<L>    Ca    10.1      29 Oct 2020 14:35    TPro  7.4  /  Alb  4.2  /  TBili  0.3  /  DBili  x   /  AST  56<H>  /  ALT  77<H>  /  AlkPhos  115  10-29      LIVER FUNCTIONS - ( 29 Oct 2020 14:35 )  Alb: 4.2 g/dL / Pro: 7.4 g/dL / ALK PHOS: 115 U/L / ALT: 77 U/L / AST: 56 U/L / GGT: x             CARDIAC MARKERS ( 29 Oct 2020 14:35 )  x     / <0.01 ng/mL / x     / x     / x                Serum Pro-Brain Natriuretic Peptide: 94 pg/mL (10-29-20 @ 14:35)        ABG - ( 30 Oct 2020 05:47 )  pH, Arterial: x     pH, Blood: 7.26  /  pCO2: 107   /  pO2: 44    / HCO3: 48    / Base Excess: 17.1  /  SaO2: 83          RADIOLOGY:  I have personally reviewed all chest and other pertinent radiology films.      REVIEW OF SYSTEMS:     · CONSTITUTIONAL:   no fever   no chills.      · EYES:   no discharge,   no irritation,    no visual changes.    · ENMT:   Ears: no ear pain and no hearing problems.  Nose: no nasal congestion and no nasal drainage.      · CARDIOVASCULAR:   no chest pain,   no swelling  no palpitations      · RESPIRATORY:  +SOB,  no wheezing ,  +respiratory difficulty  no sputum production    · GASTROINTESTINAL:   no abdominal pain,    no nausea   no vomiting.    · GENITOURINARY:  no dysuria,   no frequency,       · MUSCULOSKELETAL:   no back pain,   no neck pain,   no weakness.    · SKIN:   no pruritis,   no rashes.    · NEURO:   no loss of consciousness,   no headache,   no weakness.    · PSYCHIATRIC:   no known mental health issues  no anxiety  no depression        ALLERGIC/IMMUNOLOGIC:   No active allergic or immunologic issues    all other systems are negative        PHYSICAL EXAM:     · CONSTITUTIONAL:   not septic appearing,   well nourished,   NAD    · ENMT:   Airway patent,   Nasal mucosa clear.  Mouth with normal mucosa.   No thrush    · EYES:   Clear bilaterally,   pupils equal,   round and reactive to light.    · CARDIAC:   Normal rate,   regular rhythm.    Heart sounds S1, S2.   no thrills or bruits on palpitation  normal  cardiac impulse  No murmurs, no rubs or gallops on auscultation  no edema        CAROTID:   normal systolic impulse  no bruits    · RESPIRATORY:   rales  normal chest expansion  +tachypneic,  +retractions or use of accessory muscles  palpation of chest is normal with no fremitus  percussion of chest demonstrates no hyperresonance or dullness    · GASTROINTESTINAL:  Abdomen soft,   non-tender,   no guarding,   + BS  liver spleen not palpable      · MUSCULOSKELETAL:   range of motion is not limited,  no clubbing, cyanosis  no petechiae    · NEUROLOGICAL:   lethargic.      · SKIN:   Skin normal color for race,   warm,   dry and intact.       · PSYCHIATRIC:   Unable to obtain due to patient's condition.    · HEME LYMPH:   no splenomegaly.  No cervical  lymphadenopathy.  no inguinal lymphadenopathy

## 2020-10-30 NOTE — PROGRESS NOTE ADULT - SUBJECTIVE AND OBJECTIVE BOX
Patient is on NIPPV      T(F): 97.5 (10-30-20 @ 07:10), Max: 98.3 (10-29-20 @ 15:09)  HR: 80 (10-30-20 @ 11:00)  BP: 124/77 (10-30-20 @ 11:00)  RR: 21 (10-30-20 @ 11:00)  SpO2: 100% (10-30-20 @ 11:00) (99% - 100%)    PHYSICAL EXAM:  GENERAL: obtunded on NIPPV  HEAD:  Atraumatic, Normocephalic  NERVOUS SYSTEM:  non focal  CHEST/LUNG:  bilateral rales  HEART: Regular rate and rhythm; No murmurs, rubs, or gallops  ABDOMEN: Soft, Nontender, Nondistended; Bowel sounds present  EXTREMITIES:  b/l edema      LABS  10-30    130<L>  |  81<L>  |  15  ----------------------------<  95  4.3   |  45<HH>  |  <0.5<L>    Ca    10.1      30 Oct 2020 05:23  Phos  4.4     10-30  Mg     2.2     10-30    TPro  7.4  /  Alb  4.2  /  TBili  0.3  /  DBili  x   /  AST  56<H>  /  ALT  77<H>  /  AlkPhos  115  10-29                          10.8   6.70  )-----------( 378      ( 30 Oct 2020 05:23 )             38.0     PT/INR - ( 29 Oct 2020 14:35 )   PT: 12.00 sec;   INR: 1.04 ratio         PTT - ( 29 Oct 2020 14:35 )  PTT:32.2 sec    CARDIAC ENZYMES    Troponin T, Serum: <0.01 ng/mL (10-29-20 @ 14:35)    RADIOLOGY  < from: Xray Chest 1 View-PORTABLE IMMEDIATE (Xray Chest 1 View-PORTABLE IMMEDIATE .) (10.29.20 @ 14:53) >    Impression:    Pulmonary vascular congestion and bilateral pleural effusions unchanged. No air leak.    < end of copied text >    MEDICATIONS  (STANDING):  chlorhexidine 4% Liquid 1 Application(s) Topical two times a day  enoxaparin Injectable 70 milliGRAM(s) SubCutaneous every 12 hours  pantoprazole  Injectable 40 milliGRAM(s) IV Push daily    MEDICATIONS  (PRN):

## 2020-10-31 NOTE — PROGRESS NOTE ADULT - SUBJECTIVE AND OBJECTIVE BOX
JENN SERRANO  55y  Female      Patient is a 55y old  Female who presents with a chief complaint of Hypercapenic respiratory failure (31 Oct 2020 07:06)      INTERVAL HPI/OVERNIGHT EVENTS: No acute e      VITALS  T(C): 36.6 (10-31-20 @ 11:00), Max: 36.7 (10-30-20 @ 15:05)  HR: 97 (10-31-20 @ 13:02) (70 - 97)  BP: 136/69 (10-31-20 @ 13:02) (85/53 - 160/79)  RR: 34 (10-31-20 @ 13:02) (10 - 40)  SpO2: 99% (10-31-20 @ 13:02) (98% - 100%)  Wt(kg): --Vital Signs Last 24 Hrs  T(C): 36.6 (31 Oct 2020 11:00), Max: 36.7 (30 Oct 2020 15:05)  T(F): 97.9 (31 Oct 2020 11:00), Max: 98 (30 Oct 2020 15:05)  HR: 97 (31 Oct 2020 13:02) (70 - 97)  BP: 136/69 (31 Oct 2020 13:02) (85/53 - 160/79)  BP(mean): 88 (31 Oct 2020 13:02) (65 - 113)  RR: 34 (31 Oct 2020 13:02) (10 - 40)  SpO2: 99% (31 Oct 2020 13:02) (98% - 100%)      10-30-20 @ 07:01  -  10-31-20 @ 07:00  --------------------------------------------------------  IN: 0 mL / OUT: 1275 mL / NET: -1275 mL    10-31-20 @ 08:01  -  10-31-20 @ 13:24  --------------------------------------------------------  IN: 360 mL / OUT: 0 mL / NET: 360 mL        PHYSICAL EXAM:  GENERAL: NAD, well-groomed, well-developed  PSYCH: no agitation, baseline mentation  NERVOUS SYSTEM:  Alert & Oriented X3, Motor Strength 5/5 B/L upper and lower extremities; Sensory intact; FTN WNL  PULMONARY: Clear to percussion bilaterally; No rales, rhonchi, wheezing, or rubs  CARDIOVASCULAR: Regular rate and rhythm; No murmurs, rubs, or gallops  GI: Soft, Nontender, Nondistended; Bowel sounds present  EXTREMITIES:  2+ Peripheral Pulses, No clubbing, cyanosis, or edema  LYMPH: No lymphadenopathy noted  SKIN: No rashes or lesions    Consultant(s) Notes Reviewed:  [x ] YES  [ ] NO    Discussed with Consultants/Other Providers [ x] YES     LABS                          10.1   6.40  )-----------( 348      ( 31 Oct 2020 05:54 )             36.1     10-31    134<L>  |  83<L>  |  17  ----------------------------<  70  4.3   |  46<HH>  |  <0.5<L>    Ca    10.4<H>      31 Oct 2020 05:54  Phos  4.4     10-30  Mg     2.2     10-31    TPro  6.3  /  Alb  3.8  /  TBili  0.4  /  DBili  x   /  AST  48<H>  /  ALT  68<H>  /  AlkPhos  102  10-31    ABG - ( 31 Oct 2020 06:27 )  pH, Arterial: 7.40  pH, Blood: x     /  pCO2: 80    /  pO2: 139   / HCO3: 49    / Base Excess: 20.5  /  SaO2: 99          PT/INR - ( 29 Oct 2020 14:35 )   PT: 12.00 sec;   INR: 1.04 ratio    PTT - ( 29 Oct 2020 14:35 )  PTT:32.2 sec    CARDIAC MARKERS ( 29 Oct 2020 14:35 )  x     / <0.01 ng/mL / x     / x     / x          RADIOLOGY & ADDITIONAL TESTS:  Xray Chest 1 View-PORTABLE IMMEDIATE (Xray Chest 1 View-PORTABLE IMMEDIATE .) (10.31.20 @ 09:00) >  Impression:  Bilateral opacities/pleural effusions, unchanged.    Imaging Personally Reviewed:  [ ] YES  [ ] NO    HEALTH ISSUES - PROBLEM Dx:  Pulmonary thromboembolism    MEDICATIONS  (STANDING):  chlorhexidine 4% Liquid 1 Application(s) Topical two times a day  enoxaparin Injectable 70 milliGRAM(s) SubCutaneous every 12 hours    MEDICATIONS  (PRN):       MANSI SERRANOHIEN  55y  Female      Patient is a 55y old  Female who presents with a chief complaint of Hypercapenic respiratory failure (31 Oct 2020 07:06)      INTERVAL HPI/OVERNIGHT EVENTS: No acute events overngiht. patient wearing avaps mask and cannot talk at this time. noded headed that she is good and has no complaints and nothing hurts her at this time. notes she can breath better with mask on.      VITALS  T(C): 36.6 (10-31-20 @ 11:00), Max: 36.7 (10-30-20 @ 15:05)  HR: 97 (10-31-20 @ 13:02) (70 - 97)  BP: 136/69 (10-31-20 @ 13:02) (85/53 - 160/79)  RR: 34 (10-31-20 @ 13:02) (10 - 40)  SpO2: 99% (10-31-20 @ 13:02) (98% - 100%)  Wt(kg): --Vital Signs Last 24 Hrs  T(C): 36.6 (31 Oct 2020 11:00), Max: 36.7 (30 Oct 2020 15:05)  T(F): 97.9 (31 Oct 2020 11:00), Max: 98 (30 Oct 2020 15:05)  HR: 97 (31 Oct 2020 13:02) (70 - 97)  BP: 136/69 (31 Oct 2020 13:02) (85/53 - 160/79)  BP(mean): 88 (31 Oct 2020 13:02) (65 - 113)  RR: 34 (31 Oct 2020 13:02) (10 - 40)  SpO2: 99% (31 Oct 2020 13:02) (98% - 100%)      10-30-20 @ 07:01  -  10-31-20 @ 07:00  --------------------------------------------------------  IN: 0 mL / OUT: 1275 mL / NET: -1275 mL    10-31-20 @ 08:01  -  10-31-20 @ 13:24  --------------------------------------------------------  IN: 360 mL / OUT: 0 mL / NET: 360 mL        PHYSICAL EXAM:  GENERAL: appears older than stated age, chronically ill-appearing  PSYCH: no agitation, baseline mentation  NERVOUS SYSTEM:  Alert & Oriented X3, CN 2-12 grossly intact. Spontaneously moving all 4-ext  PULMONARY: poor respiratory effort, difficult to ausculate given avaps. No accessory muscle use.  CARDIOVASCULAR: Regular rate and rhythm; No murmurs, rubs, or gallops  GI: Soft, Nontender, Nondistended; Bowel sounds present  EXTREMITIES:  2+ Peripheral Pulses, No clubbing, cyanosis, or edema  SKIN: No rashes or lesions      Consultant(s) Notes Reviewed:  [x ] YES  [ ] NO    Discussed with Consultants/Other Providers [ x] YES     LABS                          10.1   6.40  )-----------( 348      ( 31 Oct 2020 05:54 )             36.1     10-31    134<L>  |  83<L>  |  17  ----------------------------<  70  4.3   |  46<HH>  |  <0.5<L>    Ca    10.4<H>      31 Oct 2020 05:54  Phos  4.4     10-30  Mg     2.2     10-31    TPro  6.3  /  Alb  3.8  /  TBili  0.4  /  DBili  x   /  AST  48<H>  /  ALT  68<H>  /  AlkPhos  102  10-31    ABG - ( 31 Oct 2020 06:27 )  pH, Arterial: 7.40  pH, Blood: x     /  pCO2: 80    /  pO2: 139   / HCO3: 49    / Base Excess: 20.5  /  SaO2: 99          PT/INR - ( 29 Oct 2020 14:35 )   PT: 12.00 sec;   INR: 1.04 ratio    PTT - ( 29 Oct 2020 14:35 )  PTT:32.2 sec    CARDIAC MARKERS ( 29 Oct 2020 14:35 )  x     / <0.01 ng/mL / x     / x     / x          RADIOLOGY & ADDITIONAL TESTS:  Xray Chest 1 View-PORTABLE IMMEDIATE (Xray Chest 1 View-PORTABLE IMMEDIATE .) (10.31.20 @ 09:00) >  Impression:  Bilateral opacities/pleural effusions, unchanged.    Imaging Personally Reviewed:  [ ] YES  [ ] NO    HEALTH ISSUES - PROBLEM Dx:  Pulmonary thromboembolism    MEDICATIONS  (STANDING):  chlorhexidine 4% Liquid 1 Application(s) Topical two times a day  enoxaparin Injectable 70 milliGRAM(s) SubCutaneous every 12 hours    MEDICATIONS  (PRN):

## 2020-10-31 NOTE — PROGRESS NOTE ADULT - SUBJECTIVE AND OBJECTIVE BOX
Patient is a 55y old  Female who presents with a chief complaint of Hypercapnic respiratory failure (30 Oct 2020 11:55)        HPI:  55 year old woman with PMH of metastatic breast cancer diagnosed in 2001 with mets to lungs/bones presenting to ED, s/p L sided pigtail (9/2020) for recurrent L sided malignant effusion, PE diagnosed 9/11/2020 on lovenox 80 mg twice a day, anasarca on lasix (unsure of dose), presenting to ED with 1-2 days of worsening diffuse swelling/anasarca x 1-2 days and worsening SOB. Patient has been taking lasix as needed per . Per  has been taking lasix as needed, and believes instead of taking it once a day was taking it twice a day these past few days. No fever, chills, n/v, cp, pleuritic cp, palpitations, diaphoresis, cough, ha/lh/dizziness, numbness/tingling, neck pain/stiffness, abd pain, diarrhea, constipation, melena/brbpr, urinary symptoms, trauma, recent travel or rash. Patient is normally on 40L HF during the day and CPAP during the night.     (29 Oct 2020 21:32)      Interval Events: No overnight events.    REVIEW OF SYSTEMS:   see HPI      OBJECTIVE:  ICU Vital Signs Last 24 Hrs  T(C): 35.9 (31 Oct 2020 03:00), Max: 36.7 (30 Oct 2020 15:05)  T(F): 96.7 (31 Oct 2020 03:00), Max: 98 (30 Oct 2020 15:05)  HR: 78 (31 Oct 2020 06:00) (70 - 90)  BP: 160/79 (31 Oct 2020 06:00) (85/53 - 160/79)  BP(mean): 113 (31 Oct 2020 06:00) (65 - 113)  -  RR: 28 (31 Oct 2020 06:00) (10 - 40)  SpO2: 100% (31 Oct 2020 06:00) (98% - 100%)        10-30 @ 07:01  -  10-31 @ 07:00  --------------------------------------------------------  IN: 0 mL / OUT: 1275 mL / NET: -1275 mL      CAPILLARY BLOOD GLUCOSE            PHYSICAL EXAM:     · CONSTITUTIONAL:   not septic appearing,   well nourished,   NAD    · ENMT:   Airway patent,   Nasal mucosa clear.  Mouth with normal mucosa.   No thrush    · EYES:   Clear bilaterally,   pupils equal,   round and reactive to light.    · CARDIAC:   Normal rate,   regular rhythm.    Heart sounds S1, S2.   No murmurs, no rubs or gallops on auscultation  no edema        CAROTID:   normal systolic impulse  no bruits    · RESPIRATORY:   no w/r/r/,   normal chest expansion  no tachypnea,  no retractions or use of accessory muscles  palpation of chest is normal with no fremitus  percussion of chest demonstrates no hyperresonance or dullness    · GASTROINTESTINAL:  Abdomen soft,   non-tender,   + BS  liver/spleen not palpable    · MUSCULOSKELETAL:   no clubbing, cyanosis      · NEUROLOGICAL:   Alert and oriented   no obvious focal deficits in cranial nerve areas  no motor or sensory deficits.      · SKIN:   Skin normal color for race,   warm, dry   No evidence of rash.    · PSYCHIATRIC:   Alert and oriented to person,   place, time/situation.       · HEME LYMPH:   no splenomegaly.  No cervical  lymphadenopathy.  no inguinal lymphadenopathy    HOSPITAL MEDICATIONS:  MEDICATIONS  (STANDING):  chlorhexidine 4% Liquid 1 Application(s) Topical two times a day  enoxaparin Injectable 70 milliGRAM(s) SubCutaneous every 12 hours  pantoprazole  Injectable 40 milliGRAM(s) IV Push daily    MEDICATIONS  (PRN):        LABS:                        10.1   6.40  )-----------( 348      ( 31 Oct 2020 05:54 )             36.1     10-30    130<L>  |  81<L>  |  15  ----------------------------<  95  4.3   |  45<HH>  |  <0.5<L>    Ca    10.1      30 Oct 2020 05:23  Phos  4.4     10-30  Mg     2.2     10-30    TPro  7.4  /  Alb  4.2  /  TBili  0.3  /  DBili  x   /  AST  56<H>  /  ALT  77<H>  /  AlkPhos  115  10-29    PT/INR - ( 29 Oct 2020 14:35 )   PT: 12.00 sec;   INR: 1.04 ratio         PTT - ( 29 Oct 2020 14:35 )  PTT:32.2 sec    Arterial Blood Gas:  10-31 @ 06:27  7.40/80/139/49/99/20.5  ABG lactate: --  Arterial Blood Gas:  10-30 @ 15:31  7.38/86/141/51/99/21.9  ABG lactate: --  Arterial Blood Gas:  10-30 @ 05:47  --/107/44/48/83/17.1  ABG lactate: --    Venous Blood Gas:  10-29 @ 16:20  7.28/105/36/50/61  VBG Lactate: 0.8  Venous Blood Gas:  10-29 @ 14:56  7.26/109/40/49/65  VBG Lactate: 1.1    CARDIAC MARKERS ( 29 Oct 2020 14:35 )  x     / <0.01 ng/mL / x     / x     / x                ABG - ( 31 Oct 2020 06:27 )  pH, Arterial: 7.40  pH, Blood: x     /  pCO2: 80    /  pO2: 139   / HCO3: 49    / Base Excess: 20.5  /  SaO2: 99                  RADIOLOGY: I personally reviewed latest CXR and other pertinent films.

## 2020-10-31 NOTE — PROGRESS NOTE ADULT - ASSESSMENT
55 year old woman with PMH of metastatic breast cancer diagnosed in 2001 with mets to lungs/bones presenting to ED, s/p L sided pigtail (9/2020) for recurrent L sided malignant effusion, PE diagnosed 9/11/2020 on lovenox 80 mg twice a day, anasarca on lasix (unsure of dose), presenting to ED with 1-2 days of worsening diffuse swelling/anasarca x 1-2 days and worsening SOB. Patient found with elevated co2 level        # Acute on chronic hypoxic and hypercapnic resp failure likely secondary to metastatic breast cancer to lungs  - pt does draining via pigtail twice weekly - monday and saturdays  - HOB @ 45 degrees  - Aspiration precautions.   - due to patients dx of chronic resp failure with hypercapneia, metastatic breast cancer and functional quadrapelgia, patient requires volumed augmented NIV. Standard BiPap and BiPAP ST have been ruled out due to the patients complicated disease process. Without NIV, pt is at risk for further clinical decline.    # Recent PE   - due to malignancy  - c/w  therapeutic dosing of Lovenox    # Functional quadraplegia  - profoundly deconditioned   - PT eval     # Microcytic anemia, STABLE  - given advanced disease colonoscopy would be of little utility  - monitor  - could be due to chronic disease  - no transfusions required    # Transaminitis  - unsure etiology; RUQ US unremarkable from recent admission was unremarkable  - outpt follow up was recommended; no inpatient work up recommended.      DVT ppx: lovenox  GI ppx: ppi  Diet: kosher diet low K  Activity: as tolerated  DNR not DNI  Pt has extremely poor prognosis and is high risk of decompensation and readmission    #Progress Note Handoff:  Pending (specify):  Clinical improvement  Family discussion:  Will have GOC conversation when  is with patient tomorrow  Disposition: unknown

## 2020-10-31 NOTE — PROGRESS NOTE ADULT - ASSESSMENT
IMPRESSION:  metastatic breast CA  L malignant effusion with [place] cath  acute/chronic hypercapneic respiratory failure possibly related to starting Fentanyl patch?  element CHF    SUGGEST:  AVAPS PRN  try high flow today  pt DNR/DNI  diuretics  consider hospice care  limit narcotics but use ase needed  drain [place] as needed  CXR tomorrow    prognosis grave

## 2020-11-01 NOTE — PROGRESS NOTE ADULT - ASSESSMENT
55 year old woman with PMH of metastatic breast cancer diagnosed in 2001 with mets to lungs/bones presenting to ED, s/p L sided pigtail (9/2020) for recurrent L sided malignant effusion, PE diagnosed 9/11/2020 on lovenox 80 mg twice a day, anasarca on lasix (unsure of dose), presenting to ED with 1-2 days of worsening diffuse swelling/anasarca x 1-2 days and worsening SOB. Patient found with elevated co2 level        # Acute on chronic hypoxic and hypercapnic resp failure likely secondary to metastatic breast cancer to lungs  - pt does draining via pigtail twice weekly - monday and saturdays  - HOB @ 45 degrees  - Aspiration precautions.   - due to patients dx of chronic resp failure with hypercapneia, metastatic breast cancer and functional quadrapelgia, patient requires volumed augmented NIV. Standard BiPap and BiPAP ST have been ruled out due to the patients complicated disease process. Without NIV, pt is at risk for further clinical decline.    # Recent PE   - due to malignancy  - c/w  therapeutic dosing of Lovenox    # Functional quadraplegia  - profoundly deconditioned   - PT eval     # Microcytic anemia, STABLE  - given advanced disease colonoscopy would be of little utility  - monitor  - could be due to chronic disease  - no transfusions required    # Transaminitis  - unsure etiology; RUQ US unremarkable from recent admission was unremarkable  - outpt follow up was recommended; no inpatient work up recommended.      DVT ppx: lovenox  GI ppx: ppi  Diet: kosher diet low K  Activity: as tolerated  DNR not DNI  Pt has extremely poor prognosis and is high risk of decompensation and readmission    #Progress Note Handoff:  Pending (specify):  Clinical improvement  Family discussion:  patient updated on plan of care. questions answered. in agreement.   Disposition: unknown

## 2020-11-01 NOTE — PROGRESS NOTE ADULT - ASSESSMENT
IMPRESSION:  metastatic breast CA  L malignant effusion with [place] cath  acute/chronic hypercapneic respiratory failure possibly related to starting Fentanyl patch?  element CHF    SUGGEST:  AVAPS PRN  try high flow   pt frustrated at times as she is not comfortable with anything  pt DNR/DNI  diuretics  consider hospice care  limit narcotics but use ase needed  drain [place] as needed no effusion currently  CXR tomorrow    prognosis grave

## 2020-11-01 NOTE — PROGRESS NOTE ADULT - SUBJECTIVE AND OBJECTIVE BOX
Patient is a 55y old  Female who presents with a chief complaint of Hypercapenic respiratory failure (31 Oct 2020 13:24)        HPI:  55 year old woman with PMH of metastatic breast cancer diagnosed in 2001 with mets to lungs/bones presenting to ED, s/p L sided pigtail (9/2020) for recurrent L sided malignant effusion, PE diagnosed 9/11/2020 on lovenox 80 mg twice a day, anasarca on lasix (unsure of dose), presenting to ED with 1-2 days of worsening diffuse swelling/anasarca x 1-2 days and worsening SOB. Patient has been taking lasix as needed per . Per  has been taking lasix as needed, and believes instead of taking it once a day was taking it twice a day these past few days. No fever, chills, n/v, cp, pleuritic cp, palpitations, diaphoresis, cough, ha/lh/dizziness, numbness/tingling, neck pain/stiffness, abd pain, diarrhea, constipation, melena/brbpr, urinary symptoms, trauma, recent travel or rash. Patient is normally on 40L HF during the day and CPAP during the night.     (29 Oct 2020 21:32)      Interval Events: No overnight events.    REVIEW OF SYSTEMS:   see HPI      OBJECTIVE:  ICU Vital Signs Last 24 Hrs  T(C): 37.1 (01 Nov 2020 06:37), Max: 37.1 (01 Nov 2020 06:37)  T(F): 98.7 (01 Nov 2020 06:37), Max: 98.7 (01 Nov 2020 06:37)  HR: 82 (01 Nov 2020 05:00) (68 - 98)  BP: 130/79 (01 Nov 2020 05:00) (96/62 - 153/71)  BP(mean): 100 (01 Nov 2020 03:06) (76 - 105)    RR: 13 (01 Nov 2020 05:00) (12 - 40)  SpO2: 100% (01 Nov 2020 05:00) (96% - 100%)        10-30 @ 07:01  -  10-31 @ 07:00  --------------------------------------------------------  IN: 0 mL / OUT: 1275 mL / NET: -1275 mL    10-31 @ 08:01 - 11-01 @ 06:53  --------------------------------------------------------  IN: 600 mL / OUT: 600 mL / NET: 0 mL      CAPILLARY BLOOD GLUCOSE            PHYSICAL EXAM:     · CONSTITUTIONAL:   not septic appearing,   well nourished,   NAD    · ENMT:   Airway patent,   Nasal mucosa clear.  Mouth with normal mucosa.   No thrush    · EYES:   Clear bilaterally,   pupils equal,   round and reactive to light.    · CARDIAC:   Normal rate,   regular rhythm.    Heart sounds S1, S2.   No murmurs, no rubs or gallops on auscultation  no edema        CAROTID:   normal systolic impulse  no bruits    · RESPIRATORY:   rales  normal chest expansion  tachypnea,  no retractions or use of accessory muscles  palpation of chest is normal with no fremitus  percussion of chest demonstrates no hyperresonance or dullness    · GASTROINTESTINAL:  Abdomen soft,   non-tender,   + BS  liver/spleen not palpable    · MUSCULOSKELETAL:   no clubbing, cyanosis      · NEUROLOGICAL:   lethargic      · SKIN:   Skin normal color for race,   warm, dry   No evidence of rash.      · HEME LYMPH:   no splenomegaly.  No cervical  lymphadenopathy.  no inguinal lymphadenopathy    HOSPITAL MEDICATIONS:  MEDICATIONS  (STANDING):  chlorhexidine 4% Liquid 1 Application(s) Topical two times a day  enoxaparin Injectable 70 milliGRAM(s) SubCutaneous every 12 hours  pantoprazole    Tablet 40 milliGRAM(s) Oral before breakfast    MEDICATIONS  (PRN):        LABS:                        10.1   6.40  )-----------( 348      ( 31 Oct 2020 05:54 )             36.1     10-31    134<L>  |  83<L>  |  17  ----------------------------<  70  4.3   |  46<HH>  |  <0.5<L>    Ca    10.4<H>      31 Oct 2020 05:54  Mg     2.2     10-31    TPro  6.3  /  Alb  3.8  /  TBili  0.4  /  DBili  x   /  AST  48<H>  /  ALT  68<H>  /  AlkPhos  102  10-31        Arterial Blood Gas:  10-31 @ 06:27  7.40/80/139/49/99/20.5  ABG lactate: --  Arterial Blood Gas:  10-30 @ 15:31  7.38/86/141/51/99/21.9  ABG lactate: --          ABG - ( 31 Oct 2020 06:27 )  pH, Arterial: 7.40  pH, Blood: x     /  pCO2: 80    /  pO2: 139   / HCO3: 49    / Base Excess: 20.5  /  SaO2: 99            RADIOLOGY: I personally reviewed latest CXR and other pertinent films.

## 2020-11-01 NOTE — PROGRESS NOTE ADULT - SUBJECTIVE AND OBJECTIVE BOX
JENN SERRANO  55y  Female      Patient is a 55y old  Female who presents with a chief complaint of Hypercapenic respiratory failure (01 Nov 2020 06:53)      INTERVAL HPI/OVERNIGHT EVENTS: no acute events overnight. baseline dyspnea that is intermittently comfortably when on AVAPs vs Hiflo. no major complaints this morning.       VITALS  T(C): 36.2 (11-01-20 @ 11:00), Max: 37.1 (11-01-20 @ 06:37)  HR: 78 (11-01-20 @ 08:42) (68 - 98)  BP: 110/63 (11-01-20 @ 07:34) (107/59 - 153/71)  RR: 26 (11-01-20 @ 12:22) (12 - 34)  SpO2: 100% (11-01-20 @ 12:22) (93% - 100%)  Wt(kg): --Vital Signs Last 24 Hrs  T(C): 36.2 (01 Nov 2020 11:00), Max: 37.1 (01 Nov 2020 06:37)  T(F): 97.2 (01 Nov 2020 11:00), Max: 98.7 (01 Nov 2020 06:37)  HR: 78 (01 Nov 2020 08:42) (68 - 98)  BP: 110/63 (01 Nov 2020 07:34) (107/59 - 153/71)  BP(mean): 69 (01 Nov 2020 07:34) (69 - 105)  RR: 26 (01 Nov 2020 12:22) (12 - 34)  SpO2: 100% (01 Nov 2020 12:22) (93% - 100%)      10-31-20 @ 08:01  -  11-01-20 @ 07:00  --------------------------------------------------------  IN: 600 mL / OUT: 600 mL / NET: 0 mL    11-01-20 @ 07:01  -  11-01-20 @ 12:41  --------------------------------------------------------  IN: 320 mL / OUT: 0 mL / NET: 320 mL        PHYSICAL EXAM:  GENERAL: appears older than stated age, chronically ill-appearing  PSYCH: no agitation, baseline mentation  NERVOUS SYSTEM:  Alert & Oriented X3, CN 2-12 grossly intact. Spontaneously moving all 4-ext  PULMONARY: poor respiratory effort, difficult to ausculate given avaps. No accessory muscle use.  CARDIOVASCULAR: Regular rate and rhythm; No murmurs, rubs, or gallops  GI: Soft, Nontender, Nondistended; Bowel sounds present  EXTREMITIES:  2+ Peripheral Pulses, No clubbing, cyanosis, or edema  SKIN: No rashes or lesions      Consultant(s) Notes Reviewed:  [x ] YES  [ ] NO    Discussed with Consultants/Other Providers [ x] YES     LABS                          10.3   6.26  )-----------( 331      ( 01 Nov 2020 07:48 )             35.3     11-01    134<L>  |  84<L>  |  14  ----------------------------<  102<H>  4.0   |  44<HH>  |  <0.5<L>    Ca    10.6<H>      01 Nov 2020 07:48  Mg     2.2     11-01    TPro  6.4  /  Alb  3.9  /  TBili  0.4  /  DBili  x   /  AST  40  /  ALT  56<H>  /  AlkPhos  100  11-01    ABG - ( 01 Nov 2020 07:25 )  pH, Arterial: 7.44  pH, Blood: x     /  pCO2: 70    /  pO2: 134   / HCO3: 48    / Base Excess: 21.0  /  SaO2: 99        RADIOLOGY & ADDITIONAL TESTS:    Imaging Personally Reviewed:  [ ] YES  [ ] NO    HEALTH ISSUES - PROBLEM Dx:  Pulmonary thromboembolism    Thrombophlebitis of iliac vein        MEDICATIONS  (STANDING):  chlorhexidine 4% Liquid 1 Application(s) Topical two times a day  enoxaparin Injectable 70 milliGRAM(s) SubCutaneous every 12 hours  pantoprazole    Tablet 40 milliGRAM(s) Oral before breakfast    MEDICATIONS  (PRN):

## 2020-11-02 NOTE — PROGRESS NOTE ADULT - SUBJECTIVE AND OBJECTIVE BOX
Patient is awake and alert, asking to eat and for her mask to be removed       T(F): 96.7 (11-02-20 @ 08:00), Max: 97.2 (11-01-20 @ 11:00)  HR: 77 (11-02-20 @ 08:00)  BP: 134/63 (11-02-20 @ 08:00)  RR: 21 (11-02-20 @ 08:00)  SpO2: 100% (11-02-20 @ 08:00) (99% - 100%)    PHYSICAL EXAM:  GENERAL: in some resp distress  NERVOUS SYSTEM:  no focal deficits   CHEST/LUNG:  bilateral rales  HEART: Regular rate and rhythm; No murmurs, rubs, or gallops  ABDOMEN: Soft, Nontender, Nondistended; Bowel sounds present  EXTREMITIES: b/l  edema      LABS  11-01    134<L>  |  84<L>  |  14  ----------------------------<  102<H>  4.0   |  44<HH>  |  <0.5<L>    Ca    10.6<H>      01 Nov 2020 07:48  Mg     2.2     11-01    TPro  6.4  /  Alb  3.9  /  TBili  0.4  /  DBili  x   /  AST  40  /  ALT  56<H>  /  AlkPhos  100  11-01                          10.3   6.26  )-----------( 331      ( 01 Nov 2020 07:48 )             35.3       RADIOLOGY  < from: Xray Chest 1 View- PORTABLE-Routine (Xray Chest 1 View- PORTABLE-Routine in AM.) (11.02.20 @ 05:21) >  Impression:    Bilateral opacities/pleural effusions, unchanged.    < end of copied text >    MEDICATIONS  (STANDING):  chlorhexidine 4% Liquid 1 Application(s) Topical two times a day  enoxaparin Injectable 70 milliGRAM(s) SubCutaneous every 12 hours  furosemide   Injectable 40 milliGRAM(s) IV Push once  pantoprazole    Tablet 40 milliGRAM(s) Oral before breakfast    MEDICATIONS  (PRN):

## 2020-11-02 NOTE — PROGRESS NOTE ADULT - ASSESSMENT
55 year old woman with PMH of metastatic breast cancer diagnosed in 2001 with mets to lungs/bones presenting to ED, s/p L sided pigtail (9/2020) for recurrent L sided malignant effusion, PE diagnosed 9/11/2020 on lovenox 80 mg twice a day, anasarca on lasix (unsure of dose), presenting to ED with 1-2 days of worsening diffuse swelling/anasarca x 1-2 days and worsening SOB. Patient found with elevated co2 level        # Acute on chronic hypoxic and hypercapnic respiratory  failure  secondary to metastatic breast cancer to lungs    - draining via pigtail twice weekly - Monday and Saturdays  - pco2 107 --> 70, pt now awake  - she confirms wishes to be DNR, DNI   - continue Lasix and maintain even to negative fluid balance  - continue NIPPV 4 hrs on then 4 hrs off   - grave prognosis, pt and family aware    # h/o pulmonary embolism  - due to malignancy  - c/w  therapeutic dosing of Lovenox

## 2020-11-02 NOTE — PROGRESS NOTE ADULT - ASSESSMENT
IMPRESSION:  metastatic breast CA  L malignant effusion with [place] cath  acute/chronic hypercapneic respiratory failure possibly related to starting Fentanyl patch?  element CHF    SUGGEST:  AVAPS  4 on and 4 off   Nc to keep pox > 92%   lasix 40 once   pt DNR/DNI  consider hospice care  limit narcotics but use ase needed  drain [place] as needed no effusion currently      prognosis grave

## 2020-11-02 NOTE — CHART NOTE - NSCHARTNOTESELECT_GEN_ALL_CORE
Have them come in and do a BMP in our lab. I can compare to the last labs at [facility]. If they are worsening significantly, then I would be able to call and talk to the nephrologists. If the labs are okay, that I think it would be okay for them to wait till December.    Orders Placed This Encounter   • Basic Metabolic Panel     Order Specific Question:   Should this test be performed fasting or random?     Answer:   RANDOM        Event Note/RT

## 2020-11-02 NOTE — PROGRESS NOTE ADULT - SUBJECTIVE AND OBJECTIVE BOX
Patient is awake and asking for the face mask to be removed       T(F): 96.7 (11-02-20 @ 08:00), Max: 97.2 (11-01-20 @ 11:00)  HR: 77 (11-02-20 @ 08:00)  BP: 134/63 (11-02-20 @ 08:00)  RR: 21 (11-02-20 @ 08:00)  SpO2: 100% (11-02-20 @ 08:00) (99% - 100%)    PHYSICAL EXAM:  GENERAL: NAD  HEAD:  Atraumatic, Normocephalic  NERVOUS SYSTEM:  no focal deficits   CHEST/LUNG:  bilateral rales  HEART: Regular rate and rhythm; No murmurs, rubs, or gallops  ABDOMEN: Soft, Nontender, Nondistended; Bowel sounds present  EXTREMITIES: b/l  edema      LABS  11-01    134<L>  |  84<L>  |  14  ----------------------------<  102<H>  4.0   |  44<HH>  |  <0.5<L>    Ca    10.6<H>      01 Nov 2020 07:48  Mg     2.2     11-01    TPro  6.4  /  Alb  3.9  /  TBili  0.4  /  DBili  x   /  AST  40  /  ALT  56<H>  /  AlkPhos  100  11-01                          10.3   6.26  )-----------( 331      ( 01 Nov 2020 07:48 )             35.3       RADIOLOGY  < from: Xray Chest 1 View- PORTABLE-Routine (Xray Chest 1 View- PORTABLE-Routine in AM.) (11.02.20 @ 05:21) >  Impression:    Bilateral opacities/pleural effusions, unchanged.    < end of copied text >    MEDICATIONS  (STANDING):  chlorhexidine 4% Liquid 1 Application(s) Topical two times a day  enoxaparin Injectable 70 milliGRAM(s) SubCutaneous every 12 hours  furosemide   Injectable 40 milliGRAM(s) IV Push once  pantoprazole    Tablet 40 milliGRAM(s) Oral before breakfast    MEDICATIONS  (PRN):

## 2020-11-02 NOTE — PROGRESS NOTE ADULT - ASSESSMENT
Impression   metastatic breast CA  L malignant effusion with [place] cath  acute/chronic hypercapnic respiratory failure likely to worsening   element of heart failure     plan  AVAPS  4 on and 4 off   transfer to Nc to keep pox > 92%   lasix 40 iv once  pt DNR/DNI  follow up CXR in am   limit narcotics but use ase needed  drain [place] as needed no effusion on [place] site, worsening effusion on R     patient is DNR DNI

## 2020-11-02 NOTE — CHART NOTE - NSCHARTNOTEFT_GEN_A_CORE
Pt placed on 40 L/ 40% HFNC @ 08:20 Per Physician, Pt is tolerating, Saturation 99%. Pt to be placed on NIV 4 Hours on and 4 Hours off per Physician. Will continue to monitor.

## 2020-11-02 NOTE — PROGRESS NOTE ADULT - SUBJECTIVE AND OBJECTIVE BOX
Patient is a 55y old  Female who presents with a chief complaint of Hypercapenic respiratory failure (01 Nov 2020 12:40)      Over Night Events:  Patient seen and examined.   sitting in chair   on bipap on and off     ROS:  See HPI    PHYSICAL EXAM    ICU Vital Signs Last 24 Hrs  T(C): 35.9 (02 Nov 2020 08:00), Max: 36.2 (01 Nov 2020 11:00)  T(F): 96.7 (02 Nov 2020 08:00), Max: 97.2 (01 Nov 2020 11:00)  HR: 77 (02 Nov 2020 08:00) (65 - 91)  BP: 134/63 (02 Nov 2020 08:00) (93/59 - 139/74)  BP(mean): 85 (02 Nov 2020 08:00) (71 - 102)  ABP: --  ABP(mean): --  RR: 31 (02 Nov 2020 08:00) (12 - 44)  SpO2: 100% (02 Nov 2020 08:00) (99% - 100%)      General: Aox3  HEENT:      cassandra          Lymph Nodes: NO cervical LN   Lungs: Bilateral BS  Cardiovascular: Regular   Abdomen: Soft, Positive BS  Extremities: No clubbing   Skin: warm   Neurological: no focal   Musculoskeletal: move all ext     I&O's Detail    01 Nov 2020 07:01  -  02 Nov 2020 07:00  --------------------------------------------------------  IN:    Oral Fluid: 540 mL  Total IN: 540 mL    OUT:    Voided (mL): 1100 mL  Total OUT: 1100 mL    Total NET: -560 mL          LABS:                          10.3   6.26  )-----------( 331      ( 01 Nov 2020 07:48 )             35.3         01 Nov 2020 07:48    134    |  84     |  14     ----------------------------<  102    4.0     |  44     |  <0.5     Ca    10.6       01 Nov 2020 07:48  Mg     2.2       01 Nov 2020 07:48                                                                                                                                                                                                                                   ABG - ( 02 Nov 2020 08:20 )  pH, Arterial: 7.39  pH, Blood: x     /  pCO2: 79    /  pO2: 146   / HCO3: 47    / Base Excess: 18.8  /  SaO2: 98                  MEDICATIONS  (STANDING):  chlorhexidine 4% Liquid 1 Application(s) Topical two times a day  enoxaparin Injectable 70 milliGRAM(s) SubCutaneous every 12 hours  pantoprazole    Tablet 40 milliGRAM(s) Oral before breakfast    MEDICATIONS  (PRN):          Xrays:  TLC:  OG:  ET tube:                                                                                    b/l effsuion    ECHO:  CAM ICU:

## 2020-11-03 NOTE — DISCHARGE NOTE PROVIDER - HOSPITAL COURSE
55 year old woman with PMH of metastatic breast cancer diagnosed in 2001 with mets to lungs/bones presenting to ED, s/p L sided pigtail (9/2020) for recurrent L sided malignant effusion, PE diagnosed 9/11/2020 on lovenox 80 mg twice a day, anasarca on lasix (unsure of dose), presenting to ED with 1-2 days of worsening diffuse swelling/anasarca x 1-2 days and worsening SOB.   she has metastatic breast CA with L malignant effusion with [place] cath  acute/chronic hypercapnic respiratory failure secondary to disease progression and heart failure with non compliance with bipap   s/p diuresis and instruction on using high flow at home with need to use bipap to allow excretion of CO2

## 2020-11-03 NOTE — PROGRESS NOTE ADULT - ASSESSMENT
55 year old woman with PMH of metastatic breast cancer diagnosed in 2001 with mets to lungs/bones presenting to ED, s/p L sided pigtail (9/2020) for recurrent L sided malignant effusion, PE diagnosed 9/11/2020 on lovenox 80 mg twice a day, anasarca on lasix (unsure of dose), presenting to ED with 1-2 days of worsening diffuse swelling/anasarca x 1-2 days and worsening SOB. Patient found with elevated co2 level        # Acute on chronic hypoxic and hypercapnic respiratory  failure  secondary to metastatic breast cancer to lungs    - resolved acute respiratory failure   - draining via pigtail twice weekly - Monday and Saturdays  - pco2 107 --> 70, pt now awake  - she confirms wishes to be DNR, DNI   - continue Lasix and maintain even to negative fluid balance  - continue NIPPV 4 hrs on then 4 hrs off   - grave prognosis, pt and family aware   - may dc home today, 44min spent on DC    # h/o pulmonary embolism  - due to malignancy  - c/w  therapeutic dosing of Lovenox

## 2020-11-03 NOTE — PROGRESS NOTE ADULT - REASON FOR ADMISSION
REC'D BEDSIDE REPORT FROM [facility]. PT AOX4. PT DENIES PAIN, SHORTNESS OF BREATH. PLAN OF CARE AND FALL PRECAUTIONS REVIEWED WITH PT. PT DID VERBALIZE UNDERSTANDING, PT TRANSPORT VIA STRETCHER ON CARDIAC MONITOR WITHOUT INCIDENT UP TO UNIT.   
Hypercapenic respiratory failure
Hypercapnic respiratory failure

## 2020-11-03 NOTE — DISCHARGE NOTE PROVIDER - INSTRUCTIONS
Eat more vegetables and fruits.  Swap refined grains for whole grains.  Choose fat-free or low-fat dairy products.  Choose lean protein sources like fish, poultry and beans.  Cook with vegetable oils.  Limit your intake of foods high in added sugars, like soda and candy.

## 2020-11-03 NOTE — PROGRESS NOTE ADULT - ASSESSMENT
IMPRESSION:  metastatic breast CA  L malignant effusion with [place] cath  acute/chronic hypercapneic respiratory failure possibly related to starting Fentanyl patch?  element CHF    SUGGEST:  AVAPS  4 on and 4 off   Nc to keep pox > 92%   keep is < os    pt DNR/DNI  consider hospice care  limit narcotics but use ase needed  drain [place] today   transfer to floor

## 2020-11-03 NOTE — DISCHARGE NOTE PROVIDER - CARE PROVIDER_API CALL
Rod Parr  CRITICAL CARE MEDICINE  [address] [place] [address], [facility] [address]  [place] [address]  Phone: [phone]  Fax: [phone]  Follow Up Time:

## 2020-11-03 NOTE — PROGRESS NOTE ADULT - SUBJECTIVE AND OBJECTIVE BOX
Patient seen and evaluated this am, pt was awake and alert insisting on being discharged home       T(F): 96 (11-03-20 @ 07:10), Max: 98.3 (11-02-20 @ 19:00)  HR: 96 (11-03-20 @ 08:56)  BP: 132/73 (11-03-20 @ 08:56)  RR: 20  SpO2: 100% (11-03-20 @ 08:56) (98% - 100%)    PHYSICAL EXAM:  GENERAL: NAD  HEAD:  Atraumatic, Normocephalic  NERVOUS SYSTEM:  Alert & Oriented X3, no focal deficits   CHEST/LUNG:  bilateral rhonchi  HEART: Regular rate and rhythm; No murmurs, rubs, or gallops  ABDOMEN: Soft, Nontender, Nondistended; Bowel sounds present  EXTREMITIES:  b/l edema      LABS  11-02    136  |  87<L>  |  9<L>  ----------------------------<  105<H>  4.4   |  44<HH>  |  <0.5<L>    Ca    10.8<H>      02 Nov 2020 10:02  Mg     2.4     11-02    TPro  7.0  /  Alb  4.1  /  TBili  0.3  /  DBili  x   /  AST  39  /  ALT  50<H>  /  AlkPhos  104  11-02                          11.3   6.35  )-----------( 335      ( 02 Nov 2020 10:02 )             39.3       RADIOLOGY  < from: Xray Chest 1 View- PORTABLE-Routine (Xray Chest 1 View- PORTABLE-Routine in AM.) (11.02.20 @ 05:21) >    Impression:    Bilateral opacities/pleural effusions, unchanged.      < end of copied text >    MEDICATIONS  (STANDING):  chlorhexidine 4% Liquid 1 Application(s) Topical two times a day  enoxaparin Injectable 70 milliGRAM(s) SubCutaneous every 12 hours  pantoprazole    Tablet 40 milliGRAM(s) Oral before breakfast    MEDICATIONS  (PRN):

## 2020-11-03 NOTE — PROGRESS NOTE ADULT - ASSESSMENT
Impression   metastatic breast CA  L malignant effusion with [place] cath  acute/chronic hypercapnic respiratory failure   element of heart failure     plan  AVAPS  4 on and 4 off   tolerayed to Nc to keep pox > 92%   patient in neg balance   pt DNR/DNI  follow up CXR in am   limit narcotics but use ase needed  drain [place] as needed now no effusion on L [place] site, stable effusion on R    patient is DNR DNI

## 2020-11-03 NOTE — DISCHARGE NOTE NURSING/CASE MANAGEMENT/SOCIAL WORK - PATIENT PORTAL LINK FT
You can access the FollowMyHealth Patient Portal offered by [facility] by registering at the following website: [email or web address]. By joining [facility]’s FollowMyHealth portal, you will also be able to view your health information using other applications (apps) compatible with our system.

## 2020-11-03 NOTE — PROGRESS NOTE ADULT - SUBJECTIVE AND OBJECTIVE BOX
Patient is a 55y old  Female who presents with a chief complaint of Hypercapenic respiratory failure (03 Nov 2020 07:30)      Over Night Events:  Patient seen and examined.   sitting in chair she keep asking for high flow even though she is not hypoxic told her   too much oxygen not good kai for copd patient    ROS:  See HPI    PHYSICAL EXAM    ICU Vital Signs Last 24 Hrs  T(C): 35.6 (03 Nov 2020 07:10), Max: 36.8 (02 Nov 2020 19:00)  T(F): 96 (03 Nov 2020 07:10), Max: 98.3 (02 Nov 2020 19:00)  HR: 73 (03 Nov 2020 05:00) (73 - 98)  BP: 132/82 (03 Nov 2020 03:00) (112/60 - 138/75)  BP(mean): 92 (02 Nov 2020 19:00) (88 - 98)  ABP: --  ABP(mean): --  RR: 23 (03 Nov 2020 07:10) (14 - 45)  SpO2: 100% (03 Nov 2020 05:00) (85% - 100%)      General: Ao3  HEENT: cassandra               Lymph Nodes: NO cervical LN   Lungs: decrease bibasilar   Cardiovascular: Regular   Abdomen: Soft, Positive BS  Extremities: No clubbing   Skin: warm   Neurological: no focal   Musculoskeletal: move all ext     I&O's Detail    02 Nov 2020 07:01  -  03 Nov 2020 07:00  --------------------------------------------------------  IN:    Oral Fluid: 480 mL  Total IN: 480 mL    OUT:    Voided (mL): 1900 mL  Total OUT: 1900 mL    Total NET: -1420 mL          LABS:                          11.3   6.35  )-----------( 335      ( 02 Nov 2020 10:02 )             39.3         02 Nov 2020 10:02    136    |  87     |  9      ----------------------------<  105    4.4     |  44     |  <0.5     Ca    10.8       02 Nov 2020 10:02  Mg     2.4       02 Nov 2020 10:02    TPro  7.0    /  Alb  4.1    /  TBili  0.3    /  DBili  x      /  AST  39     /  ALT  50     /  AlkPhos  104    02 Nov 2020 10:02  Amylase x     lipase x                                                                                                                                                                                                                                     ABG - ( 02 Nov 2020 08:20 )  pH, Arterial: 7.39  pH, Blood: x     /  pCO2: 79    /  pO2: 146   / HCO3: 47    / Base Excess: 18.8  /  SaO2: 98                  MEDICATIONS  (STANDING):  chlorhexidine 4% Liquid 1 Application(s) Topical two times a day  enoxaparin Injectable 70 milliGRAM(s) SubCutaneous every 12 hours  pantoprazole    Tablet 40 milliGRAM(s) Oral before breakfast    MEDICATIONS  (PRN):          Xrays:  TLC:  OG:  ET tube:                                                                                    stable b/l effusion    ECHO:  [facility] ICU:           Patient is a 55y old  Female who presents with a chief complaint of Hypercapenic respiratory failure (03 Nov 2020 07:30)      Over Night Events:  Patient seen and examined.   sitting in chair she keep asking for high flow even though she is not hypoxic told her   too much oxygen not good kai for copd patient  no acute events over the last 24 hrs   sitting in chair eating talking full sentences at her baseline     ROS:  See HPI    PHYSICAL EXAM    ICU Vital Signs Last 24 Hrs  T(C): 35.6 (03 Nov 2020 07:10), Max: 36.8 (02 Nov 2020 19:00)  T(F): 96 (03 Nov 2020 07:10), Max: 98.3 (02 Nov 2020 19:00)  HR: 73 (03 Nov 2020 05:00) (73 - 98)  BP: 132/82 (03 Nov 2020 03:00) (112/60 - 138/75)  BP(mean): 92 (02 Nov 2020 19:00) (88 - 98)  ABP: --  ABP(mean): --  RR: 23 (03 Nov 2020 07:10) (14 - 45)  SpO2: 100% (03 Nov 2020 05:00) (85% - 100%)      General: Ao3  HEENT: cassandra               Lymph Nodes: NO cervical LN   Lungs: decrease bibasilar   Cardiovascular: Regular   Abdomen: Soft, Positive BS  Extremities: No clubbing   Skin: warm   Neurological: no focal   Musculoskeletal: move all ext     I&O's Detail    02 Nov 2020 07:01  -  03 Nov 2020 07:00  --------------------------------------------------------  IN:    Oral Fluid: 480 mL  Total IN: 480 mL    OUT:    Voided (mL): 1900 mL  Total OUT: 1900 mL    Total NET: -1420 mL          LABS:                          11.3   6.35  )-----------( 335      ( 02 Nov 2020 10:02 )             39.3         02 Nov 2020 10:02    136    |  87     |  9      ----------------------------<  105    4.4     |  44     |  <0.5     Ca    10.8       02 Nov 2020 10:02  Mg     2.4       02 Nov 2020 10:02    TPro  7.0    /  Alb  4.1    /  TBili  0.3    /  DBili  x      /  AST  39     /  ALT  50     /  AlkPhos  104    02 Nov 2020 10:02  Amylase x     lipase x                                                                                                                                                                                                                                     ABG - ( 02 Nov 2020 08:20 )  pH, Arterial: 7.39  pH, Blood: x     /  pCO2: 79    /  pO2: 146   / HCO3: 47    / Base Excess: 18.8  /  SaO2: 98                  MEDICATIONS  (STANDING):  chlorhexidine 4% Liquid 1 Application(s) Topical two times a day  enoxaparin Injectable 70 milliGRAM(s) SubCutaneous every 12 hours  pantoprazole    Tablet 40 milliGRAM(s) Oral before breakfast    MEDICATIONS  (PRN):          Xrays:  TLC:  OG:  ET tube:                                                                                    stable b/l effusion    ECHO:  CAM ICU:

## 2020-11-03 NOTE — DISCHARGE NOTE NURSING/CASE MANAGEMENT/SOCIAL WORK - NSDCPELOVENOX_GEN_ALL_CORE
Enoxaparin/Lovenox - Follow up monitoring/Enoxaparin/Lovenox - Dietary Advice/Enoxaparin/Lovenox - Compliance/Enoxaparin/Lovenox - Potential for adverse drug reactions and interactions

## 2020-11-03 NOTE — PROGRESS NOTE ADULT - SUBJECTIVE AND OBJECTIVE BOX
patient is stable, improved since admission, tolerated feeds on nasal canula     Vital Signs Last 24 Hrs  T(C): 36.4 (02 Nov 2020 23:52), Max: 36.8 (02 Nov 2020 19:00)  T(F): 97.5 (02 Nov 2020 23:52), Max: 98.3 (02 Nov 2020 19:00)  HR: 73 (03 Nov 2020 05:00) (73 - 98)  BP: 132/82 (03 Nov 2020 03:00) (112/60 - 138/75)  BP(mean): 92 (02 Nov 2020 19:00) (85 - 98)  RR: 14 (03 Nov 2020 05:00) (14 - 52)  SpO2: 100% (03 Nov 2020 05:00) (85% - 100%)    PHYSICAL EXAM:  GENERAL: in some resp distress  NERVOUS SYSTEM:  no focal deficits   CHEST/LUNG:  bilateral rales  HEART: Regular rate and rhythm; No murmurs, rubs, or gallops  ABDOMEN: Soft, Nontender, Nondistended; Bowel sounds present  EXTREMITIES: b/l  edema      LABS  11-01                          11.3   6.35  )-----------( 335      ( 02 Nov 2020 10:02 )             39.3       11-02    136  |  87<L>  |  9<L>  ----------------------------<  105<H>  4.4   |  44<HH>  |  <0.5<L>    Ca    10.8<H>      02 Nov 2020 10:02  Mg     2.4     11-02    TPro  7.0  /  Alb  4.1  /  TBili  0.3  /  DBili  x   /  AST  39  /  ALT  50<H>  /  AlkPhos  104  11-02      LIVER FUNCTIONS - ( 02 Nov 2020 10:02 )  Alb: 4.1 g/dL / Pro: 7.0 g/dL / ALK PHOS: 104 U/L / ALT: 50 U/L / AST: 39 U/L / GGT: x             RADIOLOGY  < from: Xray Chest 1 View- PORTABLE-Routine (Xray Chest 1 View- PORTABLE-Routine in AM.) (11.02.20 @ 05:21) >  Impression:    Bilateral opacities/pleural effusions, unchanged.    < end of copied text >    MEDICATIONS  (STANDING):  chlorhexidine 4% Liquid 1 Application(s) Topical two times a day  enoxaparin Injectable 70 milliGRAM(s) SubCutaneous every 12 hours  furosemide   Injectable 40 milliGRAM(s) IV Push once  pantoprazole    Tablet 40 milliGRAM(s) Oral before breakfast    MEDICATIONS  (PRN):      02 Nov 2020 07:01  -  03 Nov 2020 07:00  --------------------------------------------------------  IN:    Oral Fluid: 480 mL  Total IN: 480 mL    OUT:    Voided (mL): 1900 mL  Total OUT: 1900 mL    Total NET: -1420 mL

## 2020-11-03 NOTE — DISCHARGE NOTE PROVIDER - NSDCMRMEDTOKEN_GEN_ALL_CORE_FT
clonazePAM 0.5 mg oral tablet: 1 tab(s) orally once a day, As Needed  enoxaparin: 80 milligram(s) subcutaneous 2 times a day  exemestane 25 mg oral tablet: 1 tab(s) orally   fentaNYL 25 mcg/hr transdermal film, extended release: 1 patch transdermal every 72 hours MDD:1 patch every 72 hours  oxyCODONE 10 mg oral tablet: 1 tab(s) orally every 4 hours, As needed, Moderate Pain (4 - 6) MDD:3 tabs  torsemide 5 mg oral tablet: 2 tab(s) orally 2 times a day, As Needed

## 2020-11-03 NOTE — DISCHARGE NOTE PROVIDER - NSDCCPCAREPLAN_GEN_ALL_CORE_FT
PRINCIPAL DISCHARGE DIAGNOSIS  Diagnosis: Shortness of breath  Assessment and Plan of Treatment: you had inrease in Co2 this was due to mainly decreased use of bipap. You need to use your bipap regularly to avoid CO2 narcosis   also you had some fluids in the lungs that improved with lasix so take your toresemide as needed and prescribed  follow up with your pulmonologist

## 2020-11-19 NOTE — CDI QUERY NOTE - NSCDIOTHERTXTBX_GEN_ALL_CORE_HH
Dr. Juares,    Patient admitted with acute on chronic hypercapnic respiratory failure  Critical care note 10/30/20 indicates:  "Element of CHF"  Pulmonary note 11/2/20:  "Element of CHF"  CXR on 10/29/20:  "Impression:  Pulmonary vascular congestion and bilateral pleural effusion..."  Patient treated with IV Lasix    Can you further clarify the type and acuity of the patient's CHF?    -Acute on Chronic Systolic heart failure, POA  -Acute on Chronic Diastolic heart failure, POA  -Chronic Systolic heart failure, POA  -Chronic Diastolic heart failure, POA  -Congestive heart failure ruled out after study  -Other (please specify)  -Unable to determine    Thank you  Sophia Magana  [facility] Department  [phone]

## 2020-11-27 NOTE — ED PROVIDER NOTE - OBJECTIVE STATEMENT
56 yo female, pmh of breast ca mets to lung on comfort care dnr/dni, presents to ed for resp distress. as per ems in field on ra sats were in 70s. Pt in ed placed on bipap, sat 100, c/o sob. pt states does not want to be intubated. pt is oriented x3 at this time.

## 2020-11-27 NOTE — ED PROVIDER NOTE - CLINICAL SUMMARY MEDICAL DECISION MAKING FREE TEXT BOX
56yo F with PMHx breast CA with mets to lungs/bones, pulm effusion, PE, on comfort care, DNR/DNI, presents to ED for respiratory distress. Placed on bipap with improvement. CXR stable vs prior. No fever or leukocytosis. Covid negative. Not ICU candidate 2/2 poor functional status, DNR/DNI. Will admit.

## 2020-11-27 NOTE — H&P ADULT - NSHPLABSRESULTS_GEN_ALL_CORE
CXR to me is unchanged from 11/03/2020 (Unchanged bilateral pleural effusions, opacities with Mediport present)                          11.8   7.72  )-----------( 353      ( 27 Nov 2020 15:52 )             42.0     11-27    138  |  86<L>  |  19  ----------------------------<  101<H>  4.8   |  43<HH>  |  0.5<L>    Ca    10.7<H>      27 Nov 2020 15:52  Mg     2.3     11-27    TPro  7.4  /  Alb  4.2  /  TBili  0.3  /  DBili  x   /  AST  36  /  ALT  17  /  AlkPhos  139<H>  11-27            PT/INR - ( 27 Nov 2020 15:52 )   PT: 12.70 sec;   INR: 1.10 ratio         PTT - ( 27 Nov 2020 15:52 )  PTT:34.3 sec  Lactate Trend    CARDIAC MARKERS ( 27 Nov 2020 15:52 )  x     / <0.01 ng/mL / x     / x     / x          CAPILLARY BLOOD GLUCOSE

## 2020-11-27 NOTE — H&P ADULT - PROBLEM SELECTOR PLAN 8
ER PA tells me that when patient was more awake (after ER interventions), she personally told staff that she did not want intubation ER PA tells me that when patient was more awake (after ER interventions), she personally told staff that she did not want intubation. Would complete paperwork when possible ER PA tells me that when patient was more awake (after ER interventions), she personally told staff that she did not want intubation. Would complete paperwork when possible (Per chart review a MOLST form was completed on OCT 2, 2020 but I am unable to contact medical records for a copy, in any event it will need to be updated)

## 2020-11-27 NOTE — ED PROVIDER NOTE - PHYSICAL EXAMINATION
Physical Exam    Vital Signs: I have reviewed the initial vital signs.  Constitutional: ill appearing   Eyes: Conjunctiva pink, Sclera clear  Cardiovascular: S1 and S2, regular rate, regular rhythm, well-perfused extremities, radial pulses equal and 2+  Respiratory: labored respiratory effort, decreased bs on right  Gastrointestinal: soft, non-tender abdomen, no pulsatile mass, normal bowl sounds  Musculoskeletal: supple neck, no lower extremity edema, no midline tenderness  Integumentary: warm, dry, no rash  Neurologic: awake, alert, nvi

## 2020-11-27 NOTE — ED PROVIDER NOTE - CARE PLAN
Principal Discharge DX:	Respiratory distress  Secondary Diagnosis:	Pleural effusion  Secondary Diagnosis:	Metastatic breast cancer

## 2020-11-27 NOTE — H&P ADULT - PROBLEM SELECTOR PLAN 3
hosp, palliative hospice, palliative Clarify home "comfort care" status at home,  (hospice, palliative?)

## 2020-11-27 NOTE — H&P ADULT - HISTORY OF PRESENT ILLNESS
55y (Patient obtunded, some facial grimacing with verbal stimuli, no family at bedside)  54yo female with multiple chronic medical (Patient obtunded, some facial grimacing with verbal stimuli, no family at bedside)  56yo female with multiple chronic medical conditions including breast cancer (diagnosed in 2001) metastatic to lungs and bones, anasarca and history of pulmonary embolism (diagnosed 9/11/20 has been on twice daily Lovenox), and recurrent malignant effusion, is sent to the ER (Patient obtunded, some facial grimacing with verbal stimuli, no family at bedside)  56yo female with multiple chronic medical conditions including breast cancer (diagnosed in 2001) metastatic to lungs and bones, anasarca, history of pulmonary embolism (diagnosed 9/11/20 has been on twice daily Lovenox), and recurrent malignant effusion, is sent to the ER due to an "altered mental state". ER reports that on arrival EMS found patient in respiratory distress with pulse ox in the 70's on room air (Patient obtunded, some facial grimacing with verbal stimuli, no family at bedside)  56yo female with multiple chronic medical conditions including breast cancer (diagnosed in 2001) metastatic to lungs and bones, anasarca, history of pulmonary embolism (diagnosed 9/11/20 has been on twice daily Lovenox), and recurrent malignant effusion, is sent to the ER due to an "altered mental state". ER reports that on arrival EMS found patient in respiratory distress with pulse ox in the 70's on room air. Once she was placed on BIPAP in the ER her pulse ox reached 100% and she was able to tell staff that she didn't want to be intubated. ER also documents that she is on comfort care at home

## 2020-11-27 NOTE — ED ADULT NURSE NOTE - NSIMPLEMENTINTERV_GEN_ALL_ED
Implemented All Fall with Harm Risk Interventions:  [place] to call system. Call bell, personal items and telephone within reach. Instruct patient to call for assistance. Room bathroom lighting operational. Non-slip footwear when patient is off stretcher. Physically safe environment: no spills, clutter or unnecessary equipment. Stretcher in lowest position, wheels locked, appropriate side rails in place. Provide visual cue, wrist band, yellow gown, etc. Monitor gait and stability. Monitor for mental status changes and reorient to person, place, and time. Review medications for side effects contributing to fall risk. Reinforce activity limits and safety measures with patient and family. Provide visual clues: red socks.

## 2020-11-27 NOTE — ED ADULT TRIAGE NOTE - CHIEF COMPLAINT QUOTE
BIBA, as per EMS, from home BA with mets to bones; altered mental status (not speaking, head slumped down), weakness, end tidal of 70.

## 2020-11-27 NOTE — H&P ADULT - PROBLEM SELECTOR PLAN 5
Suspect manifestation of progression of overall chronic conditions but currently aggravated by respiratory illness (in particular hypercapnia) Now on BIPAP, monitor for clinical improvement Suspect manifestation of progression of overall chronic conditions but currently aggravated by respiratory illness (in particular hypercapnia) Now on BIPAP, monitor for clinical improvement. Also for now will hold potential sedative meds such as fentanyl and klonopin

## 2020-11-27 NOTE — ED PROVIDER NOTE - ATTENDING CONTRIBUTION TO CARE
56yo F with PMHx breast CA with mets to lungs/bones, pulm effusion, PE, on comfort care, DNR/DNI, presents to ED for respiratory distress. Per EMS, O2 sat 70s in the field. Patient is AAOx3 and able to state her wishes for DNR/DNI, though is limited historian currently 2/2 respiratory distress.     Vital signs reviewed  GENERAL: Patient in severe respiratory distress. Kyphotic.   HEAD: NCAT  EYES: Anicteric  ENT: MMM  RESPIRATORY: Labored breathing. Speaks in one word sentences. Decreased air entry on right. B/l rhonchi. Tachypneic  CARDIOVASCULAR: Regular rate and rhythm  ABDOMEN: Soft. Nondistended. Nontender.   MUSCULOSKELETAL/EXTREMITIES: Brisk cap refill. Equal radial pulses.   SKIN:  Warm and dry  NEURO: AAOx3. No gross FND.

## 2020-11-28 NOTE — PROGRESS NOTE ADULT - SUBJECTIVE AND OBJECTIVE BOX
JENN SERRANO  55y  Female      Patient is a 55y old  Female who presents with a chief complaint of altered mental state (27 Nov 2020 19:39)      INTERVAL HPI/OVERNIGHT EVENTS:  pt seen and examined at bedside this morning, in impending respiratory failure, on AVAP; called  immediately  -pt new to me, with metastatic breast cancer, would really be under comfort care/hospice; placed hospice consult after speaking to , who is in full agreement, made aware of the current overall poor prognosis; family aware; updates given   -DNR/DNI  -ordered morphine and lasix IV    REVIEW OF SYSTEMS:  unable to assess due to current respiratory distress     Vital Signs Last 24 Hrs  T(C): 36 (28 Nov 2020 06:41), Max: 36.5 (27 Nov 2020 15:36)  T(F): 96.8 (28 Nov 2020 06:41), Max: 97.7 (27 Nov 2020 15:36)  HR: 78 (28 Nov 2020 07:50) (76 - 107)  BP: 136/88 (28 Nov 2020 06:41) (126/67 - 164/99)  RR: 22 (28 Nov 2020 06:41) (22 - 28)  SpO2: 98% (28 Nov 2020 07:50) (98% - 100%)    PHYSICAL EXAM:  GENERAL: in resp distress, requiring AVAP; not alert   HEAD:  Atraumatic, Normocephalic  EYES: EOMI, PERRLA, conjunctiva and sclera clear  NERVOUS SYSTEM:  grimacing, not alert   CHEST/LUNG: Rhonchi  b/l  CV/HEART: Regular rate and rhythm; No murmurs, rubs, or gallops  GI/ABDOMEN: Soft, Nontender, Nondistended; Bowel sounds present  EXTREMITIES:  2+ Peripheral Pulses, No clubbing, cyanosis, or edema  SKIN: No rashes or lesions    LAB:                        11.8   7.72  )-----------( 353      ( 27 Nov 2020 15:52 )             42.0     11-28    140  |  90<L>  |  22<H>  ----------------------------<  133<H>  4.8   |  42<HH>  |  <0.5<L>    Ca    10.6<H>      28 Nov 2020 06:42  Mg     2.3     11-27    TPro  7.4  /  Alb  4.2  /  TBili  0.3  /  DBili  x   /  AST  36  /  ALT  17  /  AlkPhos  139<H>  11-27    CARDIAC MARKERS ( 27 Nov 2020 15:52 )  x     / <0.01 ng/mL / x     / x     / x          Daily Height in cm: 157.48 (27 Nov 2020 20:35)    Daily   CAPILLARY BLOOD GLUCOSE          LIVER FUNCTIONS - ( 27 Nov 2020 15:52 )  Alb: 4.2 g/dL / Pro: 7.4 g/dL / ALK PHOS: 139 U/L / ALT: 17 U/L / AST: 36 U/L / GGT: x               RADIOLOGY:    Imaging Personally Reviewed:  [Y ] YES  [ ] NO    HEALTH ISSUES - PROBLEM Dx:  Advance directive on file  Advance directive on file    History of pulmonary embolism  History of pulmonary embolism    Acute on chronic respiratory failure with hypoxia and hypercapnia  Acute on chronic respiratory failure with hypoxia and hypercapnia    Obtunded  Obtunded    Hypercalcemia  Hypercalcemia    Metastatic breast cancer  Metastatic breast cancer    Pleural effusion  Pleural effusion    Respiratory distress  Respiratory distress      enoxaparin Injectable 70 milliGRAM(s) SubCutaneous two times a day  furosemide   Injectable 20 milliGRAM(s) IV Push every 12 hours  sodium chloride 0.9%. 1000 milliLiter(s) IV Continuous <Continuous>

## 2020-11-28 NOTE — GOALS OF CARE CONVERSATION - ADVANCED CARE PLANNING - CONVERSATION DETAILS
patient's overall deteriorating/ worsening clinical condition discussed with  in length, aware that patient is not responsive well enough to have conversation and is in impending respiratory failure/on AVAP at present, with overall extremely poor prognosis; HOSPICE consult ordered, with prn morphine push for agitation and or discomfort as  wants her to be comfortable.    She is DNR/DNI  -hospice eval for comfort measures

## 2020-11-28 NOTE — PATIENT PROFILE ADULT - VISION (WITH CORRECTIVE LENSES IF THE PATIENT USUALLY WEARS THEM):
Normal vision: sees adequately in most situations; can see medication labels, newsprint/Could not assess

## 2020-11-28 NOTE — GOALS OF CARE CONVERSATION - ADVANCED CARE PLANNING - CONVERSATION DETAILS
Patient indicated she did not want intubation. Obtained copy of MOLST completed Oct 30, 2020  confirming DNR, DNI

## 2020-11-28 NOTE — PROGRESS NOTE ADULT - ASSESSMENT
ED presentation:   56yo female with multiple chronic medical conditions including breast cancer (diagnosed in 2001) metastatic to lungs and bones, anasarca, history of pulmonary embolism (diagnosed 9/11/20 has been on twice daily Lovenox), and recurrent malignant effusion, is sent to the ER due to an "altered mental state". ER reports that on arrival EMS found patient in respiratory distress with pulse ox in the 70's on room air. Once she was placed on BIPAP in the ER her pulse ox reached 100% and she was able to tell staff that she didn't want to be intubated. ER also documents that she is on comfort care at home      1) Metastatic breast cancer  -comfort measures requested by   -placed hospice consult   - aware of the current overall poor prognosis     2) Acute respiratory failure with hypoxia  -on AVAP    3) Chronic Pulmonary Embolism  -on long term anticoagulant lovenox     DNR/DNI  prognosis extremely poor       # Progress Note Handoff  PENDING as follows  consults: Hospice consult   Test: labs noted  Family discussion: discussed with  who is aware of the current poor prognosis and agreeable for inpatient hospice evaluation   Disposition: Pending Hospice eval    Attending Physician Dr. Adriana Michele # [id number]

## 2020-11-28 NOTE — CHART NOTE - NSCHARTNOTEFT_GEN_A_CORE
11/28/2020 - 0750    Found patient on BIPAP 15/5, no MD orders found. Patient minimally responsive and breathing shallow and labored. Tidal Volumes on Bipap were below 200 yielding a minute volume of barely around 3L. Changed patient from Bipap to AVAP, Vt 450, Max P 30, Min P 10, RR 18, Fio2 45%, Epap 5. Patient tidal volumes went closer to 300mL and minute volume raised to 7L. Adjusted patients positioning with PCA Jeannie Z. Patient looks much more comfortable now, and breathing looks much less labored. MD Michele notified of these changes and lack of order. Verbal order taken over phone and entered on her behalf. Will continue to monitor condition.     Elaine Skinner, RRT

## 2020-11-29 NOTE — PROGRESS NOTE ADULT - SUBJECTIVE AND OBJECTIVE BOX
JENN SERRANO  55y  Female      Patient is a 55y old  Female who presents with a chief complaint of altered mental state (27 Nov 2020 19:39)      INTERVAL HPI/OVERNIGHT EVENTS:  pt seen and examined at bedside this morning, speaking in full sentences today, asking for water; still on AVAP, will wean off to high flow O2 as tolerated  -spoke to hospice nurse; family discussed options with hospice nurse and want to hold off on home hospice dispo  -DNR/DNI; aware of the overall poor prognosis    REVIEW OF SYSTEMS:  -thirsty; feels better compared to yesterday     Vital Signs Last 24 Hrs  T(C): 36.7 (29 Nov 2020 05:23), Max: 36.8 (28 Nov 2020 22:23)  T(F): 98 (29 Nov 2020 05:23), Max: 98.3 (28 Nov 2020 22:23)  HR: 73 (29 Nov 2020 07:56) (70 - 86)  BP: 117/63 (29 Nov 2020 05:23) (90/51 - 117/63)  RR: 18 (29 Nov 2020 05:23) (18 - 18)  SpO2: 97% (29 Nov 2020 10:15) (97% - 100%)    PHYSICAL EXAM:  GENERAL: on AVAP but speaking in full sentences and verbalizing her concerns   HEAD:  Atraumatic, Normocephalic  EYES: EOMI, PERRLA, conjunctiva and sclera clear  NERVOUS SYSTEM:  grimacing, not alert   CHEST/LUNG: Rhonchi  b/l  CV/HEART: Regular rate and rhythm; No murmurs, rubs, or gallops  GI/ABDOMEN: Soft, Nontender, Nondistended; Bowel sounds present  EXTREMITIES:  2+ Peripheral Pulses, No clubbing, cyanosis, or edema  SKIN: No rashes or lesions    LAB:             11.8   7.72  )-----------( 353      ( 27 Nov 2020 15:52 )             42.0     11-28    140  |  90<L>  |  22<H>  ----------------------------<  133<H>  4.8   |  42<HH>  |  <0.5<L>    Ca    10.6<H>      28 Nov 2020 06:42  Mg     2.3     11-27    TPro  7.4  /  Alb  4.2  /  TBili  0.3  /  DBili  x   /  AST  36  /  ALT  17  /  AlkPhos  139<H>  11-27    CARDIAC MARKERS ( 27 Nov 2020 15:52 )  x     / <0.01 ng/mL / x     / x     / x          Daily Height in cm: 157.48 (27 Nov 2020 20:35)    Daily   CAPILLARY BLOOD GLUCOSE          LIVER FUNCTIONS - ( 27 Nov 2020 15:52 )  Alb: 4.2 g/dL / Pro: 7.4 g/dL / ALK PHOS: 139 U/L / ALT: 17 U/L / AST: 36 U/L / GGT: x               RADIOLOGY:    Imaging Personally Reviewed:  [Y ] YES  [ ] NO    HEALTH ISSUES - PROBLEM Dx:  Advance directive on file  Advance directive on file    History of pulmonary embolism  History of pulmonary embolism    Acute on chronic respiratory failure with hypoxia and hypercapnia  Acute on chronic respiratory failure with hypoxia and hypercapnia    Obtunded  Obtunded    Hypercalcemia  Hypercalcemia    Metastatic breast cancer  Metastatic breast cancer    Pleural effusion  Pleural effusion    Respiratory distress  Respiratory distress    MEDICATIONS  (STANDING):  enoxaparin Injectable 70 milliGRAM(s) SubCutaneous two times a day  furosemide   Injectable 20 milliGRAM(s) IV Push every 12 hours  sodium chloride 0.9%. 1000 milliLiter(s) (50 mL/Hr) IV Continuous <Continuous>    MEDICATIONS  (PRN):  morphine  - Injectable 2 milliGRAM(s) IV Push every 4 hours PRN Severe Pain (7 - 10)

## 2020-11-29 NOTE — PROGRESS NOTE ADULT - ASSESSMENT
ED presentation:   56yo female with multiple chronic medical conditions including breast cancer (diagnosed in 2001) metastatic to lungs and bones, anasarca, history of pulmonary embolism (diagnosed 9/11/20 has been on twice daily Lovenox), and recurrent malignant effusion, is sent to the ER due to an "altered mental state". ER reports that on arrival EMS found patient in respiratory distress with pulse ox in the 70's on room air. Once she was placed on BIPAP in the ER her pulse ox reached 100% and she was able to tell staff that she didn't want to be intubated. ER also documents that she is on comfort care at home      1) Metastatic breast cancer  -comfort measures requested by  yesterday and placed hospice consult   - aware of the current overall poor prognosis; and he discussed dispo options with hospice nurse and wants to hold off on hospice dispo at home and will continue with current inpatient AVAP --- > high flow O2 with possible home d/c and pray for the best and if she goes into impending resp failure, he will bring her back to the ED (he verbalized the plan with the hospice nurse)    2) Acute respiratory failure with hypoxia  -on AVAP  -wean to high flow O2 as tolerated     3) Chronic Pulmonary Embolism  -on long term anticoagulant lovenox     DNR/DNI  prognosis extremely poor       # Progress Note Handoff  PENDING as follows  consults: Hospice consult (verbal conversation with hospice nurse over the phone this morning)  Test: labs noted  Family discussion: discussed with  who is aware of the current poor prognosis and also aware that pt may go into worsening respiratory failure   Disposition: home discharge as per the patient's wishes     Attending Physician Dr. Adriana Michele # [id number]

## 2020-11-30 NOTE — CHART NOTE - NSCHARTNOTEFT_GEN_A_CORE
Spoke with patient and her  Homero at bedside. Answered questions regarding catheter, anemia, increasing quality of life.  Update given: awaiting pulmonary consult  All questions and concerns addressed to family's satisfaction.  Family encouraged to contact me with any further concerns.

## 2020-11-30 NOTE — PROGRESS NOTE ADULT - ASSESSMENT
ED presentation:   56yo female with multiple chronic medical conditions including breast cancer (diagnosed in 2001) metastatic to lungs and bones, anasarca, history of pulmonary embolism (diagnosed 9/11/20 has been on twice daily Lovenox), and recurrent malignant effusion, is sent to the ER due to an "altered mental state". ER reports that on arrival EMS found patient in respiratory distress with pulse ox in the 70's on room air. Once she was placed on BIPAP in the ER her pulse ox reached 100% and she was able to tell staff that she didn't want to be intubated. ER also documents that she is on comfort care at home      1) Metastatic breast cancer  -overall prognosis poor   -labs noted  -oncology follow up as per patient's wishes     2) Acute respiratory failure with hypoxia  -on AVAP this morning -- weaned off to high flow   -Pulmonary consult      3) Chronic Pulmonary Embolism  -on long term anticoagulant lovenox     DNR/DNI  prognosis extremely poor       # Progress Note Handoff  PENDING as follows  consults: Pulmonary consult   Test: labs noted  Family discussion: discussed with  this admission who is aware of the current poor prognosis; unable to reach him today; awaiting his call   Disposition: home discharge as per the patient's wishes; NOT READY     Attending Physician Dr. Adriana Michele # [id number]

## 2020-11-30 NOTE — PROGRESS NOTE ADULT - SUBJECTIVE AND OBJECTIVE BOX
JENN SERRANO  55y  Female      Patient is a 55y old  Female who presents with a chief complaint of altered mental state (27 Nov 2020 19:39)      INTERVAL HPI/OVERNIGHT EVENTS:  pt seen and examined at bedside this morning, on AVAP early morning, weaned off to high flow 40% FiO2 in the afternoon  -Pulmonary consult   -DNR/DNI  -extremely poor prognosis   -check CXR     REVIEW OF SYSTEMS:  -unable to assess as pt on AVAP     Vital Signs Last 24 Hrs  T(C): 35.7 (30 Nov 2020 05:41), Max: 36.3 (29 Nov 2020 13:58)  T(F): 96.2 (30 Nov 2020 05:41), Max: 97.3 (29 Nov 2020 13:58)  HR: 98 (30 Nov 2020 10:22) (65 - 98)  BP: 150/78 (30 Nov 2020 05:41) (96/53 - 150/78)  RR: 18 (29 Nov 2020 13:58) (18 - 18)  SpO2: 82% (30 Nov 2020 10:22) (82% - 99%)    PHYSICAL EXAM:  GENERAL: on AVAP but speaking in full sentences and verbalizing her concerns   HEAD:  Atraumatic, Normocephalic  EYES: EOMI, PERRLA, conjunctiva and sclera clear  NERVOUS SYSTEM:  grimacing, not alert   CHEST/LUNG: Rhonchi  b/l  CV/HEART: Regular rate and rhythm; No murmurs, rubs, or gallops  GI/ABDOMEN: Soft, Nontender, Nondistended; Bowel sounds present  EXTREMITIES:  2+ Peripheral Pulses, No clubbing, cyanosis, or edema  SKIN: No rashes or lesions    LAB:                        10.6   6.02  )-----------( 287      ( 30 Nov 2020 07:03 )             37.6   11-30    143  |  91<L>  |  24<H>  ----------------------------<  91  3.8   |  44<HH>  |  0.5<L>    Ca    10.7<H>      30 Nov 2020 07:03                   11.8   7.72  )-----------( 353      ( 27 Nov 2020 15:52 )             42.0     11-28    140  |  90<L>  |  22<H>  ----------------------------<  133<H>  4.8   |  42<HH>  |  <0.5<L>    Ca    10.6<H>      28 Nov 2020 06:42  Mg     2.3     11-27    TPro  7.4  /  Alb  4.2  /  TBili  0.3  /  DBili  x   /  AST  36  /  ALT  17  /  AlkPhos  139<H>  11-27    CARDIAC MARKERS ( 27 Nov 2020 15:52 )  x     / <0.01 ng/mL / x     / x     / x          Daily Height in cm: 157.48 (27 Nov 2020 20:35)    Daily   CAPILLARY BLOOD GLUCOSE          LIVER FUNCTIONS - ( 27 Nov 2020 15:52 )  Alb: 4.2 g/dL / Pro: 7.4 g/dL / ALK PHOS: 139 U/L / ALT: 17 U/L / AST: 36 U/L / GGT: x               RADIOLOGY:    Imaging Personally Reviewed:  [Y ] YES  [ ] NO    HEALTH ISSUES - PROBLEM Dx:  Advance directive on file  Advance directive on file    History of pulmonary embolism  History of pulmonary embolism    Acute on chronic respiratory failure with hypoxia and hypercapnia  Acute on chronic respiratory failure with hypoxia and hypercapnia    Obtunded  Obtunded    Hypercalcemia  Hypercalcemia    Metastatic breast cancer  Metastatic breast cancer    Pleural effusion  Pleural effusion    Respiratory distress  Respiratory distress    MEDICATIONS  (STANDING):  enoxaparin Injectable 70 milliGRAM(s) SubCutaneous two times a day  furosemide   Injectable 20 milliGRAM(s) IV Push every 12 hours  sodium chloride 0.9%. 1000 milliLiter(s) (50 mL/Hr) IV Continuous <Continuous>    MEDICATIONS  (PRN):  ALPRAZolam 0.25 milliGRAM(s) Oral every 8 hours PRN anxiety/agitation  morphine  - Injectable 2 milliGRAM(s) IV Push every 4 hours PRN Severe Pain (7 - 10)

## 2020-12-01 NOTE — CONSULT NOTE ADULT - SUBJECTIVE AND OBJECTIVE BOX
Patient is a 55y old  Female who presents with a chief complaint of altered mental state (30 Nov 2020 13:23)      HPI:  (Patient obtunded, some facial grimacing with verbal stimuli, no family at bedside)  56yo female with multiple chronic medical conditions including breast cancer (diagnosed in 2001) metastatic to lungs and bones, anasarca, history of pulmonary embolism (diagnosed 9/11/20 has been on twice daily Lovenox), and recurrent malignant effusion, is sent to the ER due to an "altered mental state". ER reports that on arrival EMS found patient in respiratory distress with pulse ox in the 70's on room air. Once she was placed on BIPAP in the ER her pulse ox reached 100% and she was able to tell staff that she didn't want to be intubated. ER also documents that she is on comfort care at home (27 Nov 2020 19:39)  patient now at baseline on High flow awake oriented     PAST MEDICAL & SURGICAL HISTORY:  Metastatic breast cancer    H/O bilateral mastectomy        FAMILY HISTORY:    Family history: No family cardiovascular system   Occupation:  Alochol: Denied  Smoking: Denied  Drug Use: Denied  Marital Status:           Allergies    tamoxifen (Hives)    Intolerances        Home Medications:  clonazePAM 0.5 mg oral tablet: 1 tab(s) orally once a day, As Needed (29 Oct 2020 21:46)  enoxaparin: 80 milligram(s) subcutaneous 2 times a day (29 Oct 2020 21:46)  exemestane 25 mg oral tablet: 1 tab(s) orally  (29 Oct 2020 21:46)  torsemide 5 mg oral tablet: 2 tab(s) orally 2 times a day, As Needed (29 Oct 2020 21:46)      ROS: as in HPI; All other systems reviewed are negative        PHYSICAL EXAM:  Vital Signs Last 24 Hrs  T(C): 35.9 (01 Dec 2020 05:15), Max: 36.1 (30 Nov 2020 21:00)  T(F): 96.7 (01 Dec 2020 05:15), Max: 97 (30 Nov 2020 21:00)  HR: 73 (01 Dec 2020 05:15) (52 - 94)  BP: 126/86 (01 Dec 2020 05:15) (125/64 - 131/97)  BP(mean): --  RR: 18 (01 Dec 2020 05:15) (18 - 18)  SpO2: 98% (01 Dec 2020 07:51) (98% - 100%)      GENERAL: NAD, well-groomed, well-developed  HEAD:  Atraumatic, Normocephalic  EYES: EOMI, PERRLA, conjunctiva and sclera clear  ENMT: No tonsillar erythema, exudates, or enlargement; Moist mucous membranes, Good dentition, No lesions  NECK: Supple, No JVD, Normal thyroid  NERVOUS SYSTEM:  Alert & Oriented X3, Good concentration; Motor Strength 5/5 B/L upper and lower extremities; DTRs 2+ intact and symmetric  CHEST/LUNG: Clear to percussion bilaterally; No rales, rhonchi, wheezing, or rubs  HEART: Regular rate and rhythm; No murmurs, rubs, or gallops  ABDOMEN: Soft, Nontender, Nondistended; Bowel sounds present  EXTREMITIES:  2+ Peripheral Pulses, No clubbing, cyanosis, or edema  LYMPH: No lymphadenopathy noted  SKIN: No rashes or lesions    HOSPITAL MEDICATIONS:  MEDICATIONS  (STANDING):  enoxaparin Injectable 70 milliGRAM(s) SubCutaneous two times a day  furosemide   Injectable 20 milliGRAM(s) IV Push every 12 hours  sodium chloride 0.9%. 1000 milliLiter(s) (50 mL/Hr) IV Continuous <Continuous>    MEDICATIONS  (PRN):  ALPRAZolam 0.25 milliGRAM(s) Oral every 8 hours PRN anxiety/agitation  morphine  - Injectable 2 milliGRAM(s) IV Push every 4 hours PRN Severe Pain (7 - 10)      LABS:                        10.6   6.02  )-----------( 287      ( 30 Nov 2020 07:03 )             37.6     11-30    143  |  91<L>  |  24<H>  ----------------------------<  91  3.8   |  44<HH>  |  0.5<L>    Ca    10.7<H>      30 Nov 2020 07:03                    RADIOLOGY:  [ ] Reviewed and interpreted by me  stable b/l effusion R>L  ECHO:    Point of Care Ultrasound Findings;    PFT:

## 2020-12-01 NOTE — PROGRESS NOTE ADULT - SUBJECTIVE AND OBJECTIVE BOX
JENN SERRANO  55y  Female      Patient is a 55y old  Female who presents with a chief complaint of altered mental state (27 Nov 2020 19:39)      INTERVAL HPI/OVERNIGHT EVENTS:  pt seen and examined at bedside this morning, sitting up in chair, on high flow O2, doing better than yesterday, off AVAP  -Pulmonary consult noted   -DNR/DNI  -extremely poor prognosis   -stable chest xray     REVIEW OF SYSTEMS:  -unable to assess as pt on AVAP     Vital Signs Last 24 Hrs  T(C): 35.7 (30 Nov 2020 05:41), Max: 36.3 (29 Nov 2020 13:58)  T(F): 96.2 (30 Nov 2020 05:41), Max: 97.3 (29 Nov 2020 13:58)  HR: 98 (30 Nov 2020 10:22) (65 - 98)  BP: 150/78 (30 Nov 2020 05:41) (96/53 - 150/78)  RR: 18 (29 Nov 2020 13:58) (18 - 18)  SpO2: 82% (30 Nov 2020 10:22) (82% - 99%)    PHYSICAL EXAM:  GENERAL: on high flow O2 today, speaking in full sentences; dyspneic with labored breathing but comprehending her medical care   HEAD:  Atraumatic, Normocephalic  EYES: EOMI, PERRLA, conjunctiva and sclera clear  NERVOUS SYSTEM:  awake and alert   CHEST/LUNG: Rhonchi  b/l  CV/HEART: Regular rate and rhythm; No murmurs, rubs, or gallops  GI/ABDOMEN: Soft, Nontender, Nondistended; Bowel sounds present  EXTREMITIES:  2+ Peripheral Pulses, No clubbing, cyanosis, or edema  SKIN: No rashes or lesions    LAB:                        10.6   6.02  )-----------( 287      ( 30 Nov 2020 07:03 )             37.6   11-30    143  |  91<L>  |  24<H>  ----------------------------<  91  3.8   |  44<HH>  |  0.5<L>    Ca    10.7<H>      30 Nov 2020 07:03                   11.8   7.72  )-----------( 353      ( 27 Nov 2020 15:52 )             42.0     11-28    140  |  90<L>  |  22<H>  ----------------------------<  133<H>  4.8   |  42<HH>  |  <0.5<L>    Ca    10.6<H>      28 Nov 2020 06:42  Mg     2.3     11-27    TPro  7.4  /  Alb  4.2  /  TBili  0.3  /  DBili  x   /  AST  36  /  ALT  17  /  AlkPhos  139<H>  11-27    CARDIAC MARKERS ( 27 Nov 2020 15:52 )  x     / <0.01 ng/mL / x     / x     / x          Daily Height in cm: 157.48 (27 Nov 2020 20:35)    Daily   CAPILLARY BLOOD GLUCOSE          LIVER FUNCTIONS - ( 27 Nov 2020 15:52 )  Alb: 4.2 g/dL / Pro: 7.4 g/dL / ALK PHOS: 139 U/L / ALT: 17 U/L / AST: 36 U/L / GGT: x               RADIOLOGY:    Imaging Personally Reviewed:  [Y ] YES  [ ] NO    HEALTH ISSUES - PROBLEM Dx:  Advance directive on file  Advance directive on file    History of pulmonary embolism  History of pulmonary embolism    Acute on chronic respiratory failure with hypoxia and hypercapnia  Acute on chronic respiratory failure with hypoxia and hypercapnia    Obtunded  Obtunded    Hypercalcemia  Hypercalcemia    Metastatic breast cancer  Metastatic breast cancer    Pleural effusion  Pleural effusion    Respiratory distress  Respiratory distress    MEDICATIONS  (STANDING):  enoxaparin Injectable 70 milliGRAM(s) SubCutaneous two times a day  furosemide   Injectable 20 milliGRAM(s) IV Push every 12 hours  sodium chloride 0.9%. 1000 milliLiter(s) (50 mL/Hr) IV Continuous <Continuous>    MEDICATIONS  (PRN):  ALPRAZolam 0.25 milliGRAM(s) Oral every 8 hours PRN anxiety/agitation  morphine  - Injectable 2 milliGRAM(s) IV Push every 4 hours PRN Severe Pain (7 - 10)

## 2020-12-01 NOTE — PROGRESS NOTE ADULT - ASSESSMENT
ED presentation:   56yo female with multiple chronic medical conditions including breast cancer (diagnosed in 2001) metastatic to lungs and bones, anasarca, history of pulmonary embolism (diagnosed 9/11/20 has been on twice daily Lovenox), and recurrent malignant effusion, is sent to the ER due to an "altered mental state". ER reports that on arrival EMS found patient in respiratory distress with pulse ox in the 70's on room air. Once she was placed on BIPAP in the ER her pulse ox reached 100% and she was able to tell staff that she didn't want to be intubated. ER also documents that she is on comfort care at home      1) Metastatic breast cancer  -overall prognosis poor   -labs noted  -oncology follow up as per patient's wishes     2) Chronic respiratory failure with hypoxia  -on high flow O2; with [place] catheter; stable for now; may nee drainage tomorrow; pulmonary following  -will order repeat chest xray     3) Chronic Pulmonary Embolism  -on long term anticoagulant lovenox     DNR/DNI  prognosis extremely poor       # Progress Note Handoff  PENDING as follows  consults: Pulmonary consult appreciated   Test: labs noted  Family discussion: called  at [phone] number, no reply; awaiting his reply   Disposition: home discharge as per the patient's wishes; NOT READY yet     Attending Physician Dr. Adriana Michele # [id number]

## 2020-12-02 NOTE — CHART NOTE - NSCHARTNOTEFT_GEN_A_CORE
Pt is rx xanax 0.25mg pO howver pt is on te bipap at this time.  Pt is requesting the xanax for anxiety.  Will give 0.5mg of ativan IVP once.

## 2020-12-02 NOTE — PROGRESS NOTE ADULT - SUBJECTIVE AND OBJECTIVE BOX
JENN SERRANO  55y  Female      Patient is a 55y old  Female who presents with a chief complaint of altered mental state (27 Nov 2020 19:39)      INTERVAL HPI/OVERNIGHT EVENTS:  pt seen and examined at bedside this morning, sitting up in chair, on high flow O2, doing better compared to admission status  -spoke to Pulmonary, will try to aspirate from [place] catheter; stable resp status with trapped lung/stable chest xray  -extremely poor prognosis   -DNR/DNI  -discussed with  in length yesterday; aware of the hospital plan of care; requesting home care arrangements, pt has AVAP at home; home care nurse made aware and will address concerns and home arrangements/home care if needed      REVIEW OF SYSTEMS:  -feels better, breathing improved     Vital Signs Last 24 Hrs  T(C): 36.7 (02 Dec 2020 13:46), Max: 36.7 (02 Dec 2020 13:46)  T(F): 98.1 (02 Dec 2020 13:46), Max: 98.1 (02 Dec 2020 13:46)  HR: 89 (02 Dec 2020 13:46) (75 - 89)  BP: 131/81 (02 Dec 2020 13:46) (119/85 - 143/99)  RR: 16 (02 Dec 2020 13:46) (16 - 20)  SpO2: 100% (02 Dec 2020 13:41) (98% - 100%)    PHYSICAL EXAM:  GENERAL: on high flow O2 today, speaking in full sentences; sitting up in chair, dyspneic with labored breathing but comprehending her medical care very well  HEAD:  Atraumatic, Normocephalic  EYES: EOMI, PERRLA, conjunctiva and sclera clear  NERVOUS SYSTEM:  awake and alert   CHEST/LUNG: Rhonchi  b/l  CV/HEART: Regular rate and rhythm; No murmurs, rubs, or gallops  GI/ABDOMEN: Soft, Nontender, Nondistended; Bowel sounds present  EXTREMITIES:  2+ Peripheral Pulses, No clubbing, cyanosis, or edema  SKIN: No rashes or lesions    LAB:                          12.4   5.48  )-----------( 240      ( 02 Dec 2020 07:56 )             44.8   12-02    139  |  91<L>  |  10  ----------------------------<  109<H>  4.5   |  32  |  <0.5<L>    Ca    10.7<H>      02 Dec 2020 07:56            Daily Height in cm: 157.48 (27 Nov 2020 20:35)    Daily   CAPILLARY BLOOD GLUCOSE      LIVER FUNCTIONS - ( 27 Nov 2020 15:52 )  Alb: 4.2 g/dL / Pro: 7.4 g/dL / ALK PHOS: 139 U/L / ALT: 17 U/L / AST: 36 U/L / GGT: x           RADIOLOGY:    Imaging Personally Reviewed:  [Y ] YES  [ ] NO    HEALTH ISSUES - PROBLEM Dx:  Advance directive on file  Advance directive on file    History of pulmonary embolism  History of pulmonary embolism    Acute on chronic respiratory failure with hypoxia and hypercapnia  Acute on chronic respiratory failure with hypoxia and hypercapnia    Obtunded  Obtunded    Hypercalcemia  Hypercalcemia    Metastatic breast cancer  Metastatic breast cancer    Pleural effusion  Pleural effusion    Respiratory distress  Respiratory distress    MEDICATIONS  (STANDING):  enoxaparin Injectable 70 milliGRAM(s) SubCutaneous two times a day  furosemide   Injectable 20 milliGRAM(s) IV Push every 12 hours  sodium chloride 0.9%. 1000 milliLiter(s) (50 mL/Hr) IV Continuous <Continuous>    MEDICATIONS  (PRN):  ALPRAZolam 0.25 milliGRAM(s) Oral every 8 hours PRN anxiety/agitation  morphine  - Injectable 2 milliGRAM(s) IV Push every 4 hours PRN Severe Pain (7 - 10)

## 2020-12-02 NOTE — PROGRESS NOTE ADULT - ASSESSMENT
ED presentation:   54yo female with multiple chronic medical conditions including breast cancer (diagnosed in 2001) metastatic to lungs and bones, anasarca, history of pulmonary embolism (diagnosed 9/11/20 has been on twice daily Lovenox), and recurrent malignant effusion, is sent to the ER due to an "altered mental state". ER reports that on arrival EMS found patient in respiratory distress with pulse ox in the 70's on room air. Once she was placed on BIPAP in the ER her pulse ox reached 100% and she was able to tell staff that she didn't want to be intubated. ER also documents that she is on comfort care at home      1) Metastatic breast cancer  -overall prognosis poor   -labs noted  -oncology follow up as per patient's wishes     2) Chronic respiratory failure with hypoxia  -on high flow O2; with [place] catheter; stable for now; drainage today as per Pulmonary (discussed)  -will order repeat chest xray in am     3) Chronic Pulmonary Embolism  -on long term anticoagulant lovenox     DNR/DNI  prognosis extremely poor       # Progress Note Handoff  PENDING as follows  consults: Pulmonary consult appreciated   Test: labs noted  Family discussion:  unavailable at bedside today but spoke to him at bedside in length yesterday and answered all questions   Disposition: home with home care; NOT READY yet     Attending Physician Dr. Adriana Michele # [id number]

## 2020-12-03 NOTE — DISCHARGE NOTE PROVIDER - NSDCCPCAREPLAN_GEN_ALL_CORE_FT
PRINCIPAL DISCHARGE DIAGNOSIS  Diagnosis: Respiratory distress  Assessment and Plan of Treatment: -   - Chronic   - Pulmonary drained from [place] cath   - On home BIPAP and O2      SECONDARY DISCHARGE DIAGNOSES  Diagnosis: Metastatic breast cancer  Assessment and Plan of Treatment: -  - Poor prognosis   - Outpatient follow up with oncology as her wishes

## 2020-12-03 NOTE — DISCHARGE NOTE NURSING/CASE MANAGEMENT/SOCIAL WORK - NSCORESITESY/N_GEN_A_CORE_RD
Shefali Winn MD at [facility] ENDO (4TH FLR)    BREAST SURGERY      saline implants 2006    BRONCHOSCOPY-FIBEROPTIC N/A 1/23/2014    [facility] by Bill Cry MD at [facility] OR 2ND FLR    CARDIAC CATHETERIZATION      Colonoscopy N/A 12/4/2018    [facility] by Shefali Winn MD at [facility] ENDO (2ND FLR)    COLONOSCOPY N/A 11/19/2015    [facility] by Ruben Kuhn MD at [facility] ENDO (2ND FLR)    EGD (ESOPHAGOGASTRODUODENOSCOPY) N/A 1/23/2014    [facility] by Bill Cyr,  MD at [facility] OR 2ND FLR    ESOPHAGOGASTRODUODENOSCOPY  11/19/15    esophageal inflammation due to schleroderma, motility decreased    ESOPHAGOGASTRODUODENOSCOPY (EGD) N/A 2018    [facility] by Shefali Winn MD at [facility] ENDO (2ND FLR)    ESOPHAGOGASTRODUODENOSCOPY (EGD) N/A 2015    [facility] by Ruben Kuhn MD at [facility] ENDO (2ND FLR)    HEMORRHOID SURGERY  2010    LAVAGE, BRONCHOALVEOLAR N/A 2014    [facility] by Bill Cyr MD at [facility] OR 2ND FLR    LUNG BIOPSY  2014    left lung    MANOMETRY-ESOPHAGEAL N/A 2015    [facility] ([facility])    MANOMETRY-ESOPHAGEAL-WITH IMPEDANCE N/A 2018    [facility] by [facility] (4TH FLR)    SYMPATHECTOMY N/A 2014    [facility] by Bill Cyr MD at [facility] OR 2ND FLR    VATS (VIDEO-ASSISTED THORACOSCOPIC SURGERY) Left 2014    [facility] by Bill Cyr MD at [facility] 2345       Carlos Disla MD  02/09/19 [id number]

## 2020-12-03 NOTE — PROGRESS NOTE ADULT - SUBJECTIVE AND OBJECTIVE BOX
JENN SERRANO  55y  Female      Patient is a 55y old  Female who presents with a chief complaint of altered mental state (27 Nov 2020 19:39)      INTERVAL HPI/OVERNIGHT EVENTS:  pt seen and examined at bedside this morning, sitting up in chair, on high flow O2, back to baseline  very poor prognosis but declined hospice  Wants to be discharged today  D/w pulm and cleared for discharge  Patient requested home services   to f/u with dispo planning      REVIEW OF SYSTEMS:  -feels better, breathing improved     Vital Signs Last 24 Hrs  T(C): 36.3 (03 Dec 2020 05:41), Max: 36.7 (02 Dec 2020 13:46)  T(F): 97.3 (03 Dec 2020 05:41), Max: 98.1 (02 Dec 2020 13:46)  HR: 85 (03 Dec 2020 05:41) (69 - 89)  BP: 119/70 (03 Dec 2020 05:41) (119/70 - 133/93)  BP(mean): --  RR: 16 (03 Dec 2020 05:41) (16 - 16)  SpO2: 100% (03 Dec 2020 05:55) (98% - 100%)    PHYSICAL EXAM:  GENERAL: on high flow O2 today, speaking in full sentences; sitting up in chair, dyspneic with labored breathing but comprehending her medical care very well  HEAD:  Atraumatic, Normocephalic  EYES: EOMI, PERRLA, conjunctiva and sclera clear  NERVOUS SYSTEM:  awake and alert   CHEST/LUNG: Rhonchi  b/l  CV/HEART: Regular rate and rhythm; No murmurs, rubs, or gallops  GI/ABDOMEN: Soft, Nontender, Nondistended; Bowel sounds present  EXTREMITIES:  2+ Peripheral Pulses, No clubbing, cyanosis, or edema  SKIN: No rashes or lesions    LAB:                          12.4   5.48  )-----------( 240      ( 02 Dec 2020 07:56 )             44.8   12-02    139  |  91<L>  |  10  ----------------------------<  109<H>  4.5   |  32  |  <0.5<L>    Ca    10.7<H>      02 Dec 2020 07:56            Daily Height in cm: 157.48 (27 Nov 2020 20:35)    Daily   CAPILLARY BLOOD GLUCOSE      LIVER FUNCTIONS - ( 27 Nov 2020 15:52 )  Alb: 4.2 g/dL / Pro: 7.4 g/dL / ALK PHOS: 139 U/L / ALT: 17 U/L / AST: 36 U/L / GGT: x           RADIOLOGY:    Imaging Personally Reviewed:  [Y ] YES  [ ] NO    HEALTH ISSUES - PROBLEM Dx:  Advance directive on file  Advance directive on file    History of pulmonary embolism  History of pulmonary embolism    Acute on chronic respiratory failure with hypoxia and hypercapnia  Acute on chronic respiratory failure with hypoxia and hypercapnia    Obtunded  Obtunded    Hypercalcemia  Hypercalcemia    Metastatic breast cancer  Metastatic breast cancer    Pleural effusion  Pleural effusion    Respiratory distress  Respiratory distress    MEDICATIONS  (STANDING):  enoxaparin Injectable 70 milliGRAM(s) SubCutaneous two times a day  furosemide   Injectable 20 milliGRAM(s) IV Push every 12 hours  sodium chloride 0.9%. 1000 milliLiter(s) (50 mL/Hr) IV Continuous <Continuous>    MEDICATIONS  (PRN):  ALPRAZolam 0.25 milliGRAM(s) Oral every 8 hours PRN anxiety/agitation  morphine  - Injectable 2 milliGRAM(s) IV Push every 4 hours PRN Severe Pain (7 - 10)

## 2020-12-03 NOTE — DISCHARGE NOTE PROVIDER - HOSPITAL COURSE
To be completed by the Attending     Patient is a 54yo female with multiple chronic medical conditions including breast cancer (diagnosed in 2001) metastatic to lungs and bones, anasarca, history of pulmonary embolism (diagnosed 9/11/20 has been on twice daily Lovenox), and recurrent malignant effusion, was sent to the ER on 11/27 with a chief complaint of "altered mental state". Patient was admitted to medicine. Patient has metastatic breast cancer and has a poor prognosis, patient to follow up with oncology as she wishes. Patient also has chronic respiratory failure with hypoxia on high flow O2 with Denver cath. Patient with chronic pulmonary embolism on long term anticoagulation. Patient to be discharge from hospital to home today.     Vital Signs Last 24 Hrs  T(C): 36.3 (03 Dec 2020 05:41), Max: 36.7 (02 Dec 2020 13:46)  T(F): 97.3 (03 Dec 2020 05:41), Max: 98.1 (02 Dec 2020 13:46)  HR: 85 (03 Dec 2020 05:41) (69 - 89)  BP: 119/70 (03 Dec 2020 05:41) (119/70 - 133/93)  RR: 16 (03 Dec 2020 05:41) (16 - 16)  SpO2: 100% (03 Dec 2020 05:55) (98% - 100%)    RADIOLOGY  Xray Chest 1 View- PORTABLE-Routine (Xray Chest 1 View- PORTABLE-Routine .) (11.27.20 @ 16:13)   Impression:  Stable bilateral pleural effusion/opacities.

## 2020-12-03 NOTE — PROGRESS NOTE ADULT - REASON FOR ADMISSION
altered mental state

## 2020-12-03 NOTE — DISCHARGE NOTE NURSING/CASE MANAGEMENT/SOCIAL WORK - PATIENT PORTAL LINK FT
You can access the FollowMyHealth Patient Portal offered by [facility] by registering at the following website: [email or web address]. By joining [employer]’s FollowMyHealth portal, you will also be able to view your health information using other applications (apps) compatible with our system.

## 2020-12-03 NOTE — DISCHARGE NOTE PROVIDER - CARE PROVIDER_API CALL
PCP,   Phone: (   )    -  Fax: (   )    -  Follow Up Time: 1-3 days    Leonides Ibanez (MD)  Critical Care Medicine; Internal Medicine; Pulmonary Disease  [address] [place] [address]  Phone: [phone]  Fax: [phone]  Follow Up Time: 1 week

## 2020-12-03 NOTE — DISCHARGE NOTE PROVIDER - PROVIDER TOKENS
FREE:[LAST:[PCP],PHONE:[(   )    -],FAX:[(   )    -],FOLLOWUP:[1-3 days]],PROVIDER:[TOKEN:[[id number]],FOLLOWUP:[1 week]]

## 2020-12-03 NOTE — PROGRESS NOTE ADULT - ASSESSMENT
ED presentation:   56yo female with multiple chronic medical conditions including breast cancer (diagnosed in 2001) metastatic to lungs and bones, anasarca, history of pulmonary embolism (diagnosed 9/11/20 has been on twice daily Lovenox), and recurrent malignant effusion, is sent to the ER due to an "altered mental state". ER reports that on arrival EMS found patient in respiratory distress with pulse ox in the 70's on room air. Once she was placed on BIPAP in the ER her pulse ox reached 100% and she was able to tell staff that she didn't want to be intubated. ER also documents that she is on comfort care at home      1) Metastatic breast cancer  -overall prognosis poor   -outpatient f/u with oncologist    2) Chronic respiratory failure with hypoxia  -on high flow O2; with Denver catheter; stable for now; drainage today as per Pulmonary (discussed)    3) Chronic Pulmonary Embolism  -on long term anticoagulant lovenox     DNR/DNI  prognosis extremely poor       # Progress Note Handoff  PENDING as follows  consults: Pulmonary consult appreciated   Test: labs noted  Disposition: home with home care; NOT READY yet

## 2020-12-11 NOTE — ED PROVIDER NOTE - PHYSICAL EXAMINATION
Vital Signs: I have reviewed the initial vital signs.  Constitutional: acute respiratory distress.   HEENT: pupils pinpoint, sluggish, altered.   CV: regular rate, bradycardic.   Lungs: decreased breath sounds over the left lung with crackles bilaterally  ABD: soft, not tympanic, nondistended.    INTEG: Skin pale, dry.   NEURO: unresponsive.

## 2020-12-11 NOTE — H&P ADULT - NSHPPHYSICALEXAM_GEN_ALL_CORE
VITALS:   T(C): 32.2 (12-11-20 @ 17:30), Max: 32.6 (12-11-20 @ 17:20)  HR: 73 (12-11-20 @ 17:30) (64 - 164)  BP: 104/52 (12-11-20 @ 17:30) (86/55 - 144/64)  RR: 18 (12-11-20 @ 17:30) (16 - 18)  SpO2: 100% (12-11-20 @ 17:30) (98% - 100%)    Limited PE d/t pt being unresponsive  GENERAL: unresponsive, on AVAPS and thermal blanket  HEAD:  Atraumatic, Normocephalic  EYES: pupil 2 mm in diameter, unresponsive to light  ENT: unable to examine d/t pt being unresponsive  NECK: Supple  CHEST/LUNG: + breath sounds bilaterally, no wheezing  HEART: Regular rate and rhythm; no murmurs  ABDOMEN: +BS, Soft, Nontender, Nondistended  EXTREMITIES: 1+ pitting edema bilaterally  NERVOUS SYSTEM:  pt unresponsive to voice or sternal rub but opens her eyes from time to time  MSK: unable to examine  SKIN: limited exam

## 2020-12-11 NOTE — H&P ADULT - ASSESSMENT
55 yea old female with PMH of breast CA (diagnosed in 2001) metastatic to lung/bone, frequent malignant pleural effusion, anasarca, hx PE (on lovenox), recently admitted for respiratory failure and discharged on 12/3/20 BIB EMS with AMS. Pt unresponsive. Pupils noted to be pinpoint and not reactive to light. Pt was given naloxone 2 mg IV and 4 mg intranasally with minimal response.  Pt was evaluated by ICU and declined admission.    #Acute on chronic hypercapnic respiratory  failure  - s/p 6 mg naloxone in ED  - continue AVAPS. Per intensivist recommendation can increase RR to 24 and  keep Sao2 92 to 96  - repeat ABG  - pulmonary consult  - cxr    #Metastatic breast cancer  -overall prognosis poor   - ?hospice consult   - supportive care    #Hypothermia  - T 90 on admission  - continue thermal blanket      #Chronic Pulmonary Embolism  -continue long term anticoagulant Lovenox       Diet: NPO  DVT ppx: pt anticoagulated  DNR/DNI   55 yea old female with PMH of breast CA (diagnosed in 2001) metastatic to lung/bone, frequent malignant pleural effusion, anasarca, hx PE (on lovenox), recently admitted for respiratory failure and discharged on 12/3/20 BIB EMS with AMS. Pt unresponsive. Pupils noted to be pinpoint and not reactive to light. Pt was given naloxone 2 mg IV and 4 mg intranasally with minimal response.  Pt was evaluated by ICU and declined admission.    #Acute on chronic hypercapnic respiratory  failure  - s/p 6 mg naloxone in ED  - continue AVAPS. Per intensivist recommendation can increase RR to 24 and  keep Sao2 92 to 96  - repeat ABG  - pulmonary consult  - cxr  - avoid opiates/cns depressants    #Metastatic breast cancer  -overall prognosis poor   - ?hospice consult. Pr refused hospice in the past  - supportive care  - outpatient follow up wit oncology    #Hypothermia  - T 90 on admission  - continue thermal blanket      #Chronic Pulmonary Embolism  -continue long term anticoagulant Lovenox       Diet: NPO  DVT ppx: pt anticoagulated  DNR/DNI

## 2020-12-11 NOTE — ED ADULT TRIAGE NOTE - CHIEF COMPLAINT QUOTE
pt BIBA from home, as per pts family, pt took .5 of colnazepam last night, when they went to check on her this afternoon she was found to be unresponsive.

## 2020-12-11 NOTE — ED PROVIDER NOTE - ATTENDING CONTRIBUTION TO CARE
55 F to ED with unresponsiveness at home  h/o metastatic CA with home care for supplemental 02 and bipap.  EMS pre-notification, and ED team at   exam as noted, coarse bilat BS, RRR, abdomen soft NTND,

## 2020-12-11 NOTE — ED PROVIDER NOTE - OBJECTIVE STATEMENT
55F hx metastatic breast ca / presents with AMS. 55F hx metastatic breast ca to lung/bone/frequent malignant pleural effusion presents with AMS.  notes that she became short of breath yesterday evening,k was initially on highflow NC then switched to NC. However, patient became progressively more and more altered at midnight, started having more shortness of breath,  noted her blood pressure started to slowly drop from 110-->90's systolic, then called ems. Patient is on xanax + clonazepam at home for pain, was discharged previously with fentanyl patch that has since been removed. NO fever/vomiting/cough noted by . 55F hx metastatic breast ca to lung/bone/frequent malignant pleural effusion, chronic PE on lovenox presents with AMS.  notes that she became short of breath yesterday evening,k was initially on highflow NC then switched to NC. However, patient became progressively more and more altered at midnight, started having more shortness of breath,  noted her blood pressure started to slowly drop from 110-->90's systolic, then called ems. Patient is on xanax + clonazepam at home for pain, was discharged previously with fentanyl patch that has since been removed. NO fever/vomiting/cough noted by .

## 2020-12-11 NOTE — ED PROVIDER NOTE - CLINICAL SUMMARY MEDICAL DECISION MAKING FREE TEXT BOX
d/w family, DNR/DNI affirmed  pt with bradypnea and hypotension with hypothermia  ICU consulted and pt placed on AVAPS

## 2020-12-11 NOTE — H&P ADULT - HISTORY OF PRESENT ILLNESS
55 yea old female with PMH of breast CA (diagnosed in 2001) metastatic to lung/bone, frequent malignant pleural effusion, anasarca, hx PE (on lovenox), recently admitted for respiratory failure and discharged on 12/3/20 BIB EMS with AMS. As per ED note, pt became SOB yesterday evening and was put on NC at home. Pt also was noted to get more altered and hypotensive as per . Reportedly, pt took Xanax and Clonazepam at home yesterday.  denies fever, cough, N/V, or CP.  In the ED, pt hypothermic, bradypneic to 6, pt saturating 100% on 15L NRB. Pt was not responding to voice, sternal rub, or to pain. Pupils noted to be pinpoint and not reactive to light. Pt was given naloxone 2 mg IV and 4 mg intranasally with minimal response. Right femoral TLC placed for emergent access. Pt was given a dose of Cefepime/Vanco and bolus of LR.      55 yea old female with PMH of breast CA (diagnosed in 2001) metastatic to lung/bone, frequent malignant pleural effusion, anasarca, hx PE (on lovenox), recently admitted for respiratory failure and discharged on 12/3/20 BIB EMS with AMS. Pt unresponsive. History obtained from . Pt "was doing well" until 6 pm yesterday when she started c/o SOB. Pt took clonazepam 0.5 mg and was put on AVAP at home. Throughout the night pt was noted to get more altered and hypotensive as per . In the morning pt was found to be unresponsive, moaning, and EMS was called.  denies fever, cough, N/V, or CP.  In the ED, pt hypothermic, bradypneic to 6, pt saturating 100% on 15L NRB. Pt was not responding to voice, sternal rub, or to pain. Pupils noted to be pinpoint and not reactive to light. Pt was given naloxone 2 mg IV and 4 mg intranasally with minimal response. Right femoral TLC placed for emergent access. Pt was given a dose of Cefepime/Vanco and bolus of LR.

## 2020-12-11 NOTE — H&P ADULT - NSHPLABSRESULTS_GEN_ALL_CORE
12.0   5.89  )-----------( 354      ( 11 Dec 2020 15:21 )             46.6       12-11    140  |  89<L>  |  26<H>  ----------------------------<  151<H>  5.1<H>   |  >50<HH>  |  1.0    Ca    10.9<H>      11 Dec 2020 15:21  Mg     3.0     12-11    TPro  7.3  /  Alb  4.4  /  TBili  0.3  /  DBili  x   /  AST  42<H>  /  ALT  27  /  AlkPhos  151<H>  12-11              Urinalysis Basic - ( 11 Dec 2020 16:00 )    Color: Yellow / Appearance: Clear / SG: >=1.030 / pH: x  Gluc: x / Ketone: Negative  / Bili: Negative / Urobili: 4.0 mg/dL   Blood: x / Protein: 100 mg/dL / Nitrite: Negative   Leuk Esterase: Negative / RBC: x / WBC x   Sq Epi: x / Non Sq Epi: x / Bacteria: x      Lactate Trend  12-11 @ 15:21 Lactate:0.9       CARDIAC MARKERS ( 11 Dec 2020 15:21 )  x     / 0.03 ng/mL / x     / x     / x            CAPILLARY BLOOD GLUCOSE      POCT Blood Glucose.: 144 mg/dL (11 Dec 2020 15:13)      RADIOLOGY:  no tests performed

## 2020-12-11 NOTE — PATIENT PROFILE ADULT - VISION (WITH CORRECTIVE LENSES IF THE PATIENT USUALLY WEARS THEM):
Could not assess/Normal vision: sees adequately in most situations; can see medication labels, newsprint

## 2020-12-11 NOTE — CONSULT NOTE ADULT - ASSESSMENT
IMPRESSION:    Acute on chronic hypercapneic resp failure/ ? opiates overdose on home oxygen  Metastatic breast Ca  Recurrent malignant pleural effsuion  PE on lovenox  hypothermia      PLAN:    CNS: dc all CNS depressant/ opiates, can give intermittent narcan    HEENT:  Oral care    PULMONARY:  HOB @ 45 degrees, AVAPS increase RR to 24 and  keep Sao2 92 to 96, repeat ABG    CARDIOVASCULAR: Gentle hydration    GI: GI prophylaxis                                          Feeding NPO    RENAL:  F/u  lytes.  Correct as needed. accurate I/O    INFECTIOUS DISEASE: Pancx    HEMATOLOGICAL:  DVT prophylaxis. Lovenox    ENDOCRINE:  Follow up FS.  Insulin protocol if needed. hydrocortisone      CODE STATUS: DNR/I    DISPOSITION: Pt will not benefit from MICU care  hospice eval  very poor prognosis

## 2020-12-11 NOTE — ED ADULT NURSE NOTE - NSIMPLEMENTINTERV_GEN_ALL_ED
Implemented All Fall with Harm Risk Interventions:  [place] to call system. Call bell, personal items and telephone within reach. Instruct patient to call for assistance. Room bathroom lighting operational. Non-slip footwear when patient is off stretcher. Physically safe environment: no spills, clutter or unnecessary equipment. Stretcher in lowest position, wheels locked, appropriate side rails in place. Provide visual cue, wrist band, yellow gown, etc. Monitor gait and stability. Monitor for mental status changes and reorient to person, place, and time. Review medications for side effects contributing to fall risk. Reinforce activity limits and safety measures with patient and family. Provide visual clues: red socks.

## 2020-12-11 NOTE — CONSULT NOTE ADULT - SUBJECTIVE AND OBJECTIVE BOX
Patient is a 55y old  Female who presents with a chief complaint of altered MS    55F hx metastatic breast ca to lung/bone/ recurrent  malignant pleural effusion sp plerx cathter, chronic PE on lovenox presents with AMS.  notes that she became short of breath yesterday evening,k was initially on highflow NC?? then switched to NC. However, patient became progressively more and more altered at midnight, started having more shortness of breath,  noted her blood pressure started to slowly drop from 110-->90's systolic, then called ems. Patient is on xanax + clonazepam . opiates at home for pain, was discharged previously with fentanyl patch that has since been removed. NO fever/vomiting/cough noted by . in ED ABG undectatable PACO2 placed on AVAPS and called to evaluate. to note patient is DNR/ I, with recurrent hospitlaization      PAST MEDICAL & SURGICAL HISTORY:  Metastatic breast cancer    H/O bilateral mastectomy        SOCIAL HX:   Smoking   -    FAMILY HISTORY: uto      REVIEW OF SYSTEMS uto      Allergies    tamoxifen (Hives)    Intolerances        : Home Meds:      PHYSICAL EXAM    ICU Vital Signs Last 24 Hrs  T(C): 32.2 (11 Dec 2020 16:49), Max: 32.2 (11 Dec 2020 15:25)  T(F): 90 (11 Dec 2020 16:49), Max: 90 (11 Dec 2020 15:25)  HR: 70 (11 Dec 2020 16:49) (64 - 74)  BP: 144/64 (11 Dec 2020 16:49) (86/55 - 144/64)  RR: 18 (11 Dec 2020 16:49) (16 - 18)  SpO2: 98% (11 Dec 2020 16:49) (98% - 100%)      General: obtunded, pinpoint pupil , chronically ill looking        Lymph Nodes: No cervical LN   Lungs: dec bs both bases  Cardiovascular: Regular  Abdomen: Soft, Positive BS  Extremities: No clubbing, swelling+  Skin: Warm  Neurological: withdraw to painful stimuli      12-11-20 @ 07:01  -  12-11-20 @ 17:47  --------------------------------------------------------  IN:  Total IN: 0 mL    OUT:    Indwelling Catheter - Urethral (mL): 15 mL  Total OUT: 15 mL    Total NET: -15 mL          LABS:                          12.0   5.89  )-----------( 354      ( 11 Dec 2020 15:21 )             46.6                                               12-11    140  |  89<L>  |  26<H>  ----------------------------<  151<H>  5.1<H>   |  >50<HH>  |  1.0    Ca    10.9<H>      11 Dec 2020 15:21  Mg     3.0     12-11    TPro  7.3  /  Alb  4.4  /  TBili  0.3  /  DBili  x   /  AST  42<H>  /  ALT  27  /  AlkPhos  151<H>  12-11                                             Urinalysis Basic - ( 11 Dec 2020 16:00 )    Color: Yellow / Appearance: Clear / SG: >=1.030 / pH: x  Gluc: x / Ketone: Negative  / Bili: Negative / Urobili: 4.0 mg/dL   Blood: x / Protein: 100 mg/dL / Nitrite: Negative   Leuk Esterase: Negative / RBC: x / WBC x   Sq Epi: x / Non Sq Epi: x / Bacteria: x        CARDIAC MARKERS ( 11 Dec 2020 15:21 )  x     / 0.03 ng/mL / x     / x     / x                                                LIVER FUNCTIONS - ( 11 Dec 2020 15:21 )  Alb: 4.4 g/dL / Pro: 7.3 g/dL / ALK PHOS: 151 U/L / ALT: 27 U/L / AST: 42 U/L / GGT: x                                                                                                                                       X-Rays   reviewed

## 2020-12-11 NOTE — ED PROVIDER NOTE - PROGRESS NOTE DETAILS
[employer] Supervisory Resident Note: Pt BIBEMS as prenotification for unresponsive hypotensive pt. H/o breast CA metastasized to lung and bone, PE on lovenox, recurrent malignant left pleural effusion. Recently discharged pt took clonazepam to help her with CPAP last night. Bradypneic to 6, pt saturating 100% on 15L NRB. Not responding to voice, sternal rub, or to pain. B/l pupils pinpoint, not responding to light. Several providers at bedside for assistance, right femoral TLC placed for emergent access.

## 2020-12-11 NOTE — H&P ADULT - ATTENDING COMMENTS
Patient seen and examined at bedside independently of PA and agree with the H/P unless otherwise stated     1) Acute on Chronic Respiratory failure with hypercapnia   -Bipap/AVAP --- wean to high flow as tolerated (highly doubt for now though)  -appears distressed in ED; ICU denied; with overall poor prognosis with advanced metastatic malignancy     2) Metastatic breast cancer/Advanced  -overall prognosis extremely poor   -labs noted --- abnormal    3) Chronic Pulmonary Embolism  -on long term anticoagulant lovenox     DNR/DNI  prognosis extremely poor/grave   -pt would benefit from hospice eval ( refused hospice last admission and did not want to talk about comfort care until he himself feels the need for it)    # Progress Note Handoff  PENDING as follows  consults: Pulmonary consult   Test: daily labs and chest xray   Family discussion:  unavailable at bedside in ED; but discussed over the phone and aware that wife is unresponsive with very abnormal labs  Disposition: extremely poor prognosis; may pass inpatient     Attending Physician Dr. Adriana Michele # [id number]

## 2020-12-12 NOTE — PROGRESS NOTE ADULT - SUBJECTIVE AND OBJECTIVE BOX
SUBJECTIVE:    Patient is a 55y old Female who presents with a chief complaint of   Currently admitted to medicine with the primary diagnosis of Hypercapnic respiratory failure    Today is hospital day 1d. This morning she is resting comfortably in bed and reports no new issues or overnight events. Patient more alert today.     PAST MEDICAL & SURGICAL HISTORY  Metastatic breast cancer    H/O bilateral mastectomy      SOCIAL HISTORY:  Negative for smoking/alcohol/drug use.     ALLERGIES:  tamoxifen (Hives)    MEDICATIONS:  STANDING MEDICATIONS  cefTRIAXone   IVPB 1000 milliGRAM(s) IV Intermittent every 24 hours  enoxaparin Injectable 80 milliGRAM(s) SubCutaneous two times a day  furosemide   Injectable 20 milliGRAM(s) IV Push two times a day    PRN MEDICATIONS    VITALS:   T(F): 97.5  HR: 74  BP: 101/66  RR: 20  SpO2: 100%    LABS:                        10.8   6.13  )-----------( 242      ( 12 Dec 2020 08:06 )             38.2     12-12    144  |  94<L>  |  21<H>  ----------------------------<  78  4.1   |  43<HH>  |  0.5<L>    Ca    10.1      12 Dec 2020 08:06  Mg     3.0     12-11    TPro  7.3  /  Alb  4.4  /  TBili  0.3  /  DBili  x   /  AST  42<H>  /  ALT  27  /  AlkPhos  151<H>  12-11      Urinalysis Basic - ( 11 Dec 2020 16:00 )    Color: Yellow / Appearance: Clear / SG: >=1.030 / pH: x  Gluc: x / Ketone: Negative  / Bili: Negative / Urobili: 4.0 mg/dL   Blood: x / Protein: 100 mg/dL / Nitrite: Negative   Leuk Esterase: Negative / RBC: 1-2 /HPF / WBC 3-5 /HPF   Sq Epi: x / Non Sq Epi: Occasional /HPF / Bacteria: TNTC /HPF      ABG - ( 12 Dec 2020 08:14 )  pH, Arterial: 7.40  pH, Blood: x     /  pCO2: 79    /  pO2: 168   / HCO3: 48    / Base Excess: 20.1  /  SaO2: 100                   CARDIAC MARKERS ( 11 Dec 2020 15:21 )  x     / 0.03 ng/mL / x     / x     / x          RADIOLOGY:  Xray Chest 1 View- PORTABLE-Routine):  Limited as the chin overlies the apices.  Bilateral opacities, unchanged.  Right-sided Port-A-Cath.        PHYSICAL EXAM:  GEN: No acute distress, obese  LUNGS: Clear to auscultation bilaterally   HEART: S1/S2 present. RRR.   ABD: Soft, non-tender, non-distended. Bowel sounds present  EXT: NC/NC/NE/2+PP/DANIELS  NEURO: AAOX2  SKIN: intact  (+) Port-A-Cath

## 2020-12-12 NOTE — CHART NOTE - NSCHARTNOTEFT_GEN_A_CORE
Pt with metastatic breast CA luis admitted for acute on chronic respiratory failure with hypercapnia.  Pt is doing better today.  Labs  reviewed.   UA positive for bacteria. Pt afebrile, no leukocytosis.  Will f/u final Ucx and in the meantime will treat empirically for UTI with Ceftriaxone 1g q24h.  Family agrees to consider palliative care. Consult ordered.     Case d/w Dr. Michele

## 2020-12-12 NOTE — CHART NOTE - NSCHARTNOTEFT_GEN_A_CORE
Consult received for help with GOC discussions for patient.   Spoke with primary team.   Per Dr. Michele, hospitalist, the patient is hospice appropriate, but /patient does not want to stop using O2 supplementation (AVAPs at home).   He has spoken to hospice in the past and the family rejected it due to the face that she could no longer use AVAPs.   She has had many recent hospitalizations.   Reached out to the , left message to CB the on-call palliative line to discuss options at any time this weekend.  Spoke with Dr. Michele, potentially we can have a meeting tomorrow with al three of us to discuss GOC.   Case also discussed with Dr. Natalya Michele, palliative attending.     Will follow up tomorrow.   X [phone] PRN.

## 2020-12-12 NOTE — PROGRESS NOTE ADULT - ASSESSMENT
55 year old female with pmhx of breast cancer (diagnosed in 2001) metastatic to lungs and bones, recurrent malignant effusion, anasarca, pulmonary embolism (diagnosed 9/11/20 on Lovenox) admitted for AMS    # Encephalopathy secondary to Acute on chronic hypercapnic resp failure  # Suspected opioid overdose  - s/p Narcan  - mental status markedly improved on AVAPS  - keep pt on AVAPS --> will transition to high flow tomorrow   - ABG in am  - NPO for now --> advance diet tomorrow   - start NS@60  - check urine tox  - f/u pul recommendation   - palliative consult placed     # Asymptomatic UTI  - start ceftriaxone   - f/u urine culture     # Metastatic breast cancer  # Recurrent malignant pleural effusion  - c/w Lasix     # Chronic Pulmonary Embolism  # DVT ppx  - c/w Lovenox     # Out of bed to chair    # Anticipated for discharge    # Very poor prognosis  - discussed with  about the poor prognosis   -  wants to speak to palliative care    # DNR / DNI

## 2020-12-13 NOTE — DISCHARGE NOTE PROVIDER - NSDCCPCAREPLAN_GEN_ALL_CORE_FT
PRINCIPAL DISCHARGE DIAGNOSIS  Diagnosis: Hypercapnic respiratory failure  Assessment and Plan of Treatment:       SECONDARY DISCHARGE DIAGNOSES  Diagnosis: Metastatic breast cancer  Assessment and Plan of Treatment:      PRINCIPAL DISCHARGE DIAGNOSIS  Diagnosis: Hypercapnic respiratory failure  Assessment and Plan of Treatment:       SECONDARY DISCHARGE DIAGNOSES  Diagnosis: Urinary tract infection  Assessment and Plan of Treatment:     Diagnosis: Metastatic breast cancer  Assessment and Plan of Treatment:

## 2020-12-13 NOTE — GOALS OF CARE CONVERSATION - ADVANCED CARE PLANNING - CONVERSATION DETAILS
Spoke with  on phone about his wife's current situation in detail. I introduced palliative care as a inpatient service. He declined hospice in the past because she cannot stay on AVAPs at home on hospice. He has spoken with [employer] leaders in his community and his family, and they cannot stop the AVAPs, nor does he want to. He understands the severity/terminal nature of the disease. We discussed that should his wife and/or family decide not to return to the hospital at any point, their primary care provider may be able to help with symptom management at that time. Should she be re-admitted, we are available for consultation again. Ideally, the patient would prefer to be at home and die at home, but is not ready to stop life prolonging treatments/admissions.     Homero has 4 children who help make all decisions. They have a lot of good support at home. Spoke with  on phone about his wife's situation.I introduced palliative care as a inpatient service. He declined hospice in the past because she cannot stay on AVAPs at home on hospice. He has spoken with [employer] leaders in his community and his family, and they cannot stop the AVAPs, nor does he want to. He understands the severity/terminal nature of the disease. Should she be re-admitted, we are available for consultation again.     Tthe patient would prefer to be at home and die at home, but is not ready to stop life prolonging treatments/admissions.  We discussed that should his wife and/or family decide not to return to the hospital at any point, their primary care provider may be able to help with symptom management at that time while still continuing AVAPs. Per Homero, the PCP is available 24/7 and is very supportive and helpful. I encouraged him to update the PCP about this conversation after her discharge home.     Homero has 4 children who help make all decisions. They have a lot of good support at home. Homero has been feeling exhausted.

## 2020-12-13 NOTE — DISCHARGE NOTE PROVIDER - PROVIDER TOKENS
PROVIDER:[TOKEN:[[id number]],FOLLOWUP:[1 week]],PROVIDER:[TOKEN:[[id number]],FOLLOWUP:[1 week]]

## 2020-12-13 NOTE — DISCHARGE NOTE PROVIDER - NSDCMRMEDTOKEN_GEN_ALL_CORE_FT
clonazePAM 0.5 mg oral tablet: 1 tab(s) orally once a day, As Needed MDD:1 tablet a day ONLY  enoxaparin: 80 milligram(s) subcutaneous 2 times a day  exemestane 25 mg oral tablet: 1 tab(s) orally   torsemide 5 mg oral tablet: 2 tab(s) orally 2 times a day, As Needed   cefpodoxime 200 mg oral tablet: 1 tab(s) orally 2 times a day   clonazePAM 0.5 mg oral tablet: 1 tab(s) orally once a day, As Needed MDD:1 tablet a day ONLY  enoxaparin: 80 milligram(s) subcutaneous 2 times a day  exemestane 25 mg oral tablet: 1 tab(s) orally   torsemide 5 mg oral tablet: 2 tab(s) orally 2 times a day, As Needed

## 2020-12-13 NOTE — PROGRESS NOTE ADULT - ASSESSMENT
IMPRESSION:  Known from previous admissions  metastatic cancer  chronic respiratory failure  on multiple meds causing respiratory suppression suspect OD as was in past    SUGGEST:  pt should be comfort only care  cont AVAPS until more awake  palliative care eval  minimal sedating medications only as needed IMPRESSION:  Known from previous admissions  metastatic cancer  chronic respiratory failure  on multiple meds causing respiratory suppression suspect OD as was in past  also suspect noncompliance with AVAPS at home    SUGGEST:  pt should be comfort only care  cont AVAPS until more awake  palliative care eval  minimal sedating medications only as needed

## 2020-12-13 NOTE — DISCHARGE NOTE PROVIDER - CARE PROVIDER_API CALL
Natalya Michele; MAGDALENA)  [facility]  [address] [place] [address]  Phone: [phone]  Fax: [phone]  Follow Up Time: 1 week    Leonides Ibanez)  Critical Care Medicine; Internal Medicine; Pulmonary Disease  [address] [facility], [facility] [address]  [place] [address]  Phone: [phone]  Fax: [phone]  Follow Up Time: 1 week

## 2020-12-13 NOTE — PROGRESS NOTE ADULT - SUBJECTIVE AND OBJECTIVE BOX
SUBJECTIVE:    Patient is a 55y old Female who presents with a chief complaint of unresponsiveness (13 Dec 2020 09:20)    Currently admitted to medicine with the primary diagnosis of Hypercapnic respiratory failure    Today is hospital day 2d. This morning she is resting comfortably in bed and reports no new issues or overnight events.       PAST MEDICAL & SURGICAL HISTORY  Metastatic breast cancer    H/O bilateral mastectomy      SOCIAL HISTORY:  Negative for smoking/alcohol/drug use.     ALLERGIES:  tamoxifen (Hives)    MEDICATIONS:  STANDING MEDICATIONS  cefTRIAXone   IVPB 1000 milliGRAM(s) IV Intermittent every 24 hours  enoxaparin Injectable 80 milliGRAM(s) SubCutaneous two times a day  furosemide   Injectable 20 milliGRAM(s) IV Push daily  sodium chloride 0.9%. 1000 milliLiter(s) IV Continuous <Continuous>    PRN MEDICATIONS  sodium chloride 0.65% Nasal 1 Spray(s) Both Nostrils four times a day PRN    VITALS:   T(F): 96.1  HR: 73  BP: 99/60  RR: 16  SpO2: 96%    LABS:                        9.9    5.43  )-----------( 260      ( 13 Dec 2020 08:04 )             34.5     12-13    146  |  95<L>  |  26<H>  ----------------------------<  69<L>  3.5   |  41<HH>  |  0.6<L>    Ca    10.8<H>      13 Dec 2020 08:04  Mg     2.3     12-13      ABG - ( 13 Dec 2020 08:16 )  pH, Arterial: 7.48  pH, Blood: x     /  pCO2: 60    /  pO2: 138   / HCO3: 45    / Base Excess: 18.8  /  SaO2: 99          Culture - Urine (collected 11 Dec 2020 16:00)  Source: .Urine Catheterized  Final Report (12 Dec 2020 20:00):    No growth      RADIOLOGY:  Xray Chest 1 View- PORTABLE-Routine (Xray Chest 1 View- PORTABLE-Routine in AM.) (12.12.20 @ 06:08) >  Limited as the chin overlies the apices.  Bilateral opacities, unchanged.  Right-sided Port-A-Cath.    PHYSICAL EXAM:  GEN: No acute distress, weak, on high flow   LUNGS: Clear to auscultation bilaterally; B/l mastectomy   HEART: S1/S2 present. RRR.   ABD: Soft, non-tender, non-distended. Bowel sounds present  EXT: NC/NC/NE/2+PP/DANIELS  NEURO: AAOX3  SKIN: intact

## 2020-12-13 NOTE — GOALS OF CARE CONVERSATION - ADVANCED CARE PLANNING - TREATMENT GUIDELINE COMMENT
DNR/DNI  Continue medical management and AVAPs at home.   Palliative available for reconsult. DNR/DNI  Continue medical management and AVAPs at home.   Update their primary care provider who may be able to help with symptom management. They will speak with PCP after D/C.  Palliative available for reconsult.

## 2020-12-13 NOTE — PROGRESS NOTE ADULT - ASSESSMENT
55 year old female with pmhx of breast cancer (diagnosed in 2001) metastatic to lungs and bones, recurrent malignant effusion, anasarca, pulmonary embolism (diagnosed 9/11/20 on Lovenox) admitted for AMS    # Encephalopathy secondary to Acute on chronic hypercapnic resp failure  # Suspected opioid overdose  - s/p Narcan  - mental status markedly improved on AVAPS yesterday   - stable on high flow today   - keep AVAPS at 4 on 4 off  - urine tox: (+) opioids  - f/u pul recommendation   - palliative consult appreciated    # Asymptomatic UTI  - c/w ceftriaxone   - Urine Cx: no growth    # Metastatic breast cancer  # Recurrent malignant pleural effusion  - c/w Lasix     # Chronic Pulmonary Embolism  # DVT ppx  - c/w Lovenox q12     # Out of bed to chair    # Anticipated for discharge    # Very poor prognosis  - discussed with  about the poor prognosis     # DNR / DNI 55 year old female with pmhx of breast cancer (diagnosed in 2001) metastatic to lungs and bones, recurrent malignant effusion, anasarca, pulmonary embolism (diagnosed 9/11/20 on Lovenox) admitted for AMS    # Encephalopathy secondary to Acute on chronic hypercapnic resp failure  # Suspected opioid overdose  - s/p Narcan  - mental status markedly improved on AVAPS yesterday   - stable on high flow today   - keep AVAPS at 4 on 4 off  - urine tox: (+) opioids  - f/u pul recommendation   - palliative consult appreciated    # Asymptomatic UTI  - c/w ceftriaxone   - Urine Cx: no growth    # Metastatic breast cancer  # Recurrent malignant pleural effusion  - c/w Lasix     # Chronic Pulmonary Embolism  # DVT ppx  - c/w Lovenox q12     # Regular diet    # Out of bed to chair    # Anticipated for discharge    # Very poor prognosis  - discussed with  about the poor prognosis     # DNR / DNI

## 2020-12-13 NOTE — DISCHARGE NOTE PROVIDER - HOSPITAL COURSE
55 year old woman with PMH of metastatic breast cancer diagnosed in 2001 with mets to lungs/bones presenting to ED, s/p L sided pigtail (9/2020) for recurrent L sided malignant effusion, PE diagnosed 9/11/2020 on Lovenox 80 mg twice a day, anasarca on diuretics and discharged on 12/3/20 BIB EMS for unresponsiveness. On admission pt was bradypnea with pin-ponit pupils. She was responsive to Narcan. She was put on AVAPS. Within 24 hours her mental status was back to baseline.  was informed about her situation. Today, she was put on high flow, able to tolerated diet, She is ready to be discharged.

## 2020-12-13 NOTE — GOALS OF CARE CONVERSATION - ADVANCED CARE PLANNING - WHAT MATTERS MOST
Helping the patient be more comfortable.   Not withdrawing life sustaining supplemental oxygen.   Patient made herself DNR.DNI. Helping the patient be more comfortable.   Not withdrawing life sustaining supplemental oxygen.   Patient made herself DNR/DNI.

## 2020-12-14 NOTE — DISCHARGE NOTE NURSING/CASE MANAGEMENT/SOCIAL WORK - NSPROEXTENSIONSOFSELF_GEN_A_NUR
Spoke with the sister in detail. States he is taking the fosomax and tolerating it well. Has had 2 doses. Advised to take calcium and vit d also. Voiced understanding.    none

## 2020-12-14 NOTE — CHART NOTE - NSCHARTNOTEFT_GEN_A_CORE
PA notified to discharge patient as per attending.  Case discussed with Dr. Juares  Patient stable no changes.  Patient agreeable to discharge and family notified.         T(C): 36.1 (12-14-20 @ 05:28), Max: 36.1 (12-14-20 @ 05:28)  HR: 67 (12-14-20 @ 05:28) (67 - 91)  BP: 113/59 (12-14-20 @ 05:28) (99/60 - 113/59)  RR: 16 (12-14-20 @ 05:28) (16 - 16)  SpO2: 96% (12-14-20 @ 08:20) (96% - 98%)        Patient discharged home. RN aware and will manage.

## 2020-12-14 NOTE — PROGRESS NOTE ADULT - SUBJECTIVE AND OBJECTIVE BOX
Patient is a 55y old  Female who presents with a chief complaint of unresponsiveness (13 Dec 2020 17:08)      T(F): 97 (12-14-20 @ 05:28), Max: 97 (12-14-20 @ 05:28)  HR: 67 (12-14-20 @ 05:28)  BP: 113/59 (12-14-20 @ 05:28)  RR: 16 (12-14-20 @ 05:28)  SpO2: 96% (12-14-20 @ 08:20) (96% - 98%)    PHYSICAL EXAM:  GENERAL: NAD, well-groomed, well-developed  HEAD:  Atraumatic, Normocephalic  EYES: EOMI, PERRLA, conjunctiva and sclera clear  ENMT: No tonsillar erythema, exudates, or enlargement; Moist mucous membranes, Good dentition, No lesions  NECK: Supple, No JVD, Normal thyroid  NERVOUS SYSTEM:  Alert & Oriented X3, Good concentration; Motor Strength 5/5 B/L upper and lower extremities; DTRs 2+ intact and symmetric  CHEST/LUNG: Clear to percussion bilaterally; No rales, rhonchi, wheezing, or rubs  HEART: Regular rate and rhythm; No murmurs, rubs, or gallops  ABDOMEN: Soft, Nontender, Nondistended; Bowel sounds present  EXTREMITIES:  2+ Peripheral Pulses, No clubbing, cyanosis, or edema  LYMPH: No lymphadenopathy noted  SKIN: No rashes or lesions    LABS  12-13    146  |  95<L>  |  26<H>  ----------------------------<  69<L>  3.5   |  41<HH>  |  0.6<L>    Ca    10.8<H>      13 Dec 2020 08:04  Mg     2.3     12-13                          9.9    5.43  )-----------( 260      ( 13 Dec 2020 08:04 )             34.5       CARDIAC ENZYMES      Troponin T, Serum: 0.03 ng/mL (12-11-20 @ 15:21)    Culture Results:   No growth (12-11-20)    RADIOLOGY  < from: Xray Chest 1 View- PORTABLE-Routine (Xray Chest 1 View- PORTABLE-Routine in AM.) (12.12.20 @ 06:08) >    Impression:    Limited as the chin overlies the apices.    Bilateral opacities, unchanged.    Right-sided Port-A-Cath.      < end of copied text >    MEDICATIONS  (STANDING):  cefTRIAXone   IVPB 1000 milliGRAM(s) IV Intermittent every 24 hours  enoxaparin Injectable 80 milliGRAM(s) SubCutaneous two times a day  furosemide   Injectable 20 milliGRAM(s) IV Push daily    MEDICATIONS  (PRN):  sodium chloride 0.65% Nasal 1 Spray(s) Both Nostrils four times a day PRN Nasal Congestion

## 2020-12-14 NOTE — DISCHARGE NOTE NURSING/CASE MANAGEMENT/SOCIAL WORK - NSDCPELOVENOX_GEN_ALL_CORE
Enoxaparin/Lovenox - Dietary Advice/Enoxaparin/Lovenox - Compliance/Enoxaparin/Lovenox - Potential for adverse drug reactions and interactions/Enoxaparin/Lovenox - Follow up monitoring

## 2020-12-14 NOTE — PROGRESS NOTE ADULT - ASSESSMENT
55 year old female with pmhx of breast cancer (diagnosed in 2001) metastatic to lungs and bones, recurrent malignant effusion, anasarca, pulmonary embolism (diagnosed 9/11/20 on Lovenox) admitted for AMS    # Encephalopathy secondary to Acute on chronic hypercapnic resp failure - resolved   - now awake and alert, denies any dysuria   - continue NIPPV at home  - keep AVAPS at 4 on 4 off  - palliative consult appreciated    # Asymptomatic UTI  - completed ceftriaxone course in hospital  - Urine Cx: no growth   - no need for further antibiotics     # Metastatic breast cancer /  Recurrent malignant pleural effusion / Pulmonary Embolism  - continue drainage of lung catheter at home 2-3 x weekly    - continue oral Lasix and sq Lovenox   - grave prognosis - family and pt aware   - DNR / DNI    33min spent on DC

## 2020-12-14 NOTE — DISCHARGE NOTE NURSING/CASE MANAGEMENT/SOCIAL WORK - PATIENT PORTAL LINK FT
You can access the FollowMyHealth Patient Portal offered by [facility] by registering at the following website: [email or web address]. By joining [email or web address] [email or web address] portal, you will also be able to view your health information using other applications (apps) compatible with our system.

## 2020-12-22 NOTE — ED PROVIDER NOTE - ATTENDING CONTRIBUTION TO CARE
54 yo F PMHx breast cancer with mets, h/o PE on anti-coagulation, has pleural effusion with [place] catheter that hasn't been draining for the last 4 weeks per family.  Per family pt more unresponsive today.  Uses AVAP at home 4 hrs at a time, but family states that it was not helping today.  DNR/DNI confirmed, On exam pt is minimally responsive, bradypneic, decreased BS, + anasarca, abd soft nt nd

## 2020-12-22 NOTE — ED PROVIDER NOTE - OBJECTIVE STATEMENT
54 y/o female with hx of metastatic breast cancer, anasarca, history of pulmonary embolism on lovenox, and recurrent malignant effusion with left pleural, is sent to the ER due to an "altered mental state" 56 y/o female with hx of metastatic breast cancer, anasarca, history of pulmonary embolism on lovenox, and recurrent malignant effusion with left [place] catheter, which as per  has not been any draiange in 2 weeks, is sent to the ER due to a decreased responsiveness as per . As per , symptoms occurred within 3 hrs without any change with AVAPS. Patient is to have 4 hrs on with AVAPS and 4 hrs with high flow oxygen. patient without any falls or head injury today.

## 2020-12-22 NOTE — H&P ADULT - HISTORY OF PRESENT ILLNESS
54yo female with extensive PMH including several recent admissions related to respiratory failure which improved with application of NIV, is sent to the ER due to unresponsiveness

## 2020-12-22 NOTE — H&P ADULT - NSHPLABSRESULTS_GEN_ALL_CORE
< from: CT Head No Cont (12.22.20 @ 19:39) >      EXAM:  CT BRAIN          PROCEDURE DATE:  12/22/2020      < from: CT Head No Cont (12.22.20 @ 19:39) >    Impression:    No intracranial hemorrhage.    Innumerable lytic lesions seen throughout the calvarium consistent with osseous metastatic disease.    Limited assessment for intraparenchymal metastases without IV contrast.    Spoke with ADELSO BARNES PA on 12/22/2020 8:31 PM with readback.    AMBROSE JACKSON MD; Attending Radiologist  This document has been electronically signed. Dec 22 2020  8:32PM    < end of copied text >                          12.1   6.21  )-----------( 366      ( 22 Dec 2020 16:49 )             43.6     12-22    138  |  86<L>  |  19  ----------------------------<  142<H>  4.2   |  46<HH>  |  0.5<L>    Ca    11.1<H>      22 Dec 2020 16:49    TPro  7.5  /  Alb  4.2  /  TBili  0.3  /  DBili  x   /  AST  44<H>  /  ALT  24  /  AlkPhos  159<H>  12-22            PT/INR - ( 22 Dec 2020 16:49 )   PT: 12.00 sec;   INR: 1.04 ratio         PTT - ( 22 Dec 2020 16:49 )  PTT:33.5 sec  Lactate Trend    CARDIAC MARKERS ( 22 Dec 2020 16:49 )  x     / <0.01 ng/mL / x     / x     / x          CAPILLARY BLOOD GLUCOSE        Culture Results:   No growth (12-12 @ 23:40)  Culture Results:   No growth (12-11 @ 16:00)

## 2020-12-22 NOTE — CHART NOTE - NSCHARTNOTEFT_GEN_A_CORE
Pt is a 56 yo female with PMH of breast CA (diagnosed in 2001) metastatic to lung/bone, frequent malignant pleural effusion, anasarca, hx PE (on lovenox), multiple admissions this month – most recently from 12/11- 12/14 for AMS with respiratory failure. Pt DNR/DNI, uses AVAP at home and has undergone evaluation for palliliatve care on last admission. Pt was brought into ER by  who states she is increasingly less responsive. Denies fever, chills, cp, n/v/d, melena.   Vitals stable in ER, O2 sat 100% on AVAPS         PAST MEDICAL & SURGICAL HISTORY:  Pulmonary embolism  Metastatic breast cancer  H/O bilateral mastectomy    Allergies  tamoxifen (Hives)  Intolerances      Home Medications:  enoxaparin: 80 milligram(s) subcutaneous 2 times a day (29 Oct 2020 21:46)  exemestane 25 mg oral tablet: 1 tab(s) orally  (29 Oct 2020 21:46)  torsemide 5 mg oral tablet: 2 tab(s) orally 2 times a day, As Needed (29 Oct 2020 21:46)    Occupation:  Alochol: Denied  Smoking: Denied  Drug Use: Denied  Marital Status:     ROS: as in HPI; All other systems reviewed are negative    ICU Vital Signs Last 24 Hrs  T(C): 36.4 (22 Dec 2020 19:01), Max: 37.2 (22 Dec 2020 16:20)  T(F): 97.5 (22 Dec 2020 19:01), Max: 98.9 (22 Dec 2020 16:20)  HR: 92 (22 Dec 2020 19:22) (92 - 105)  BP: 149/79 (22 Dec 2020 19:22) (149/79 - 181/101)-  RR: 18 (22 Dec 2020 19:22) (18 - 25)  SpO2: 100% (22 Dec 2020 19:22) (100% - 100%)    Physical Examination:  General: sleeping but arousable to voice, on AVAPS   HEENT: Pupils equal, reactive to light.  Symmetric.  PULM: R sided rales, no wheeze, rhonchi   CVS: Regular rate and rhythm, no murmurs, rubs, or gallops  ABD: tense in the lower quadrants, distended, nontender on palpation normoactive bowel sounds, no masses  EXT: 3+ edema, no deformity or decrease ROM         LABS:             12.1   6.21  )-----------( 366      ( 22 Dec 2020 16:49 )             43.6     22 Dec 2020 16:49    138    |  86     |  19     ----------------------------<  142    4.2     |  46     |  0.5      Ca    11.1       22 Dec 2020 16:49    TPro  7.5    /  Alb  4.2    /  TBili  0.3    /  DBili  x      /  AST  44     /  ALT  24     /  AlkPhos  159    22 Dec 2020 16:49  Amylase x     lipase x          CARDIAC MARKERS ( 22 Dec 2020 16:49 )  x     / <0.01 ng/mL / x     / x     / x            PT/INR - ( 22 Dec 2020 16:49 )   PT: 12.00 sec;   INR: 1.04 ratio       PTT - ( 22 Dec 2020 16:49 )  PTT:33.5 sec        A/p:  Pt is a 56 yo female DNR/DNI with h/o breast CA (diagnosed in 2001) metastatic to lung/bone, frequent malignant pleural effusion, anasarca, hx PE (on lovenox), multiple admissions this month p/w Acute on chronic hypercapnia/respiratory failure placed on AVAPS in ER, with 100% O2 saturation. On last admission pt evaluated by hospice but because pt/ wanted pt to continue to receive AVAPS ay home she was not a accepted. Admission to critical care not warranted at this time.            CRITICAL CARE TIME SPENT: ***

## 2020-12-22 NOTE — ED PROVIDER NOTE - CRITICAL CARE PROVIDED
direct patient care (not related to procedure)/documentation/consultation with other physicians/conducted a detailed discussion of DNR status/consult w/ pt's family directly relating to pts condition

## 2020-12-22 NOTE — H&P ADULT - PROBLEM SELECTOR PLAN 1
For now continue NIV and monitor (Acute on chronic) For now continue NIV and monitor (Acute on chronic) For now continue NIV and monitor. Is patient getting opioids? Will defer doing ABG since will not

## 2020-12-22 NOTE — ED PROVIDER NOTE - PROGRESS NOTE DETAILS
spoke with  anjana regarding patient history . patient with continued non responsive to painful or verbal stimuli. patient on bipap.  Ct scan results without any large territorial hemorrhage. patient to have ICU evaluation .

## 2020-12-22 NOTE — ED PROVIDER NOTE - CLINICAL SUMMARY MEDICAL DECISION MAKING FREE TEXT BOX
Pt with hypercapnic respiratory failure not improving with AVAP at home.  Results noted.  Little improvement in ED.  CT scan obtained which reveals mets to skull, no bleed. Pt seen by Critical care team. Pt DNR/DNI, confirmed with .  Will admit to non CCU telel

## 2020-12-23 NOTE — PROGRESS NOTE ADULT - SUBJECTIVE AND OBJECTIVE BOX
Patient seen and examined at bedside,  on BiPAP.  Patient only responsive to painful stimuli    T(F): 94.7 (12-23-20 @ 00:31), Max: 98.9 (12-22-20 @ 16:20)  HR: 74 (12-23-20 @ 06:35)  BP: 110/60 (12-23-20 @ 00:31)  RR: 18 (12-23-20 @ 00:31)  SpO2: 96% (12-23-20 @ 06:35) (96% - 100%)    PHYSICAL EXAM:  GENERAL: on BiPAP, non-responsive  HEAD:  Atraumatic, Normocephalic  EYES: EOMI, PERRLA, conjunctiva and sclera clear  ENMT: No tonsillar erythema, exudates, or enlargement; Moist mucous membranes, Good dentition, No lesions  NECK: Supple, No JVD, Normal thyroid  NERVOUS SYSTEM:  responsive only to Painful stimuli  CHEST/LUNG: CTA  HEART: Regular rate and rhythm; No murmurs, rubs, or gallops  ABDOMEN: Soft, Nontender, Nondistended; Bowel sounds present  EXTREMITIES:  2+ BLE pitting edemea  SKIN: No rashes or lesions    LABS  12-22    138  |  86<L>  |  19  ----------------------------<  142<H>  4.2   |  46<HH>  |  0.5<L>    Ca    11.1<H>      22 Dec 2020 16:49    TPro  7.5  /  Alb  4.2  /  TBili  0.3  /  DBili  x   /  AST  44<H>  /  ALT  24  /  AlkPhos  159<H>  12-22                          12.1   6.21  )-----------( 366      ( 22 Dec 2020 16:49 )             43.6     PT/INR - ( 22 Dec 2020 16:49 )   PT: 12.00 sec;   INR: 1.04 ratio         PTT - ( 22 Dec 2020 16:49 )  PTT:33.5 sec    CARDIAC ENZYMES      Troponin T, Serum: <0.01 ng/mL (12-22-20 @ 16:49)      Culture Results:   No growth (12-12-20)  Culture Results:   No growth (12-11-20)    RADIOLOGY    MEDICATIONS  (STANDING):  enoxaparin Injectable 80 milliGRAM(s) SubCutaneous two times a day    MEDICATIONS  (PRN):  clonazePAM  Tablet 0.5 milliGRAM(s) Oral daily PRN anxiety  torsemide 10 milliGRAM(s) Oral two times a day PRN edema    CTH:  Impression:    No intracranial hemorrhage.    Innumerable lytic lesions seen throughout the calvarium consistent with osseous metastatic disease.    Limited assessment for intraparenchymal metastases without IV contrast.       Patient seen and examined at bedside,  on BiPAP.  Patient only responsive to painful stimuli  unable to obtain ros    T(F): 94.7 (12-23-20 @ 00:31), Max: 98.9 (12-22-20 @ 16:20)  HR: 74 (12-23-20 @ 06:35)  BP: 110/60 (12-23-20 @ 00:31)  RR: 18 (12-23-20 @ 00:31)  SpO2: 96% (12-23-20 @ 06:35) (96% - 100%)    PHYSICAL EXAM:  GENERAL: on BiPAP, arousable to deep stimuli  HEAD:  Atraumatic, Normocephalic  EYES: EOMI, PERRLA, conjunctiva and sclera clear  ENMT: No tonsillar erythema, exudates, or enlargement; Moist mucous membranes, Good dentition, No lesions  NECK: Supple, No JVD, Normal thyroid  NERVOUS SYSTEM:  responsive only to Painful stimuli  CHEST/LUNG: CTA  HEART: Regular rate and rhythm; No murmurs, rubs, or gallops  ABDOMEN: Soft, Nontender, Nondistended; Bowel sounds present  EXTREMITIES:  2+ BLE pitting edemea  SKIN: No rashes or lesions    LABS  12-22    138  |  86<L>  |  19  ----------------------------<  142<H>  4.2   |  46<HH>  |  0.5<L>    Ca    11.1<H>      22 Dec 2020 16:49    TPro  7.5  /  Alb  4.2  /  TBili  0.3  /  DBili  x   /  AST  44<H>  /  ALT  24  /  AlkPhos  159<H>  12-22                          12.1   6.21  )-----------( 366      ( 22 Dec 2020 16:49 )             43.6     PT/INR - ( 22 Dec 2020 16:49 )   PT: 12.00 sec;   INR: 1.04 ratio         PTT - ( 22 Dec 2020 16:49 )  PTT:33.5 sec    CARDIAC ENZYMES      Troponin T, Serum: <0.01 ng/mL (12-22-20 @ 16:49)      Culture Results:   No growth (12-12-20)  Culture Results:   No growth (12-11-20)    RADIOLOGY    MEDICATIONS  (STANDING):  enoxaparin Injectable 80 milliGRAM(s) SubCutaneous two times a day    MEDICATIONS  (PRN):  clonazePAM  Tablet 0.5 milliGRAM(s) Oral daily PRN anxiety  torsemide 10 milliGRAM(s) Oral two times a day PRN edema    CTH:  Impression:    No intracranial hemorrhage.    Innumerable lytic lesions seen throughout the calvarium consistent with osseous metastatic disease.    Limited assessment for intraparenchymal metastases without IV contrast.

## 2020-12-23 NOTE — PROGRESS NOTE ADULT - ASSESSMENT
54 y/o female with hx of metastatic breast cancer, anasarca, history of pulmonary embolism on lovenox, and recurrent malignant effusion with left [place] catheter admitted 2/2 unresponsiveness    #Hypercapnic Respiratory Failure with probable CO2 narcosis  - No ABG performed??  - c/w NIPPV  - has AVAPS at home, should be on/off 4/4 per last admission  - FU AM Labs  - CXR reviewed, unable to view right hemithorax 2/2 placement      #PE  - c/w Tx Lovenox    #Recurrent malignant pleural effusion   - continue drainage of lung catheter at home 2-3 x weekly     #Metastatic breast cancer  - continue Torsemide PRN for edema  - DNR / DNI, not able to be on Hospice as family does not want to d/c AVAPS use  - consider Palliative c/s again 54 y/o female with hx of metastatic breast cancer, anasarca, history of pulmonary embolism on lovenox, and recurrent malignant effusion with left [place] catheter admitted 2/2 unresponsiveness    #Hypercapnic Respiratory Failure with probable CO2 narcosis  - No ABG performed??  Check stat now  - c/w NIPPV  - has AVAPS at home, should be on/off 4/4 per last admission  - FU AM Labs  - CXR reviewed, unable to view right hemithorax 2/2 placement.  Will re-order    #PE  - c/w Tx Lovenox    #Recurrent malignant pleural effusion   - continue drainage of lung catheter at home 2-3 x weekly     #Metastatic breast cancer  - continue Torsemide PRN for edema  - DNR / DNI, not able to be on Hospice as family does not want to d/c AVAPS use  - consider Palliative c/s again

## 2020-12-23 NOTE — PROGRESS NOTE ADULT - ATTENDING COMMENTS
#Altered mental status/ lethargy  in setting of presumed hypercapnia, unclear etiology  pt unable to tolerate avaps, only using 2 hours per session  f/u abg, no co2 reported on last vbg  bipap prn, qhs  cth with calvarium osseous mets, d/w   dnr/ dni #Unresponsiveness/ lethargy  in setting of presumed hypercapnia, unclear etiology  pt unable to tolerate avaps, only using 2 hours per session  f/u abg, no co2 reported on last vbg  bipap prn, qhs  cth with calvarium osseous mets, d/w   dnr/ dni #Unresponsiveness/ lethargy  in setting of presumed hypercapnia, unclear etiology  pt unable to tolerate avaps at home, only using 2 hours per session  f/u abg, no co2 reported on last vbg  bipap prn, qhs  cth with calvarium osseous mets, d/w   dnr/ dni

## 2020-12-24 NOTE — PROGRESS NOTE ADULT - ASSESSMENT
55F PMHx breast ca 2001 metastatic to bone, lung c/b malignant L pleural effusion s/p chest tube, PE 9/2020 on lovenox here with acute hypercapnic resp failure, lethargy.    #Unresponsiveness/ lethargy, due to acute on chronic hypercapnic resp failure  in setting of breast ca metastatic to lung, L pleural effusion s/p chest tube  pt unable to tolerate avaps at home, only using 2 hours per session  mental status improving on bipap  bipap prn, qhs  cth with calvarium osseous mets, d/w   dnr/ dni  #PE  lovenox  #DVT ppx  lovenox     #Progress Note Handoff:  Pending (specify):  Consults_________, Tests________, Test Results_______, Other___hypercapnia______  Family discussion:d/w  via phone re: treatment plan, primary dx  Disposition: Home___/SNF___/Other________/Unknown at this time____x____

## 2020-12-24 NOTE — PROGRESS NOTE ADULT - SUBJECTIVE AND OBJECTIVE BOX
INTERVAL HPI/OVERNIGHT EVENTS:    SUBJECTIVE: Patient seen and examined at bedside.     no cp, sob, abd pain, fever  no sob, orthopnea, pnd, cough    OBJECTIVE:    VITAL SIGNS:  Vital Signs Last 24 Hrs  T(C): 35.6 (24 Dec 2020 05:32), Max: 35.8 (23 Dec 2020 18:40)  T(F): 96 (24 Dec 2020 05:32), Max: 96.5 (23 Dec 2020 18:40)  HR: 101 (24 Dec 2020 08:56) (77 - 101)  BP: 125/79 (24 Dec 2020 05:32) (125/77 - 134/65)  BP(mean): --  RR: 16 (24 Dec 2020 05:32) (16 - 16)  SpO2: 100% (24 Dec 2020 08:56) (94% - 100%)      PHYSICAL EXAM:    General: NAD  HEENT: NC/AT; PERRL, clear conjunctiva  Neck: supple  Respiratory: CTA b/l  Cardiovascular: +S1/S2; RRR  Abdomen: soft, NT/ND; +BS x4  Extremities: WWP, 2+ peripheral pulses b/l; no LE edema  Skin: normal color and turgor; no rash  Neurological:    MEDICATIONS:  MEDICATIONS  (STANDING):  enoxaparin Injectable 80 milliGRAM(s) SubCutaneous two times a day    MEDICATIONS  (PRN):  clonazePAM  Tablet 0.5 milliGRAM(s) Oral daily PRN anxiety  torsemide 10 milliGRAM(s) Oral two times a day PRN edema      ALLERGIES:  Allergies    tamoxifen (Hives)    Intolerances        LABS:                        11.3   5.38  )-----------( 286      ( 24 Dec 2020 09:17 )             40.9     Hemoglobin: 11.3 g/dL (12-24 @ 09:17)  Hemoglobin: 11.3 g/dL (12-24 @ 09:11)  Hemoglobin: 12.1 g/dL (12-22 @ 16:49)    CBC Full  -  ( 24 Dec 2020 09:17 )  WBC Count : 5.38 K/uL  RBC Count : 4.51 M/uL  Hemoglobin : 11.3 g/dL  Hematocrit : 40.9 %  Platelet Count - Automated : 286 K/uL  Mean Cell Volume : 90.7 fL  Mean Cell Hemoglobin : 25.1 pg  Mean Cell Hemoglobin Concentration : 27.6 g/dL  Auto Neutrophil # : x  Auto Lymphocyte # : x  Auto Monocyte # : x  Auto Eosinophil # : x  Auto Basophil # : x  Auto Neutrophil % : x  Auto Lymphocyte % : x  Auto Monocyte % : x  Auto Eosinophil % : x  Auto Basophil % : x    12-24    141  |  89<L>  |  26<H>  ----------------------------<  78  4.7   |  45<HH>  |  <0.5<L>    Ca    11.2<H>      24 Dec 2020 09:11  Phos  2.9     12-24  Mg     2.3     12-24    TPro  6.7  /  Alb  4.0  /  TBili  0.3  /  DBili  x   /  AST  48<H>  /  ALT  30  /  AlkPhos  144<H>  12-24    Creatinine Trend: <0.5<--, 0.5<--, 0.6<--, 0.5<--, 1.0<--, 0.5<--  LIVER FUNCTIONS - ( 24 Dec 2020 09:11 )  Alb: 4.0 g/dL / Pro: 6.7 g/dL / ALK PHOS: 144 U/L / ALT: 30 U/L / AST: 48 U/L / GGT: x           PT/INR - ( 22 Dec 2020 16:49 )   PT: 12.00 sec;   INR: 1.04 ratio         PTT - ( 22 Dec 2020 16:49 )  PTT:33.5 sec    hs Troponin:    ABG - ( 23 Dec 2020 15:05 )  pH, Arterial: x     pH, Blood: 7.31  /  pCO2: 108   /  pO2: 55    / HCO3: 54    / Base Excess: 22.7  /  SaO2: 89                  15:05 - ABG - pH:       |  pCO2: 108   |  pO2: 55    | Lactate:       | BE: 22.7         CSF:                      EKG:   MICROBIOLOGY:    IMAGING:      Labs, imaging, EKG personally reviewed    RADIOLOGY & ADDITIONAL TESTS: Reviewed.

## 2020-12-25 NOTE — CHART NOTE - NSCHARTNOTEFT_GEN_A_CORE
Contacted Dr. Watson around 0927. Patient found completely obtunded, unarousable and desaturating to spo2 85% on same AVAPS settings. Patient barely breathing above the machine. adjusted settings to offer more support for the patient. Minimal improvement in volumes returned. Patient is unresponsive and NIV is contraindicated, MD Watson will notify family of current status and determine a plan of care.     Currently on 100% spo2 raised to 96%.     Will continue to monitor.    Elaine Skinner, RRT

## 2020-12-25 NOTE — PROGRESS NOTE ADULT - SUBJECTIVE AND OBJECTIVE BOX
INTERVAL HPI/OVERNIGHT EVENTS:    SUBJECTIVE: Patient seen and examined at bedside.     unable to obtain ros    OBJECTIVE:    VITAL SIGNS:  Vital Signs Last 24 Hrs  T(C): 35.6 (25 Dec 2020 05:00), Max: 36.1 (24 Dec 2020 13:31)  T(F): 96 (25 Dec 2020 05:00), Max: 97 (24 Dec 2020 13:31)  HR: 101 (25 Dec 2020 05:00) (92 - 101)  BP: 159/87 (25 Dec 2020 05:00) (109/65 - 159/87)  BP(mean): --  RR: 18 (25 Dec 2020 05:00) (18 - 18)  SpO2: --      PHYSICAL EXAM:    General: mild-mod resp distress  HEENT: NC/AT; PERRL, clear conjunctiva  Neck: supple  Respiratory: R crackles  Cardiovascular: +S1/S2; RRR  Abdomen: soft, NT/ND; +BS x4  Extremities: WWP, 2+ peripheral pulses b/l; no LE edema  Skin: normal color and turgor; no rash  Neurological:    MEDICATIONS:  MEDICATIONS  (STANDING):  enoxaparin Injectable 80 milliGRAM(s) SubCutaneous two times a day  furosemide   Injectable 60 milliGRAM(s) IV Push once  sodium chloride 0.9%. 1000 milliLiter(s) (50 mL/Hr) IV Continuous <Continuous>    MEDICATIONS  (PRN):  clonazePAM  Tablet 0.5 milliGRAM(s) Oral daily PRN anxiety  torsemide 10 milliGRAM(s) Oral two times a day PRN edema      ALLERGIES:  Allergies    tamoxifen (Hives)    Intolerances        LABS:                        11.3   5.38  )-----------( 286      ( 24 Dec 2020 09:17 )             40.9     Hemoglobin: 11.3 g/dL (12-24 @ 09:17)  Hemoglobin: 11.3 g/dL (12-24 @ 09:11)  Hemoglobin: 12.1 g/dL (12-22 @ 16:49)    CBC Full  -  ( 24 Dec 2020 09:17 )  WBC Count : 5.38 K/uL  RBC Count : 4.51 M/uL  Hemoglobin : 11.3 g/dL  Hematocrit : 40.9 %  Platelet Count - Automated : 286 K/uL  Mean Cell Volume : 90.7 fL  Mean Cell Hemoglobin : 25.1 pg  Mean Cell Hemoglobin Concentration : 27.6 g/dL  Auto Neutrophil # : x  Auto Lymphocyte # : x  Auto Monocyte # : x  Auto Eosinophil # : x  Auto Basophil # : x  Auto Neutrophil % : x  Auto Lymphocyte % : x  Auto Monocyte % : x  Auto Eosinophil % : x  Auto Basophil % : x    12-25    143  |  91<L>  |  29<H>  ----------------------------<  87  4.7   |  42<HH>  |  <0.5<L>    Ca    11.3<H>      25 Dec 2020 08:00  Phos  3.2     12-25  Mg     2.3     12-25    TPro  6.7  /  Alb  4.0  /  TBili  0.3  /  DBili  x   /  AST  48<H>  /  ALT  30  /  AlkPhos  144<H>  12-24    Creatinine Trend: <0.5<--, <0.5<--, 0.5<--, 0.6<--, 0.5<--, 1.0<--  LIVER FUNCTIONS - ( 24 Dec 2020 09:11 )  Alb: 4.0 g/dL / Pro: 6.7 g/dL / ALK PHOS: 144 U/L / ALT: 30 U/L / AST: 48 U/L / GGT: x               hs Troponin:    ABG - ( 23 Dec 2020 15:05 )  pH, Arterial: x     pH, Blood: 7.31  /  pCO2: 108   /  pO2: 55    / HCO3: 54    / Base Excess: 22.7  /  SaO2: 89                        CSF:                      EKG:   MICROBIOLOGY:    IMAGING:      Labs, imaging, EKG personally reviewed    RADIOLOGY & ADDITIONAL TESTS: Reviewed.

## 2020-12-25 NOTE — PROGRESS NOTE ADULT - ASSESSMENT
55F PMHx breast ca 2001 metastatic to bone, lung c/b malignant L pleural effusion s/p chest tube, PE 9/2020 on lovenox here with acute hypercapnic resp failure, lethargy.    #Unresponsiveness/ lethargy, due to acute on chronic hypercapnic resp failure  in setting of breast ca metastatic to lung, L pleural effusion s/p chest tube  pt unable to tolerate avaps at home, only using 2 hours per session  worsening mental status today  repeat cxr  cont bipap  ongoing goc with , palliative care consult  dnr/ dni  #PE  lovenox  #DVT ppx  lovenox     #Progress Note Handoff:  Pending (specify):  Consults_________, Tests________, Test Results_______, Other___hypercapnia______  Family discussion:d/w  via phone re: treatment plan, primary dx  Disposition: Home___/SNF___/Other________/Unknown at this time____x____ 55F PMHx breast ca 2001 metastatic to bone, lung c/b malignant L pleural effusion s/p chest tube, PE 9/2020 on lovenox here with acute hypercapnic resp failure, lethargy.    #Unresponsiveness/ lethargy, due to acute on chronic hypercapnic resp failure  in setting of breast ca metastatic to lung, not treatable  L pleural effusion s/p chest tube  pt unable to tolerate avaps at home, only using 2 hours per session  worsening mental status today  repeat cxr  cont bipap  ongoing goc with , palliative care consult  dnr/ dni  #PE  lovenox  #DVT ppx  lovenox     #Progress Note Handoff:  Pending (specify):  Consults_________, Tests________, Test Results_______, Other___hypercapnia______  Family discussion:d/w  via phone re: treatment plan, primary dx  Disposition: Home___/SNF___/Other________/Unknown at this time____x____

## 2020-12-25 NOTE — PROVIDER CONTACT NOTE (OTHER) - SITUATION
Pat on BIPAP, receiving 100% O2, sating 96% not responding to verbal or physical stimuli. Pat on BIPAP, receiving 100% O2, sating 96% not responding to verbal or physical stimuli. pt DNR/DNI

## 2020-12-25 NOTE — CHART NOTE - NSCHARTNOTEFT_GEN_A_CORE
Spoke to attending doctor Shirley, and bedside RN  Consulted for pt who has hypercapnic  respiratory failure r/t stage 4 breast cancer  Pt known to palliative care service. Md Sepulveda requesting palliative care team call pt's spouse for support r/t pt's spouse nearing end of life and struggle with decision making. Pt's spouse Homero has been given option of comfort care but told palliative care team for Zoroastrianism reasons he cannot remove BIPAP/AVAP support. He is aware that her prognosis is grave and that she has respiratory failure. She currently is not in distress but is lethargic and not arouable on the BIPAP. Palliative care NP will call pt's spouse and ask how we can be supportive at this time.     Called spouse and left message with palliative service number to call us back as needed. Palliative care available at x [phone]

## 2020-12-26 NOTE — DIETITIAN INITIAL EVALUATION ADULT. - ADD RECOMMEND
1) Will continue to monitor goal of care and palliative c/s recommendations and will f/u with pt in 3 days.

## 2020-12-26 NOTE — DIETITIAN INITIAL EVALUATION ADULT. - PHYSCIAL ASSESSMENT
Skin: intact per RN flow sheets  edema: left arm edema +3 per RN flow sheets  Unable to conduct nutrition focused physical exam at his time, pt. lethargic and not arousable well nourished

## 2020-12-26 NOTE — DIETITIAN INITIAL EVALUATION ADULT. - OTHER CALCULATIONS
Estimated energy/ protein needs not appropriate at this time will continue to monitor patients goals of care and adjust prn.

## 2020-12-26 NOTE — PROGRESS NOTE ADULT - SUBJECTIVE AND OBJECTIVE BOX
INTERVAL HPI/OVERNIGHT EVENTS:    SUBJECTIVE: Patient seen and examined at bedside.     unable to obtain ros    OBJECTIVE:    VITAL SIGNS:  Vital Signs Last 24 Hrs  T(C): --  T(F): --  HR: 86 (26 Dec 2020 08:48) (73 - 99)  BP: 89/53 (26 Dec 2020 05:15) (89/53 - 102/71)  BP(mean): --  RR: 17 (26 Dec 2020 05:15) (17 - 18)  SpO2: 100% (26 Dec 2020 08:56) (98% - 100%)      PHYSICAL EXAM:    General: NAD, arousable to deep stimuli  HEENT: NC/AT; PERRL, clear conjunctiva  Neck: supple  Respiratory: diffuse crackles  Cardiovascular: +S1/S2; RRR  Abdomen: soft, NT/ND; +BS x4  Extremities: WWP, 2+ peripheral pulses b/l; no LE edema  Skin: normal color and turgor; no rash  Neurological:    MEDICATIONS:  MEDICATIONS  (STANDING):  dextrose 5%. 1000 milliLiter(s) (50 mL/Hr) IV Continuous <Continuous>  enoxaparin Injectable 80 milliGRAM(s) SubCutaneous two times a day  furosemide   Injectable 40 milliGRAM(s) IV Push two times a day    MEDICATIONS  (PRN):  clonazePAM  Tablet 0.5 milliGRAM(s) Oral daily PRN anxiety  torsemide 10 milliGRAM(s) Oral two times a day PRN edema      ALLERGIES:  Allergies    tamoxifen (Hives)    Intolerances        LABS:                        11.9   5.36  )-----------( 233      ( 26 Dec 2020 07:33 )             44.1     Hemoglobin: 11.9 g/dL (12-26 @ 07:33)  Hemoglobin: 11.3 g/dL (12-24 @ 09:17)  Hemoglobin: 11.3 g/dL (12-24 @ 09:11)  Hemoglobin: 12.1 g/dL (12-22 @ 16:49)    CBC Full  -  ( 26 Dec 2020 07:33 )  WBC Count : 5.36 K/uL  RBC Count : 4.73 M/uL  Hemoglobin : 11.9 g/dL  Hematocrit : 44.1 %  Platelet Count - Automated : 233 K/uL  Mean Cell Volume : 93.2 fL  Mean Cell Hemoglobin : 25.2 pg  Mean Cell Hemoglobin Concentration : 27.0 g/dL  Auto Neutrophil # : x  Auto Lymphocyte # : x  Auto Monocyte # : x  Auto Eosinophil # : x  Auto Basophil # : x  Auto Neutrophil % : x  Auto Lymphocyte % : x  Auto Monocyte % : x  Auto Eosinophil % : x  Auto Basophil % : x    12-26    138  |  91<L>  |  45<H>  ----------------------------<  220<H>  6.3<HH>   |  42<HH>  |  1.3    Ca    10.5<H>      26 Dec 2020 07:33  Phos  6.1     12-26  Mg     2.6     12-26      Creatinine Trend: 1.3<--, <0.5<--, <0.5<--, 0.5<--, 0.6<--, 0.5<--        hs Troponin:    ABG - ( 26 Dec 2020 03:53 )  pH, Arterial: 7.08  pH, Blood: x     /  pCO2: x     /  pO2: 173   / HCO3: x     / Base Excess: x     /  SaO2: 100                 03:53 - ABG - pH: 7.08  |  pCO2:       |  pO2: 173   | Lactate:       | BE:        15:14 - ABG - pH: 7.04  |  pCO2:       |  pO2: 300   | Lactate:       | BE:              CSF:                      EKG:   MICROBIOLOGY:    IMAGING:      Labs, imaging, EKG personally reviewed    RADIOLOGY & ADDITIONAL TESTS: Reviewed.

## 2020-12-26 NOTE — CHART NOTE - NSCHARTNOTEFT_GEN_A_CORE
Called for ABG result which is not significantly changed from one done in afternoon. Since ABG's will not in any way change my management of this patient, I've asked respiratory tech not to do any on my watch Called for ABG result which is not significantly changed from one done in afternoon. Since ABG's will not in any way change my management of this patient, I've asked respiratory tech not to do any on my watch unless clinical condition warrants one Called for ABG result which is not significantly changed from one done in afternoon. Since ABG's will not in any way change my management of this patient, I've asked respiratory tech not to do any on my watch unless clinical condition warrants one. Feel that all other treatments such as lab draws, use of NIV, etc should continue since these items can be used to adjust treatments. Called for ABG result which shows patient no longer over oxygenated while lactic acid level is good Called for ABG result which shows patient less over oxygenated (compared to yesterday afternoon) while lactic acid level is good. Consider lowering oxygen saturation further (to be done)

## 2020-12-26 NOTE — DIETITIAN INITIAL EVALUATION ADULT. - PERTINENT LABORATORY DATA
12/26: Hemoglobin: 11.9, Potassium: 6.3, chloride: 91, BUN: 45, Glucose: 220, Egfr: 46, magnesium:2.6, Phosphorus 6.1

## 2020-12-26 NOTE — PROGRESS NOTE ADULT - ASSESSMENT
55F PMHx breast ca 2001 metastatic to bone, lung c/b malignant L pleural effusion s/p chest tube, PE 9/2020 on lovenox here with acute hypercapnic resp failure, lethargy.    #Unresponsiveness/ lethargy, due to acute on chronic hypercapnic resp failure  in setting of breast ca metastatic to lung, not treatable  L pleural effusion s/p chest tube  pt unable to tolerate avaps at home, only using 2 hours per session  worsening mental status today  repeat cxr  cont bipap  ongoing goc with , palliative care consult  dnr/ dni  #PE  lovenox  #DVT ppx  lovenox     #Progress Note Handoff:  Pending (specify):  Consults_________, Tests________, Test Results_______, Other___hypercapnia______  Family discussion:d/w  via phone re: treatment plan, primary dx  Disposition: Home___/SNF___/Other________/Unknown at this time____x____ 55F PMHx breast ca 2001 metastatic to bone, lung c/b malignant L pleural effusion s/p chest tube, PE 9/2020 on lovenox here with acute hypercapnic resp failure, lethargy.    #Unresponsiveness/ lethargy, due to acute on chronic hypercapnic resp failure  in setting of breast ca metastatic to lung, not treatable  L pleural effusion s/p chest tube  pt unable to tolerate avaps at home, only using 2 hours per session  mental status improving today  severe resp acidosis, pH 7.08  bipap, cxr noted  repeat abg  ongoing goc with , palliative care consult  dnr/ dni  #PE  lovenox  #DVT ppx  lovenox     #Progress Note Handoff:  Pending (specify):  Consults_________, Tests________, Test Results_______, Other___hypercapnia______  Family discussion:d/w  via phone re: treatment plan, primary dx  Disposition: Home___/SNF___/Other________/Unknown at this time____x____

## 2020-12-26 NOTE — DIETITIAN INITIAL EVALUATION ADULT. - NAME AND PHONE
RD to monitor: diet order, body composition, energy intake, nutrition focused physical finding, glucose profile, renal profile/ electrolyte . at risk f/u in 3 days RD to monitor: diet order, body composition, energy intake, nutrition focused physical finding, glucose profile, renal profile/ electrolyte . at risk f/u in 3 days. Discussed with LIP

## 2020-12-26 NOTE — DIETITIAN INITIAL EVALUATION ADULT. - OTHER INFO
Attempted to call emergency contact number at [phone] at 11:58; No answer. Will attempt to obtain nutrition hx at f/u. Patient currently NPO and per RN; palliative care was Pertinent nutrition hx: Patient with PMH; breast ca 2001 metastatic to bone, lung c/b malignant L pleural effusion s/p chest tube, PE on lovenox here with acute hypercapnic resp failure, lethargy.Per internal medicine, Unresponsiveness/ lethargy, due to acute on chronic hypercapnic resp failure in setting of breast ca metastatic to lung, not treatable and L pleural effusion s/p chest tube. Severe respiratory acidosis noted. Goals of care being discussed with . Per palliative note; "MD Watson requesting palliative care team call pt's spouse for support r/t pt's spouse nearing end of life and struggle with decision making. Pt's spouse Homero has been given option of comfort care but told palliative care team for Orthodox reasons he cannot remove BIPAP/AVAP support".      Attempted to call emergency contact number at [phone] at 11:58; No answer. Will attempt to obtain nutrition hx at f/u. Patient currently NPO and per RN; palliative care is following patient; and pt. will most likely be palliative care however pt.'s  is in denial and it not has been confirmed that patient is palliative care. Patient on AVAP/BiPAP and unable to be off at this time, ot. extremely lethargic per RN.

## 2020-12-26 NOTE — DIETITIAN INITIAL EVALUATION ADULT. - PROBLEM SELECTOR PLAN 1
(Acute on chronic) For now continue NIV and monitor. Is patient getting opioids? Will defer doing ABG since will not

## 2020-12-27 NOTE — PROGRESS NOTE ADULT - ASSESSMENT
55F PMHx breast ca 2001 metastatic to bone, lung c/b malignant L pleural effusion s/p chest tube, PE 9/2020 on lovenox here with acute hypercapnic resp failure, lethargy.    #Unresponsiveness/ lethargy, due to acute on chronic hypercapnic resp failure  in setting of breast ca metastatic to lung, not treatable  L pleural effusion s/p chest tube  pt unable to tolerate avaps at home, only using 2 hours per session  mental status slight improved, requiring continuous bipap  repeat abg with improvement in pH, co2 still too high to detect  ongoing goc with , palliative care consult  dnr/ dni  #PE  lovenox  #DVT ppx  lovenox     #Progress Note Handoff:  Pending (specify):  Consults_________, Tests________, Test Results_______, Other___hypercapnia______  Family discussion:d/w  via phone re: treatment plan, primary dx  Disposition: Home___/SNF___/Other________/Unknown at this time____x____

## 2020-12-27 NOTE — PROGRESS NOTE ADULT - SUBJECTIVE AND OBJECTIVE BOX
INTERVAL HPI/OVERNIGHT EVENTS:    SUBJECTIVE: Patient seen and examined at bedside.     unable to obtain ros    OBJECTIVE:    VITAL SIGNS:  Vital Signs Last 24 Hrs  T(C): 35.9 (27 Dec 2020 05:04), Max: 36.7 (26 Dec 2020 14:29)  T(F): 96.6 (27 Dec 2020 05:04), Max: 98 (26 Dec 2020 14:29)  HR: 97 (27 Dec 2020 05:04) (79 - 97)  BP: 110/65 (27 Dec 2020 05:04) (102/63 - 110/65)  BP(mean): --  RR: 18 (27 Dec 2020 05:04) (18 - 18)  SpO2: 98% (26 Dec 2020 18:01) (98% - 98%)      PHYSICAL EXAM:    General: NAD, arousable to deep stimuli  HEENT: NC/AT; PERRL, clear conjunctiva  Neck: supple  Respiratory: diffuse crackles  Cardiovascular: +S1/S2; RRR  Abdomen: soft, NT/ND; +BS x4  Extremities: WWP, 2+ peripheral pulses b/l; no LE edema  Skin: normal color and turgor; no rash  Neurological:    MEDICATIONS:  MEDICATIONS  (STANDING):  dextrose 5%. 1000 milliLiter(s) (50 mL/Hr) IV Continuous <Continuous>  enoxaparin Injectable 80 milliGRAM(s) SubCutaneous two times a day  furosemide   Injectable 40 milliGRAM(s) IV Push two times a day  sodium zirconium cyclosilicate 10 Gram(s) Oral once    MEDICATIONS  (PRN):  clonazePAM  Tablet 0.5 milliGRAM(s) Oral daily PRN anxiety  torsemide 10 milliGRAM(s) Oral two times a day PRN edema      ALLERGIES:  Allergies    tamoxifen (Hives)    Intolerances        LABS:                        10.8   7.23  )-----------( 269      ( 27 Dec 2020 07:12 )             39.8     Hemoglobin: 10.8 g/dL (12-27 @ 07:12)  Hemoglobin: 11.9 g/dL (12-26 @ 07:33)  Hemoglobin: 11.3 g/dL (12-24 @ 09:17)  Hemoglobin: 11.3 g/dL (12-24 @ 09:11)  Hemoglobin: 12.1 g/dL (12-22 @ 16:49)    CBC Full  -  ( 27 Dec 2020 07:12 )  WBC Count : 7.23 K/uL  RBC Count : 4.34 M/uL  Hemoglobin : 10.8 g/dL  Hematocrit : 39.8 %  Platelet Count - Automated : 269 K/uL  Mean Cell Volume : 91.7 fL  Mean Cell Hemoglobin : 24.9 pg  Mean Cell Hemoglobin Concentration : 27.1 g/dL  Auto Neutrophil # : x  Auto Lymphocyte # : x  Auto Monocyte # : x  Auto Eosinophil # : x  Auto Basophil # : x  Auto Neutrophil % : x  Auto Lymphocyte % : x  Auto Monocyte % : x  Auto Eosinophil % : x  Auto Basophil % : x    12-27    138  |  88<L>  |  54<H>  ----------------------------<  113<H>  5.1<H>   |  41<HH>  |  1.3    Ca    10.2<H>      27 Dec 2020 07:12  Phos  3.6     12-27  Mg     2.4     12-27      Creatinine Trend: 1.3<--, 1.2<--, 1.3<--, <0.5<--, <0.5<--, 0.5<--        hs Troponin:    ABG - ( 27 Dec 2020 09:43 )  pH, Arterial: 7.17  pH, Blood: x     /  pCO2: x     /  pO2: 31    / HCO3: x     / Base Excess: x     /  SaO2: 52                  09:43 - ABG - pH: 7.17  |  pCO2:       |  pO2: 31    | Lactate:       | BE:              CSF:                      EKG:   MICROBIOLOGY:    IMAGING:      Labs, imaging, EKG personally reviewed    RADIOLOGY & ADDITIONAL TESTS: Reviewed.

## 2020-12-28 NOTE — PROGRESS NOTE ADULT - SUBJECTIVE AND OBJECTIVE BOX
Patient is a 55y old  Female who presents with a chief complaint of Unresponsiveness (26 Dec 2020 11:54)      Over Night Events:  Patient seen and examined.   lethargic   hypercapnia on avap     ROS:  See HPI    PHYSICAL EXAM    ICU Vital Signs Last 24 Hrs  T(C): 35.6 (27 Dec 2020 21:00), Max: 35.6 (27 Dec 2020 21:00)  T(F): 96 (27 Dec 2020 21:00), Max: 96 (27 Dec 2020 21:00)  HR: 75 (28 Dec 2020 08:00) (75 - 94)  BP: 104/64 (28 Dec 2020 04:58) (99/62 - 113/68)  BP(mean): --  ABP: --  ABP(mean): --  RR: 18 (28 Dec 2020 04:58) (18 - 20)  SpO2: 90% (28 Dec 2020 08:00) (90% - 100%)      General: lethargic barely opne eyes   HEENT:                Lymph Nodes: NO cervical LN   Lungs: Bilateral BS  Cardiovascular: Regular   Abdomen: Soft, Positive BS  Extremities: No clubbing   Skin: warm   Neurological:   Musculoskeletal: move all ext     I&O's Detail    27 Dec 2020 07:01  -  28 Dec 2020 07:00  --------------------------------------------------------  IN:  Total IN: 0 mL    OUT:    Voided (mL): 100 mL  Total OUT: 100 mL    Total NET: -100 mL          LABS:                          10.8   7.23  )-----------( 269      ( 27 Dec 2020 07:12 )             39.8         27 Dec 2020 07:12    138    |  88     |  54     ----------------------------<  113    5.1     |  41     |  1.3      Ca    10.2       27 Dec 2020 07:12  Phos  3.6       27 Dec 2020 07:12  Mg     2.4       27 Dec 2020 07:12                                                                                                                                                                                                                                   ABG - ( 27 Dec 2020 09:43 )  pH, Arterial: 7.17  pH, Blood: x     /  pCO2: x     /  pO2: 31    / HCO3: x     / Base Excess: x     /  SaO2: 52                  MEDICATIONS  (STANDING):  dextrose 5%. 1000 milliLiter(s) (50 mL/Hr) IV Continuous <Continuous>  enoxaparin Injectable 80 milliGRAM(s) SubCutaneous two times a day  furosemide   Injectable 40 milliGRAM(s) IV Push two times a day    MEDICATIONS  (PRN):  clonazePAM  Tablet 0.5 milliGRAM(s) Oral daily PRN anxiety  torsemide 10 milliGRAM(s) Oral two times a day PRN edema          Xrays:  TLC:  OG:  ET tube:                                                                                    b/l effusion stable    ECHO:  [facility] ICU:

## 2020-12-28 NOTE — PROGRESS NOTE ADULT - ASSESSMENT
55F PMHx breast ca 2001 metastatic to bone, lung c/b malignant L pleural effusion s/p chest tube, PE 9/2020 on lovenox here with acute hypercapnic resp failure, lethargy.    #Unresponsiveness/ lethargy, due to acute on chronic hypercapnic resp failure  in setting of breast ca metastatic to lung, not treatable  L pleural effusion s/p chest tube  pt unable to tolerate avaps at home, only using 2 hours per session  again somnolent today  requiring continuous bipap  repeat abg with improvement in pH, co2 still too high to detect  ongoing goc with , palliative care consult  dnr/ dni  #PE  lovenox  #DVT ppx  lovenox     #Progress Note Handoff:  Pending (specify):  Consults_________, Tests________, Test Results_______, Other___hypercapnia______  Family discussion:d/w  via phone re: treatment plan, primary dx  Disposition: Home___/SNF___/Other________/Unknown at this time____x____

## 2020-12-28 NOTE — PROGRESS NOTE ADULT - ASSESSMENT
IMPRESSION:  metastatic cancer  acute on chronic respiratory failure hypercapnia   on multiple meds causing respiratory suppression suspect OD as was in past  also suspect noncompliance with AVAPS at home    SUGGEST:  increase rate to 30 pmax 35   consider hospice consult and morphine drip   patient not improving with Non invasive ventilation   pt should be comfort only care  cont AVAPS   palliative care eval  no sedation or pain meds   case discussed with hospitalist

## 2020-12-28 NOTE — PROGRESS NOTE ADULT - SUBJECTIVE AND OBJECTIVE BOX
INTERVAL HPI/OVERNIGHT EVENTS:    SUBJECTIVE: Patient seen and examined at bedside.     unable to obtain ros    OBJECTIVE:    VITAL SIGNS:  Vital Signs Last 24 Hrs  T(C): 35.6 (27 Dec 2020 21:00), Max: 35.6 (27 Dec 2020 21:00)  T(F): 96 (27 Dec 2020 21:00), Max: 96 (27 Dec 2020 21:00)  HR: 75 (28 Dec 2020 08:00) (75 - 94)  BP: 104/64 (28 Dec 2020 04:58) (99/62 - 113/68)  BP(mean): --  RR: 18 (28 Dec 2020 04:58) (18 - 20)  SpO2: 90% (28 Dec 2020 08:00) (90% - 100%)      PHYSICAL EXAM:    General: arousable to deep stimuli  HEENT: NC/AT; PERRL, clear conjunctiva  Neck: supple  Respiratory: CTA b/l  Cardiovascular: +S1/S2; RRR  Abdomen: soft, NT/ND; +BS x4  Extremities: WWP, 2+ peripheral pulses b/l; no LE edema  Skin: normal color and turgor; no rash  Neurological:    MEDICATIONS:  MEDICATIONS  (STANDING):  dextrose 5%. 1000 milliLiter(s) (50 mL/Hr) IV Continuous <Continuous>  enoxaparin Injectable 80 milliGRAM(s) SubCutaneous two times a day  furosemide   Injectable 40 milliGRAM(s) IV Push two times a day    MEDICATIONS  (PRN):  clonazePAM  Tablet 0.5 milliGRAM(s) Oral daily PRN anxiety  torsemide 10 milliGRAM(s) Oral two times a day PRN edema      ALLERGIES:  Allergies    tamoxifen (Hives)    Intolerances        LABS:                        10.8   7.23  )-----------( 269      ( 27 Dec 2020 07:12 )             39.8     Hemoglobin: 10.8 g/dL (12-27 @ 07:12)  Hemoglobin: 11.9 g/dL (12-26 @ 07:33)  Hemoglobin: 11.3 g/dL (12-24 @ 09:17)  Hemoglobin: 11.3 g/dL (12-24 @ 09:11)    CBC Full  -  ( 27 Dec 2020 07:12 )  WBC Count : 7.23 K/uL  RBC Count : 4.34 M/uL  Hemoglobin : 10.8 g/dL  Hematocrit : 39.8 %  Platelet Count - Automated : 269 K/uL  Mean Cell Volume : 91.7 fL  Mean Cell Hemoglobin : 24.9 pg  Mean Cell Hemoglobin Concentration : 27.1 g/dL  Auto Neutrophil # : x  Auto Lymphocyte # : x  Auto Monocyte # : x  Auto Eosinophil # : x  Auto Basophil # : x  Auto Neutrophil % : x  Auto Lymphocyte % : x  Auto Monocyte % : x  Auto Eosinophil % : x  Auto Basophil % : x    12-27    138  |  88<L>  |  54<H>  ----------------------------<  113<H>  5.1<H>   |  41<HH>  |  1.3    Ca    10.2<H>      27 Dec 2020 07:12  Phos  3.6     12-27  Mg     2.4     12-27      Creatinine Trend: 1.3<--, 1.2<--, 1.3<--, <0.5<--, <0.5<--, 0.5<--        hs Troponin:    ABG - ( 27 Dec 2020 09:43 )  pH, Arterial: 7.17  pH, Blood: x     /  pCO2: x     /  pO2: 31    / HCO3: x     / Base Excess: x     /  SaO2: 52                        CSF:                      EKG:   MICROBIOLOGY:    IMAGING:      Labs, imaging, EKG personally reviewed    RADIOLOGY & ADDITIONAL TESTS: Reviewed.

## 2020-12-29 NOTE — GOALS OF CARE CONVERSATION - ADVANCED CARE PLANNING - CONVERSATION DETAILS
D/w  at bedside. He understands his wife is dying, he is not allowed to withdraw care which would be viewed as expediting her death due to his [employer] laws. He has discussed with his rabbi; he cannot withdraw bipap. Decision made to continue current care without escalation of care
D/w  via phone re: resusictation. Pt has metastatic breast ca to bone, admitted with lethargy, unresponsiveness. Pt is dnr/dni, molst filled, completed in chart.
D/w  via phone. Pt with worsening lethargy today, suspected due to hypercapnia. She has metastatic breast ca, that is not treatable. He would like to speak with palliative care, but he does not want withdrawal of care, including removing bipap. She is dnr/dni.

## 2020-12-29 NOTE — CHART NOTE - NSCHARTNOTEFT_GEN_A_CORE
Registered Dietitian Follow-Up     Patient Profile Reviewed                           Yes [x]   No []     Nutrition History Previously Obtained        Yes []  No [x]  pt unable to provide; ongoing GOC discussions with family. No family was present at bedside upon RD attempt. Nutrition hx deferred vs not indicated.      Pertinent Subjective Information: Per GOC note on 12/29 @13:34, pt  understands his wife is dying, but is not allowed to withdraw care d/t [employer] beliefs (he discussed with ); cannot withdraw BiPAP, decision to continue current care without escalation of care.      Pertinent Medical Interventions: Unresponsiveness/lethargy d/t acute on chronic hypercapnic respiratory failure in the setting of breast cancer metastatic to lung (not treatable). L pleural effusion sp chest tube. Pt mentation noted- not improving on BiPAP. D5 initiated per family request.      Diet order: NPO    Anthropometrics:  Height (cm): 165.1 (12-23-20 @ 18:40)  Weight (kg): 61.9 (12-23-20 @ 18:40)  BMI (kg/m2): 22.7 (12-23-20 @ 18:40)  IBW: 56.8 kg/125 lbs    Daily   % Weight Change no new wt recorded since previously assessed.    MEDICATIONS  (STANDING):  dextrose 5%. 1000 milliLiter(s) (50 mL/Hr) IV Continuous <Continuous>  enoxaparin Injectable 80 milliGRAM(s) SubCutaneous two times a day  erythromycin   Ointment 1 Application(s) Both EYES two times a day    MEDICATIONS  (PRN):  clonazePAM  Tablet 0.5 milliGRAM(s) Oral daily PRN anxiety    Pertinent Labs: 12-29 @ 08:12: Hgb 10.7, Na 128, K 5.2, BUN 66, Cr 2.4, glucose 106, Ca 10.5, Mg 2.6    Physical Findings:  - Appearance: well nourished; (12/28) +1 edema (L/R arms)  - GI function: no BMs recorded per EMR since admission  - Tubes: NA  - Oral/Mouth cavity: NPO  - Skin: ecchymosis (12/28)        [x] Previous Nutrition Diagnosis: Inadequate oral intake            [x] Ongoing    Est protein/energy needs not calculated during previous assessment for the reasons below (see: goal.)    Nutrition Intervention coordination of care    Goal:  D/t pt medical condition, pt not expected to meet est protein/energy requirements + no escalation of care (this likely includes NO feeding tube).      Indicator/Monitoring: RD to monitor diet order, energy intake, body composition, NFPF, glucose/renal/electrolyte profiles     Recommendation: continue with current medical management + nutrition plans. D/t pt medical condition, nutrition intervention is not indicated. Will continue to monitor q3-4 days.

## 2020-12-29 NOTE — PROGRESS NOTE ADULT - SUBJECTIVE AND OBJECTIVE BOX
INTERVAL HPI/OVERNIGHT EVENTS:    SUBJECTIVE: Patient seen and examined at bedside.     unable to obtain ros    OBJECTIVE:    VITAL SIGNS:  Vital Signs Last 24 Hrs  T(C): 27.2 (29 Dec 2020 05:00), Max: 36.1 (28 Dec 2020 22:22)  T(F): 81 (29 Dec 2020 05:00), Max: 97 (28 Dec 2020 22:22)  HR: 98 (29 Dec 2020 08:20) (76 - 98)  BP: 100/49 (29 Dec 2020 05:00) (97/56 - 101/56)  BP(mean): --  RR: 16 (29 Dec 2020 05:00) (16 - 16)  SpO2: 100% (29 Dec 2020 08:20) (90% - 100%)      PHYSICAL EXAM:    General: difficult to arouse with deep stimuli  HEENT: NC/AT; PERRL, clear conjunctiva  Neck: supple  Respiratory: base crackles  Cardiovascular: +S1/S2; RRR  Abdomen: soft, NT/ND; +BS x4  Extremities: WWP, 2+ peripheral pulses b/l; no LE edema  Skin: normal color and turgor; no rash  Neurological:    MEDICATIONS:  MEDICATIONS  (STANDING):  dextrose 5%. 1000 milliLiter(s) (50 mL/Hr) IV Continuous <Continuous>  enoxaparin Injectable 80 milliGRAM(s) SubCutaneous two times a day    MEDICATIONS  (PRN):  clonazePAM  Tablet 0.5 milliGRAM(s) Oral daily PRN anxiety      ALLERGIES:  Allergies    tamoxifen (Hives)    Intolerances        LABS:                        10.7   6.94  )-----------( 206      ( 29 Dec 2020 08:12 )             39.0     Hemoglobin: 10.7 g/dL (12-29 @ 08:12)  Hemoglobin: 10.8 g/dL (12-27 @ 07:12)  Hemoglobin: 11.9 g/dL (12-26 @ 07:33)    CBC Full  -  ( 29 Dec 2020 08:12 )  WBC Count : 6.94 K/uL  RBC Count : 4.28 M/uL  Hemoglobin : 10.7 g/dL  Hematocrit : 39.0 %  Platelet Count - Automated : 206 K/uL  Mean Cell Volume : 91.1 fL  Mean Cell Hemoglobin : 25.0 pg  Mean Cell Hemoglobin Concentration : 27.4 g/dL  Auto Neutrophil # : x  Auto Lymphocyte # : x  Auto Monocyte # : x  Auto Eosinophil # : x  Auto Basophil # : x  Auto Neutrophil % : x  Auto Lymphocyte % : x  Auto Monocyte % : x  Auto Eosinophil % : x  Auto Basophil % : x    12-29    128<L>  |  82<L>  |  66<HH>  ----------------------------<  106<H>  5.2<H>   |  45<HH>  |  2.4<H>    Ca    10.5<H>      29 Dec 2020 08:12  Phos  4.1     12-29  Mg     2.6     12-29      Creatinine Trend: 2.4<--, 1.3<--, 1.2<--, 1.3<--, <0.5<--, <0.5<--        hs Troponin:    ABG - ( 29 Dec 2020 11:00 )  pH, Arterial: 7.16  pH, Blood: x     /  pCO2: x     /  pO2: 158   / HCO3: x     / Base Excess: x     /  SaO2: 99                  11:00 - ABG - pH: 7.16  |  pCO2:       |  pO2: 158   | Lactate:       | BE:              CSF:                      EKG:   MICROBIOLOGY:    IMAGING:      Labs, imaging, EKG personally reviewed    RADIOLOGY & ADDITIONAL TESTS: Reviewed.

## 2020-12-29 NOTE — PROGRESS NOTE ADULT - ASSESSMENT
55F PMHx breast ca 2001 metastatic to bone, lung c/b malignant L pleural effusion s/p chest tube, PE 9/2020 on lovenox here with acute hypercapnic resp failure, lethargy.    #Unresponsiveness/ lethargy, due to acute on chronic hypercapnic resp failure  in setting of breast ca metastatic to lung, not treatable  L pleural effusion s/p chest tube  pt unable to tolerate avaps at home, only using 2 hours per session  mentatation not improving on continuous bipap  abg without improvement  ongoing goc with   dnr/ dni  #GIOVANNY, suspect overdiuresis  d/c lasix  d5w 50 cc/hr per family request  trend scr  #PE  lovenox  #DVT ppx  lovenox     #Progress Note Handoff:  Pending (specify):  Consults_________, Tests________, Test Results_______, Other___hypercapnia______  Family discussion:d/w  via phone re: treatment plan, primary dx  Disposition: Home___/SNF___/Other________/Unknown at this time____x____

## 2020-12-30 NOTE — PROGRESS NOTE ADULT - SUBJECTIVE AND OBJECTIVE BOX
CC.  Respiratory failure  HPI.  Patient is obtunded.  Unable to obtain ros/HX    Vital Signs Last 24 Hrs  T(C): 34.7 (29 Dec 2020 21:34), Max: 35.6 (29 Dec 2020 14:17)  T(F): 94.5 (29 Dec 2020 21:34), Max: 96.1 (29 Dec 2020 14:17)  HR: 82 (30 Dec 2020 08:31) (74 - 83)  BP: 80/53 (29 Dec 2020 21:34) (80/53 - 102/54)  BP(mean): --  RR: 18 (29 Dec 2020 14:17) (18 - 18)  SpO2: 100% (30 Dec 2020 08:31) (100% - 100%)      PHYSICAL EXAM-  GENERAL:  chronic ill appearing female  HEAD:  Atraumatic, Normocephalic  EYES: EOMI, PERRLA, conjunctiva and sclera clear  NECK: Supple, No JVD, Normal thyroid  NERVOUS SYSTEM:  obtunded unable to asses   CHEST/LUNG: diffuse rhonchi with good air entry.  no retractions or accessory muscle usage  HEART: Regular rate and rhythm; No murmurs, rubs, or gallops  ABDOMEN: Soft, Nontender, Nondistended; Bowel sounds present  EXTREMITIES:   No clubbing, cyanosis   SKIN: No rashes or lesions       MEDICATIONS  (STANDING):  dextrose 5%. 1000 milliLiter(s) (50 mL/Hr) IV Continuous <Continuous>  enoxaparin Injectable 80 milliGRAM(s) SubCutaneous two times a day  erythromycin   Ointment 1 Application(s) Both EYES two times a day    MEDICATIONS  (PRN):  clonazePAM  Tablet 0.5 milliGRAM(s) Oral daily PRN anxiety        Imaging Personally Reviewed:     [x ] YES  [ ] NO    Consultant(s) Notes Reviewed:  [x ] YES  [ ] NO    Care Discussed with Consultants/Other Providers [x ] YES  [ ] No medical contraindication for discharge

## 2020-12-30 NOTE — PROGRESS NOTE ADULT - ASSESSMENT
55F PMHx breast ca 2001 metastatic to bone, lung c/b malignant L pleural effusion s/p chest tube, PE 9/2020 on lovenox here with acute hypercapnic resp failure, lethargy.    #AMS  -Seems to be secondary to acute on Chronic hypercapnic respiratory failure  in the setting of advanced metastatic breast cancer/not treatable  -L pleural effusion s/p chest tube  -Patient is DNR/DNI as per /HCP  -Mentation has not been improving on BIPAP  -ABG W/O any improvement  -As per , he would like to continue with BIPAP as per [employer] believes    #GIOVANNY, suspect overdiuresis  -Hold Lasix  -Continue with IVF  -Monitor Renal function  -Might consider renal consult     #PE  -Continue with Lovenox    #DVT ppx  Lovenox     DNR/DNI.  Overall prognosis very poor  #Progress Note Handoff  Pending (specify):  as above  Family discussion:  plan of care was discussed with patient and family in details.  all questions were answered.  seems to understand, and in agreement  Disposition:  unknown   55F PMHx breast ca 2001 metastatic to bone, lung c/b malignant L pleural effusion s/p chest tube, PE 9/2020 on lovenox here with acute hypercapnic resp failure, lethargy.    #AMS  -Seems to be secondary to acute on Chronic hypercapnic respiratory failure  in the setting of advanced metastatic breast cancer/not treatable  -L pleural effusion s/p chest tube  -Patient is DNR/DNI as per /HCP  -Mentation has not been improving on BIPAP  -ABG W/O any improvement  -As per , he would like to continue with BIPAP as per [employer] believes    #GIOVANNY, suspect overdiuresis  -Hold Lasix  -Continue with IVF  -renal US, bladder scan  -Monitor Renal function  -renal consult     #PE  -Continue with Lovenox    #DVT ppx  Lovenox     DNR/DNI.  Overall prognosis very poor  #Progress Note Handoff  Pending (specify):  as above  Family discussion:  plan of care was discussed with patient and family in details.  all questions were answered.  seems to understand, and in agreement  Disposition:  unknown   55F PMHx breast ca 2001 metastatic to bone, lung c/b malignant L pleural effusion s/p chest tube, PE 9/2020 on lovenox here with acute hypercapnic resp failure, lethargy.    #AMS  -Seems to be secondary to acute on Chronic hypercapnic respiratory failure  in the setting of advanced metastatic breast cancer/not treatable  -L pleural effusion s/p chest tube  -Patient is DNR/DNI as per /HCP  -Mentation has not been improving on BIPAP  -ABG W/O any improvement  -As per , he would like to continue with BIPAP as per [employer]ist believes    #GIOVANNY, suspect overdiuresis  -Hold Lasix  -Continue with IVF  -renal US, bladder scan  -Monitor Renal function  -renal consult     #PE  -recieved lovenox in am  -Creatinine clearance less than 30  -Might consider starting heparin drip in am     #DVT ppx  Lovenox     DNR/DNI.  Overall prognosis very poor  #Progress Note Handoff  Pending (specify):  as above  Family discussion:  plan of care was discussed with patient and family in details.  all questions were answered.  seems to understand, and in agreement  Disposition:  unknown

## 2020-12-31 NOTE — PROGRESS NOTE ADULT - SUBJECTIVE AND OBJECTIVE BOX
NEPHROLOGY CONSULTATION NOTE    JENN SERRANO  55y  Female  [id number]    CC:   Patient is a 55y old  Female who presents with a chief complaint of Respiratory failure (30 Dec 2020 11:09)      HPI:  56yo female with extensive PMH including several recent admissions related to respiratory failure which improved with application of NIV, is sent to the ER due to unresponsiveness (22 Dec 2020 21:28)      PAST MEDICAL & SURGICAL HISTORY:  Pulmonary embolism    Metastatic breast cancer    H/O bilateral mastectomy      Allergies:  tamoxifen (Hives)    Home Medications Reviewed  Hospital Medications:   MEDICATIONS  (STANDING):  dextrose 5% + sodium chloride 0.9%. 1000 milliLiter(s) (60 mL/Hr) IV Continuous <Continuous>  erythromycin   Ointment 1 Application(s) Both EYES two times a day    MEDICATIONS  (PRN):    Home medications:  Home Medications:  enoxaparin: 80 milligram(s) subcutaneous 2 times a day (29 Oct 2020 21:46)  exemestane 25 mg oral tablet: 1 tab(s) orally  (29 Oct 2020 21:46)  torsemide 5 mg oral tablet: 2 tab(s) orally 2 times a day, As Needed (29 Oct 2020 21:46)      SOCIAL HISTORY:  Social History:      FAMILY HISTORY:      REVIEW OF SYSTEMS:     All other review of systems is negative unless indicated above.    VITALS:  T(F): 99.8 (12-31-20 @ 06:05), Max: 99.8 (12-31-20 @ 06:05)  HR: 80 (12-31-20 @ 07:51)  BP: 86/49 (12-31-20 @ 06:05)  RR: 22 (12-31-20 @ 06:05)  SpO2: 100% (12-31-20 @ 08:08)      I&O's Detail    30 Dec 2020 07:01  -  31 Dec 2020 07:00  --------------------------------------------------------  IN:  Total IN: 0 mL    OUT:    Voided (mL): 0 mL  Total OUT: 0 mL    Total NET: 0 mL          I&O's Summary    30 Dec 2020 07:01  -  31 Dec 2020 07:00  --------------------------------------------------------  IN: 0 mL / OUT: 0 mL / NET: 0 mL        PHYSICAL EXAM:  Gen: NAD  resp:   card: S1/S2  abd: soft  ext: no edema  vascular access:     LABS:  Daily     Daily     12-31    125<L>  |  81<L>  |  82<HH>  ----------------------------<  93  5.9<H>   |  39<H>  |  3.7<H>    Ca    9.7      31 Dec 2020 09:49  Phos  4.9     12-30  Mg     2.6     12-30      Creatinine Trend:   Creatinine, Serum: 3.7 mg/dL (12-31-20 @ 09:49)  Creatinine, Serum: 3.4 mg/dL (12-30-20 @ 17:29)  Creatinine, Serum: 3.2 mg/dL (12-30-20 @ 11:16)  Creatinine, Serum: 2.4 mg/dL (12-29-20 @ 08:12)  Creatinine, Serum: 1.3 mg/dL (12-27-20 @ 07:12)  Creatinine, Serum: 1.2 mg/dL (12-26-20 @ 15:22)  Creatinine, Serum: 1.3 mg/dL (12-26-20 @ 07:33)  Creatinine, Serum: <0.5 mg/dL (12-25-20 @ 08:00)  Creatinine, Serum: <0.5 mg/dL (12-24-20 @ 09:11)                          9.3    8.07  )-----------( 170      ( 31 Dec 2020 09:49 )             32.6     Mean Cell Volume: 88.1 fL (12-31-20 @ 09:49)  Mean Cell Volume: 91.1 fL (12-29-20 @ 08:12)    Urine Studies:            RADIOLOGY & ADDITIONAL STUDIES:             NEPHROLOGY CONSULTATION NOTE    JENN SERRANO  55y  Female  [id number]    CC:   Patient is a 55y old  Female who presents with a chief complaint of Respiratory failure (30 Dec 2020 11:09)      HPI:  54yo female with extensive PMH including several recent admissions related to respiratory failure which improved with application of NIV, is sent to the ER due to unresponsiveness (22 Dec 2020 21:28)      PAST MEDICAL & SURGICAL HISTORY:  Pulmonary embolism    Metastatic breast cancer    H/O bilateral mastectomy      Allergies:  tamoxifen (Hives)    Home Medications Reviewed  Hospital Medications:   MEDICATIONS  (STANDING):  dextrose 5% + sodium chloride 0.9%. 1000 milliLiter(s) (60 mL/Hr) IV Continuous <Continuous>  erythromycin   Ointment 1 Application(s) Both EYES two times a day    MEDICATIONS  (PRN):    Home medications:  Home Medications:  enoxaparin: 80 milligram(s) subcutaneous 2 times a day (29 Oct 2020 21:46)  exemestane 25 mg oral tablet: 1 tab(s) orally  (29 Oct 2020 21:46)  torsemide 5 mg oral tablet: 2 tab(s) orally 2 times a day, As Needed (29 Oct 2020 21:46)      SOCIAL HISTORY:  Social History:      FAMILY HISTORY:      REVIEW OF SYSTEMS:     All other review of systems is negative unless indicated above.    VITALS:  T(F): 99.8 (12-31-20 @ 06:05), Max: 99.8 (12-31-20 @ 06:05)  HR: 80 (12-31-20 @ 07:51)  BP: 86/49 (12-31-20 @ 06:05)  RR: 22 (12-31-20 @ 06:05)  SpO2: 100% (12-31-20 @ 08:08)      I&O's Detail    30 Dec 2020 07:01  -  31 Dec 2020 07:00  --------------------------------------------------------  IN:  Total IN: 0 mL    OUT:    Voided (mL): 0 mL  Total OUT: 0 mL    Total NET: 0 mL          I&O's Summary    30 Dec 2020 07:01  -  31 Dec 2020 07:00  --------------------------------------------------------  IN: 0 mL / OUT: 0 mL / NET: 0 mL        PHYSICAL EXAM:  Gen: ill appearing on bipap, obtunded  resp: b/l avelina  card: S1/S2  abd: soft  ext: edema    LABS:  Daily     Daily     12-31    125<L>  |  81<L>  |  82<HH>  ----------------------------<  93  5.9<H>   |  39<H>  |  3.7<H>    Ca    9.7      31 Dec 2020 09:49  Phos  4.9     12-30  Mg     2.6     12-30      Creatinine Trend:   Creatinine, Serum: 3.7 mg/dL (12-31-20 @ 09:49)  Creatinine, Serum: 3.4 mg/dL (12-30-20 @ 17:29)  Creatinine, Serum: 3.2 mg/dL (12-30-20 @ 11:16)  Creatinine, Serum: 2.4 mg/dL (12-29-20 @ 08:12)  Creatinine, Serum: 1.3 mg/dL (12-27-20 @ 07:12)  Creatinine, Serum: 1.2 mg/dL (12-26-20 @ 15:22)  Creatinine, Serum: 1.3 mg/dL (12-26-20 @ 07:33)  Creatinine, Serum: <0.5 mg/dL (12-25-20 @ 08:00)  Creatinine, Serum: <0.5 mg/dL (12-24-20 @ 09:11)                          9.3    8.07  )-----------( 170      ( 31 Dec 2020 09:49 )             32.6     Mean Cell Volume: 88.1 fL (12-31-20 @ 09:49)  Mean Cell Volume: 91.1 fL (12-29-20 @ 08:12)

## 2020-12-31 NOTE — PROGRESS NOTE ADULT - ASSESSMENT
55F with PMH of metastatic breast cancer admitted for acute respiratory failure c/b GIOVANNY.  GOC discussions today, plan for patient to continue current management without further blood draws or escalation of care.  GIOVANNY c/b hyperkalemia and hyponatremia, in setting of hypotension, respiratory failure likely due to ATN, will not perform further w/u or intervention.  Can continue high dose iv diuretic if within pt's GOC for respiratory comfort, preferably morphine.    Nephrology signing off.

## 2020-12-31 NOTE — PROGRESS NOTE ADULT - SUBJECTIVE AND OBJECTIVE BOX
CC.  Respiratory failure  HPI.  Patient is obtunded.  Unable to obtain ros/HX    Vital Signs Last 24 Hrs  T(C): 37.7 (12-31-20 @ 06:05), Max: 37.7 (12-31-20 @ 06:05)  T(F): 99.8 (12-31-20 @ 06:05), Max: 99.8 (12-31-20 @ 06:05)  HR: 80 (12-31-20 @ 07:51) (79 - 96)  BP: 86/49 (12-31-20 @ 06:05) ( 86/49)  RR: 22 (12-31-20 @ 06:05) (22 - 22)  SpO2: 100% (12-31-20 @ 08:08) (95% - 100%)      PHYSICAL EXAM-  GENERAL:  chronic ill appearing female  HEAD:  Atraumatic, Normocephalic  EYES: EOMI, PERRLA, conjunctiva and sclera clear  NECK: Supple, No JVD, Normal thyroid  NERVOUS SYSTEM:  obtunded unable to asses   CHEST/LUNG: diffuse rhonchi with good air entry.  no retractions or accessory muscle usage  HEART: Regular rate and rhythm; No murmurs, rubs, or gallops  ABDOMEN: Soft, Nontender, Nondistended; Bowel sounds present  EXTREMITIES:   No clubbing, cyanosis   SKIN: No rashes or lesions                               9.3    8.07  )-----------( 170      ( 31 Dec 2020 09:49 )             32.6     12-31    125<L>  |  81<L>  |  82<HH>  ----------------------------<  93  5.9<H>   |  39<H>  |  3.7<H>    Ca    9.7      31 Dec 2020 09:49  Phos  4.9     12-30  Mg     2.6     12-30    MEDICATIONS  (STANDING):  dextrose 5% + sodium chloride 0.9%. 1000 milliLiter(s) (60 mL/Hr) IV Continuous <Continuous>  erythromycin   Ointment 1 Application(s) Both EYES two times a day  furosemide   Injectable 80 milliGRAM(s) IV Push once    MEDICATIONS  (PRN):        Imaging Personally Reviewed:     [x ] YES  [ ] NO    Consultant(s) Notes Reviewed:  [x ] YES  [ ] NO    Care Discussed with Consultants/Other Providers [x ] YES  [ ] No medical contraindication for discharge

## 2020-12-31 NOTE — PROGRESS NOTE ADULT - ASSESSMENT
55F PMHx breast ca 2001 metastatic to bone, lung c/b malignant L pleural effusion s/p chest tube, PE 9/2020 on lovenox here with acute hypercapnic resp failure, lethargy.    #AMS  -Seems to be secondary to acute on Chronic hypercapnic respiratory failure  in the setting of advanced metastatic breast cancer/not treatable  -L pleural effusion s/p chest tube  -Patient is DNR/DNI as per /HCP  -Mentation has not been improving on BIPAP  -ABG W/O any improvement  -As per , he would like to continue with BIPAP as per Confucianism believes    #GIOVANNY, suspect overdiuresis  -Continue with IVF  -renal US, bladder scan shows no acute proces  -Monitor Renal function  -Discussed case with nephrology who recommended IV lasix X 1 dose  -Nephrology to follow up      #PE  -recieved lovenox in am  -Creatinine clearance less than 30  -will start heparin drip given her renal function     #DVT ppx  heparin    DNR/DNI.  Overall prognosis very poor  #Progress Note Handoff  Pending (specify):  as above  Family discussion:  plan of care was discussed with patient and family in details.  all questions were answered.  seems to understand, and in agreement  Disposition:  unknown   55F PMHx breast ca 2001 metastatic to bone, lung c/b malignant L pleural effusion s/p chest tube, PE 9/2020 on lovenox here with acute hypercapnic resp failure, lethargy.    #AMS  -Seems to be secondary to acute on Chronic hypercapnic respiratory failure  in the setting of advanced metastatic breast cancer/not treatable  -L pleural effusion s/p chest tube  -Patient is DNR/DNI as per /HCP  -Mentation has not been improving on BIPAP  -ABG W/O any improvement  -As per , he would like to continue with BIPAP as per Shinto believes    #GIOVANNY, suspect overdiuresis  -Continue with IVF  -renal US, bladder scan shows no acute proces  -Monitor Renal function  -Discussed case with nephrology who recommended IV lasix X 1 dose  -Nephrology to follow up      #PE  -Given her renal function, will hold off on lovenox.  Will not start heparin drip since patient will not benefits, and family in agreement with no blood work       #DVT ppx  heparin    DNR/DNI/no feeding tube/no central line/no blood work.  Overall prognosis very poor  #Progress Note Handoff  Pending (specify):  as above  Family discussion:  plan of care was discussed with patient and family in details.  all questions were answered.  seems to understand, and in agreement  Disposition:  unknown

## 2021-01-01 VITALS — OXYGEN SATURATION: 97 %

## 2021-01-01 PROCEDURE — 99233 SBSQ HOSP IP/OBS HIGH 50: CPT

## 2021-01-01 PROCEDURE — 99239 HOSP IP/OBS DSCHRG MGMT >30: CPT | Mod: 25

## 2021-01-01 RX ORDER — NOREPINEPHRINE BITARTRATE/D5W 8 MG/250ML
0.05 PLASTIC BAG, INJECTION (ML) INTRAVENOUS
Qty: 8 | Refills: 0 | Status: DISCONTINUED | OUTPATIENT
Start: 2021-01-01 | End: 2021-01-01

## 2021-01-01 RX ORDER — SODIUM CHLORIDE 9 MG/ML
250 INJECTION INTRAMUSCULAR; INTRAVENOUS; SUBCUTANEOUS ONCE
Refills: 0 | Status: COMPLETED | OUTPATIENT
Start: 2021-01-01 | End: 2021-01-01

## 2021-01-01 RX ORDER — SALICYLIC ACID 0.5 %
1 CLEANSER (GRAM) TOPICAL
Refills: 0 | Status: DISCONTINUED | OUTPATIENT
Start: 2021-01-01 | End: 2021-01-01

## 2021-01-01 RX ORDER — MORPHINE SULFATE 50 MG/1
2 CAPSULE, EXTENDED RELEASE ORAL
Refills: 0 | Status: DISCONTINUED | OUTPATIENT
Start: 2021-01-01 | End: 2021-01-01

## 2021-01-01 RX ADMIN — Medication 1 APPLICATION(S): at 05:00

## 2021-01-01 RX ADMIN — HEPARIN SODIUM 5000 UNIT(S): 5000 INJECTION INTRAVENOUS; SUBCUTANEOUS at 06:25

## 2021-01-01 RX ADMIN — Medication 1 APPLICATION(S): at 17:18

## 2021-01-01 RX ADMIN — MORPHINE SULFATE 2 MILLIGRAM(S): 50 CAPSULE, EXTENDED RELEASE ORAL at 13:20

## 2021-01-01 RX ADMIN — SODIUM CHLORIDE 60 MILLILITER(S): 9 INJECTION, SOLUTION INTRAVENOUS at 04:56

## 2021-01-01 RX ADMIN — SODIUM CHLORIDE 250 MILLILITER(S): 9 INJECTION INTRAMUSCULAR; INTRAVENOUS; SUBCUTANEOUS at 05:47

## 2021-01-01 RX ADMIN — HEPARIN SODIUM 5000 UNIT(S): 5000 INJECTION INTRAVENOUS; SUBCUTANEOUS at 21:19

## 2021-01-01 RX ADMIN — HEPARIN SODIUM 5000 UNIT(S): 5000 INJECTION INTRAVENOUS; SUBCUTANEOUS at 05:42

## 2021-01-01 RX ADMIN — Medication 1 APPLICATION(S): at 05:42

## 2021-01-01 RX ADMIN — HEPARIN SODIUM 5000 UNIT(S): 5000 INJECTION INTRAVENOUS; SUBCUTANEOUS at 05:00

## 2021-01-01 RX ADMIN — MORPHINE SULFATE 2 MILLIGRAM(S): 50 CAPSULE, EXTENDED RELEASE ORAL at 13:39

## 2021-01-01 RX ADMIN — Medication 1 APPLICATION(S): at 18:00

## 2021-01-01 RX ADMIN — HEPARIN SODIUM 5000 UNIT(S): 5000 INJECTION INTRAVENOUS; SUBCUTANEOUS at 18:00

## 2021-01-01 RX ADMIN — Medication 1 APPLICATION(S): at 21:19

## 2021-01-01 RX ADMIN — Medication 5.8 MICROGRAM(S)/KG/MIN: at 06:25

## 2021-01-01 RX ADMIN — Medication 1 APPLICATION(S): at 06:25

## 2021-01-01 RX ADMIN — Medication 1 APPLICATION(S): at 17:17

## 2021-01-01 NOTE — PROGRESS NOTE ADULT - ASSESSMENT
55F PMHx breast ca 2001 metastatic to bone, lung c/b malignant L pleural effusion s/p chest tube, PE 9/2020 on lovenox here with acute hypercapnic resp failure, lethargy.    #AMS  -Seems to be secondary to acute on Chronic hypercapnic respiratory failure  in the setting of advanced metastatic breast cancer/not treatable  -L pleural effusion s/p chest tube  -Patient is DNR/DNI as per /HCP  -Mentation has not been improving on BIPAP  -ABG W/O any improvement  -As per , he would like to continue with BIPAP as per Worship believes    #GIOVANNY, suspect overdiuresis  -Continue with IVF  -renal US, bladder scan shows no acute proces  -Monitor Renal function  -IV lasix PRN for comfort  -Nephrology to follow up      #PE  -Given her renal function, will hold off on lovenox.  Will not start heparin drip since patient will not benefits, and family in agreement with no blood work       #DVT ppx  heparin    DNR/DNI/no feeding tube/no central line/pressors/no blood work.  Overall prognosis very poor  #Progress Note Handoff  Pending (specify):  as above  Family discussion:  plan of care was discussed with patient, family, and Rabbi in details.  all questions were answered.  seems to understand, and in agreement  Disposition:  unknown

## 2021-01-01 NOTE — CHART NOTE - NSCHARTNOTESELECT_GEN_ALL_CORE
RT/Event Note
Critical Care/Event Note
Event Note
Event Note
Palliative Care NP/Event Note
RD at risk f/u/Event Note
RD follow up/Event Note

## 2021-01-01 NOTE — CHART NOTE - NSCHARTNOTEFT_GEN_A_CORE
Registered Dietitian Follow-Up     Patient Profile Reviewed                           Yes [x]   No []     Nutrition History Previously Obtained        Yes []  No [x]  pt unable to provide at this time. Goals of care are ongoing and being discussed  with family. No family was present at bedside upon RD attempt. Nutrition hx deferred at this time.       Pertinent Subjective Information: Spoke to RN, who reports that the patient is dying 2/2 Catholic reasons that cant remove BiPAP, decision to continue current care without escalation of care.     Pertinent Medical Interventions:   ---Unresponsiveness/lethargy d/t acute on chronic hypercapnic respiratory failure in the setting of breast cancer metastatic to lung (not treatable).   ---L pleural effusion sp chest tube. Pt mentation noted- not improving on BiPAP. D5 initiated per family request.   ----As per , he would like to continue with BIPAP as per Catholic believes  ---Nephrology following      Diet order: NPO    Anthropometrics:  Height (cm): 165.1  Weight (kg): 61.9 -- dosing weight   BMI (kg/m2): 22.7  IBW: 56.8 kg/125 lbs    Daily   % Weight Change no new wt recorded since previously assessed.    MEDICATIONS  (STANDING): heparin, Dextrose 5%, Morphine     Pertinent Labs: 12-31@: H/H 9.3/32.6,  Na 125, K 5.9, BUN 82, Cr 3.7, eGFR:13    Physical Findings:  - Appearance: well nourished; face; right leg; left leg; left arm; right arm edema +2 per RN flow sheets  - GI function: no BMs recorded per EMR since admission. No GI s/s per RN.   - Tubes: NA  - Oral/Mouth cavity: NPO  - Skin: ecchymosis noted per RN flow sheets         [x] Previous Nutrition Diagnosis: Inadequate oral intake            [x] Ongoing    Est protein/energy needs not calculated during previous assessment for the reasons below (see: goal.)    Nutrition Intervention coordination of care    Goal:  D/t pt medical condition, pt not expected to meet est protein/energy requirements + no escalation of care (this likely includes NO feeding tube).      Indicator/Monitoring: RD to monitor diet order, energy intake, body composition, NFPF, glucose/renal/electrolyte profiles.      Recommendation: continue with current medical management + nutrition plans. D/t pt medical condition, nutrition intervention is not indicated. Will continue to monitor q3-4 days.

## 2021-01-01 NOTE — PROGRESS NOTE ADULT - SUBJECTIVE AND OBJECTIVE BOX
CC.  Respiratory failure  HPI.  Patient is obtunded.  Unable to obtain ros/HX    Vital Signs Last 24 Hrs  T(C): 35.8 (01 Jan 2021 05:26), Max: 36 (31 Dec 2020 21:45)  T(F): 96.4 (01 Jan 2021 05:26), Max: 96.8 (31 Dec 2020 21:45)  HR: 83 (01 Jan 2021 07:47) (83 - 90)  BP: 95/47 (01 Jan 2021 05:26) (71/40 - 95/47)  BP(mean): --  RR: 16 (01 Jan 2021 05:26) (16 - 16)  SpO2: 100% (01 Jan 2021 07:47) (99% - 100%)      PHYSICAL EXAM-  GENERAL:  chronic ill appearing female  HEAD:  Atraumatic, Normocephalic  EYES: EOMI, PERRLA, conjunctiva and sclera clear  NECK: Supple, No JVD, Normal thyroid  NERVOUS SYSTEM:  obtunded unable to asses   CHEST/LUNG: diffuse rhonchi with good air entry.  no retractions or accessory muscle usage  HEART: Regular rate and rhythm; No murmurs, rubs, or gallops  ABDOMEN: Soft, Nontender, Nondistended; Bowel sounds present  EXTREMITIES:   No clubbing, cyanosis   SKIN: No rashes or lesions           MEDICATIONS  (STANDING):  dextrose 5% + sodium chloride 0.9%. 1000 milliLiter(s) (60 mL/Hr) IV Continuous <Continuous>  erythromycin   Ointment 1 Application(s) Both EYES two times a day  furosemide   Injectable 80 milliGRAM(s) IV Push once    MEDICATIONS  (PRN):        Imaging Personally Reviewed:     [x ] YES  [ ] NO    Consultant(s) Notes Reviewed:  [x ] YES  [ ] NO    Care Discussed with Consultants/Other Providers [x ] YES  [ ] No medical contraindication for discharge

## 2021-01-02 NOTE — PROGRESS NOTE ADULT - ASSESSMENT
55F PMHx breast ca 2001 metastatic to bone, lung c/b malignant L pleural effusion s/p chest tube, PE 9/2020 on lovenox here with acute hypercapnic resp failure, lethargy.    #AMS  -Seems to be secondary to acute on Chronic hypercapnic respiratory failure  in the setting of advanced metastatic breast cancer/not treatable  -L pleural effusion s/p chest tube  -Patient is DNR/DNI as per /HCP  -Mentation has not been improving on BIPAP  -ABG W/O any improvement  -As per , he would like to continue with BIPAP as per Pentecostalism believes  -Will consider removing BIPAP tomorrow    #GIOVANNY, suspect overdiuresis  -renal US, bladder scan shows no acute proces  -Monitor Renal function  -IV lasix PRN for comfort  -Nephrology to follow up      #PE  -Given her renal function, will hold off on lovenox.  Will not start heparin drip since patient will not benefits, and family in agreement with no blood work       #DVT ppx  heparin    DNR/DNI/no feeding tube/no central line/pressors/no blood work.  Overall prognosis very poor  #Progress Note Handoff  Pending (specify):  as above  Family discussion:  plan of care was discussed with  family, and Leebi in details.  all questions were answered.  seems to understand, and in agreement  Disposition:  unknown

## 2021-01-02 NOTE — PROGRESS NOTE ADULT - SUBJECTIVE AND OBJECTIVE BOX
CC.  Respiratory failure  HPI.  Patient is obtunded.  Unable to obtain ros/HX    Vital Signs Last 24 Hrs  T(C): 35.6 (02 Jan 2021 04:47), Max: 36.2 (01 Jan 2021 14:06)  T(F): 96.1 (02 Jan 2021 04:47), Max: 97.2 (01 Jan 2021 14:06)  HR: 80 (02 Jan 2021 04:47) (80 - 83)  BP: 107/55 (02 Jan 2021 04:47) (90/54 - 107/55)  BP(mean): --  RR: 16 (02 Jan 2021 04:47) (16 - 16)  SpO2: 100% (02 Jan 2021 04:47) (98% - 100%)      PHYSICAL EXAM-  GENERAL:  chronic ill appearing female  HEAD:  Atraumatic, Normocephalic  EYES: EOMI, PERRLA, conjunctiva and sclera clear  NECK: Supple, No JVD, Normal thyroid  NERVOUS SYSTEM:  obtunded unable to asses   CHEST/LUNG: diffuse rhonchi with good air entry.  no retractions or accessory muscle usage  HEART: Regular rate and rhythm; No murmurs, rubs, or gallops  ABDOMEN: Soft, Nontender, Nondistended; Bowel sounds present  EXTREMITIES:   No clubbing, cyanosis   SKIN: No rashes or lesions           MEDICATIONS  (STANDING):  dextrose 5% + sodium chloride 0.9%. 1000 milliLiter(s) (60 mL/Hr) IV Continuous <Continuous>  erythromycin   Ointment 1 Application(s) Both EYES two times a day  furosemide   Injectable 80 milliGRAM(s) IV Push once    MEDICATIONS  (PRN):        Imaging Personally Reviewed:     [x ] YES  [ ] NO    Consultant(s) Notes Reviewed:  [x ] YES  [ ] NO    Care Discussed with Consultants/Other Providers [x ] YES  [ ] No medical contraindication for discharge

## 2021-01-03 NOTE — PROGRESS NOTE ADULT - REASON FOR ADMISSION
Respiratory failure
Unresponsiveness

## 2021-01-03 NOTE — PROGRESS NOTE ADULT - PROVIDER SPECIALTY LIST ADULT
Nephrology
Pulmonology
Hospitalist
Internal Medicine
Hospitalist

## 2021-01-03 NOTE — DISCHARGE NOTE FOR THE EXPIRED PATIENT - HOSPITAL COURSE
55F PMHx breast ca 2001 metastatic to bone, lung c/b malignant L pleural effusion s/p chest tube, PE 9/2020 on Lovenox admitted for Encephalopathy secondary to acute on Chronic hypercapnic respiratory failure in the setting of advanced metastatic breast cancer/not treatable.    Patient is DNR/DNI as per  (HCP)   After discussion with , patient was put on comfort measures and bipap was discontinued.

## 2021-01-07 DIAGNOSIS — I95.9 HYPOTENSION, UNSPECIFIED: ICD-10-CM

## 2021-01-07 DIAGNOSIS — Z86.711 PERSONAL HISTORY OF PULMONARY EMBOLISM: ICD-10-CM

## 2021-01-07 DIAGNOSIS — R62.7 ADULT FAILURE TO THRIVE: ICD-10-CM

## 2021-01-07 DIAGNOSIS — C50.919 MALIGNANT NEOPLASM OF UNSPECIFIED SITE OF UNSPECIFIED FEMALE BREAST: ICD-10-CM

## 2021-01-07 DIAGNOSIS — C78.00 SECONDARY MALIGNANT NEOPLASM OF UNSPECIFIED LUNG: ICD-10-CM

## 2021-01-07 DIAGNOSIS — E87.1 HYPO-OSMOLALITY AND HYPONATREMIA: ICD-10-CM

## 2021-01-07 DIAGNOSIS — J96.22 ACUTE AND CHRONIC RESPIRATORY FAILURE WITH HYPERCAPNIA: ICD-10-CM

## 2021-01-07 DIAGNOSIS — Z66 DO NOT RESUSCITATE: ICD-10-CM

## 2021-01-07 DIAGNOSIS — N17.0 ACUTE KIDNEY FAILURE WITH TUBULAR NECROSIS: ICD-10-CM

## 2021-01-07 DIAGNOSIS — E87.5 HYPERKALEMIA: ICD-10-CM

## 2021-01-07 DIAGNOSIS — N17.9 ACUTE KIDNEY FAILURE, UNSPECIFIED: ICD-10-CM

## 2021-01-07 DIAGNOSIS — Z90.13 ACQUIRED ABSENCE OF BILATERAL BREASTS AND NIPPLES: ICD-10-CM

## 2021-01-07 DIAGNOSIS — Z51.5 ENCOUNTER FOR PALLIATIVE CARE: ICD-10-CM

## 2021-01-07 DIAGNOSIS — G93.49 OTHER ENCEPHALOPATHY: ICD-10-CM

## 2021-01-07 DIAGNOSIS — C79.51 SECONDARY MALIGNANT NEOPLASM OF BONE: ICD-10-CM

## 2021-01-07 DIAGNOSIS — Z99.89 DEPENDENCE ON OTHER ENABLING MACHINES AND DEVICES: ICD-10-CM

## 2021-01-07 DIAGNOSIS — J91.0 MALIGNANT PLEURAL EFFUSION: ICD-10-CM

## 2022-03-15 NOTE — PHYSICAL THERAPY INITIAL EVALUATION ADULT - PERSONAL SAFETY AND JUDGMENT, REHAB EVAL
Problem: Pain  Goal: #Acceptable pain level achieved/maintained at rest using NRS/Faces  Description: This goal is used for patients who can self-report.  Acceptable means the level is at or below the identified comfort/function goal.  Outcome: Outcome Met, Continue evaluating goal progress toward completion     Problem: At Risk for Falls  Goal: # Patient does not fall  Outcome: Outcome Met, Continue evaluating goal progress toward completion     Problem: At Risk for Injury Due to Fall  Goal: # Patient does not fall  Outcome: Outcome Met, Continue evaluating goal progress toward completion      intact

## 2022-03-23 NOTE — ED ADULT TRIAGE NOTE - DIRECT TO ROOM CARE INITIATED:
Abdomen , soft, nontender, nondistended , no guarding or rigidity , no masses palpable , normal bowel sounds , Liver and Spleen , no hepatomegaly present , no hepatosplenomegaly , liver nontender , spleen not palpable
22-Dec-2020 16:40

## 2022-05-06 NOTE — H&P ADULT - NSHPPOACENTRALVENOUSCATHETER_GEN_ALL_CORE
----- Message from Katrina Tang sent at 9/4/2019  2:10 PM CDT -----  Contact: pt  Refill request for pain meds pt does not know the name   no 1.5

## 2022-05-25 NOTE — H&P ADULT - PROBLEM SELECTOR PROBLEM 4
Patient's wife canceled today's appointment (due to medical oncology emergency) Patient's port was checked by a nurse and they said it looks okay. Patient wants to know if Remy Gomez will approve missing PO appointment. They do not want to reschedule it unless he recommends to do so. Advance directive on file

## 2022-07-11 NOTE — H&P ADULT - PROBLEM SELECTOR PROBLEM 2
Pleural effusion Hemigard Intro: Due to skin fragility and wound tension, it was decided to use HEMIGARD adhesive retention suture devices to permit a linear closure. The skin was cleaned and dried for a 6cm distance away from the wound. Excessive hair, if present, was removed to allow for adhesion.

## 2022-12-16 NOTE — ED PROVIDER NOTE - INPATIENT RESIDENT/ACP NOTIFIED DATE
Name: Lydia Rivas      : 1983      MRN: [id number]  Encounter Provider: Tj Hassan DO  Encounter Date: 2022   Encounter department: [place] [address]     1  Upper back pain  Comments:  suspect strain and tension in upper back as cause of symptoms  PT eval advised  if not improving or if worsening -> t/c imaging    2  Side pain    3  Sprain of right ankle, unspecified ligament, sequela    4  Flu vaccine need  Comments:  administered today  Orders:  -     influenza vaccine, quadrivalent, 0 5 mL, preservative-free, for adult and pediatric patients 6 mos+ (AFLURIA, FLUARIX, FLULAVAL, FLUZONE)           Subjective      HPI     Here with c/o back pain off/on for the last few weeks  She sprained her right ankle around thanksgiving, had it checked but is limping a bit still due to pain  She then started to have b/l upper back pain and some right back and side pain  She got worried about the pain because she recently was treated for kidney cancer and it made her anxious which made the pain worse  She is taking tylenol for ankle pain which provides some relief of her back pain as well  The pain is not brought on with motion but certain positions relieve the pain as did going for a walk a couple of days ago  ROS otherwise negative, no other complaints  Review of Systems   Constitutional: Negative for fever  Cardiovascular: Negative for chest pain  Gastrointestinal: Negative for abdominal pain  Genitourinary: Negative for dysuria and pelvic pain  Musculoskeletal: Positive for back pain  Allergic/Immunologic: Negative for immunocompromised state  Neurological: Negative for weakness, numbness and headaches         Current Outpatient Medications on File Prior to Visit   Medication Sig   • multivitamin (THERAGRAN) TABS Take 1 tablet by mouth daily   • norethindrone-ethinyl estradiol (Junel FE 1/20) 1-20 MG-MCG per tablet Take 1 tablet by mouth daily   • pantoprazole (PROTONIX) 20 mg tablet TAKE 1 TABLET (20 MG TOTAL) BY MOUTH DAILY BEFORE BREAKFAST AS DIRECTED BY GI SPECIALIST       Objective     /76   Pulse 88   Resp 15   Ht 5' 6" (1 676 m)   Wt 73 5 kg (162 lb)   SpO2 98%   BMI 26 15 kg/m²     Physical Exam  Vitals reviewed  Constitutional:       General: She is not in acute distress  Appearance: Normal appearance  HENT:      Head: Normocephalic and atraumatic  Eyes:      Conjunctiva/sclera: Conjunctivae normal    Cardiovascular:      Rate and Rhythm: Normal rate and regular rhythm  Heart sounds: No murmur heard  Pulmonary:      Effort: Pulmonary effort is normal       Breath sounds: No wheezing or rales  Abdominal:      General: Bowel sounds are normal       Palpations: Abdomen is soft  Tenderness: There is no abdominal tenderness  Musculoskeletal:        Arms:       Right lower leg: No edema  Left lower leg: No edema  Neurological:      Mental Status: She is alert  Mental status is at baseline  Psychiatric:         Mood and Affect: Mood is anxious (& concerned)           Behavior: Behavior normal        Yuriy Malone DO 29-Oct-2020 21:38

## 2022-12-23 NOTE — H&P ADULT - PROBLEM SELECTOR PLAN 3
- c/w home torsemide 10 mg BID   - f/u recommendations from Dr. Watt regarding if effusions need to be drained
90

## 2023-03-29 NOTE — PATIENT PROFILE ADULT - NSPROPASSIVESMOKEEXPOSURE_GEN_A_NUR
Pt meets criteria for severe protein-calorie malnutrition in the context of acute on chronic illness Unknown Pt meets criteria for moderate protein-calorie malnutrition in the context of chronic illness

## 2023-04-17 NOTE — PATIENT PROFILE ADULT - NS PRO AD PATIENT TYPE
Do Not Resuscitate (DNR) Trilobed Flap Text: The defect edges were debeveled with a #15 scalpel blade.  Given the location of the defect and the proximity to free margins a trilobed flap was deemed most appropriate.  Using a sterile surgical marker, an appropriate trilobed flap drawn around the defect.    The area thus outlined was incised deep to adipose tissue with a #15 scalpel blade.  The skin margins were undermined to an appropriate distance in all directions utilizing iris scissors.

## 2023-11-16 NOTE — PATIENT PROFILE ADULT - CONTRAINDICATIONS & PRECAUTIONS (SELECT ALL THAT APPLY)
Additional Notes: Advised patient if area fails to resolve he is to RTC sooner Detail Level: Simple Render Risk Assessment In Note?: no none...

## 2023-11-17 NOTE — DIETITIAN INITIAL EVALUATION ADULT. - PHYSICAL APPEARANCE
debilitated/not wasted.Not able to do NFPE at this time due to presently moving to another floor + pregnancy

## 2023-12-01 NOTE — PROGRESS NOTE ADULT - SUBJECTIVE AND OBJECTIVE BOX
The patient is comfortable except at night she misses her 's help in getting to the bathroom.  Basically she remains dyspneic at rest on oxygen.     the patient is sitting in a chair on oxygen  By nasal cannula at 4 L a minute.    Examination of the head ears eyes nose and throat demonstrates the nasal cannula and new mild central cyanosis off oxygen.    Examination of the neck reveals no palpable lymphadenopathy, jugular venous distention or thyromegaly.    There is dullness to percussion at both lung bases but greater on the left than right.  The breath sounds are somewhat distant but there is no adventitious sounds or audible wheeze.  The patient has percussion tenderness over the low cervical and upper thoracic spines.    The heart rate is regular at 80  The patient was seen and examined at 6:00 a.m. in the morning and the consult written and the and the consult written beats per minute with the hint of a systolic ejection murmur over the aortic valve and no reciprocal  mitral regurgitant murmur.    The patient has had a left mastectomy and there are no axillary or contralateral breast pathologies.    The patient has truncal obesity but there is no palpable hepatomegaly or splenomegaly.    The patient has bilateral chronic venous stasis changes both lower extremities with lymphedema of both lower extremities to the knees.    Neurologically the patient is intact in higher cortical function, cerebellar function, motor and sensory function with the exception of weakness of pelvic and shoulder girdle muscles.    The patient has no rash.   100% of the time

## 2023-12-26 NOTE — PATIENT PROFILE ADULT - FOOD INSECURITY
Patient not reached. Preop instructions left on voice mail. [id number]___[phone]____________    -Date_12/27/23______time__1100_____arrival__0930_________  -Nothing to eat or drink after midnight  -Responsible adult 25 or older to stay on site while you are here and drive you home and stay with you after  -Follow any instructions your doctors office has given you  -Bring a complete list of all your medications and supplements  -If you normally take the following medications in the morning please do so with a small    sip of water-heart,blood pressure,seizure,breathing or thyroid-avoid water pilll Do not take blood pressure medications ending in \"aroldo\" or \"pril\" the AM of surgery or the jose prior  -You may use your inhalers  -Take half of your normal dose of any long acting insulins the night before-do not take    any diabetic medications in the morning  -Follow your doctors instructions regarding blood thinners  -Any questions call your surgeons office            VISITOR POLICY(subject to change)    Current policy is 2 visitors per patient. No children. Masks at discretion of facility. Visiting hours are 8a-8p. Overnight visitors will be at the discretion of the nurse. All policies are subject to change. no

## 2024-01-03 NOTE — CONSULT NOTE ADULT - ASSESSMENT
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

## 2024-05-15 NOTE — PROGRESS NOTE ADULT - ASSESSMENT
CALLED SW PT COMMUNICATED TO PT CAN BE SCHEDULED  05/28/2024 FOR 11/26/2024.OK FOR HUB TO RELAY   55F PMHx breast ca 2001 metastatic to bone, lung c/b malignant L pleural effusion s/p chest tube, PE 9/2020 on lovenox here with acute on chronic  hypercapnic resp failure, GIOVANNY, Chronic PE, stage 4 non-treatable Breast cancer.  BP was on the low side this am, recieved IVF bolus, and was started on pressor, but  opted to d/c pressor with full understanding of risks, benefits, and alternative      Patient is DNR/DNI/No feeding tube/No central line/no pressor/no dialysis/no blood draw    All options were discussed with  this am  including risks, benefits, and alternative.  He opted for CMO (with full understanding of what it involves in details) with removal of BIPAP this afternoon.    /HCP aware of overall prognosis  emotional support provided       RN witnessed the conversation

## 2025-04-15 NOTE — RADRPT
EXAM DATE:  6/7/2018 4:33 PM EDT

AGE/SEX:        53 years / Female



INDICATIONS:  Pericardial effusion



CLINICAL DATA:  This is the patient's initial encounter. Patient reports that signs and symptoms have
 been present for 1 day and indicates a pain score of 0/10. 

                                                                          

MEDICAL/SURGICAL HISTORY:   Carcinoma, breast. Mastectomy, bilateral.



RADIATION DOSE: CTDI (mGy)







COMPARISON:      [facility] [place]ULMONARY [place]GIOGRAM, 6/4/2018.  . 





PROCEDURE :



The risks, benefits and alternatives to the procedure were explained and verbal and written consent w
as obtained.  Using automated exposure control and adjustment of the mA and/or kV according to patien
t size, radiation dose was kept as low as reasonably achievable to obtain optimal diagnostic quality 
images.  The site was prepped in sterile fashion.  Full sterile technique was used, including cap, ma
sk, sterile gloves and gown and a large sterile sheet.  Hand hygiene and 2% chlorhexidine and/or beta
dine/alcohol prep was utilized per protocol for cutaneous antisepsis.  The skin and subcutaneous tiss
ues were infiltrated with local anesthetic solution.  DICOM format image data is available electronic
ally for review and comparison.  



Preprocedure imaging documents a moderate size pericardial effusion. The best area for drainage was m
arked on the patient's skin. The patient's skin was then prepped and draped in a sterile fashion and 
lidocaine was used as a local anesthetic. A small dermatotomy was made and micropuncture kit was util
ized to access the pericardial space. Guidewire was then placed and over guidewire a 6 [place] nonlock
ing skater catheter was placed into the pericardial space. Approximately 470 cc of dark red fluid was
 removed without difficulty. The drain was then removed and a sterile dressing was applied. Postproce
dure imaging demonstrates complete evacuation of the pericardial effusion. There is also questionable
 small amount of air in the pleural space.



The patient tolerated the procedure well. No immediate complications are evident.



CONCLUSION:  

Pericardiocentesis procedure was performed, as above, with removal of 470 cc of fluid. Samples were s
ent to the lab for evaluation as ordered. There is a questionable amount of left pleural air on the p
ostprocedure imaging. Therefore, the pleural space may have been crossed by the needle. A follow-up c
hest x-ray has been ordered for later this afternoon.



Electronically signed by: Miguel Ángel Schneider MD  6/7/2018 4:44 PM EDT [employer] initiated on CMM. (Key: [id number])     Meets Pharmacy Policy  Effective Date: 2/16/2025  Authorization Expiration Date: 10/15/2025    Called [facility] pharmacy and left a message making them aware of the approval and for a cb if any questions.